# Patient Record
Sex: MALE | Race: WHITE | NOT HISPANIC OR LATINO | Employment: FULL TIME | URBAN - METROPOLITAN AREA
[De-identification: names, ages, dates, MRNs, and addresses within clinical notes are randomized per-mention and may not be internally consistent; named-entity substitution may affect disease eponyms.]

---

## 2014-09-19 LAB — SL AMB POCT HEMOGLOBIN AIC: 5.8

## 2017-03-31 ENCOUNTER — OFFICE VISIT (OUTPATIENT)
Dept: URGENT CARE | Facility: MEDICAL CENTER | Age: 57
End: 2017-03-31
Payer: COMMERCIAL

## 2017-03-31 PROCEDURE — S9088 SERVICES PROVIDED IN URGENT: HCPCS

## 2017-03-31 PROCEDURE — 99203 OFFICE O/P NEW LOW 30 MIN: CPT

## 2017-08-05 ENCOUNTER — TRANSCRIBE ORDERS (OUTPATIENT)
Dept: LAB | Facility: HOSPITAL | Age: 57
End: 2017-08-05

## 2017-08-05 ENCOUNTER — APPOINTMENT (OUTPATIENT)
Dept: LAB | Facility: HOSPITAL | Age: 57
End: 2017-08-05
Payer: COMMERCIAL

## 2017-08-05 DIAGNOSIS — Z00.8 HEALTH EXAMINATION IN POPULATION SURVEY: Primary | ICD-10-CM

## 2017-08-05 DIAGNOSIS — Z00.8 HEALTH EXAMINATION IN POPULATION SURVEY: ICD-10-CM

## 2017-08-05 LAB
CHOLEST SERPL-MCNC: 265 MG/DL (ref 50–200)
EST. AVERAGE GLUCOSE BLD GHB EST-MCNC: 131 MG/DL
HBA1C MFR BLD: 6.2 % (ref 4.2–6.3)
HDLC SERPL-MCNC: 43 MG/DL (ref 40–60)
LDLC SERPL CALC-MCNC: 168 MG/DL (ref 0–100)
TRIGL SERPL-MCNC: 269 MG/DL

## 2017-08-05 PROCEDURE — 80061 LIPID PANEL: CPT

## 2017-08-05 PROCEDURE — 83036 HEMOGLOBIN GLYCOSYLATED A1C: CPT

## 2017-08-05 PROCEDURE — 36415 COLL VENOUS BLD VENIPUNCTURE: CPT

## 2018-04-21 ENCOUNTER — OFFICE VISIT (OUTPATIENT)
Dept: URGENT CARE | Facility: MEDICAL CENTER | Age: 58
End: 2018-04-21
Payer: COMMERCIAL

## 2018-04-21 VITALS
RESPIRATION RATE: 18 BRPM | TEMPERATURE: 96 F | DIASTOLIC BLOOD PRESSURE: 78 MMHG | OXYGEN SATURATION: 97 % | SYSTOLIC BLOOD PRESSURE: 124 MMHG | HEART RATE: 68 BPM | BODY MASS INDEX: 27.47 KG/M2 | HEIGHT: 67 IN | WEIGHT: 175 LBS

## 2018-04-21 DIAGNOSIS — K57.92 DIVERTICULITIS: Primary | ICD-10-CM

## 2018-04-21 PROCEDURE — S9088 SERVICES PROVIDED IN URGENT: HCPCS | Performed by: PHYSICIAN ASSISTANT

## 2018-04-21 PROCEDURE — 99212 OFFICE O/P EST SF 10 MIN: CPT | Performed by: PHYSICIAN ASSISTANT

## 2018-04-21 RX ORDER — CIPROFLOXACIN 500 MG/1
500 TABLET, FILM COATED ORAL EVERY 12 HOURS SCHEDULED
Qty: 20 TABLET | Refills: 0 | Status: SHIPPED | OUTPATIENT
Start: 2018-04-21 | End: 2018-05-01

## 2018-04-21 RX ORDER — METOPROLOL SUCCINATE 50 MG/1
100 TABLET, EXTENDED RELEASE ORAL DAILY
COMMUNITY
End: 2019-03-13 | Stop reason: ALTCHOICE

## 2018-04-21 RX ORDER — IBUPROFEN 800 MG/1
TABLET ORAL
Status: ON HOLD | COMMUNITY
End: 2018-07-29

## 2018-04-21 RX ORDER — GLUCOSAMINE SULFATE 500 MG
CAPSULE ORAL DAILY
COMMUNITY

## 2018-04-21 RX ORDER — AMOXICILLIN 500 MG
CAPSULE ORAL 2 TIMES DAILY
COMMUNITY

## 2018-04-21 RX ORDER — METRONIDAZOLE 500 MG/1
500 TABLET ORAL EVERY 8 HOURS SCHEDULED
Qty: 21 TABLET | Refills: 0 | Status: SHIPPED | OUTPATIENT
Start: 2018-04-21 | End: 2018-04-28

## 2018-04-21 NOTE — PROGRESS NOTES
[facility] Now        NAME: Lorie Colvin is a 61 y o  male  : 1960    MRN: [id number]  DATE: 2018  TIME: 10:05 AM    Assessment and Plan   Diverticulitis [K57 92]  1  Diverticulitis  metroNIDAZOLE (FLAGYL) 500 mg tablet    ciprofloxacin (CIPRO) 500 mg tablet         Patient Instructions   Call to Reschedule colonoscopy    Follow up with PCP in 3-5 days  Proceed to  ER if symptoms worsen  Chief Complaint     Chief Complaint   Patient presents with    Abdominal Pain     LLQ intermittent abdominal pain that began Thursday, Pt has hx of diverticulitis  History of Present Illness       Abdominal Pain   This is a new problem  The current episode started yesterday  The onset quality is gradual  The problem occurs intermittently  The problem has been gradually worsening  The pain is located in the LLQ  The pain is mild  The quality of the pain is cramping  The abdominal pain does not radiate  Associated symptoms include diarrhea  Pertinent negatives include no anorexia, arthralgias, belching, constipation, dysuria, fever, flatus, frequency, headaches, hematochezia, hematuria, melena, myalgias, nausea, vomiting or weight loss  Nothing aggravates the pain  Patient with history of diverticulitis states he has started with left lower quadrant pain and this is how he usually presents for his diverticulitis  Last episode was several years ago  He was scheduled for a colonoscopy next week  Review of Systems   Review of Systems   Constitutional: Negative for fever and weight loss  Gastrointestinal: Positive for abdominal pain and diarrhea  Negative for anorexia, constipation, flatus, hematochezia, melena, nausea and vomiting  Genitourinary: Negative for dysuria, frequency and hematuria  Musculoskeletal: Negative for arthralgias and myalgias  Neurological: Negative for headaches  All other systems reviewed and are negative          Current Medications       Current Outpatient Prescriptions:     ciprofloxacin (CIPRO) 500 mg tablet, Take 1 tablet (500 mg total) by mouth every 12 (twelve) hours for 10 days, Disp: 20 tablet, Rfl: 0    glucosamine 500 MG CAPS capsule, Take by mouth, Disp: , Rfl:     ibuprofen (MOTRIN) 800 mg tablet, Take by mouth, Disp: , Rfl:     metoprolol succinate (TOPROL XL) 50 mg 24 hr tablet, Take by mouth, Disp: , Rfl:     metroNIDAZOLE (FLAGYL) 500 mg tablet, Take 1 tablet (500 mg total) by mouth every 8 (eight) hours for 7 days, Disp: 21 tablet, Rfl: 0    Omega-3 Fatty Acids (FISH OIL) 1200 MG CAPS, Take by mouth, Disp: , Rfl:     Current Allergies     Allergies as of 04/21/2018    (No Known Allergies)            The following portions of the patient's history were reviewed and updated as appropriate: allergies, current medications, past family history, past medical history, past social history, past surgical history and problem list      History reviewed  No pertinent past medical history  History reviewed  No pertinent surgical history  History reviewed  No pertinent family history  Medications have been verified  Objective   /78   Pulse 68   Temp (!) 96 °F (35 6 °C)   Resp 18   Ht 5' 7" (1 702 m)   Wt 79 4 kg (175 lb)   SpO2 97%   BMI 27 41 kg/m²        Physical Exam     Physical Exam   Constitutional: He appears well-developed and well-nourished  Cardiovascular: Normal rate, regular rhythm and normal heart sounds  Pulmonary/Chest: Effort normal and breath sounds normal    Abdominal: Soft  He exhibits no distension and no mass  There is tenderness  There is guarding  There is no rebound  Nursing note and vitals reviewed  Positive tenderness left lower quadrant with mild guarding  Increased bowel sounds throughout

## 2018-04-21 NOTE — PATIENT INSTRUCTIONS
Diverticulitis   WHAT YOU NEED TO KNOW:   Diverticulitis is a condition that causes small pockets along your intestine called diverticula to become inflamed or infected  This is caused by hard bowel movements, food, or bacteria that get stuck in the pockets  DISCHARGE INSTRUCTIONS:   Return to the emergency department if:   · You have bowel movement or foul-smelling discharge leaking from your vagina or in your urine  · You have severe diarrhea  · You urinate less than usual or not at all  · You are not able to have a bowel movement  · You cannot stop vomiting  · You have severe abdominal pain, a fever, and your abdomen is larger than usual      · You have new or increased blood in your bowel movements  Contact your healthcare provider if:   · You have pain when you urinate  · Your symptoms get worse or do not go away  · You have questions or concerns about your condition or care  Medicines:   · Antibiotics  may be given to help treat a bacterial infection  · Prescription pain medicine  may be given  Ask your healthcare provider how to take this medicine safely  Some prescription pain medicines contain acetaminophen  Do not take other medicines that contain acetaminophen without talking to your healthcare provider  Too much acetaminophen may cause liver damage  Prescription pain medicine may cause constipation  Ask your healthcare provider how to prevent or treat constipation  · Take your medicine as directed  Contact your healthcare provider if you think your medicine is not helping or if you have side effects  Tell him or her if you are allergic to any medicine  Keep a list of the medicines, vitamins, and herbs you take  Include the amounts, and when and why you take them  Bring the list or the pill bottles to follow-up visits  Carry your medicine list with you in case of an emergency  Clear liquid diet:  A clear liquid diet includes any liquids that you can see through  Examples include water, ginger-arpita, cranberry or apple juice, frozen fruit ice, or broth  Stay on a clear liquid diet until your symptoms are gone, or as directed  Follow up with your healthcare provider as directed: You may need to return for a colonoscopy  When your symptoms are gone, you may need a low-fat, high-fiber diet to prevent diverticulitis from developing again  Your healthcare provider or dietitian can help you create meal plans  Write down your questions so you remember to ask them during your visits  © 2017 [facility] [place] Information is for End User's use only and may not be sold, redistributed or otherwise used for commercial purposes  All illustrations and images included in CareNotes® are the copyrighted property of [employer] , [employer]  or Jorden Billings  The above information is an  only  It is not intended as medical advice for individual conditions or treatments  Talk to your doctor, nurse or pharmacist before following any medical regimen to see if it is safe and effective for you

## 2018-05-29 ENCOUNTER — ANESTHESIA EVENT (OUTPATIENT)
Dept: GASTROENTEROLOGY | Facility: HOSPITAL | Age: 58
End: 2018-05-29
Payer: COMMERCIAL

## 2018-05-30 ENCOUNTER — ANESTHESIA (OUTPATIENT)
Dept: GASTROENTEROLOGY | Facility: HOSPITAL | Age: 58
End: 2018-05-30
Payer: COMMERCIAL

## 2018-05-30 ENCOUNTER — HOSPITAL ENCOUNTER (OUTPATIENT)
Facility: HOSPITAL | Age: 58
Setting detail: OUTPATIENT SURGERY
Discharge: HOME | End: 2018-05-30
Attending: COLON & RECTAL SURGERY | Admitting: COLON & RECTAL SURGERY
Payer: COMMERCIAL

## 2018-05-30 VITALS
WEIGHT: 175 LBS | TEMPERATURE: 98.4 F | OXYGEN SATURATION: 96 % | BODY MASS INDEX: 27.47 KG/M2 | RESPIRATION RATE: 16 BRPM | SYSTOLIC BLOOD PRESSURE: 105 MMHG | DIASTOLIC BLOOD PRESSURE: 66 MMHG | HEART RATE: 61 BPM | HEIGHT: 67 IN

## 2018-05-30 DIAGNOSIS — Z86.010 HISTORY OF COLONIC POLYPS: ICD-10-CM

## 2018-05-30 PROCEDURE — 88305 TISSUE EXAM BY PATHOLOGIST: CPT | Performed by: PATHOLOGY

## 2018-05-30 RX ORDER — SODIUM CHLORIDE 9 MG/ML
125 INJECTION, SOLUTION INTRAVENOUS CONTINUOUS
Status: DISCONTINUED | OUTPATIENT
Start: 2018-05-30 | End: 2018-05-30 | Stop reason: HOSPADM

## 2018-05-30 RX ORDER — PROPOFOL 10 MG/ML
INJECTION, EMULSION INTRAVENOUS AS NEEDED
Status: DISCONTINUED | OUTPATIENT
Start: 2018-05-30 | End: 2018-05-30 | Stop reason: SURG

## 2018-05-30 RX ORDER — DIPHENOXYLATE HYDROCHLORIDE AND ATROPINE SULFATE 2.5; .025 MG/1; MG/1
1 TABLET ORAL DAILY
COMMUNITY

## 2018-05-30 RX ADMIN — PROPOFOL 20 MG: 10 INJECTION, EMULSION INTRAVENOUS at 08:48

## 2018-05-30 RX ADMIN — PROPOFOL 20 MG: 10 INJECTION, EMULSION INTRAVENOUS at 08:44

## 2018-05-30 RX ADMIN — PROPOFOL 20 MG: 10 INJECTION, EMULSION INTRAVENOUS at 08:40

## 2018-05-30 RX ADMIN — PROPOFOL 50 MG: 10 INJECTION, EMULSION INTRAVENOUS at 08:28

## 2018-05-30 RX ADMIN — PROPOFOL 50 MG: 10 INJECTION, EMULSION INTRAVENOUS at 08:24

## 2018-05-30 RX ADMIN — PROPOFOL 20 MG: 10 INJECTION, EMULSION INTRAVENOUS at 08:32

## 2018-05-30 RX ADMIN — SODIUM CHLORIDE: 900 INJECTION, SOLUTION INTRAVENOUS at 08:40

## 2018-05-30 RX ADMIN — PROPOFOL 50 MG: 10 INJECTION, EMULSION INTRAVENOUS at 08:19

## 2018-05-30 RX ADMIN — PROPOFOL 150 MG: 10 INJECTION, EMULSION INTRAVENOUS at 08:15

## 2018-05-30 RX ADMIN — SODIUM CHLORIDE 125 ML/HR: 900 INJECTION, SOLUTION INTRAVENOUS at 07:34

## 2018-05-30 RX ADMIN — PROPOFOL 20 MG: 10 INJECTION, EMULSION INTRAVENOUS at 08:36

## 2018-05-30 NOTE — ANESTHESIA PREPROCEDURE EVALUATION
Review of Systems/Medical History  Patient summary reviewed  Chart reviewed  No history of anesthetic complications     Cardiovascular  Hypertension controlled,    Pulmonary  Negative pulmonary ROS        GI/Hepatic    Bowel prep  Comment: Diverticular disease     Negative  ROS        Endo/Other  Negative endo/other ROS      GYN       Hematology  Negative hematology ROS      Musculoskeletal  Negative musculoskeletal ROS        Neurology  Negative neurology ROS      Psychology   Negative psychology ROS              Physical Exam    Airway    Mallampati score: I  TM Distance: >3 FB  Neck ROM: full     Dental   No notable dental hx     Cardiovascular      Pulmonary      Other Findings        Anesthesia Plan  ASA Score- 2     Anesthesia Type- IV sedation with anesthesia with ASA Monitors  Additional Monitors:   Airway Plan:         Plan Factors-  Patient did not smoke on day of surgery  Induction-     Postoperative Plan-     Informed Consent- Anesthetic plan and risks discussed with patient  I personally reviewed this patient with the CRNA  Discussed and agreed on the Anesthesia Plan with the CRNA  Vera Nuñez

## 2018-05-30 NOTE — H&P
History and Physical   Colon and Rectal Surgery   Jimbo Ruffin 61 y o  male MRN: [id number]  Unit/Bed#: [facility] Encounter: [id number]  05/30/18   8:11 AM      CC: History of polyps and diverticultis  History of Present Illness   HPI:  Jimbo Ruffin is a 61 y o  male for surveillance  Historical Information   Past Medical History:   Diagnosis Date    Cat scratch fever     Colon polyp     Diverticulitis     Hypertension     Patella-femoral syndrome      Past Surgical History:   Procedure Laterality Date    CLOSED REDUCTION WRIST FRACTURE      COLONOSCOPY      UMBILICAL GRANULOMA EXCISION Left     groin    WRIST GANGLION EXCISION         Meds/Allergies     Prescriptions Prior to Admission   Medication    metoprolol succinate (TOPROL XL) 50 mg 24 hr tablet    multivitamin (THERAGRAN) TABS    Omega-3 Fatty Acids (FISH OIL) 1200 MG CAPS    glucosamine 500 MG CAPS capsule    ibuprofen (MOTRIN) 800 mg tablet         Current Facility-Administered Medications:     sodium chloride 0 9 % infusion, 125 mL/hr, Intravenous, Continuous, Emory Roberts MD, Last Rate: 125 mL/hr at 05/30/18 0734, 125 mL/hr at 05/30/18 0734    No Known Allergies      Social History   History   Alcohol Use    Yes     Comment: social     History   Drug Use No     History   Smoking Status    Never Smoker   Smokeless Tobacco    Never Used         Family History: History reviewed  No pertinent family history  Objective     Current Vitals:   Blood Pressure: 156/84 (05/30/18 0721)  Pulse: 76 (05/30/18 0721)  Temperature: 98 4 °F (36 9 °C) (05/30/18 0721)  Temp Source: Oral (05/30/18 0721)  Respirations: 20 (05/30/18 0721)  Height: 5' 7" (170 2 cm) (05/30/18 0721)  Weight - Scale: 79 4 kg (175 lb) (05/30/18 0721)  SpO2: 97 % (05/30/18 0721)  No intake or output data in the 24 hours ending 05/30/18 0811    Physical Exam:  General:  Well nourished, no distress    Neuro: Alert and oriented  Eyes:Sclera anicteric, conjunctiva pink  Pulm: Clear to auscultation bilaterally  No respiratory Distress  CV:  Regular rate and rhythm  No murmurs  Abdomen:  Soft, flat, non-tender, without masses or hepatosplenomegaly  Lab Results:       ASSESSMENT:  Syed Pascual is a 61 y o  male for surveillance of polyps and recent diverticulitis  PLAN:  Colonoscopy  Risks , including, but not limited to, bleeding, perforation, missed lesions, and potential need for surgery, were reviewed  Alternatives to colonoscopy were discussed    Yeny Tsai MD

## 2018-05-30 NOTE — ANESTHESIA POSTPROCEDURE EVALUATION
Post-Op Assessment Note      CV Status:  Stable    Mental Status:  Awake and somnolent    Hydration Status:  Euvolemic    PONV Controlled:  Controlled    Airway Patency:  Patent    Post Op Vitals Reviewed: Yes          Staff: CRNA           /71 (05/30/18 0850)    Temp      Pulse 69 (05/30/18 0850)   Resp 16 (05/30/18 0850)    SpO2 96 % (05/30/18 0850)

## 2018-05-30 NOTE — OP NOTE
**** GI/ENDOSCOPY REPORT ****     PATIENT NAME: Shayne Conn ------ VISIT ID:  Patient ID:   [id number] YOB: 1960     INTRODUCTION: Colonoscopy - A 61 male patient presents for an outpatient   Colonoscopy at [address] [place] [address]  PREVIOUS COLONOSCOPY:     INDICATIONS: Screening for personal history of polyps  History of   diverticulitis  CONSENT:  The benefits, risks, and alternatives to the procedure were   discussed and informed consent was obtained from the patient  PREPARATION: EKG, pulse, pulse oximetry and blood pressure were monitored   throughout the procedure  The patient was identified by myself both   verbally and by visual inspection of ID band  MEDICATIONS: Anesthesia-check records     PROCEDURE:  The endoscope was passed without difficulty through the anus   under direct visualization and advanced to the cecum, confirmed by   appendiceal orifice, ileocecal valve, and cecal strap (crow's foot)  The   scope was withdrawn and the mucosa was carefully examined  The quality of   the preparation was  Cecal Intubation Time: Minute(s) Scope Withdrawal   Time: Minute(s)     RECTAL EXAM: Normal rectal exam  No prostatic nodules  FINDINGS:  There was evidence of moderately severe diverticulosis in the   sigmoid colon  A single polyp, measuring between 5 and 10 mm in size, was   found in the sigmoid colon  The polyp was removed by hot biopsy   polypectomy and placed in jar 1  Otherwise, the colon appeared to be   normal      COMPLICATIONS: There were no complications  IMPRESSIONS: Moderately severe diverticulosis found in the sigmoid colon  A single polyp found in the sigmoid colon; removed by hot biopsy   polypectomy  RECOMMENDATIONS: Colonoscopy recommended in 5 years  Call if any signs or   symptoms of abdominal pain, bleeding or diverticulitis       ESTIMATED BLOOD LOSS:     PATHOLOGY SPECIMENS: Single polyp removed by hot biopsy polypectomy (jar   1)   PROCEDURE CODES:     ICD-9 Codes: V12 72 Personal history of colonic polyps 562 10   Diverticulosis of colon (without mention of hemorrhage) 211 3 Benign   neoplasm of colon     ICD-10 Codes: Z86 010 Personal history of colonic polyps K57 Diverticular   disease of intestine K63 5 Polyp of colon     PERFORMED BY: ART Garg  on 05/30/2018  Version 1, electronically signed by ART Ng  on 05/30/2018 at   08:48

## 2018-07-27 ENCOUNTER — APPOINTMENT (EMERGENCY)
Dept: RADIOLOGY | Facility: HOSPITAL | Age: 58
End: 2018-07-27
Payer: COMMERCIAL

## 2018-07-27 ENCOUNTER — HOSPITAL ENCOUNTER (OUTPATIENT)
Facility: HOSPITAL | Age: 58
Setting detail: OBSERVATION
Discharge: HOME | End: 2018-07-29
Attending: SURGERY | Admitting: SURGERY
Payer: COMMERCIAL

## 2018-07-27 DIAGNOSIS — S32.039A CLOSED FRACTURE OF THIRD LUMBAR VERTEBRA, UNSPECIFIED FRACTURE MORPHOLOGY, INITIAL ENCOUNTER (HCC): Primary | ICD-10-CM

## 2018-07-27 PROBLEM — S32.030A CLOSED COMPRESSION FRACTURE OF L3 LUMBAR VERTEBRA: Status: ACTIVE | Noted: 2018-07-27

## 2018-07-27 LAB
BASE EXCESS BLDA CALC-SCNC: 4 MMOL/L (ref -2–3)
CA-I BLD-SCNC: 1.17 MMOL/L (ref 1.12–1.32)
GLUCOSE SERPL-MCNC: 104 MG/DL (ref 65–140)
HCO3 BLDA-SCNC: 29.3 MMOL/L (ref 24–30)
HCT VFR BLD CALC: 42 % (ref 36.5–49.3)
HGB BLDA-MCNC: 14.3 G/DL (ref 12–17)
PCO2 BLD: 31 MMOL/L (ref 21–32)
PCO2 BLD: 47.8 MM HG (ref 42–50)
PH BLD: 7.39 [PH] (ref 7.3–7.4)
PO2 BLD: 32 MM HG (ref 35–45)
POTASSIUM BLD-SCNC: 4.3 MMOL/L (ref 3.5–5.3)
SAO2 % BLD FROM PO2: 61 % (ref 95–98)
SODIUM BLD-SCNC: 141 MMOL/L (ref 136–145)
SPECIMEN SOURCE: ABNORMAL

## 2018-07-27 PROCEDURE — 84132 ASSAY OF SERUM POTASSIUM: CPT

## 2018-07-27 PROCEDURE — 71260 CT THORAX DX C+: CPT

## 2018-07-27 PROCEDURE — 84295 ASSAY OF SERUM SODIUM: CPT

## 2018-07-27 PROCEDURE — 82947 ASSAY GLUCOSE BLOOD QUANT: CPT

## 2018-07-27 PROCEDURE — 85014 HEMATOCRIT: CPT

## 2018-07-27 PROCEDURE — 99219 PR INITIAL OBSERVATION CARE/DAY 50 MINUTES: CPT | Performed by: SURGERY

## 2018-07-27 PROCEDURE — 74177 CT ABD & PELVIS W/CONTRAST: CPT

## 2018-07-27 PROCEDURE — 71045 X-RAY EXAM CHEST 1 VIEW: CPT

## 2018-07-27 PROCEDURE — 82803 BLOOD GASES ANY COMBINATION: CPT

## 2018-07-27 PROCEDURE — 82330 ASSAY OF CALCIUM: CPT

## 2018-07-27 RX ORDER — METHOCARBAMOL 500 MG/1
500 TABLET, FILM COATED ORAL EVERY 6 HOURS SCHEDULED
Status: DISCONTINUED | OUTPATIENT
Start: 2018-07-28 | End: 2018-07-29 | Stop reason: HOSPADM

## 2018-07-27 RX ORDER — OXYCODONE HYDROCHLORIDE 10 MG/1
10 TABLET ORAL EVERY 4 HOURS PRN
Status: DISCONTINUED | OUTPATIENT
Start: 2018-07-27 | End: 2018-07-29 | Stop reason: HOSPADM

## 2018-07-27 RX ORDER — OXYCODONE HYDROCHLORIDE 5 MG/1
5 TABLET ORAL EVERY 4 HOURS PRN
Status: DISCONTINUED | OUTPATIENT
Start: 2018-07-27 | End: 2018-07-29 | Stop reason: HOSPADM

## 2018-07-27 RX ORDER — LIDOCAINE 50 MG/G
1 PATCH TOPICAL
Status: DISCONTINUED | OUTPATIENT
Start: 2018-07-27 | End: 2018-07-29 | Stop reason: HOSPADM

## 2018-07-27 RX ORDER — ACETAMINOPHEN 325 MG/1
650 TABLET ORAL EVERY 6 HOURS SCHEDULED
Status: DISCONTINUED | OUTPATIENT
Start: 2018-07-28 | End: 2018-07-29 | Stop reason: HOSPADM

## 2018-07-27 RX ORDER — ONDANSETRON 2 MG/ML
4 INJECTION INTRAMUSCULAR; INTRAVENOUS EVERY 4 HOURS PRN
Status: DISCONTINUED | OUTPATIENT
Start: 2018-07-27 | End: 2018-07-29 | Stop reason: HOSPADM

## 2018-07-27 RX ADMIN — IOHEXOL 100 ML: 350 INJECTION, SOLUTION INTRAVENOUS at 20:33

## 2018-07-27 RX ADMIN — LIDOCAINE 1 PATCH: 50 PATCH TOPICAL at 21:48

## 2018-07-27 RX ADMIN — OXYCODONE HYDROCHLORIDE 10 MG: 10 TABLET ORAL at 21:47

## 2018-07-28 ENCOUNTER — APPOINTMENT (OUTPATIENT)
Dept: RADIOLOGY | Facility: HOSPITAL | Age: 58
End: 2018-07-28
Payer: COMMERCIAL

## 2018-07-28 LAB
PLATELET # BLD AUTO: 272 THOUSANDS/UL (ref 149–390)
PMV BLD AUTO: 10.3 FL (ref 8.9–12.7)

## 2018-07-28 PROCEDURE — 99285 EMERGENCY DEPT VISIT HI MDM: CPT

## 2018-07-28 PROCEDURE — G8979 MOBILITY GOAL STATUS: HCPCS

## 2018-07-28 PROCEDURE — 72080 X-RAY EXAM THORACOLMB 2/> VW: CPT

## 2018-07-28 PROCEDURE — G8978 MOBILITY CURRENT STATUS: HCPCS

## 2018-07-28 PROCEDURE — 36415 COLL VENOUS BLD VENIPUNCTURE: CPT | Performed by: STUDENT IN AN ORGANIZED HEALTH CARE EDUCATION/TRAINING PROGRAM

## 2018-07-28 PROCEDURE — 99245 OFF/OP CONSLTJ NEW/EST HI 55: CPT | Performed by: NEUROLOGICAL SURGERY

## 2018-07-28 PROCEDURE — 97162 PT EVAL MOD COMPLEX 30 MIN: CPT

## 2018-07-28 PROCEDURE — 85049 AUTOMATED PLATELET COUNT: CPT | Performed by: STUDENT IN AN ORGANIZED HEALTH CARE EDUCATION/TRAINING PROGRAM

## 2018-07-28 PROCEDURE — G8980 MOBILITY D/C STATUS: HCPCS

## 2018-07-28 RX ORDER — LIDOCAINE 50 MG/G
1 PATCH TOPICAL DAILY
Status: DISCONTINUED | OUTPATIENT
Start: 2018-07-28 | End: 2018-07-29 | Stop reason: HOSPADM

## 2018-07-28 RX ORDER — METOPROLOL SUCCINATE 100 MG/1
100 TABLET, EXTENDED RELEASE ORAL DAILY
Status: DISCONTINUED | OUTPATIENT
Start: 2018-07-29 | End: 2018-07-28

## 2018-07-28 RX ORDER — METOPROLOL SUCCINATE 100 MG/1
100 TABLET, EXTENDED RELEASE ORAL DAILY
Status: DISCONTINUED | OUTPATIENT
Start: 2018-07-28 | End: 2018-07-29 | Stop reason: HOSPADM

## 2018-07-28 RX ADMIN — METHOCARBAMOL 500 MG: 500 TABLET ORAL at 01:16

## 2018-07-28 RX ADMIN — ACETAMINOPHEN 650 MG: 325 TABLET ORAL at 05:35

## 2018-07-28 RX ADMIN — ACETAMINOPHEN 650 MG: 325 TABLET ORAL at 23:59

## 2018-07-28 RX ADMIN — METOPROLOL SUCCINATE 100 MG: 100 TABLET, FILM COATED, EXTENDED RELEASE ORAL at 18:16

## 2018-07-28 RX ADMIN — OXYCODONE HYDROCHLORIDE 10 MG: 10 TABLET ORAL at 02:34

## 2018-07-28 RX ADMIN — ENOXAPARIN SODIUM 30 MG: 30 INJECTION, SOLUTION INTRAVENOUS; SUBCUTANEOUS at 09:09

## 2018-07-28 RX ADMIN — ACETAMINOPHEN 650 MG: 325 TABLET ORAL at 17:36

## 2018-07-28 RX ADMIN — METHOCARBAMOL 500 MG: 500 TABLET ORAL at 23:59

## 2018-07-28 RX ADMIN — OXYCODONE HYDROCHLORIDE 10 MG: 10 TABLET ORAL at 09:08

## 2018-07-28 RX ADMIN — LIDOCAINE 1 PATCH: 50 PATCH TOPICAL at 22:01

## 2018-07-28 RX ADMIN — ACETAMINOPHEN 650 MG: 325 TABLET ORAL at 12:24

## 2018-07-28 RX ADMIN — ENOXAPARIN SODIUM 30 MG: 30 INJECTION, SOLUTION INTRAVENOUS; SUBCUTANEOUS at 01:22

## 2018-07-28 RX ADMIN — ENOXAPARIN SODIUM 30 MG: 30 INJECTION, SOLUTION INTRAVENOUS; SUBCUTANEOUS at 22:01

## 2018-07-28 RX ADMIN — LIDOCAINE 1 PATCH: 50 PATCH TOPICAL at 18:17

## 2018-07-28 RX ADMIN — METHOCARBAMOL 500 MG: 500 TABLET ORAL at 17:36

## 2018-07-28 RX ADMIN — METHOCARBAMOL 500 MG: 500 TABLET ORAL at 12:24

## 2018-07-28 RX ADMIN — ACETAMINOPHEN 650 MG: 325 TABLET ORAL at 01:17

## 2018-07-28 RX ADMIN — LIDOCAINE 1 PATCH: 50 PATCH CUTANEOUS at 18:17

## 2018-07-28 RX ADMIN — METHOCARBAMOL 500 MG: 500 TABLET ORAL at 06:08

## 2018-07-28 RX ADMIN — OXYCODONE HYDROCHLORIDE 10 MG: 10 TABLET ORAL at 22:05

## 2018-07-28 NOTE — PHYSICAL THERAPY NOTE
Physical Therapy Evaluation     Patient's Name: Iam Brito    Admitting Diagnosis  Closed fracture of third lumbar vertebra, unspecified fracture morphology, initial encounter ([facility] [address] ) Mark Villalta  Unspecified multiple injuries, initial encounter [T07  XXXA]    Problem List  Patient Active Problem List   Diagnosis    Closed compression fracture of L3 lumbar vertebra [facility])       Past Medical History  Past Medical History:   Diagnosis Date    Cat scratch fever     Colon polyp     Diverticulitis     Hypertension     Patella-femoral syndrome        Past Surgical History  Past Surgical History:   Procedure Laterality Date    CLOSED REDUCTION WRIST FRACTURE      COLONOSCOPY      CO COLONOSCOPY FLX DX W/COLLJ SPEC WHEN PFRMD N/A 5/30/2018    Procedure: COLONOSCOPY;  Surgeon: Hosea Gama MD;  [facility];   Service: Colorectal    UMBILICAL GRANULOMA EXCISION Left     groin    WRIST GANGLION EXCISION          07/28/18 1154   Note Type   Note type Eval only   Pain Assessment   Pain Assessment 0-10   Pain Score 7   Pain Type Acute pain   Pain Location Rib cage   Pain Orientation Left   Effect of Pain on Daily Activities impaired tolerance to mobility    Home Living   Type of [address] Multi-level   Additional Comments Patient fully (I), 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   Prior Function   Level of [place] Independent with ADLs and functional mobility   Lives With Alone   Receives Help From Family;Friend(s)   ADL Assistance Independent   IADLs Independent   Falls in the last 6 months 1 to 4  (1-reason for admission )   Vocational Full time employment   Restrictions/Precautions   Wells Luis Bearing Precautions Per Order No   Other Precautions Spinal precautions   General   Additional Pertinent History Pt educated on and able to recall and demosntrate spinal precautions    Family/Caregiver Present No   Cognition   Overall Cognitive Status [facility]   Arousal/Participation Alert   Orientation Level Oriented X4   Memory Within functional limits   Following Commands Follows all commands and directions without difficulty   RUE Assessment   RUE Assessment WFL   LUE Assessment   LUE Assessment WFL   RLE Assessment   RLE Assessment WFL   LLE Assessment   LLE Assessment WFL   Coordination   Movements are Fluid and Coordinated 1   Sensation WFL   Light Touch   RLE Light Touch Grossly intact   LLE Light Touch Grossly intact   Proprioception   RLE Proprioception Grossly intact   LLE Proprioception Grossly Intact   Bed Mobility   Rolling R 6  Modified independent   Additional items Increased time required   Supine to Sit 6  Modified independent   Additional items Increased time required   Sit to Supine 6  Modified independent   Additional items Increased time required   Additional Comments Educated on log roll tecnhique-able to demonstrate  No dizziness/light headedness EOB    Transfers   Sit to Stand 6  Modified independent   Additional items Increased time required   Stand to Sit 6  Modified independent   Additional items Increased time required   Ambulation/Elevation   Gait pattern Antalgic   Gait Assistance 6  Modified independent   Additional items Other (Comment)  (INCREASED TIME )   Assistive Device None   Distance 30' IN ROOM    Balance   Static Sitting Good   Dynamic Sitting Fair +   Static Standing Fair +   Dynamic Standing Fair +   Ambulatory Fair   Endurance Deficit   Endurance Deficit Yes   Endurance Deficit Description PAIN REPORTED IN RIB CAGE    Activity Tolerance   Activity Tolerance Patient limited by pain   Medical Staff Made Aware Irena Grider CM    Nurse Made Aware appropriate to see    Assessment   Prognosis Good   Problem List Orthopedic restrictions;Pain   Assessment Pt is a 60year old male presenting s/p fall out of tree while cutting branches in which he sustained a closed fracture of 3rd lumbar vertebra   Pt with and additional problem list which includes HTN, diverticulitis, and patella femoral syndrome  PT consulted to assess functional mobility and assist with safe d/c planning  PT eval performed with LS precautions and clearance from Faith Montana, miroslava HERCULES and MIKALA Carvalho to mobilize  PTA, pt living at home where he was fully (I), driving and working as an RN  On eval, pt limited by pain in rib cage but demonstrate ability to perform all functional tasks (I)  NO LOB or adverse reactions noted  Patient educated on and able to demonstrate spinal precautions  No further skilled IP PT needs at this time      Goals   Patient Goals Patient eager to go home    Plan   PT Frequency One time visit   Recommendation   Recommendation Home independently   Equipment Recommended Other (Comment)  (no needs identified)   PT - OK to Discharge Yes  (when medically appropriate )   Barthel Index   Feeding 10   Bathing 5   Grooming Score 5   Dressing Score 10   Bladder Score 10   Bowels Score 10   Toilet Use Score 10   Transfers (Bed/Chair) Score 15   Mobility (Level Surface) Score 0  (<150')   Stairs Score 0   Barthel Index Score 75         Zaynab Orozco, PT

## 2018-07-28 NOTE — OCCUPATIONAL THERAPY NOTE
OT SCREEN    OT orders received  Chart reviewed  Spoke w/ pt who reported he had been ambulating around his room independently  He reported he has no concerns w/ ADLS/IADLS  Pt has help at home if needed and has unused DME if necessary  Pt's only complaint is feeling sore in his ribs  Pt reports no concerns about d/c home w/ family support  Pt w/ no immediate acute skilled OT needs  Will D/C OT at this time  Please re-consult if medically necessary      Thanks  [employer], OTR/L

## 2018-07-28 NOTE — PROGRESS NOTES
Pt care rounds completed with Dr Rudi Islas, awaiting neurosurgery consult and recommendation anticipate discharge when cleared by p/t and stable

## 2018-07-28 NOTE — SOCIAL WORK
Cm met with patient to review role of Cm  Patient presented as alert during conversation  Patient reported that he lives with wife Jonh Johnson, but daughter Nova Backer [phone] is his emergency contact  Patient reported that he lives in a 3 story home with 5 steps to enter with rails  Patient reported that bedroom is on the 1st floor  Patient reported that he does not need to go 2nd floor, but that there are 13 steps with rails to basement level  Patient denied having any inpatient PT/OT, inpatient , substance abuse treatment or [facility] services  Patient reported that pharmacy of choice is [facility]  Patient refused [facility] Card for PCP connection  Patient anticipated for d/c today or tomorrow pending medical clearance  Per PT/OT patient is cleared to discharge home with no other needs  Awaiting medical clearance

## 2018-07-28 NOTE — ED PROVIDER NOTES
Emergency Department Airway Evaluation and Management Form    History  Obtained from:  Patient  Patient has no known allergies  Chief Complaint   Patient presents with    Fall     Patient was cutting the branches from a tree that fell and fell about 4 feet, landed flat on his back, complaining of lower thoracic, lumbar and sacral pain, denies numbness or tingling in lower extremities, no LOC     HPI this is a 34-year-old male who was climbing in a tree that had fallen, and fell 3 in a 0 5 feet to the ground, landing on his back  The patient states he got the wind knocked out of him  The patient complains of some back discomfort, did not lose consciousness and denies neck pain, numbness, tingling, or weakness  He has no other complaints at this time  Past Medical History:   Diagnosis Date    Cat scratch fever     Colon polyp     Diverticulitis     Hypertension     Patella-femoral syndrome      Past Surgical History:   Procedure Laterality Date    CLOSED REDUCTION WRIST FRACTURE      COLONOSCOPY      GA COLONOSCOPY FLX DX W/COLLJ SPEC WHEN PFRMD N/A 5/30/2018    Procedure: COLONOSCOPY;  Surgeon: Bri Wong MD;  [facility]; Service: Colorectal    UMBILICAL GRANULOMA EXCISION Left     groin    WRIST GANGLION EXCISION       History reviewed  No pertinent family history  Social History   Substance Use Topics    Smoking status: Never Smoker    Smokeless tobacco: Never Used    Alcohol use Yes      Comment: social     I have reviewed and agree with the history as documented  Review of Systems    Physical Exam  /84   Pulse 79   Temp 98 °F (36 7 °C) (Tympanic)   Resp 20   Ht 5' 7" (1 702 m)   Wt 79 4 kg (175 lb)   SpO2 96%   BMI 27 41 kg/m²     Physical Exam  On exam the patient is awake, alert, interactive, hemodynamically stable  He has an abrasion to his right upper extremity    He has normal work of breathing, able to manage his secretions, midline trachea, GCS of 15   ED Medications  Medications - No data to display    Intubation  Procedures    Notes      CritCare Time    Final Diagnosis  Final diagnoses:   None    Arm abrasion, back pain, stable airway    ED Provider  Electronically Signed by     Philippe Vaughn MD  07/27/18 2023

## 2018-07-28 NOTE — CASE MANAGEMENT
Initial Clinical Review    Admission: Date/Time/Statement: OBS 7/27 @ 2102    Orders Placed This Encounter   Procedures    Place in Observation     Standing Status:   Standing     Number of Occurrences:   1     Order Specific Question:   Admitting Physician     Answer:   Oscar Hoffman [[id number]]     Order Specific Question:   Level of Care     Answer:   Med Surg [16]     Order Specific Question:   Bed Type     Answer:   Trauma [7]       ED: Date/Time/Mode of Arrival:   ED Arrival Information     Expected Arrival Acuity Means of Arrival Escorted By Service Admission Type    - 7/27/2018 20:02 Emergent Walk-In Self Trauma Emergency    Arrival Complaint    fall         Chief Complaint:   Chief Complaint   Patient presents with    Fall     Patient was cutting the branches from a tree that fell and fell about 4 feet, landed flat on his back, complaining of lower thoracic, lumbar and sacral pain, denies numbness or tingling in lower extremities, no LOC       History of Illness: 21-year-old male who was climbing in a tree that had fallen, and fell 3 & 0 5 feet to the ground, landing on his back  The patient states he got the wind knocked out of him  The patient complains of some back discomfort, did not lose consciousness and denies neck pain, numbness, tingling, or weakness    He has no other complaints at this time    ED Vital Signs:   ED Triage Vitals [07/27/18 2010]   Temperature Pulse Respirations Blood Pressure SpO2   97 9 °F (36 6 °C) 69 18 (!) 190/97 96 %      Temp Source Heart Rate Source Patient Position - Orthostatic VS BP Location FiO2 (%)   Tympanic Monitor Standing Right arm --      Pain Score       8        Wt Readings from Last 1 Encounters:   07/28/18 79 4 kg (175 lb)       Vital Signs (abnormal):   07/27/18 2145 -- 80 18  170/104 95 % -- MEM   07/27/18 2115 -- 74 18 159/96 96 % -- MEM   07/27/18 2100 -- 74 18  170/104 97 % -- MEM   07/27/18 2045 -- 80 18 155/70 98 % -- GP   07/27/18 2025 -- 77 18 178/107 98 % -- BJY   07/27/18 2020 98 °F (36 7 °C) 79 20 158/84 96 % -- BJY   07/27/18 2010 97 9 °F (36 6 °C) 69 18  190/97          Abnormal Labs/Diagnostic Test Results:  CT chest/abd/pelvis:    1   Acute mild superior endplate compression fracture of L3   Acute small fracture fragment along the anterior superior endplate, without significant displacement  2  Incidental partially visualized right lower thyroid nodule measuring approximate 1 9 x 1 9 cm identified on this CT  ED Treatment:   Medication Administration from 07/27/2018 2002 to 07/28/2018 0210       Date/Time Order Dose Route Action Action by Comments     07/27/2018 2033 iohexol (OMNIPAQUE) 350 MG/ML injection (MULTI-DOSE) 100 mL 100 mL Intravenous Given       07/28/2018 0122 enoxaparin (LOVENOX) subcutaneous injection 30 mg 30 mg Subcutaneous Given       07/28/2018 0117 acetaminophen (TYLENOL) tablet 650 mg 650 mg Oral Given       07/27/2018 2147 oxyCODONE (ROXICODONE) immediate release tablet 10 mg 10 mg Oral Given       07/28/2018 0116 methocarbamol (ROBAXIN) tablet 500 mg 500 mg Oral Given       07/27/2018 2148 lidocaine (LIDODERM) 5 % patch 1 patch 1 patch Transdermal Medication Applied  left ribs          Past Medical/Surgical History: Active Ambulatory Problems     Diagnosis Date Noted    No Active Ambulatory Problems     Resolved Ambulatory Problems     Diagnosis Date Noted    No Resolved Ambulatory Problems     Past Medical History:   Diagnosis Date    Cat scratch fever     Colon polyp     Diverticulitis     Hypertension     Patella-femoral syndrome        Admitting Diagnosis: Closed fracture of third lumbar vertebra, unspecified fracture morphology, initial encounter ([facility] ) [S32 039A]  Unspecified multiple injuries, initial encounter [T07  XXXA]    Age/Sex: 60 y o  male    Assessment/Plan:   Trauma Alert: Level B  Model of Arrival: Self  Trauma Team: Attending Ronnie Brito and Residents [facility] [place]  Consultants: Neurosurgery     Trauma Active Problems:   S/p fall from tree  L3 superior endplate fracture     Trauma Plan:  Admit to trauma  Neurosurgery consultation  Lumbar spine precautions  Pain control  DVT ppx     Admission Orders:  OBS  Neuro Checks q4h  Consult NeuroSx  PT / OT Eval  Lumbar spine precautions    Scheduled Meds:   Current Facility-Administered Medications:  acetaminophen 650 mg Oral Q6H [address]    enoxaparin 30 mg Subcutaneous Q12H [address]    lidocaine 1 patch Transdermal HS    methocarbamol 500 mg Oral Q6H [address]                           Continuous Infusions:    PRN Meds: ondansetron    oxyCODONE 10 mg  X 1  And x 2 thus far 7/28    oxyCODONE    7/28: 97 5 - 60 - 18   118/71;  RA 94%    Per neuro sx:   1  Mechanical fall  2  L3 superior/anterior endplate fracture     Plan:  - he has been ambulating in the room independently with minimal back discomfort  - will check a upright thoracolumbar x-ray with no brace at this time  Thank you,  [employer] Utilization Review Department  Phone: [phone]; Fax [phone]  ATTENTION: The Network Utilization Review Department is now centralized for our 9 Facilities  Make a note that we have a new phone and fax numbers for our Department  Please call with any questions or concerns to [phone] and carefully follow the prompts so that you are directed to the right person  All voicemails are confidential  Fax any determinations, approvals, denials, and requests for initial or continue stay review clinical to [phone]  Due to HIGH CALL volume, it would be easier if you could please send faxed requests to expedite your requests and in part, help us provide discharge notifications faster

## 2018-07-28 NOTE — TERTIARY TRAUMA SURVEY
Progress Note - Tertiary Trauma Survery   Jimbo Ruffin 60 y o  male MRN: [id number]  Unit/Bed#: [facility] 925-01 Encounter: [id number]    Summary of Diagnosed Injuries:   1  L3 superior endplate fracture     Clinical Plan:   Nsx consult  Pain control  PT/OT  DVT ppx    Nurse/provider rounds were completed with Bee Meza and patient  All questions were answered and plan of care was discussed with both patient and nurse  Mechanism of Injury: Fall    Transfer from: N/A  Outside Films Received: not applicable  Tertiary Exam Due on: 7/28    Vitals: Blood pressure 118/71, pulse 60, temperature 97 5 °F (36 4 °C), temperature source Oral, resp  rate 18, height 5' 7" (1 702 m), weight 79 4 kg (175 lb), SpO2 94 %  ,Body mass index is 27 41 kg/m²  CT / RADIOGRAPHS: ALL RESULTS MUST BE CONFIRMED BY FACULTY OR PRINTED REPORT    CT HEAD:  CT CHEST:    CT FACE:  CT C/ ABDOMEN / PELVIS:1   Acute mild superior endplate compression fracture of L3  Acute small fracture fragment along the anterior superior endplate, without significant displacement  Incidental partially visualized right lower thyroid nodule measuring approximate 1 9 x 1 9 cm identified on this CT  According to guidelines published in the February 2015 white paper on incidental thyroid nodules in the Journal of the [employer] of Radiology [facility]), Because the nodule(s) are greater than 1 5 cm in size, further characterization with thyroid ultrasound is recommended     CT CERVICAL SPINE:  XR PELVIS:    CT THORACIC / LUMBAR SPINE:  CXR CHEST: NAD   OTHER: OTHER:    OTHER:  OTHER:    OTHER: OTHER:    OTHER:  OTHER:    OTHER:  OTHER:      Consultants - List Service/ Faculty and Date: Neurosurgery- 7/28    Active medications:           Current Facility-Administered Medications:     acetaminophen (TYLENOL) tablet 650 mg, 650 mg, Oral, Q6H ERNIE, 650 mg at 07/28/18 0535    enoxaparin (LOVENOX) subcutaneous injection 30 mg, 30 mg, Subcutaneous, Q12H ERNIE, 30 mg at 07/28/18 0909    lidocaine (LIDODERM) 5 % patch 1 patch, 1 patch, Transdermal, HS, 1 patch at 07/27/18 2148    methocarbamol (ROBAXIN) tablet 500 mg, 500 mg, Oral, Q6H ERNIE, 500 mg at 07/28/18 0608    ondansetron (ZOFRAN) injection 4 mg, 4 mg, Intravenous, Q4H PRN    oxyCODONE (ROXICODONE) immediate release tablet 10 mg, 10 mg, Oral, Q4H PRN, 10 mg at 07/28/18 0908    oxyCODONE (ROXICODONE) IR tablet 5 mg, 5 mg, Oral, Q4H PRN      Intake/Output Summary (Last 24 hours) at 07/28/18 1209  Last data filed at 07/28/18 9473   Gross per 24 hour   Intake              300 ml   Output                0 ml   Net              300 ml       Invasive Devices     Peripheral Intravenous Line            Peripheral IV 07/27/18 Right Antecubital less than 1 day                CAGE-AID Questionnaire:    Was the patient able to participate in the CAGE-AID screening questions on admission? Yes    Is the patient 65 years or older: No    1  GCS:  GCS Total:  15  2  Head:    Normocephalic/atraumatic  3  Neck:    No midline C-spine tenderness  Full range of motion without any pain  4  Chest:    Heart:  Regular rate rhythm  Lungs:  CTA bilaterally  5  Abdomen/Pelvis:    Soft, nontender, nondistended  6  Back (log roll with spinal immobilization unless cleared radiographically):   Positive L-spine tenderness without any bony step-offs  No T-spine tenderness  No abrasions or ecchymosis  7  Extremities:  From range of motion of all extremities without any pain or difficulty  No abrasions or ecchymosis noted  8  Peripheral Nerves:   Neurovascularly intact throughout    Do NOT use the following abbreviations: DTO, gr, Francisco, MS, MSO4, MgSO4, Nitro, QD, QID, QOD, u, , ?, ?g or trailing zeros   Always use a zero before a decimal

## 2018-07-28 NOTE — CONSULTS
Consultation - Neurosurgery   Susana Cornelius 60 y o  male MRN: [id number]  Unit/Bed#: [facility] 925-01 Encounter: [id number]      Assessment/Plan     Assessment:  1  Mechanical fall  2  L3 superior/anterior endplate fracture    Plan:  - he has been ambulating in the room independently with minimal back discomfort  - will check a upright thoracolumbar x-ray with no brace at this time  - 3-4 week Neurosurgery outpatient follow-up    History of Present Illness   Consults    HPI: Susana Cornelius is a 60 y o  male who was admitted to the Trauma service following a mechanical fall  He was cutting branches from a tree when he fell landing flat on his back from approximately 4 feet  He had no loss of consciousness  He is not on any blood thinners  He has some back pain  Otherwise no other injuries  He has already been ambulating in the room independently  He has not or brace  He complains of minimal back pain  Consults    Review of Systems   Musculoskeletal: Back pain: Which is minimal    All other systems reviewed and are negative  Historical Information   Past Medical History:   Diagnosis Date    Cat scratch fever     Colon polyp     Diverticulitis     Hypertension     Patella-femoral syndrome      Past Surgical History:   Procedure Laterality Date    CLOSED REDUCTION WRIST FRACTURE      COLONOSCOPY      SD COLONOSCOPY FLX DX W/COLLJ SPEC WHEN PFRMD N/A 5/30/2018    Procedure: COLONOSCOPY;  Surgeon: Alfa Martinez MD;  [facility]; Service: Colorectal    UMBILICAL GRANULOMA EXCISION Left     groin    WRIST GANGLION EXCISION       History   Alcohol Use    Yes     Comment: social     History   Drug Use No     History   Smoking Status    Never Smoker   Smokeless Tobacco    Never Used     History reviewed  No pertinent family history      Meds/Allergies   all current active meds have been reviewed  No Known Allergies    Objective     Intake/Output Summary (Last 24 hours) at 07/28/18 [place] 2 filed at 07/28/18 0908   Gross per 24 hour   Intake              580 ml   Output                0 ml   Net              580 ml       Physical Exam   Constitutional: He is oriented to person, place, and time  He appears well-nourished  HENT:   Head: Normocephalic and atraumatic  Eyes: EOM are normal  Pupils are equal, round, and reactive to light  Neck: Normal range of motion  Neck supple  Cardiovascular: Normal rate  Pulmonary/Chest: Effort normal    Abdominal: Soft  Musculoskeletal: Normal range of motion  He exhibits no tenderness  Neurological: He is alert and oriented to person, place, and time  He has normal strength  Gait normal    Skin: Skin is warm and dry  Psychiatric: He has a normal mood and affect  His speech is normal      Neurologic Exam     Mental Status   Oriented to person, place, and time  Attention: normal  Concentration: normal    Speech: speech is normal   Level of consciousness: alert  Knowledge: good  Cranial Nerves   Cranial nerves II through XII intact  CN III, IV, VI   Pupils are equal, round, and reactive to light  Extraocular motions are normal      Motor Exam   Muscle bulk: normal  Overall muscle tone: normal    Strength   Strength 5/5 throughout  Sensory Exam   Light touch normal      Gait, Coordination, and Reflexes     Gait  Gait: normal       Vitals:Blood pressure 118/71, pulse 60, temperature 97 5 °F (36 4 °C), temperature source Oral, resp  rate 18, height 5' 7" (1 702 m), weight 79 4 kg (175 lb), SpO2 94 %  ,Body mass index is 27 41 kg/m²  Lab Results: I have personally reviewed pertinent results  Imaging Studies: I have personally reviewed pertinent films in PACS  CT CHEST, ABDOMEN AND PELVIS WITH IV CONTRAST        IMPRESSION:  1  Acute mild superior endplate compression fracture of L3  Acute small fracture fragment along the anterior superior endplate, without significant displacement    2  Incidental partially visualized right lower thyroid nodule measuring approximate 1 9 x 1 9 cm identified on this CT  According to guidelines published in the February 2015 white paper on incidental thyroid nodules in the Journal of the [employer] of Radiology [facility]), Because the nodule(s) are greater than 1 5 cm in size, further characterization with thyroid ultrasound is recommended        VTE Prophylaxis: Sequential compression device (Venodyne)

## 2018-07-28 NOTE — H&P
H&P Exam - Trauma   Crissy James 60 y o  male MRN: [id number]  Unit/Bed#: [facility] Encounter: [id number]    Assessment/Plan   Trauma Alert: Level B  Model of Arrival: Self  Trauma Team: Attending Herbert Milian and Residents Lorena Horn  Consultants: Neurosurgery    Trauma Active Problems:   S/p fall from tree  L3 superior endplate fracture    Trauma Plan:  Admit to trauma  Neurosurgery consultation  Lumbar spine precautions  Pain control  DVT ppx    Chief Complaint: Frankie Finner    History of Present Illness   HPI:  Crissy James is a 60 y o  male who presents as a level B trauma alert  He was cutting branches from a tree when he fell out of a tree  Prior he fell approximately 4 feet and landed on his back  He denies loss of consciousness  He denies hitting his head  He was brought in complaining of back pain  Is otherwise relatively healthy and does not take any anticoagulation or antiplatelet agents  Mechanism:Fall, +LOC:  no    Review of Systems   Constitutional: Negative  HENT: Negative  Eyes: Negative  Respiratory: Negative  Cardiovascular: Negative  Gastrointestinal: Negative  Endocrine: Negative  Genitourinary: Negative  Musculoskeletal: Positive for back pain  Skin: Negative  Allergic/Immunologic: Negative  Neurological: Negative  Hematological: Negative  Psychiatric/Behavioral: Negative  Historical Information   History is unobtainable from the patient due to n/a  Efforts to obtain history included the following sources: obtained from other records    Past Medical History:   Diagnosis Date    Cat scratch fever     Colon polyp     Diverticulitis     Hypertension     Patella-femoral syndrome      Past Surgical History:   Procedure Laterality Date    CLOSED REDUCTION WRIST FRACTURE      COLONOSCOPY      [place] COLONOSCOPY FLX DX W/COLLJ SPEC WHEN PFRMD N/A 5/30/2018    Procedure: COLONOSCOPY;  Surgeon: Ciera Mas MD;  [facility];   Service: Colorectal  UMBILICAL GRANULOMA EXCISION Left     groin    WRIST GANGLION EXCISION       Social History   History   Alcohol Use    Yes     Comment: social     History   Drug Use No     History   Smoking Status    Never Smoker   Smokeless Tobacco    Never Used       There is no immunization history on file for this patient  Last Tetanus: unknown  Family History: Non-contributory  Unable to obtain/limited by n/a      Meds/Allergies   PTA meds:   Prior to Admission Medications   Prescriptions Last Dose Informant Patient Reported? Taking? KRILL OIL PO   Yes Yes   Sig: Take by mouth   Omega-3 Fatty Acids (FISH OIL) 1200 MG CAPS   Yes No   Sig: Take by mouth   glucosamine 500 MG CAPS capsule   Yes No   Sig: Take by mouth   ibuprofen (MOTRIN) 800 mg tablet   Yes No   Sig: Take by mouth   metoprolol succinate (TOPROL XL) 50 mg 24 hr tablet   Yes Yes   Sig: Take 100 mg by mouth     multivitamin (THERAGRAN) TABS   Yes No   Sig: Take 1 tablet by mouth daily      Facility-Administered Medications: None       No Known Allergies      PHYSICAL EXAM    PE limited by: n/a    Objective   Vitals:   First set: Temperature: 97 9 °F (36 6 °C) (07/27/18 2010)  Pulse: 69 (07/27/18 2010)  Respirations: 18 (07/27/18 2010)  Blood Pressure: (!) 190/97 (07/27/18 2010)    Primary Survey:   (A) Airway: intact  (B) Breathing: breath sounds bilaterally  (C) Circulation: Pulses:   carotid  2/4, pedal  2/4, radial  2/4 and femoral  2/4  (D) Disabliity:  GCS Total:  15  (E) Expose:  Completed    Secondary Survey: (Click on Physical Exam tab above)  Physical Exam   Constitutional: He is oriented to person, place, and time  He appears well-developed and well-nourished  HENT:   Head: Normocephalic  Eyes: Pupils are equal, round, and reactive to light  Neck: Normal range of motion  Cardiovascular: Normal rate  Pulmonary/Chest: Effort normal    Abdominal: Soft  He exhibits no distension  There is no tenderness     Musculoskeletal: Normal range of motion  He exhibits no tenderness or deformity  Neurological: He is alert and oriented to person, place, and time  No C or T spine tenderness, L spine tenderness, no step-offs  5/5 strength bilateral upper and lower extremities  Sensation grossly intact   Skin: Skin is warm and dry  Psychiatric: He has a normal mood and affect  His behavior is normal        Invasive Devices     Peripheral Intravenous Line            Peripheral IV 07/27/18 Right Antecubital less than 1 day                Lab Results: ISTAT: No components found for: VBG  Imaging/EKG Studies: FAST: negative x4   Trauma - Ct Chest Abdomen Pelvis W Contrast    Result Date: 7/27/2018  Impression: 1  Acute mild superior endplate compression fracture of L3  Acute small fracture fragment along the anterior superior endplate, without significant displacement  2  Incidental partially visualized right lower thyroid nodule measuring approximate 1 9 x 1 9 cm identified on this CT  According to guidelines published in the February 2015 white paper on incidental thyroid nodules in the Journal of the [employer] of Radiology [facility]), Because the nodule(s) are greater than 1 5 cm in size, further characterization with thyroid ultrasound is recommended    I personally discussed this study with Cole Espino on 7/27/2018 at 8:50 PM  Workstation performed: [id number]     Other Studies: n/a    Code Status: Level 1 - Full Code  Advance Directive and Living Will:      Power of :    POLST:

## 2018-07-28 NOTE — ED TRIAGE NOTES
Charge nurse made aware of patient, as per Dr Juve Farrar patient needs to be a level B trauma alert

## 2018-07-28 NOTE — PLAN OF CARE
Discharge to home or other facility with appropriate resources Progressing      Patient/family/caregiver demonstrates understanding of disease process, treatment plan, medications, and discharge instructions Progressing      Maintain or return mobility to safest level of function Progressing      Maintain proper alignment of affected body part Progressing      Verbalizes/displays adequate comfort level or baseline comfort level Progressing      Patient will remain free of falls Progressing      Maintain or return to baseline ADL function Progressing      Maintain or return mobility status to optimal level Progressing      Skin integrity remains intact Progressing      Incision(s), wounds(s) or drain site(s) healing without S/S of infection Progressing      Oral mucous membranes remain intact Progressing

## 2018-07-29 VITALS
OXYGEN SATURATION: 94 % | HEIGHT: 67 IN | HEART RATE: 71 BPM | RESPIRATION RATE: 18 BRPM | DIASTOLIC BLOOD PRESSURE: 85 MMHG | SYSTOLIC BLOOD PRESSURE: 152 MMHG | WEIGHT: 175 LBS | BODY MASS INDEX: 27.47 KG/M2 | TEMPERATURE: 98.4 F

## 2018-07-29 PROCEDURE — 99213 OFFICE O/P EST LOW 20 MIN: CPT | Performed by: NEUROLOGICAL SURGERY

## 2018-07-29 RX ORDER — METHOCARBAMOL 500 MG/1
750 TABLET, FILM COATED ORAL EVERY 6 HOURS SCHEDULED
Qty: 60 TABLET | Refills: 0 | Status: SHIPPED | OUTPATIENT
Start: 2018-07-29 | End: 2018-08-14 | Stop reason: SDUPTHER

## 2018-07-29 RX ORDER — ACETAMINOPHEN 325 MG/1
650 TABLET ORAL EVERY 6 HOURS PRN
Qty: 30 TABLET | Refills: 0 | Status: SHIPPED | OUTPATIENT
Start: 2018-07-29 | End: 2019-03-13 | Stop reason: ALTCHOICE

## 2018-07-29 RX ORDER — IBUPROFEN 800 MG/1
800 TABLET ORAL EVERY 8 HOURS SCHEDULED
Qty: 30 TABLET | Refills: 0 | Status: SHIPPED | OUTPATIENT
Start: 2018-07-29 | End: 2018-09-04 | Stop reason: ALTCHOICE

## 2018-07-29 RX ORDER — METHOCARBAMOL 500 MG/1
750 TABLET, FILM COATED ORAL EVERY 6 HOURS SCHEDULED
Qty: 60 TABLET | Refills: 0 | Status: SHIPPED | OUTPATIENT
Start: 2018-07-29 | End: 2018-07-29

## 2018-07-29 RX ORDER — OXYCODONE HYDROCHLORIDE AND ACETAMINOPHEN 5; 325 MG/1; MG/1
1 TABLET ORAL EVERY 4 HOURS PRN
Qty: 30 TABLET | Refills: 0 | Status: SHIPPED | OUTPATIENT
Start: 2018-07-29 | End: 2018-08-08

## 2018-07-29 RX ADMIN — METOPROLOL SUCCINATE 100 MG: 100 TABLET, FILM COATED, EXTENDED RELEASE ORAL at 09:10

## 2018-07-29 RX ADMIN — METHOCARBAMOL 500 MG: 500 TABLET ORAL at 05:42

## 2018-07-29 RX ADMIN — ACETAMINOPHEN 650 MG: 325 TABLET ORAL at 05:42

## 2018-07-29 RX ADMIN — ACETAMINOPHEN 650 MG: 325 TABLET ORAL at 12:06

## 2018-07-29 RX ADMIN — METHOCARBAMOL 500 MG: 500 TABLET ORAL at 12:06

## 2018-07-29 NOTE — CASE MANAGEMENT
Continued Stay Review    Date:  7/29/2018   OBSERVATION    Vital Signs: /85 (BP Location: Left arm)   Pulse 71   Temp 98 4 °F (36 9 °C) (Oral)   Resp 18   Ht 5' 7" (1 702 m)   Wt 79 4 kg (175 lb)   SpO2 94%   BMI 27 41 kg/m²     Medications:   Scheduled Meds:   Current Facility-Administered Medications:  acetaminophen 650 mg Oral Q6H ERNIE    enoxaparin 30 mg Subcutaneous Q12H [facility]    lidocaine 1 patch Transdermal HS    lidocaine 1 patch Transdermal Daily    lidocaine 1 patch Transdermal Daily    methocarbamol 500 mg Oral Q6H [facility]    metoprolol succinate 100 mg Oral Daily                           Continuous Infusions:    PRN Meds: ondansetron    oxyCODONE    oxyCODONE    Abnormal Labs/Diagnostic Results:   Xray Spine Thoracolumbar:  7/28: Stable appearance to the L3 superior endplate compression      Retrolisthesis of L5 with respect to L4 also stable  This may be on a degenerative basis as there is facet degeneration seen at the L4-L5 and L5-S1 levels      Age/Sex: 60 y o  male     Assessment/Plan:  PENDING for 7/29  Anticipate DC today    Discharge Plan:  Home -no needs once medically cleared

## 2018-07-29 NOTE — ORTHOTIC NOTE
Orthotic Note            Date: 7/29/2018     Time: 10:27    Patient Name: Anthony Rucker        LSO ordered by Amna Cole PA-C    Reason for Consult:  Patient Active Problem List   Diagnosis    Closed compression fracture of L3 lumbar vertebra ([address] )     Pt has already been ambulating with PT without a brace therefore pt was able to stand at the bedside to have the LSO donned  Adjusted the brace to a size large for optimal fit  Pt was able to don with verbal instructions; educated on how to doff  Pt did not have any questions at this time  Pt's nurse, Sky Benites is aware pt has been fit for the LSO  Recommendations:  Please call the ortho tech, ext [phone], with any bracing questions and/or adjustments      [employer],

## 2018-07-29 NOTE — PROGRESS NOTES
Progress Note - Cherry Zavala 1960, 60 y o  male MRN: [id number]    Unit/Bed#: [facility] 925-01 Encounter: [id number]    Primary Care Provider: No primary care provider on file  Date and time admitted to hospital: 7/27/2018  8:18 PM        * Closed compression fracture of L3 lumbar vertebra ([address] )   Assessment & Plan    Ambulating well without brace  Pain is controlled  - brace PRN  - discharge with neurosurgery follow-up                  Subjective/Objective   Chief Complaint: None    Subjective: no events  Feels well  Walking well without brace  Objective:     Meds/Allergies   Prescriptions Prior to Admission   Medication Sig Dispense Refill Last Dose    KRILL OIL PO Take by mouth       metoprolol succinate (TOPROL XL) 50 mg 24 hr tablet Take 100 mg by mouth     5/29/2018 at Unknown time    glucosamine 500 MG CAPS capsule Take by mouth       ibuprofen (MOTRIN) 800 mg tablet Take by mouth       multivitamin (THERAGRAN) TABS Take 1 tablet by mouth daily   5/29/2018 at Unknown time    Omega-3 Fatty Acids (FISH OIL) 1200 MG CAPS Take by mouth   5/29/2018 at Unknown time       Vitals: Blood pressure 152/85, pulse 71, temperature 98 4 °F (36 9 °C), temperature source Oral, resp  rate 18, height 5' 7" (1 702 m), weight 79 4 kg (175 lb), SpO2 94 %  Body mass index is 27 41 kg/m²   SpO2 Device: O2 Device: None (Room air)    ABG: No results found for: PHART, RXF4CDD, PO2ART, ORN8SBJ, Y4LKIAMF, BEART, SOURCE      Intake/Output Summary (Last 24 hours) at 07/29/18 1324  Last data filed at 07/29/18 0900   Gross per 24 hour   Intake              760 ml   Output                0 ml   Net              760 ml       Invasive Devices     Peripheral Intravenous Line            Peripheral IV 07/27/18 Right Antecubital 1 day                Nutrition/GI Proph/Bowel Reg: regular    Physical Exam:   GENERAL APPEARANCE: NAD AAOx3  HEENT: NCAT  CV: regular  LUNGS: non-labored  ABD: soft  EXT: atraumatic  NEURO: non-focal, 5/5 strength b/l upper and lower extr  SKIN: intact    Lab Results: BMP/CMP: No results found for: NA, K, CL, CO2, ANIONGAP, BUN, CREATININE, GLUCOSE, CALCIUM, AST, ALT, ALKPHOS, PROT, ALBUMIN, BILITOT, EGFR and CBC: No results found for: WBC, HGB, HCT, MCV, PLT, ADJUSTEDWBC, MCH, MCHC, RDW, MPV, NRBC  Imaging/EKG Studies: Results: I have personally reviewed pertinent reports      Other Studies: n/a  VTE Prophylaxis: Lovenox

## 2018-07-29 NOTE — PROGRESS NOTES
Progress Note - Neurosurgery   Mark Nagy 60 y o  male MRN: [id number]  Unit/Bed#: [facility] 925-01 Encounter: [id number]    Assessment:  1  Mechanical fall  2  L3 superior/anterior endplate fracture    Plan:  -upright x-rays completed and stable  - although there is height loss at the L3 level  - recommend LSO brace with activity  - follow-up with Neurosurgery in 3-4 weeks as outpatient  - okay to discharge from a neurosurgery standpoint    Subjective/Objective     Some back soreness with activity        Invasive Devices     Peripheral Intravenous Line            Peripheral IV 07/27/18 Right Antecubital 1 day                Physical Exam:    Vitals: Blood pressure 152/85, pulse 71, temperature 98 4 °F (36 9 °C), temperature source Oral, resp  rate 18, height 5' 7" (1 702 m), weight 79 4 kg (175 lb), SpO2 94 %  ,Body mass index is 27 41 kg/m²  Awake and alert  Moves all extremities  Ambulates independently  No tenderness to palpation over the expected area  Good strength throughout    Lab Results: I have personally reviewed pertinent results  Imaging Studies: I have personally reviewed pertinent films in PACS  THORACOLUMBAR SPINE  IMPRESSION:     Stable appearance to the L3 superior endplate compression  Retrolisthesis of L5 with respect to L4 also stable  This may be on a degenerative basis as there is facet degeneration seen at the L4-L5 and L5-S1 levels        VTE Pharmacologic Prophylaxis: Enoxaparin (Lovenox)    VTE Mechanical Prophylaxis: sequential compression device

## 2018-07-29 NOTE — PLAN OF CARE
Discharge to home or other facility with appropriate resources Adequate for Discharge      Discharge to post-acute care or home with appropriate resources Adequate for Discharge      Patient/family/caregiver demonstrates understanding of disease process, treatment plan, medications, and discharge instructions Adequate for Discharge      Maintain or return mobility to safest level of function Adequate for Discharge      Maintain proper alignment of affected body part Adequate for Discharge      Verbalizes/displays adequate comfort level or baseline comfort level Adequate for Discharge      Patient will remain free of falls Adequate for Discharge      Maintain or return to baseline ADL function Adequate for Discharge      Maintain or return mobility status to optimal level Adequate for Discharge      Skin integrity remains intact Adequate for Discharge      Incision(s), wounds(s) or drain site(s) healing without S/S of infection Adequate for Discharge      Oral mucous membranes remain intact Adequate for Discharge

## 2018-07-29 NOTE — PLAN OF CARE
Discharge to home or other facility with appropriate resources Progressing      Discharge to post-acute care or home with appropriate resources Progressing      Patient/family/caregiver demonstrates understanding of disease process, treatment plan, medications, and discharge instructions Progressing      Maintain or return mobility to safest level of function Progressing      Maintain proper alignment of affected body part Progressing      Verbalizes/displays adequate comfort level or baseline comfort level Progressing      Patient will remain free of falls Progressing      Maintain or return to baseline ADL function Progressing      Maintain or return mobility status to optimal level Progressing      Skin integrity remains intact Progressing      Incision(s), wounds(s) or drain site(s) healing without S/S of infection Progressing      Oral mucous membranes remain intact Progressing

## 2018-07-29 NOTE — ASSESSMENT & PLAN NOTE
Ambulating well without brace  Pain is controlled  - brace PRN  - discharge with neurosurgery follow-up

## 2018-07-29 NOTE — DISCHARGE SUMMARY
Discharge Summary - Trauma  Meghna Vaughn 60 y o  male MRN: [id number]  Unit/Bed#: [facility] 925-01 Encounter: [id number]      Date of admission: 7/27/2018  Date of discharge: 7/29/2018    Admitting diagnosis: Closed fracture of third lumbar vertebra, unspecified fracture morphology, initial encounter ([facility] ) [S32 039A]  Unspecified multiple injuries, initial encounter [T07  XXXA]  Discharge diagnosis: Same    HPI  Meghna Vaughn is a 60 y o  male who presented to hospital as a level B trauma alert  He was cutting trees and fell about 4 feet onto his back  Hospital Course  He was seen and evaluated by the trauma team on admission  He was found an L3 compression fracture  He was admitted to the hospital for pain control  Neurosurgery was consulted and recommended a brace  Upright lumbar plain films were obtained which revealed stable fracture  He was ambulating well and his pain was controlled  He was discharged on 07/29 with instructions to follow up with Neurosurgery as an outpatient  Condition at discharge: good    Procedures/Services:  Trauma evaluation  Neurosurgical evaluation  Physical and occupational therapy    Current Vitals:   Blood Pressure: 152/85 (07/29/18 0737)  Pulse: 71 (07/29/18 0737)  Temperature: 98 4 °F (36 9 °C) (07/29/18 0737)  Temp Source: Oral (07/29/18 0737)  Respirations: 18 (07/29/18 0737)  Height: 5' 7" (170 2 cm) (07/28/18 0231)  Weight - Scale: 79 4 kg (175 lb) (07/28/18 0231)  SpO2: 94 % (07/29/18 0737)      Intake/Output Summary (Last 24 hours) at 07/29/18 1327  Last data filed at 07/29/18 0900   Gross per 24 hour   Intake              760 ml   Output                0 ml   Net              760 ml       Lab Results: I have personally reviewed pertinent lab results  Imaging: I have personally reviewed pertinent reports  EKG, Pathology, and Other Studies: I have personally reviewed pertinent reports        Disposition: Home  Planned Readmission: No    Discharge Medications:  See after visit summary for reconciled discharge medications provided to patient and family  Discharge instructions/Information to patient and family:   See after visit summary for information provided to patient and family  Provisions for Follow-Up Care:  See after visit summary for information related to follow-up care and any pertinent home health orders  Discharge Statement   I spent 30 minutes discharging the patient  This time was spent on the day of discharge  I had direct contact with the patient on the day of discharge  Additional documentation is required if more than 30 minutes were spent on discharge

## 2018-07-30 DIAGNOSIS — S32.009A CLOSED FRACTURE OF LUMBAR VERTEBRA, UNSPECIFIED FRACTURE MORPHOLOGY, INITIAL ENCOUNTER (HCC): Primary | ICD-10-CM

## 2018-08-14 ENCOUNTER — OFFICE VISIT (OUTPATIENT)
Dept: SURGERY | Facility: CLINIC | Age: 58
End: 2018-08-14
Payer: COMMERCIAL

## 2018-08-14 VITALS
BODY MASS INDEX: 27.37 KG/M2 | SYSTOLIC BLOOD PRESSURE: 122 MMHG | WEIGHT: 174.4 LBS | TEMPERATURE: 96.8 F | DIASTOLIC BLOOD PRESSURE: 82 MMHG | HEIGHT: 67 IN

## 2018-08-14 DIAGNOSIS — E04.1 THYROID NODULE: ICD-10-CM

## 2018-08-14 DIAGNOSIS — S32.030D CLOSED COMPRESSION FRACTURE OF L3 LUMBAR VERTEBRA WITH ROUTINE HEALING, SUBSEQUENT ENCOUNTER: ICD-10-CM

## 2018-08-14 DIAGNOSIS — S20.212D CONTUSION OF LEFT CHEST WALL, SUBSEQUENT ENCOUNTER: Primary | ICD-10-CM

## 2018-08-14 DIAGNOSIS — S32.039A CLOSED FRACTURE OF THIRD LUMBAR VERTEBRA, UNSPECIFIED FRACTURE MORPHOLOGY, INITIAL ENCOUNTER (HCC): ICD-10-CM

## 2018-08-14 PROBLEM — S20.219A CHEST WALL CONTUSION: Status: ACTIVE | Noted: 2018-08-14

## 2018-08-14 PROCEDURE — 1111F DSCHRG MED/CURRENT MED MERGE: CPT | Performed by: EMERGENCY MEDICINE

## 2018-08-14 PROCEDURE — 99214 OFFICE O/P EST MOD 30 MIN: CPT | Performed by: EMERGENCY MEDICINE

## 2018-08-14 RX ORDER — METHOCARBAMOL 500 MG/1
750 TABLET, FILM COATED ORAL EVERY 6 HOURS SCHEDULED
Qty: 60 TABLET | Refills: 0 | Status: SHIPPED | OUTPATIENT
Start: 2018-08-14 | End: 2019-03-06 | Stop reason: ALTCHOICE

## 2018-08-14 RX ORDER — METHOCARBAMOL 500 MG/1
500 TABLET, FILM COATED ORAL 4 TIMES DAILY
Qty: 20 TABLET | Refills: 0 | Status: SHIPPED | OUTPATIENT
Start: 2018-08-14 | End: 2018-09-04 | Stop reason: SDUPTHER

## 2018-08-14 NOTE — ASSESSMENT & PLAN NOTE
- Rx for robaxin given  - continue other modalities such as heat, ibuprofen, tylenol  - follow up here as needed

## 2018-08-14 NOTE — PROGRESS NOTES
Office Visit - General Surgery  Marisol Bauer MRN: [id number]  Encounter: [id number]    Assessment and Plan    Problem List Items Addressed This Visit     None          Chief Complaint:  Marisol Bauer is a 60 y o  male who presents for Fall (f/u fall)    Subjective  Here for follow up  C/O left sided rib pain  NO rib fractures on radiograph  Due to see neurosurgery next week  Past Medical History  Past Medical History:   Diagnosis Date    Cat scratch fever     Colon polyp     Diverticulitis     Hypertension     Patella-femoral syndrome        Past Surgical History  Past Surgical History:   Procedure Laterality Date    CLOSED REDUCTION WRIST FRACTURE      COLONOSCOPY      MN COLONOSCOPY FLX DX W/COLLJ SPEC WHEN PFRMD N/A 5/30/2018    Procedure: COLONOSCOPY;  Surgeon: Julio Miranda MD;  Location: [facility]; Service: Colorectal    UMBILICAL GRANULOMA EXCISION Left     groin    WRIST GANGLION EXCISION         Family History  History reviewed  No pertinent family history  Medications  Current Outpatient Prescriptions on File Prior to Visit   Medication Sig Dispense Refill    acetaminophen (TYLENOL) 325 mg tablet Take 2 tablets (650 mg total) by mouth every 6 (six) hours as needed for mild pain 30 tablet 0    glucosamine 500 MG CAPS capsule Take by mouth      ibuprofen (MOTRIN) 800 mg tablet Take 1 tablet (800 mg total) by mouth every 8 (eight) hours 30 tablet 0    KRILL OIL PO Take by mouth      methocarbamol (ROBAXIN) 500 mg tablet Take 1 5 tablets (750 mg total) by mouth every 6 (six) hours 60 tablet 0    metoprolol succinate (TOPROL XL) 50 mg 24 hr tablet Take 100 mg by mouth        multivitamin (THERAGRAN) TABS Take 1 tablet by mouth daily      Omega-3 Fatty Acids (FISH OIL) 1200 MG CAPS Take by mouth       No current facility-administered medications on file prior to visit  Allergies  No Known Allergies    Review of Systems   Constitutional: Negative      HENT: Negative  Eyes: Negative  Respiratory: Negative  Cardiovascular: Negative  Gastrointestinal: Negative  Endocrine: Negative  Genitourinary: Negative  Musculoskeletal: Positive for myalgias  Skin: Negative  Allergic/Immunologic: Negative  Neurological: Negative  Psychiatric/Behavioral: Negative  Objective  Vitals:    08/14/18 1510   BP: 122/82   Temp: (!) 96 8 °F (36 °C)       Physical Exam   Constitutional: He is oriented to person, place, and time  He appears well-developed and well-nourished  HENT:   Head: Normocephalic and atraumatic  Eyes: Conjunctivae are normal  Pupils are equal, round, and reactive to light  Neck: Normal range of motion  Neck supple  No tracheal deviation present  Cardiovascular: Normal rate  Pulmonary/Chest: Effort normal and breath sounds normal  No respiratory distress  He has no wheezes  Abdominal: Soft  Bowel sounds are normal  He exhibits no distension  There is no tenderness  Musculoskeletal: Normal range of motion  He exhibits tenderness (left lateral ribs)  He exhibits no edema  Neurological: He is alert and oriented to person, place, and time  No cranial nerve deficit

## 2018-08-14 NOTE — LETTER
August 14, 2018     Patient: Juany Mejia   YOB: 1960   Date of Visit: 8/14/2018       To Whom it May Concern:    Danica Alexander is under my professional care  He was seen in my office on 8/14/2018  He may return to work on 8/23/18  If you have any questions or concerns, please don't hesitate to call           Sincerely,        DELISA Ellis TRAUMA PROVIDER        CC: No Recipients

## 2018-08-20 ENCOUNTER — APPOINTMENT (OUTPATIENT)
Dept: LAB | Facility: HOSPITAL | Age: 58
End: 2018-08-20
Payer: COMMERCIAL

## 2018-08-20 ENCOUNTER — HOSPITAL ENCOUNTER (OUTPATIENT)
Dept: RADIOLOGY | Facility: HOSPITAL | Age: 58
Discharge: HOME | End: 2018-08-20
Payer: COMMERCIAL

## 2018-08-20 ENCOUNTER — TRANSCRIBE ORDERS (OUTPATIENT)
Dept: RADIOLOGY | Facility: HOSPITAL | Age: 58
End: 2018-08-20

## 2018-08-20 DIAGNOSIS — Z00.8 HEALTH EXAMINATION IN POPULATION SURVEY: Primary | ICD-10-CM

## 2018-08-20 DIAGNOSIS — S32.009A CLOSED FRACTURE OF LUMBAR VERTEBRA, UNSPECIFIED FRACTURE MORPHOLOGY, INITIAL ENCOUNTER (HCC): ICD-10-CM

## 2018-08-20 DIAGNOSIS — Z00.8 HEALTH EXAMINATION IN POPULATION SURVEY: ICD-10-CM

## 2018-08-20 LAB
CHOLEST SERPL-MCNC: 247 MG/DL (ref 50–200)
EST. AVERAGE GLUCOSE BLD GHB EST-MCNC: 126 MG/DL
HBA1C MFR BLD: 6 % (ref 4.2–6.3)
HDLC SERPL-MCNC: 40 MG/DL (ref 40–60)
LDLC SERPL CALC-MCNC: 131 MG/DL (ref 0–100)
NONHDLC SERPL-MCNC: 207 MG/DL
TRIGL SERPL-MCNC: 378 MG/DL

## 2018-08-20 PROCEDURE — 36415 COLL VENOUS BLD VENIPUNCTURE: CPT

## 2018-08-20 PROCEDURE — 80061 LIPID PANEL: CPT

## 2018-08-20 PROCEDURE — 83036 HEMOGLOBIN GLYCOSYLATED A1C: CPT

## 2018-08-20 PROCEDURE — 72080 X-RAY EXAM THORACOLMB 2/> VW: CPT

## 2018-08-22 ENCOUNTER — OFFICE VISIT (OUTPATIENT)
Dept: NEUROSURGERY | Facility: CLINIC | Age: 58
End: 2018-08-22
Payer: COMMERCIAL

## 2018-08-22 VITALS
HEIGHT: 67 IN | SYSTOLIC BLOOD PRESSURE: 140 MMHG | TEMPERATURE: 98.1 F | WEIGHT: 181.6 LBS | HEART RATE: 71 BPM | DIASTOLIC BLOOD PRESSURE: 80 MMHG | BODY MASS INDEX: 28.5 KG/M2

## 2018-08-22 DIAGNOSIS — S32.030D CLOSED COMPRESSION FRACTURE OF L3 LUMBAR VERTEBRA WITH ROUTINE HEALING, SUBSEQUENT ENCOUNTER: Primary | ICD-10-CM

## 2018-08-22 PROCEDURE — 99213 OFFICE O/P EST LOW 20 MIN: CPT | Performed by: PHYSICIAN ASSISTANT

## 2018-08-22 NOTE — LETTER
August 22, 2018     Patient: Ina Castro   YOB: 1960   Date of Visit: 8/22/2018       To Whom it May Concern:    Yazan Guerrero is under my professional care  He was seen in my office on 8/22/2018  He is to remain out of work at least until he is re-evaluated at follow up visit in approximately 4 weeks  If you have any questions or concerns, please don't hesitate to call           Sincerely,          Sylvester Higgins PA-C        CC: No Recipients

## 2018-08-22 NOTE — LETTER
August 23, 2018     Beba Curtis, [address]    Patient: Ivett Rich   YOB: 1960   Date of Visit: 8/22/2018       Dear Dr Kendrick Dover:    Thank you for referring Marcial Blum to me for evaluation  Below are my notes for this consultation  If you have questions, please do not hesitate to call me  I look forward to following your patient along with you  Sincerely,        Amparo Cool PA-C        CC: No Recipients  Amparo Cool PA-C  8/23/2018  5:09 AM  Sign at close encounter  Assessment/Plan:    No problem-specific Assessment & Plan notes found for this encounter  Diagnoses and all orders for this visit:    Closed compression fracture of L3 lumbar vertebra with routine healing, subsequent encounter  -     X-ray lumbar spine 2 or 3 views; Future        Discussion / Summary: This is a 60 y o  male who presents for hospital consult (7/28/18) follow up of L3 compression fracture that was diagnosed s/p about  3 1/2 feet fall from tree branch  Patient has been maintained in LSO  back brace for approximately 3 1/2 weeks   The thoracolumbar spine xray ( 8/20/18 )  was reviewed in detail by Dr Robin Rizvi and demonstrates stable height and alignment of the L3 vertebral compression deformity compared to thoracolumbar xray (7/28/18)  There is lower lumbar DDD changes as well  Also reviewed prior CT Abd/Pelvis 7/27/18  Continued management with LSO brace is advised  May remove back brace to shower but otherwise recommend he wear LSO brace when out of bed  Advised no driving in setting of having to wear back brace  He is to refrain from strenuous activity  Patient is to refrain from bending / twisting back and refrain from lifting, pulling, or pushing more then  5-10 lbs  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 at next follow up visit      Recommend patient take fall precautions and refrain from activity that increases chance of falling or injury to back  Patient is advised to follow up in 4-6 weeks with L-spine xray to be completed a few days prior to follow up visit  Patient wishes to follow up in 4 weeks so have asked office to arranged accordingly  Patient is to contact our office or present to hospital Emergency Room if experience worsening or new back pain, sensory or motor change, bladder or bowel dysfunction, or other neurological change  Patient expressed understanding and agreement     ______________________________________________________________________________    Subjective:      Patient ID: Yesika Dee is a 60 y o  male follow up of L3 compression fracture that was diagnosed s/p about  3 1/2 feet fall from tree branch  He had been seen in hospital consult 7/28/18 and recommended management with LSO brace  He had follow up L-spine xray completed 8/20/18  HPI  Patient reports he has some achy lower back pain (rated 2-3/10 currently) which is consistent with his typical chronic low back pain he had even prior to the fall  Bending can be aggravating factor  He reports he has been wearing LSO brace with driving and with activity  He reports he experienced pain in right buttock and down right leg to calf with sitting when had brace on -- resolved when standing  Last occurred this past Sunday  No leg pain currently  Denies numbness, tingling, or weakness of lower extremities  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  What is most aggravating is reports of left sided lateral rib pain  He has followed up with Trauma service for this -- contusion of left chest wall listed  Reports taking Methocarbamol 500mg 1 5 tabs q 6 hrs and Tylenol 325mg 2 tabs q 6 hrs prn  Reports he discontinued Percocet about 1 week ago  Denies bladder or bowel dysfunction  Denies saddle paresthesias       The following portions of the patient's history were reviewed and updated as appropriate: allergies, current medications, past family history, past medical history, past social history and past surgical history  Review of Systems   Constitutional: Negative for fever  Respiratory: Negative for shortness of breath  Gastrointestinal:        Denies bowel dysfunction    Genitourinary: Negative for difficulty urinating  Musculoskeletal: Positive for back pain  Neurological: Negative for weakness and numbness  Psychiatric/Behavioral: Negative for agitation  Objective:      /80 (BP Location: Right arm, Patient Position: Sitting, Cuff Size: Standard)   Pulse 71   Temp 98 1 °F (36 7 °C) (Tympanic)   Ht 5' 7" (1 702 m)   Wt 82 4 kg (181 lb 9 6 oz)   BMI 28 44 kg/m²           Physical Exam   Constitutional: He appears well-developed and well-nourished  No distress  Wearing LSO brace   Eyes: Conjunctivae are normal    Pulmonary/Chest: Effort normal    Musculoskeletal:        Lumbar back: He exhibits no tenderness and no deformity  Tenderness on palpation over lateral aspect of ribs on left side   Neurological: He is alert  Gait normal    Reflex Scores:       Tricep reflexes are 2+ on the right side and 2+ on the left side  Bicep reflexes are 2+ on the right side and 2+ on the left side  Brachioradialis reflexes are 2+ on the right side and 2+ on the left side  Patellar reflexes are 2+ on the right side and 2+ on the left side  Achilles reflexes are 2+ on the right side and 2+ on the left side  Psychiatric: He has a normal mood and affect   His speech is normal        Neurologic Exam     Mental Status   Speech: speech is normal   Level of consciousness: alert    Motor Exam     Strength   Right deltoid: 5/5  Left deltoid: 5/5  Right biceps: 5/5  Left biceps: 5/5  Right triceps: 5/5  Left triceps: 5/5  Right wrist flexion: 5/5  Left wrist flexion: 5/5  Right wrist extension: 5/5  Left wrist extension: 5/5  Right interossei: 5/5  Left interossei: 5/5  Right iliopsoas: 5/5  Left iliopsoas: 5/5  Right quadriceps: 5/5  Left quadriceps: 5/5  Right hamstrin/5  Left hamstrin/5  Right anterior tibial: 5/5  Left anterior tibial: 5/5  Right gastroc: 5/5  Left gastroc: 5/5  Right EHL 5/5  Left EHL 5/5     Sensory Exam   Vibration normal    Pinprick normal      Sensation to pinprick intact b/l upper extremities, torso, and b/l lower extremities  JPS and Vibratory sense intact b/l thumbs and great toes  Gait, Coordination, and Reflexes     Gait  Gait: normal (Independent )    Reflexes   Right brachioradialis: 2+  Left brachioradialis: 2+  Right biceps: 2+  Left biceps: 2+  Right triceps: 2+  Left triceps: 2+  Right patellar: 2+  Left patellar: 2+  Right achilles: 2+  Left achilles: 2+  Right plantar: normal  Left plantar: normal  Right ankle clonus: absent  Left ankle clonus: absent      Imaging study:    [facility] Thoracolumbar spine xray -- per report:  "THORACOLUMBAR SPINE     INDICATION:   S32 009A: Unspecified fracture of unspecified lumbar vertebra, initial encounter for closed fracture      COMPARISON: 2018     VIEWS:  XR SPINE THORACOLUMBAR 2 VW        FINDINGS:     The superior endplate compression deformity at L3 remains stable  Overall alignment of the lumbar spine demonstrates minimal retrolisthesis of L5 with respect to L4      Stable disc space narrowing L5-S1   Remaining disc spaces appear preserved       The pedicles appear intact      IMPRESSION:     Stable appearance to the L3 superior endplate compression deformity      Stable retrolisthesis of L5 with respect to L4         Workstation performed: [id number] "

## 2018-08-22 NOTE — PATIENT INSTRUCTIONS
May remove LSO back brace to shower  Otherwise wear LSO back brace when out of bed  Refrain from strenuous activity  Refrain from bending and twisting back  Limit lifting, pulling, pushing to no more then 5-10 lbs  You should not drive while wearing back brace  Recommend you take fall precautions and refrain from activity that increases chance of fall or injury to back  Recommend you remain out of work at least until re-evaluated at next follow up visit  Contact our office or present to Emergency Room if experience worsening or new back pain, sensory or motor change, bladder or bowel dysfunction, or other neurological change

## 2018-08-22 NOTE — PROGRESS NOTES
Assessment/Plan:    No problem-specific Assessment & Plan notes found for this encounter  Diagnoses and all orders for this visit:    Closed compression fracture of L3 lumbar vertebra with routine healing, subsequent encounter  -     X-ray lumbar spine 2 or 3 views; Future        Discussion / Summary: This is a 60 y o  male who presents for hospital consult (7/28/18) follow up of L3 compression fracture that was diagnosed s/p about  3 1/2 feet fall from tree branch  Patient has been maintained in LSO  back brace for approximately 3 1/2 weeks   The thoracolumbar spine xray ( 8/20/18 )  was reviewed in detail by Dr Aj Vidal and demonstrates stable height and alignment of the L3 vertebral compression deformity compared to thoracolumbar xray (7/28/18)  There is lower lumbar DDD changes as well  Also reviewed prior CT Abd/Pelvis 7/27/18  Continued management with LSO brace is advised  May remove back brace to shower but otherwise recommend he wear LSO brace when out of bed  Advised no driving in setting of having to wear back brace  He is to refrain from strenuous activity  Patient is to refrain from bending / twisting back and refrain from lifting, pulling, or pushing more then  5-10 lbs  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 at next follow up visit  Recommend patient take fall precautions and refrain from activity that increases chance of falling or injury to back  Patient is advised to follow up in 4-6 weeks with L-spine xray to be completed a few days prior to follow up visit  Patient wishes to follow up in 4 weeks so have asked office to arranged accordingly  Patient is to contact our office or present to hospital Emergency Room if experience worsening or new back pain, sensory or motor change, bladder or bowel dysfunction, or other neurological change      Patient expressed understanding and agreement     ______________________________________________________________________________    Subjective:      Patient ID: Bebeto Daigle is a 60 y o  male follow up of L3 compression fracture that was diagnosed s/p about  3 1/2 feet fall from tree branch  He had been seen in hospital consult 7/28/18 and recommended management with LSO brace  He had follow up L-spine xray completed 8/20/18  HPI  Patient reports he has some achy lower back pain (rated 2-3/10 currently) which is consistent with his typical chronic low back pain he had even prior to the fall  Bending can be aggravating factor  He reports he has been wearing LSO brace with driving and with activity  He reports he experienced pain in right buttock and down right leg to calf with sitting when had brace on -- resolved when standing  Last occurred this past Sunday  No leg pain currently  Denies numbness, tingling, or weakness of lower extremities  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  What is most aggravating is reports of left sided lateral rib pain  He has followed up with Trauma service for this -- contusion of left chest wall listed  Reports taking Methocarbamol 500mg 1 5 tabs q 6 hrs and Tylenol 325mg 2 tabs q 6 hrs prn  Reports he discontinued Percocet about 1 week ago  Denies bladder or bowel dysfunction  Denies saddle paresthesias  The following portions of the patient's history were reviewed and updated as appropriate: allergies, current medications, past family history, past medical history, past social history and past surgical history  Review of Systems   Constitutional: Negative for fever  Respiratory: Negative for shortness of breath  Gastrointestinal:        Denies bowel dysfunction    Genitourinary: Negative for difficulty urinating  Musculoskeletal: Positive for back pain  Neurological: Negative for weakness and numbness     Psychiatric/Behavioral: Negative for agitation  Objective:      /80 (BP Location: Right arm, Patient Position: Sitting, Cuff Size: Standard)   Pulse 71   Temp 98 1 °F (36 7 °C) (Tympanic)   Ht 5' 7" (1 702 m)   Wt 82 4 kg (181 lb 9 6 oz)   BMI 28 44 kg/m²          Physical Exam   Constitutional: He appears well-developed and well-nourished  No distress  Wearing LSO brace   Eyes: Conjunctivae are normal    Pulmonary/Chest: Effort normal    Musculoskeletal:        Lumbar back: He exhibits no tenderness and no deformity  Tenderness on palpation over lateral aspect of ribs on left side   Neurological: He is alert  Gait normal    Reflex Scores:       Tricep reflexes are 2+ on the right side and 2+ on the left side  Bicep reflexes are 2+ on the right side and 2+ on the left side  Brachioradialis reflexes are 2+ on the right side and 2+ on the left side  Patellar reflexes are 2+ on the right side and 2+ on the left side  Achilles reflexes are 2+ on the right side and 2+ on the left side  Psychiatric: He has a normal mood and affect  His speech is normal        Neurologic Exam     Mental Status   Speech: speech is normal   Level of consciousness: alert    Motor Exam     Strength   Right deltoid: 5/5  Left deltoid: 5/5  Right biceps: 5/5  Left biceps: 5/5  Right triceps: 5/5  Left triceps: 5/5  Right wrist flexion: 5/5  Left wrist flexion: 5/5  Right wrist extension: 5/5  Left wrist extension: 5/5  Right interossei: 5/5  Left interossei: 5/5  Right iliopsoas: 5/5  Left iliopsoas: 5/5  Right quadriceps: 5/5  Left quadriceps: 5/5  Right hamstrin/5  Left hamstrin/5  Right anterior tibial: 5/5  Left anterior tibial: 5/5  Right gastroc: 5/5  Left gastroc: 5/5  Right EHL 5/5  Left EHL 5/5     Sensory Exam   Vibration normal    Pinprick normal      Sensation to pinprick intact b/l upper extremities, torso, and b/l lower extremities  JPS and Vibratory sense intact b/l thumbs and great toes         Gait, Coordination, and Reflexes     Gait  Gait: normal (Independent )    Reflexes   Right brachioradialis: 2+  Left brachioradialis: 2+  Right biceps: 2+  Left biceps: 2+  Right triceps: 2+  Left triceps: 2+  Right patellar: 2+  Left patellar: 2+  Right achilles: 2+  Left achilles: 2+  Right plantar: normal  Left plantar: normal  Right ankle clonus: absent  Left ankle clonus: absent      Imaging study:    8/20/18 [facility] Thoracolumbar spine xray -- per report:  "THORACOLUMBAR SPINE     INDICATION:   S32 009A: Unspecified fracture of unspecified lumbar vertebra, initial encounter for closed fracture      COMPARISON: 7/28/2018     VIEWS:  XR SPINE THORACOLUMBAR 2 VW        FINDINGS:     The superior endplate compression deformity at L3 remains stable  Overall alignment of the lumbar spine demonstrates minimal retrolisthesis of L5 with respect to L4      Stable disc space narrowing L5-S1   Remaining disc spaces appear preserved       The pedicles appear intact      IMPRESSION:     Stable appearance to the L3 superior endplate compression deformity      Stable retrolisthesis of L5 with respect to L4         Workstation performed: [id number] "

## 2018-09-04 ENCOUNTER — OFFICE VISIT (OUTPATIENT)
Dept: FAMILY MEDICINE CLINIC | Facility: CLINIC | Age: 58
End: 2018-09-04
Payer: COMMERCIAL

## 2018-09-04 VITALS
HEIGHT: 65 IN | BODY MASS INDEX: 29.36 KG/M2 | HEART RATE: 82 BPM | DIASTOLIC BLOOD PRESSURE: 78 MMHG | TEMPERATURE: 98.1 F | WEIGHT: 176.2 LBS | RESPIRATION RATE: 16 BRPM | SYSTOLIC BLOOD PRESSURE: 140 MMHG

## 2018-09-04 DIAGNOSIS — E78.2 MIXED HYPERLIPIDEMIA: ICD-10-CM

## 2018-09-04 DIAGNOSIS — Z12.5 SCREENING FOR PROSTATE CANCER: ICD-10-CM

## 2018-09-04 DIAGNOSIS — R73.01 IMPAIRED FASTING GLUCOSE: ICD-10-CM

## 2018-09-04 DIAGNOSIS — E04.1 THYROID NODULE: ICD-10-CM

## 2018-09-04 DIAGNOSIS — S32.030D CLOSED COMPRESSION FRACTURE OF L3 LUMBAR VERTEBRA WITH ROUTINE HEALING, SUBSEQUENT ENCOUNTER: ICD-10-CM

## 2018-09-04 DIAGNOSIS — I10 ESSENTIAL HYPERTENSION: Primary | ICD-10-CM

## 2018-09-04 DIAGNOSIS — N28.1 RENAL CYST: ICD-10-CM

## 2018-09-04 PROCEDURE — 99204 OFFICE O/P NEW MOD 45 MIN: CPT | Performed by: FAMILY MEDICINE

## 2018-09-04 NOTE — ASSESSMENT & PLAN NOTE
Discussed a low-cholesterol, low-fat diet with patient  Recommended regular exercise  Continue Omega 3  Will recheck labs in 6 months

## 2018-09-04 NOTE — PROGRESS NOTES
Chief Complaint   Patient presents with    New Patient     New patient here to establish care  Health Maintenance   Topic Date Due    Depression Screening PHQ  1960    DTaP,Tdap,and Td Vaccines (1 - Tdap) 06/21/1980    INFLUENZA VACCINE  09/01/2018    CRC Screening: Colonoscopy  05/30/2028     Assessment/Plan:    Essential hypertension  BP is stable  Continue Metoprolol  mg daily  Mixed hyperlipidemia  Discussed a low-cholesterol, low-fat diet with patient  Recommended regular exercise  Continue Omega 3  Will recheck labs in 6 months  Impaired fasting glucose  Follow a low carb diet, avoid concentrated sweets  Thyroid nodule  Incidental finding on CT of the abdomen  Recommend to schedule Thyroid U/S  Renal cyst  Schedule kidney U/S  Closed compression fracture of L3 lumbar vertebra (HCC)  Continue to wear LSO brace  Follow-up with neurosurgeon this month  HM:  patient will get Flu shot at work next month  Colonoscopy done in May 2018  Schedule follow-up office visit in 6 months  Check labs prior to next visit  Diagnoses and all orders for this visit:    Essential hypertension  -     CBC and differential; Future  -     Comprehensive metabolic panel; Future  -     TSH, 3rd generation with Free T4 reflex; Future    Mixed hyperlipidemia  -     Comprehensive metabolic panel; Future  -     Lipid panel; Future    Impaired fasting glucose    Thyroid nodule  -     US thyroid; Future    Renal cyst  -     US kidney and bladder; Future    Screening for prostate cancer  -     PSA, Total Screen; Future    Closed compression fracture of L3 lumbar vertebra with routine healing, subsequent encounter          Subjective:      Patient ID: Yesika Dee is a 60 y o  male  HPI     New patient presents to establish medical care  He has H/o HTN, Hyperlipidemia, impaired fasting glucose        Patient works at [employer], had blood work done for [employer] program last month  Hemoglobin A1c 6 0, cholesterol 247, triglycerides 378, HDL 40,   Patient takes Omega 3 1000 mg one capsule twice daily  Patient fell on July 27, 2018, was seen in ER and was diagnosed with close compression fracture of L3  Patient has been followed by a neurosurgeon, last seen  August 22, 2018  He wears LSO brace  C/o pain in lower back  He stopped taking Ibuprofen 800 mg due to GI side effects  Currently taking Methocarbamol 750 mg 4 times daily  He will follow up with neurosurgeon in 2 weeks  CT scan abdomen done in July showed 1 9 x 1 9 cm right thyroid nodule, 1 or more sharply circumscribed subcentimeter renal hypodensities, most likely representing benign cortical renal cysts  HTN - BP is stable  Patient takes Metoprolol  mg daily  Denies CP, SOB, dizziness  Family history is positive for melanoma in his father, colon cancer in maternal uncle and grandparents  Patient denies tobacco use  He drinks wine a few times per week  Denies drinking beer  Colonoscopy done in May 2018, one colon polyp was removed which showed no dysplasia or malignancy  Patient gets flu shot at work at [employer]     The following portions of the patient's history were reviewed and updated as appropriate: allergies, past family history, past medical history, past social history, past surgical history and problem list     Review of Systems   Constitutional: Negative for activity change, appetite change, chills, fatigue and fever  HENT: Negative for congestion, facial swelling, nosebleeds, sore throat, tinnitus and trouble swallowing  Eyes: Negative for pain, discharge, redness, itching and visual disturbance  Respiratory: Negative for cough, chest tightness, shortness of breath and wheezing  Cardiovascular: Negative for chest pain, palpitations and leg swelling     Gastrointestinal: Negative for abdominal pain, blood in stool, constipation, diarrhea, nausea and vomiting  Genitourinary: Negative for difficulty urinating, dysuria, flank pain, frequency and hematuria  Musculoskeletal: Positive for back pain (low back)  Negative for gait problem and joint swelling  Skin: Negative for rash and wound  Neurological: Negative for dizziness, syncope and headaches  Hematological: Negative  Psychiatric/Behavioral: Negative  Objective:      /78 (BP Location: Left arm, Patient Position: Sitting, Cuff Size: Standard)   Pulse 82   Temp 98 1 °F (36 7 °C) (Tympanic)   Resp 16   Ht 5' 5 25" (1 657 m)   Wt 79 9 kg (176 lb 3 2 oz)   BMI 29 10 kg/m²          Physical Exam   Constitutional: He appears well-developed and well-nourished  HENT:   Head: Normocephalic and atraumatic  Right Ear: External ear normal    Left Ear: External ear normal    Mouth/Throat: Oropharynx is clear and moist    Eyes: Conjunctivae are normal  Pupils are equal, round, and reactive to light  Neck: Normal range of motion  Neck supple  No thyromegaly present  Cardiovascular: Normal rate, regular rhythm and normal heart sounds  No murmur heard  No BL LE edema  No abdominal bruits  No BL LE edema   Pulmonary/Chest: Effort normal and breath sounds normal  He has no wheezes  He has no rales  Abdominal: Soft  Bowel sounds are normal  There is no tenderness  Musculoskeletal:   Patient wears LSO brace   Skin: Skin is warm and dry  No rash noted  Psychiatric: He has a normal mood and affect  Nursing note and vitals reviewed

## 2018-09-11 ENCOUNTER — HOSPITAL ENCOUNTER (OUTPATIENT)
Dept: RADIOLOGY | Facility: HOSPITAL | Age: 58
Discharge: HOME | End: 2018-09-11
Payer: COMMERCIAL

## 2018-09-11 DIAGNOSIS — N28.1 RENAL CYST: ICD-10-CM

## 2018-09-11 DIAGNOSIS — E04.1 THYROID NODULE: ICD-10-CM

## 2018-09-11 DIAGNOSIS — S32.030D CLOSED COMPRESSION FRACTURE OF L3 LUMBAR VERTEBRA WITH ROUTINE HEALING, SUBSEQUENT ENCOUNTER: ICD-10-CM

## 2018-09-11 PROCEDURE — 76770 US EXAM ABDO BACK WALL COMP: CPT

## 2018-09-11 PROCEDURE — 72100 X-RAY EXAM L-S SPINE 2/3 VWS: CPT

## 2018-09-11 PROCEDURE — 76536 US EXAM OF HEAD AND NECK: CPT

## 2018-09-16 DIAGNOSIS — E04.1 THYROID NODULE: Primary | ICD-10-CM

## 2018-09-16 DIAGNOSIS — N28.1 RENAL CYST: Primary | ICD-10-CM

## 2018-09-17 ENCOUNTER — OFFICE VISIT (OUTPATIENT)
Dept: NEUROSURGERY | Facility: CLINIC | Age: 58
End: 2018-09-17
Payer: COMMERCIAL

## 2018-09-17 VITALS
WEIGHT: 179 LBS | SYSTOLIC BLOOD PRESSURE: 150 MMHG | HEIGHT: 65 IN | HEART RATE: 97 BPM | BODY MASS INDEX: 29.82 KG/M2 | DIASTOLIC BLOOD PRESSURE: 90 MMHG | TEMPERATURE: 98 F

## 2018-09-17 DIAGNOSIS — M43.17 SPONDYLOLISTHESIS OF LUMBOSACRAL REGION: ICD-10-CM

## 2018-09-17 DIAGNOSIS — M51.36 DDD (DEGENERATIVE DISC DISEASE), LUMBAR: ICD-10-CM

## 2018-09-17 DIAGNOSIS — S32.030D CLOSED COMPRESSION FRACTURE OF L3 LUMBAR VERTEBRA WITH ROUTINE HEALING, SUBSEQUENT ENCOUNTER: Primary | ICD-10-CM

## 2018-09-17 PROBLEM — M51.369 DDD (DEGENERATIVE DISC DISEASE), LUMBAR: Status: ACTIVE | Noted: 2018-09-17

## 2018-09-17 PROBLEM — G89.29 CHRONIC LOW BACK PAIN: Status: ACTIVE | Noted: 2018-09-17

## 2018-09-17 PROBLEM — M54.50 CHRONIC LOW BACK PAIN: Status: ACTIVE | Noted: 2018-09-17

## 2018-09-17 PROCEDURE — 99213 OFFICE O/P EST LOW 20 MIN: CPT | Performed by: PHYSICIAN ASSISTANT

## 2018-09-17 NOTE — LETTER
September 17, 2018     Patient: Liliana More   YOB: 1960   Date of Visit: 9/17/2018       To Whom it May Concern:    Esvin Monet is under my professional care  He was seen in my office on 9/17/2018  He is to remain out of work until cleared from neurosurgical standpoint  If you have any questions or concerns, please don't hesitate to call           Sincerely,          Ezekiel Paniagua PA-C        CC: No Recipients

## 2018-09-17 NOTE — PROGRESS NOTES
Assessment/Plan:       Diagnoses and all orders for this visit:    Closed compression fracture of L3 lumbar vertebra with routine healing, subsequent encounter  -     Ambulatory referral to Physical Therapy; Future    DDD (degenerative disc disease), lumbar  -     Ambulatory referral to Physical Therapy; Future    Spondylolisthesis of lumbosacral region  -     Ambulatory referral to Physical Therapy; Future        Discussion / Summary: This is a 60 y o  male who presents for follow up of traumatic L3 compression fracture  Patient has been maintained in LSO back brace for approximately 7 weeks  Lumbar spine xray ( 9/11/18 )  was reviewed in detail by Dr Federico Kate and demonstrates stable height of L3 vertebral body compression deformity and stable alignment of lumbosacral spine in comparison to previous imaging (8/20/18 and 7/28/18 thoracolumbar xrays)  He has degenerative disc disease L5-S1 with mild anterolisthesis L4 on L5 and retrolisthesis of L5 on S1 likely degenerative  (He has history of prior L-spine MRI 6/25/14)  At this juncture Mr Albert Rhodes may gradually wean out of LSO back brace in the next 7-10 days  Discussed with patient how to gradually wean out of back brace by taking back brace off for short periods of time, reapplying back brace periodically, and gradually increasing the amount of time he has back brace off until fully out of back brace over next 7-10 days  He may then begin a course of physical therapy  He may gradually increase his range of motion, lifting, and activity with physical therapy as tolerated  Recommend he pursue at least 2-3 weeks of physical therapy prior to resuming working  Patient is advised to contact our office after he completes 2-3 weeks of physical therapy to update our office of how he is progressing with therapy to consider providing clearance for returning to work at that juncture      Recommend patient fall precautions and refrain from activity that increases chance for fall or injury to back  Further neurosurgical follow up would be on prn basis  Patient is not interested in considering anything surgical at this juncture for lower lumbar DDD / spondylolisthesis  Patient advised to contact our office in future it he wishes to discuss surgical option for history of chronic lower lumbar degenerative disc disease / spondylolisthesis -- at which juncture would suggest lumbar flex/ext xray  Patient is to contact our office of present to Emergency Room if experience worsening or new back/leg pain, sensory or motor change, bladder or bowel dysfunction, or other neurological change  Patient expressed understanding and agreement     ___________________________________________________________________________      Subjective:      Patient ID: 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 low back pain who presents for follow up of traumatic L3 compression fracture  Ms Xiang Rincon has been managed in LSO brace for approximately 7 weeks  He had f/u L-spine xray completed 9/11/18  Mr Xiang Rincon is accompanied with his wife  HPI   In review -- Mr Xiang Rincon reports history of chronic low back pain for about 20+ years  He was diagnosed with L3 compression fracture at the end of July 2018 s/p 3 1/2 ft fall from tree branch  He has been managed with LSO brace for the L3 compression fracture  Patient reports his low back discomfort is consistent with his chronic lower back pain that he has had for several years and predated his back injury at end of July 2018  He currently rates LBP as 2/10 on pain scale  Reports notices the LBP with weather changes  He reported history of low back injections in past but has been several years since last received  He reports the previous rib pain resolved  He denies lower extremity pain  He denies numbness, tingling, or weakness of his lower extremities   He ambulates independent denies any issues with walking  Patient reports he has probably been "doing more then I should"  Patient remains out of work  (nurse) but would like to get back to work as soon as possible  Patient denies taking pain medication / muscle relaxer at this juncture  Patient reports he discontinued taking Methocarbamol as of about 2 weeks ago  He reports he has not taken Tylenol for last 1 1/2 - 2 weeks  Patient discontinued taking Percocet as of about 1 week prior to last office visit on 8/22/18  Patient denies bladder or bowel dysfunction  He denies saddle paresthesias  The following portions of the patient's history were reviewed and updated as appropriate: allergies, current medications, past family history, past medical history, past social history and past surgical history  Review of Systems   Constitutional: Negative for fever  Respiratory: Negative for shortness of breath  Gastrointestinal:        Denies bowel dysfunction    Genitourinary: Negative for difficulty urinating  Musculoskeletal: Positive for back pain (see HPI)  Neurological: Negative for weakness and numbness  Psychiatric/Behavioral: Negative for agitation  Objective:      /90 (BP Location: Right arm, Patient Position: Sitting, Cuff Size: Standard)   Pulse 97   Temp 98 °F (36 7 °C) (Temporal)   Ht 5' 5 25" (1 657 m)   Wt 81 2 kg (179 lb)   BMI 29 56 kg/m²          Physical Exam   Constitutional: He is oriented to person, place, and time  He appears well-developed and well-nourished  No distress  Wearing LSO brace which appears well fit  Eyes: Conjunctivae are normal    Pulmonary/Chest: Effort normal    Musculoskeletal:        Thoracic back: He exhibits no tenderness and no deformity  Lumbar back: He exhibits no tenderness and no deformity  Neurological: He is alert and oriented to person, place, and time   Gait normal    Reflex Scores:       Tricep reflexes are 2+ on the right side and 2+ on the left side  Bicep reflexes are 2+ on the right side and 2+ on the left side  Brachioradialis reflexes are 2+ on the right side and 2+ on the left side  Patellar reflexes are 2+ on the right side and 2+ on the left side  Achilles reflexes are 2+ on the right side and 1+ on the left side  Skin: Skin is warm and dry  Psychiatric: He has a normal mood and affect  His speech is normal        Neurologic Exam     Mental Status   Oriented to person, place, and time  Speech: speech is normal   Level of consciousness: alert    Motor Exam     Motor:  Shoulder abduction 5/5 bilaterally  Elbow flexion/extension 5/5 bilaterally  Wrist flexion/extension 5/5 bilaterally  Finger  / abduction 5/5 bilaterally  Hip flexion/abduction/adduction 5/5 bilaterally  Knee flexion/extension 5/5 bilaterally  Ankle DF/PF 5/5 bilaterally  Great toe DF 5/5 bilaterally  Sensory Exam     Sensation to pinprick intact b/l upper extremities, torso, and b/l lower extremities  JPS and Vibratory sense intact b/l thumbs and great toes  Gait, Coordination, and Reflexes     Gait  Gait: normal (Independent)    Reflexes   Right brachioradialis: 2+  Left brachioradialis: 2+  Right biceps: 2+  Left biceps: 2+  Right triceps: 2+  Left triceps: 2+  Right patellar: 2+  Left patellar: 2+  Right achilles: 2+  Left achilles: 1+  Right plantar: normal  Left plantar: normal  Right ankle clonus: absent  Left ankle clonus: absent      Imaging study:    9/11/18 [facility] L-spine xray -- per report: " LUMBAR SPINE     INDICATION:   [id number] [id number]: Wedge compression fracture of third lumbar vertebra, subsequent encounter for fracture with routine healing  L3 compression fracture      COMPARISON:  Thoracolumbar spine series of 8/20/2018     VIEWS:  XR SPINE LUMBAR 2 OR 3 VIEWS INJURY        FINDINGS:     Superior endplate compression deformity again noted at L3 of mild/moderate severity, stable    Vertebral body height is otherwise maintained  Mild grade 1 retrolisthesis of L5 in relationship to L4 and S1      Mild intervertebral disc space narrowing at L4-5 and L5-S1 with mild marginal osteophyte formation      The pedicles appear intact      External back brace noted      IMPRESSION:     1    Stable mild/moderate superior endplate deformity at L3   2  Stable mild retrolisthesis of L5      Workstation performed: [id number] "

## 2018-09-17 NOTE — PATIENT INSTRUCTIONS
May gradually wean out of LSO back brace in the next 7-10 days  Then you may begin a course of physical therapy  You may gradually increase your range of motion, lifting, and activity with physical therapy as tolerated  Recommend you pursue at least 2-3 weeks of physical therapy prior to resuming working  Please contact our office after you completed 2-3 weeks of physical therapy to update our office of how you are doing to discuss return to work status  Contact our office of present to Emergency Room if experience worsening or new back/leg pain, sensory or motor change, bladder or bowel dysfunction, or other neurological change  Recommend you take fall precautions and refrain from activity that increases chance for fall or injury to back  Contact our office in future it you wish to discuss surgical option for history of lower lumbar degenerative disc disease / spondylolisthesis

## 2018-09-17 NOTE — LETTER
September 18, 2018     Merlene Jean[address]    Patient: Kala Ortega   YOB: 1960   Date of Visit: 9/17/2018       Dear Dr David Hamilton:    Thank you for referring Sarthak Saavedra to me for evaluation  Below are my notes for this consultation  If you have questions, please do not hesitate to call me  I look forward to following your patient along with you  Sincerely,        Johnathan Hidalgo PA-C        CC: No Recipients  Johnathan Hidalgo PA-C  9/18/2018  3:41 AM  Sign at close encounter  Assessment/Plan:       Diagnoses and all orders for this visit:    Closed compression fracture of L3 lumbar vertebra with routine healing, subsequent encounter  -     Ambulatory referral to Physical Therapy; Future    DDD (degenerative disc disease), lumbar  -     Ambulatory referral to Physical Therapy; Future    Spondylolisthesis of lumbosacral region  -     Ambulatory referral to Physical Therapy; Future        Discussion / Summary: This is a 60 y o  male who presents for follow up of traumatic L3 compression fracture  Patient has been maintained in LSO back brace for approximately 7 weeks  Lumbar spine xray ( 9/11/18 )  was reviewed in detail by Dr Dominic De Los Santos and demonstrates stable height of L3 vertebral body compression deformity and stable alignment of lumbosacral spine in comparison to previous imaging (8/20/18 and 7/28/18 thoracolumbar xrays)  He has degenerative disc disease L5-S1 with mild anterolisthesis L4 on L5 and retrolisthesis of L5 on S1 likely degenerative  (He has history of prior L-spine MRI 6/25/14)  At this juncture Mr Mallory Romero may gradually wean out of LSO back brace in the next 7-10 days    Discussed with patient how to gradually wean out of back brace by taking back brace off for short periods of time, reapplying back brace periodically, and gradually increasing the amount of time he has back brace off until fully out of back brace over next 7-10 days  He may then begin a course of physical therapy  He may gradually increase his range of motion, lifting, and activity with physical therapy as tolerated  Recommend he pursue at least 2-3 weeks of physical therapy prior to resuming working  Patient is advised to contact our office after he completes 2-3 weeks of physical therapy to update our office of how he is progressing with therapy to consider providing clearance for returning to work at that juncture  Recommend patient fall precautions and refrain from activity that increases chance for fall or injury to back  Further neurosurgical follow up would be on prn basis  Patient is not interested in considering anything surgical at this juncture for lower lumbar DDD / spondylolisthesis  Patient advised to contact our office in future it he wishes to discuss surgical option for history of chronic lower lumbar degenerative disc disease / spondylolisthesis -- at which juncture would suggest lumbar flex/ext xray  Patient is to contact our office of present to Emergency Room if experience worsening or new back/leg pain, sensory or motor change, bladder or bowel dysfunction, or other neurological change  Patient expressed understanding and agreement     ___________________________________________________________________________      Subjective:      Patient ID: 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 who presents for follow up of traumatic L3 compression fracture  Ms Rikki Barbour has been managed in LSO brace for approximately 7 weeks  He had f/u L-spine xray completed 9/11/18  Mr Rikki Barbour is accompanied with his wife  HPI   In review -- Mr Rikki Barbour reports history of chronic low back pain for about 20+ years  He was diagnosed with L3 compression fracture at the end of July 2018 s/p 3 1/2 ft fall from tree branch    He has been managed with LSO brace for the L3 compression fracture  Patient reports his low back discomfort is consistent with his chronic lower back pain that he has had for several years and predated his back injury at end of July 2018  He currently rates LBP as 2/10 on pain scale  Reports notices the LBP with weather changes  He reported history of low back injections in past but has been several years since last received  He reports the previous rib pain resolved  He denies lower extremity pain  He denies numbness, tingling, or weakness of his lower extremities  He ambulates independent denies any issues with walking  Patient reports he has probably been "doing more then I should"  Patient remains out of work  (nurse) but would like to get back to work as soon as possible  Patient denies taking pain medication / muscle relaxer at this juncture  Patient reports he discontinued taking Methocarbamol as of about 2 weeks ago  He reports he has not taken Tylenol for last 1 1/2 - 2 weeks  Patient discontinued taking Percocet as of about 1 week prior to last office visit on 8/22/18  Patient denies bladder or bowel dysfunction  He denies saddle paresthesias  The following portions of the patient's history were reviewed and updated as appropriate: allergies, current medications, past family history, past medical history, past social history and past surgical history  Review of Systems   Constitutional: Negative for fever  Respiratory: Negative for shortness of breath  Gastrointestinal:        Denies bowel dysfunction    Genitourinary: Negative for difficulty urinating  Musculoskeletal: Positive for back pain (see HPI)  Neurological: Negative for weakness and numbness  Psychiatric/Behavioral: Negative for agitation           Objective:      /90 (BP Location: Right arm, Patient Position: Sitting, Cuff Size: Standard)   Pulse 97   Temp 98 °F (36 7 °C) (Temporal)   Ht 5' 5 25" (1 657 m)   Wt 81 2 kg (179 lb) BMI 29 56 kg/m²           Physical Exam   Constitutional: He is oriented to person, place, and time  He appears well-developed and well-nourished  No distress  Wearing LSO brace which appears well fit  Eyes: Conjunctivae are normal    Pulmonary/Chest: Effort normal    Musculoskeletal:        Thoracic back: He exhibits no tenderness and no deformity  Lumbar back: He exhibits no tenderness and no deformity  Neurological: He is alert and oriented to person, place, and time  Gait normal    Reflex Scores:       Tricep reflexes are 2+ on the right side and 2+ on the left side  Bicep reflexes are 2+ on the right side and 2+ on the left side  Brachioradialis reflexes are 2+ on the right side and 2+ on the left side  Patellar reflexes are 2+ on the right side and 2+ on the left side  Achilles reflexes are 2+ on the right side and 1+ on the left side  Skin: Skin is warm and dry  Psychiatric: He has a normal mood and affect  His speech is normal        Neurologic Exam     Mental Status   Oriented to person, place, and time  Speech: speech is normal   Level of consciousness: alert    Motor Exam     Motor:  Shoulder abduction 5/5 bilaterally  Elbow flexion/extension 5/5 bilaterally  Wrist flexion/extension 5/5 bilaterally  Finger  / abduction 5/5 bilaterally  Hip flexion/abduction/adduction 5/5 bilaterally  Knee flexion/extension 5/5 bilaterally  Ankle DF/PF 5/5 bilaterally  Great toe DF 5/5 bilaterally  Sensory Exam     Sensation to pinprick intact b/l upper extremities, torso, and b/l lower extremities  JPS and Vibratory sense intact b/l thumbs and great toes         Gait, Coordination, and Reflexes     Gait  Gait: normal (Independent)    Reflexes   Right brachioradialis: 2+  Left brachioradialis: 2+  Right biceps: 2+  Left biceps: 2+  Right triceps: 2+  Left triceps: 2+  Right patellar: 2+  Left patellar: 2+  Right achilles: 2+  Left achilles: 1+  Right plantar: normal  Left plantar: normal  Right ankle clonus: absent  Left ankle clonus: absent      Imaging study:    9/11/18 [facility] L-spine xray -- per report: " LUMBAR SPINE     INDICATION:   S32 030D: Wedge compression fracture of third lumbar vertebra, subsequent encounter for fracture with routine healing  L3 compression fracture      COMPARISON:  Thoracolumbar spine series of 8/20/2018     VIEWS:  XR SPINE LUMBAR 2 OR 3 VIEWS INJURY        FINDINGS:     Superior endplate compression deformity again noted at L3 of mild/moderate severity, stable  Vertebral body height is otherwise maintained  Mild grade 1 retrolisthesis of L5 in relationship to L4 and S1      Mild intervertebral disc space narrowing at L4-5 and L5-S1 with mild marginal osteophyte formation      The pedicles appear intact      External back brace noted      IMPRESSION:     1    Stable mild/moderate superior endplate deformity at L3   2  Stable mild retrolisthesis of L5      Workstation performed: [id number] "

## 2018-09-20 ENCOUNTER — TRANSCRIBE ORDERS (OUTPATIENT)
Dept: RADIOLOGY | Facility: HOSPITAL | Age: 58
End: 2018-09-20

## 2018-09-20 ENCOUNTER — HOSPITAL ENCOUNTER (OUTPATIENT)
Dept: RADIOLOGY | Facility: HOSPITAL | Age: 58
Discharge: HOME | End: 2018-09-20
Admitting: RADIOLOGY
Payer: COMMERCIAL

## 2018-09-20 DIAGNOSIS — E04.1 THYROID NODULE: ICD-10-CM

## 2018-09-20 PROCEDURE — 76942 ECHO GUIDE FOR BIOPSY: CPT

## 2018-09-20 PROCEDURE — 88173 CYTOPATH EVAL FNA REPORT: CPT | Performed by: PATHOLOGY

## 2018-09-20 PROCEDURE — 10022 HB FNA W/IMAGE: CPT

## 2018-09-20 RX ORDER — LIDOCAINE HYDROCHLORIDE 10 MG/ML
5 INJECTION, SOLUTION EPIDURAL; INFILTRATION; INTRACAUDAL; PERINEURAL ONCE
Status: COMPLETED | OUTPATIENT
Start: 2018-09-20 | End: 2018-09-20

## 2018-09-20 RX ADMIN — LIDOCAINE HYDROCHLORIDE 1 ML: 10 INJECTION, SOLUTION EPIDURAL; INFILTRATION; INTRACAUDAL; PERINEURAL at 09:25

## 2018-09-21 ENCOUNTER — APPOINTMENT (OUTPATIENT)
Dept: PHYSICAL THERAPY | Facility: MEDICAL CENTER | Age: 58
End: 2018-09-21
Payer: COMMERCIAL

## 2018-09-21 ENCOUNTER — EVALUATION (OUTPATIENT)
Dept: PHYSICAL THERAPY | Facility: MEDICAL CENTER | Age: 58
End: 2018-09-21
Payer: COMMERCIAL

## 2018-09-21 DIAGNOSIS — S32.030D CLOSED COMPRESSION FRACTURE OF L3 LUMBAR VERTEBRA WITH ROUTINE HEALING, SUBSEQUENT ENCOUNTER: ICD-10-CM

## 2018-09-21 DIAGNOSIS — M51.36 DDD (DEGENERATIVE DISC DISEASE), LUMBAR: ICD-10-CM

## 2018-09-21 DIAGNOSIS — M43.17 SPONDYLOLISTHESIS OF LUMBOSACRAL REGION: ICD-10-CM

## 2018-09-21 DIAGNOSIS — E04.1 THYROID NODULE: Primary | ICD-10-CM

## 2018-09-21 PROCEDURE — 97161 PT EVAL LOW COMPLEX 20 MIN: CPT | Performed by: PHYSICAL THERAPIST

## 2018-09-21 PROCEDURE — 97110 THERAPEUTIC EXERCISES: CPT | Performed by: PHYSICAL THERAPIST

## 2018-09-21 PROCEDURE — G8991 OTHER PT/OT GOAL STATUS: HCPCS | Performed by: PHYSICAL THERAPIST

## 2018-09-21 PROCEDURE — G8990 OTHER PT/OT CURRENT STATUS: HCPCS | Performed by: PHYSICAL THERAPIST

## 2018-09-21 NOTE — PROGRESS NOTES
PT Evaluation     Today's date: 2018  Patient name: Syed Pascual  : 1960  MRN: [id number]  Referring provider: VIDAL Steel  Dx:   Encounter Diagnosis     ICD-10-CM    1  Closed compression fracture of L3 lumbar vertebra with routine healing, subsequent encounter S32 030D Ambulatory referral to Physical Therapy   2  DDD (degenerative disc disease), lumbar M51 36 Ambulatory referral to Physical Therapy   3  Spondylolisthesis of lumbosacral region M43 17 Ambulatory referral to Physical Therapy                  Assessment  Impairments: abnormal muscle firing, abnormal muscle tone, abnormal or restricted ROM, impaired physical strength, lacks appropriate home exercise program and pain with function    Assessment details: Syed Pascual is a pleasant 60 y o  male presents with a closed L3 compression fx with routine healing  No further referral appears necessary at this time  Pt was educated on his lifting precautions and driving precautions at this point  Primary movement diagnosis of decreased lumbar stability resulting in symptoms of decreased upper body lifting tolerancc  and limiting his participation/performance in worki  Primary impairments include:  1) decreased lumbar stabilization--> addressed with stabilization activity  2) hypomobile lumbar region--> addressed with progressive exercise incorporating further motion    Skilled PT services appropriate to facilitate return to prior level of function with transition to home exercise program for independent management when appropriate  Understanding of Dx/Px/POC: good   Prognosis: good    Goals  Patient will successfully transition to home exercise program   Patient will be able to manage symptoms independently  Patient will be able to return to work with minimal pain within 4 weeks  Patient will be able to perform lumbar motions WFL within 4 weeks    Patient will perform ADLs with minimal levels of pain within 4 weeks  Patient will lift 20 pounds from floor to waist without pain within 8 weeks  Plan  Patient would benefit from: skilled PT  Referral necessary: No  Planned modality interventions: thermotherapy: hydrocollator packs  Planned therapy interventions: home exercise program, manual therapy, neuromuscular re-education, patient education, functional ROM exercises, strengthening, stretching, joint mobilization, graded activity, graded exercise, therapeutic exercise, body mechanics training, motor coordination training and activity modification  Frequency: 2x week  Duration in weeks: 12  Treatment plan discussed with: patient        Subjective Evaluation    History of Present Illness  Date of onset: 2018  Mechanism of injury: Leonid Pillai is a 60 y o  male presenting to therapy with a closed L3 compression fx with routine healing  Pt reports falling 3 5 feet out of a tree  He is currently wearing an LSO 4 hours a day and weaning off  Pt reported that he has been taking minimal pain medication  He reported he has not been following the 10 lb lifting restriction closely at home and has been driving  Occupation: nurse in labor and delivery    Symptom Aggravating factors include:   1) downward pressure in the hips  2) forward bend  Symptom alleviating factors include  1) sitting up straight  2) staying active    Pain  Current pain ratin  At best pain ratin  At worst pain ratin  Quality: dull ache      Diagnostic Tests  X-ray: abnormal  CT scan: abnormal  Treatments  Previous treatment: physical therapy  Patient Goals  Patient goals for therapy: return to work, increased strength and increased motion          Objective     Special Questions  Negative for night pain, disturbed sleep and bowel dysfunction    Neurological Testing     Sensation     Lumbar   Left   Intact: light touch    Right   Intact: light touch    Hip   Left Hip   Intact: light touch    Right Hip   Intact: light touch    Knee Left Knee   Intact: light touch    Right Knee   Intact: light touch     Reflexes   Left   Patellar (L4): trace (1+)  Achilles (S1): normal (2+)    Right   Patellar (L4): trace (1+)  Achilles (S1): normal (2+)    Active Range of Motion   Left Hip   Normal active range of motion    Right Hip   Normal active range of motion  Left Knee   Normal active range of motion    Right Knee   Normal active range of motion    Additional Active Range of Motion Details  Full lumbar motion testing was not performed and will be incorporated progressively throughout sessions  Passive Range of Motion   Left Hip   Normal passive range of motion    Right Hip   Normal passive range of motion  Left Knee   Normal passive range of motion    Right Knee   Normal passive range of motion    Strength/Myotome Testing     Lumbar   Left   Normal strength    Right   Normal strength    Left Hip   Normal muscle strength    Right Hip   Normal muscle strength    Tests     Left Hip   90/90 SLR: Positive  Right Hip   90/90 SLR: Positive             Precautions: L3 compression fx, mild anterolisthesis L4 on L5 and retrolisthesis of L5 on S1, 5-10 pound lifting precaution    Daily Treatment Diary     Manual                                                                                   Exercise Diary              LTR             theraband presses             bridge             Quadriped alternating             Supine ab brace w/march             bike                                                                                                                                                                                                       Modalities

## 2018-09-24 ENCOUNTER — OFFICE VISIT (OUTPATIENT)
Dept: PHYSICAL THERAPY | Facility: MEDICAL CENTER | Age: 58
End: 2018-09-24
Payer: COMMERCIAL

## 2018-09-24 DIAGNOSIS — S32.030D CLOSED COMPRESSION FRACTURE OF L3 LUMBAR VERTEBRA WITH ROUTINE HEALING, SUBSEQUENT ENCOUNTER: Primary | ICD-10-CM

## 2018-09-24 PROCEDURE — 97110 THERAPEUTIC EXERCISES: CPT | Performed by: PHYSICAL THERAPIST

## 2018-09-24 NOTE — PROGRESS NOTES
Daily Note     Today's date: 2018  Patient name: Susana Cornelius  : 1960  MRN: [id number]  Referring provider: ANNA Gold*  Dx:   Encounter Diagnosis     ICD-10-CM    1  Closed compression fracture of L3 lumbar vertebra with routine healing, subsequent encounter S32 030D                   Subjective: Pt reported that he is not having pain in his low back and the exercises have been going well at home  Objective: See treatment diary below    Precautions: L3 compression fx, mild anterolisthesis L4 on L5 and retrolisthesis of L5 on S1, 5-10 pound lifting precaution    Daily Treatment Diary     Manual                                                                                   Exercise Diary              LTR -            theraband presses 2x10 ea PTB            bridge 3x10            Quadriped alternating 3x10 standing            Supine ab brace w/march -            bike 10'            SLR flex w/ abd 2x10 ea            Clamshells w/ab brace 3x10 PTB            Adductor set w/ bridge 3x10            Floor to waist lift 2x10 5'            Wall squat  3x10 2#                                                                                                                                     Modalities                                                         Assessment details: Susana Cornelius is a pleasant 60 y o  male presents with a closed L3 compression fx with routine healing  No further referral appears necessary at this time  Pt was educated on his lifting precautions and driving precautions at this point  Primary movement diagnosis of decreased lumbar stability resulting in symptoms of decreased upper body lifting tolerancc  and limiting his participation/performance in worki    Primary impairments include:  1) decreased lumbar stabilization--> addressed with stabilization activity  2) hypomobile lumbar region--> addressed with progressive exercise incorporating further motion    Skilled PT services appropriate to facilitate return to prior level of function with transition to home exercise program for independent management when appropriate  Understanding of Dx/Px/POC: good   Prognosis: good    Goals  Patient will successfully transition to home exercise program   Patient will be able to manage symptoms independently  Patient will be able to return to work with minimal pain within 4 weeks  Patient will be able to perform lumbar motions WFL within 4 weeks  Patient will perform ADLs with minimal levels of pain within 4 weeks  Patient will lift 20 pounds from floor to waist without pain within 8 weeks  Assessment: Tolerated treatment well  Patient would benefit from continued PT  Pt was able to perform exercises within lifting protocol and will continue to be progressed as tolerated to increase lumbar stability  Plan: Continue per plan of care

## 2018-09-26 ENCOUNTER — OFFICE VISIT (OUTPATIENT)
Dept: PHYSICAL THERAPY | Facility: MEDICAL CENTER | Age: 58
End: 2018-09-26
Payer: COMMERCIAL

## 2018-09-26 DIAGNOSIS — S32.030D CLOSED COMPRESSION FRACTURE OF L3 LUMBAR VERTEBRA WITH ROUTINE HEALING, SUBSEQUENT ENCOUNTER: Primary | ICD-10-CM

## 2018-09-26 PROCEDURE — 97110 THERAPEUTIC EXERCISES: CPT

## 2018-09-26 PROCEDURE — 97112 NEUROMUSCULAR REEDUCATION: CPT

## 2018-09-26 NOTE — PROGRESS NOTES
Daily Note     Today's date: 2018  Patient name: Syed Pascual  : 1960  MRN: [id number]  Referring provider: ANNA Steel*  Dx:   Encounter Diagnosis     ICD-10-CM    1  Closed compression fracture of L3 lumbar vertebra with routine healing, subsequent encounter S32 030D    2  DDD (degenerative disc disease), lumbar M51 36    3  Spondylolisthesis of lumbosacral region M43 17                   Subjective: Pt reported that he is not having pain in his back  He sometimes has discomfort with sitting if it's for a prolonged period of time  Objective: See treatment diary below    Precautions: L3 compression fx, mild anterolisthesis L4 on L5 and retrolisthesis of L5 on S1, 5-10 pound lifting precaution    Daily Treatment Diary     Manual                                                                                   Exercise Diary              LTR -            theraband presses 3x10 ea PTB            bridge 3x12            Quadriped alternating 3x10 standing alt arm/leg            Supine ab brace w/march -            bike 10'            SLR flex w/ abd 3x10 ea            Clamshells w/ab brace 3x12 PTB            Adductor set w/ bridge 3x12            Floor to waist lift 2x10 5#            Wall squat  3x10 2#                                                                                                                                     Modalities                                                         Assessment details: Syed Pascual is a pleasant 60 y o  male presents with a closed L3 compression fx with routine healing  No further referral appears necessary at this time  Pt was educated on his lifting precautions and driving precautions at this point  Primary movement diagnosis of decreased lumbar stability resulting in symptoms of decreased upper body lifting tolerancc  and limiting his participation/performance in worki    Primary impairments include:  1) decreased lumbar stabilization--> addressed with stabilization activity  2) hypomobile lumbar region--> addressed with progressive exercise incorporating further motion    Skilled PT services appropriate to facilitate return to prior level of function with transition to home exercise program for independent management when appropriate  Understanding of Dx/Px/POC: good   Prognosis: good    Goals  Patient will successfully transition to home exercise program   Patient will be able to manage symptoms independently  Patient will be able to return to work with minimal pain within 4 weeks  Patient will be able to perform lumbar motions WFL within 4 weeks  Patient will perform ADLs with minimal levels of pain within 4 weeks  Patient will lift 20 pounds from floor to waist without pain within 8 weeks  Assessment: Tolerated treatment well  Patient would benefit from continued PT  Pt was able to perform exercises within lifting protocol demonstrating good body mechanics and ex technique  Will continue to  progress as the pt tolerates to increase lumbar stability  Plan: Continue per plan of care

## 2018-09-28 ENCOUNTER — OFFICE VISIT (OUTPATIENT)
Dept: PHYSICAL THERAPY | Facility: MEDICAL CENTER | Age: 58
End: 2018-09-28
Payer: COMMERCIAL

## 2018-09-28 DIAGNOSIS — S32.030D CLOSED COMPRESSION FRACTURE OF L3 LUMBAR VERTEBRA WITH ROUTINE HEALING, SUBSEQUENT ENCOUNTER: Primary | ICD-10-CM

## 2018-09-28 PROCEDURE — 97110 THERAPEUTIC EXERCISES: CPT | Performed by: PHYSICAL THERAPIST

## 2018-09-28 PROCEDURE — 97112 NEUROMUSCULAR REEDUCATION: CPT | Performed by: PHYSICAL THERAPIST

## 2018-09-28 NOTE — PROGRESS NOTES
Daily Note     Today's date: 2018  Patient name: Crissy James  : 1960  MRN: [id number]  Referring provider: ANNA Wilson*  Dx:   Encounter Diagnosis     ICD-10-CM    1  Closed compression fracture of L3 lumbar vertebra with routine healing, subsequent encounter S32 030D                   Subjective: Pt reported that his back and knees have been hurting a little more secondary to the weather changes  Objective: See treatment diary below  Precautions: L3 compression fx, mild anterolisthesis L4 on L5 and retrolisthesis of L5 on S1, 5-10 pound lifting precaution    Daily Treatment Diary     Manual                                                                                   Exercise Diary              LTR x30            theraband presses 3x10 ea PTB            bridge 3x12            Quadriped alternating 3x10 standing alt arm/leg            Supine ab brace w/march -            bike 10'            SLR flex w/ abd 3x10 ea            Clamshells w/ab brace 3x12 PTB            Adductor set w/ bridge 3x12            Floor to waist lift -            Wall squat  3x10 2#            3 way PB rollouts x30            Modified plank 5x10"                                                                                                           Modalities                                                           Primary movement diagnosis of decreased lumbar stability resulting in symptoms of decreased upper body lifting tolerancc  and limiting his participation/performance in worki  Primary impairments include:  1) decreased lumbar stabilization--> addressed with stabilization activity  2) hypomobile lumbar region--> addressed with progressive exercise incorporating further motion    Assessment: Tolerated treatment well  Patient would benefit from continued PT  Pt reported decreased LBP following PB rollouts and no increases above baseline with other activities   Continue to progress LB strengthening and NMRE as tolerated  Plan: Continue per plan of care

## 2018-10-01 ENCOUNTER — OFFICE VISIT (OUTPATIENT)
Dept: PHYSICAL THERAPY | Facility: MEDICAL CENTER | Age: 58
End: 2018-10-01
Payer: COMMERCIAL

## 2018-10-01 DIAGNOSIS — M43.17 SPONDYLOLISTHESIS OF LUMBOSACRAL REGION: ICD-10-CM

## 2018-10-01 DIAGNOSIS — S32.030D CLOSED COMPRESSION FRACTURE OF L3 LUMBAR VERTEBRA WITH ROUTINE HEALING, SUBSEQUENT ENCOUNTER: Primary | ICD-10-CM

## 2018-10-01 DIAGNOSIS — M51.36 DDD (DEGENERATIVE DISC DISEASE), LUMBAR: ICD-10-CM

## 2018-10-01 PROCEDURE — 97110 THERAPEUTIC EXERCISES: CPT

## 2018-10-01 PROCEDURE — 97112 NEUROMUSCULAR REEDUCATION: CPT

## 2018-10-01 PROCEDURE — 97140 MANUAL THERAPY 1/> REGIONS: CPT

## 2018-10-01 NOTE — PROGRESS NOTES
Daily Note     Today's date: 10/1/2018  Patient name: Víctor Tellez  : 1960  MRN: [id number]  Referring provider: ANNA Ford*  Dx:   Encounter Diagnosis     ICD-10-CM    1  Closed compression fracture of L3 lumbar vertebra with routine healing, subsequent encounter S32 030D    2  DDD (degenerative disc disease), lumbar M51 36    3  Spondylolisthesis of lumbosacral region M43 17                   Subjective:  Ashish Chu reports that he is still really sore across his low back  He notes that his back feels the worst when he is sedentary verses being busy  Objective: See treatment diary below  Precautions: L3 compression fx, mild anterolisthesis L4 on L5 and retrolisthesis of L5 on S1, 5-10 pound lifting precaution    Daily Treatment Diary     Manual  10/1                         STM B low back 10'                                                       Exercise Diary              LTR x30            theraband presses 3x10 ea PTB            bridge See below            Quadriped alternating 3x10 standing alt arm/leg/np            Supine ab brace w/march -            bike 10'            SLR flex w/ abd 3x10 ea            Clamshells w/ab brace 3x12 PTB            Adductor set w/ bridge 3x12            Floor to waist lift -            Wall squat  3x10 2#            3 way PB rollouts 10x  10"            Modified plank 5x10"                                                                                                           Modalities                           MHP 10'                               Primary movement diagnosis of decreased lumbar stability resulting in symptoms of decreased upper body lifting tolerancc  and limiting his participation/performance in worki    Primary impairments include:  1) decreased lumbar stabilization--> addressed with stabilization activity  2) hypomobile lumbar region--> addressed with progressive exercise incorporating further motion    Assessment: Tolerated treatment well  Pt presents with reports of continued low back soreness which worsens at rest   Added STM to B low back area   + R>L lumbar paraspinal and quadratus lumborum tightness noted  Patient would benefit from continued PT to improved core/hip strength, improve soft tissue mobility and return to full activities  Plan: Continue per plan of care

## 2018-10-03 ENCOUNTER — OFFICE VISIT (OUTPATIENT)
Dept: PHYSICAL THERAPY | Facility: MEDICAL CENTER | Age: 58
End: 2018-10-03
Payer: COMMERCIAL

## 2018-10-03 DIAGNOSIS — S32.030D CLOSED COMPRESSION FRACTURE OF L3 LUMBAR VERTEBRA WITH ROUTINE HEALING, SUBSEQUENT ENCOUNTER: Primary | ICD-10-CM

## 2018-10-03 PROCEDURE — 97112 NEUROMUSCULAR REEDUCATION: CPT

## 2018-10-03 PROCEDURE — 97140 MANUAL THERAPY 1/> REGIONS: CPT

## 2018-10-03 PROCEDURE — 97110 THERAPEUTIC EXERCISES: CPT

## 2018-10-03 NOTE — PROGRESS NOTES
10/4/2018    Francisco Batch  1960  [id number]    Discussion and Plan    Regarding imaging findings, patient appears to have a Bosniak 2 renal cyst on the right with a single septation  This is likely benign, though I have recommended follow-up ultrasound in 1 year to ensure stability  We had a prolonged discussion regards to the etiology of Peyronie's disease and management options  Fortunately is able have relations without difficulty  I therefore have recommended low-dose vitamin E and antihistamine daily  Patient will return in 1 year with ultrasound and PSA prior to visit  All questions answered at this time  1  Renal cyst  - Ambulatory referral to Urology  - US retroperitoneal complete; Future    2  Peyronie disease  - PSA Total, Diagnostic; Future    Assessment      Patient Active Problem List   Diagnosis    Closed compression fracture of L3 lumbar vertebra (HCC)    Chest wall contusion    Thyroid nodule    Primary osteoarthritis of left knee    Primary osteoarthritis of right knee    Essential hypertension    Mixed hyperlipidemia    Impaired fasting glucose    Renal cyst    DDD (degenerative disc disease), lumbar    Spondylolisthesis of lumbosacral region    Chronic low back pain    Peyronie disease       History of Present Illness    Amina Quinteros is a 60 y o  male seen today in regards to a history of an incidentally discovered minimally complex right renal cyst   This is detected initially on a CT scan performed following a fall  Subsequent ultrasound demonstrates a greater than 5 cm right renal cyst with solitary septation  No areas of enhancement noted  He denies any family history of genitourinary malignancy  No urinary complaints  No prior bouts of hematuria  Unrelated, patient does note constriction of the distal phallus with upward curvature during erections      Urinary Symptom Assessment        Past Medical History  Past Medical History:   Diagnosis Date    Cat scratch fever     Colon polyp     Diverticulitis     Hyperlipidemia     Hypertension     IFG (impaired fasting glucose)     L3 vertebral fracture (HCC) 07/2018    Patella-femoral syndrome        Past Social History  Past Surgical History:   Procedure Laterality Date    CLOSED REDUCTION WRIST FRACTURE      COLONOSCOPY      KS COLONOSCOPY FLX DX W/COLLJ SPEC WHEN PFRMD N/A 5/30/2018    Procedure: COLONOSCOPY;  Surgeon: Padmini Yoder MD;  Location:  GI LAB; Service: Colorectal    UMBILICAL GRANULOMA EXCISION Left     groin    US GUIDED THYROID BIOPSY  9/20/2018    WISDOM TOOTH EXTRACTION      WRIST GANGLION EXCISION  1969    closed reduction        Past Family History  Family History   Problem Relation Age of Onset    Hypertension Mother     Melanoma Father     Colon cancer Maternal Grandmother     Colon cancer Maternal Uncle        Past Social history  Social History     Social History    Marital status: /Civil Union     Spouse name: N/A    Number of children: N/A    Years of education: N/A     Occupational History    Not on file       Social History Main Topics    Smoking status: Never Smoker    Smokeless tobacco: Never Used    Alcohol use 1 8 oz/week     3 Glasses of wine per week      Comment: social    Drug use: No    Sexual activity: Not on file     Other Topics Concern    Not on file     Social History Narrative    No narrative on file       Current Medications  Current Outpatient Prescriptions   Medication Sig Dispense Refill    acetaminophen (TYLENOL) 325 mg tablet Take 2 tablets (650 mg total) by mouth every 6 (six) hours as needed for mild pain 30 tablet 0    glucosamine 500 MG CAPS capsule Take by mouth daily        metoprolol succinate (TOPROL XL) 50 mg 24 hr tablet Take 100 mg by mouth daily        multivitamin (THERAGRAN) TABS Take 1 tablet by mouth daily      Omega-3 Fatty Acids (FISH OIL) 1200 MG CAPS Take by mouth      methocarbamol (ROBAXIN) 500 mg tablet Take 1 5 tablets (750 mg total) by mouth every 6 (six) hours (Patient not taking: Reported on 10/4/2018 ) 60 tablet 0     No current facility-administered medications for this visit  Allergies  No Known Allergies    Past Medical History, Social History, Family History, medications and allergies were reviewed  Review of Systems  Review of Systems   Constitutional: Negative  HENT: Negative  Eyes: Negative  Respiratory: Negative  Cardiovascular: Negative  Gastrointestinal: Negative  Endocrine: Negative  Genitourinary: Positive for difficulty urinating  Negative for decreased urine volume, hematuria and urgency  Musculoskeletal: Negative  Skin: Negative  Neurological: Negative  Hematological: Negative  Psychiatric/Behavioral: Negative  Vitals  Vitals:    10/04/18 0938   BP: 130/66   Pulse: 79   Weight: 80 8 kg (178 lb 3 2 oz)   Height: 5' 7" (1 702 m)         Physical Exam    Physical Exam   Constitutional: He is oriented to person, place, and time  He appears well-developed and well-nourished  HENT:   Head: Normocephalic and atraumatic  Eyes: Pupils are equal, round, and reactive to light  Neck: Normal range of motion  Cardiovascular: Normal rate, regular rhythm and normal heart sounds  Pulmonary/Chest: Effort normal and breath sounds normal  No accessory muscle usage  No respiratory distress  Abdominal: Soft  Normal appearance and bowel sounds are normal  There is no tenderness  Genitourinary: No penile tenderness  Genitourinary Comments: Approximate 2 cm Peyronie's plaque in the dorsum of phallus toward midportion  Musculoskeletal: Normal range of motion  Neurological: He is alert and oriented to person, place, and time  Skin: Skin is warm, dry and intact  Psychiatric: He has a normal mood and affect  His speech is normal  Cognition and memory are normal    Nursing note and vitals reviewed        Results    Below listed labs, pathology results, and radiology images were personally reviewed:    Lab Results   Component Value Date/Time    PSA 2 2 08/22/2014 09:28 AM     Lab Results   Component Value Date    GLUCOSE 104 07/27/2018    CALCIUM 9 1 08/22/2014     (U) 08/22/2014    K 4 2 08/22/2014    CO2 29 08/22/2014     (U) 08/22/2014    BUN 16 08/22/2014    CREATININE 0 90 08/22/2014     Lab Results   Component Value Date    WBC 5 59 08/22/2014    HGB 14 3 07/27/2018    HCT 42 07/27/2018    MCV 95 08/22/2014     07/28/2018       No results found for this or any previous visit (from the past 1 hour(s)) ]    RENAL ULTRASOUND     INDICATION:   N28 1: Cyst of kidney, acquired      COMPARISON: CT 7/27/2018     TECHNIQUE:   Ultrasound of the retroperitoneum was performed with a curvilinear transducer utilizing volumetric sweeps and still imaging techniques       FINDINGS:     KIDNEYS:  Symmetric and normal size  Right kidney:  12 8 x 4 9 cm  Left kidney: cm      Right kidney  Normal echogenicity and contour  No suspicious masses detected  Large cyst right upper pole 4 5 x 5 5 x 5 2 cm  It has thin marginal septation's without suspicious wall thickening or solid nodularity  (Bosniak II)  No hydronephrosis  No shadowing calculi  No perinephric fluid collections      Left kidney  Normal echogenicity and contour  No suspicious masses detected  No hydronephrosis  No shadowing calculi  No perinephric fluid collections      URETERS:  Nonvisualized      BLADDER:   Normally distended  No focal thickening or mass lesions  Bilateral ureteral jets detected      IMPRESSION:     Minimally complex cyst right upper pole without suspicious nodular features    Bosniak II       Workstation performed: [id number]

## 2018-10-03 NOTE — PROGRESS NOTES
Daily Note     Today's date: 10/3/2018  Patient name: Dalila Chowdhury  : 1960  MRN: [id number]  Referring provider: ANNA Cagle*  Dx:   Encounter Diagnosis     ICD-10-CM    1  Closed compression fracture of L3 lumbar vertebra with routine healing, subsequent encounter S32 030D                   Subjective: Pt reports that his back is feeling better, he is less sore today  Holly Pavon notes that he felt the soft tissue massage was helpful  Objective: See treatment diary below  Precautions: L3 compression fx, mild anterolisthesis L4 on L5 and retrolisthesis of L5 on S1, 5-10 pound lifting precaution    Daily Treatment Diary     Manual  10/3                         STM B low back 10'                                                       Exercise Diary              LTR x30            theraband presses 3x10 ea PTB            bridge See below            Quadriped alternating 3x10 standing alt arm/leg/np            Supine ab brace w/march -            bike 10'            SLR flex w/ abd 3x10 ea            Clamshells/Reverse clamshells w/ab brace 3x12 S/L with ball            Adductor set w/ bridge 3x12            Floor to waist lift -            Wall squat  3x10 2#            3 way PB rollouts 10x  10"            Modified plank 10x5"                                                                                                           Modalities                           MHP 10'                               Primary movement diagnosis of decreased lumbar stability resulting in symptoms of decreased upper body lifting tolerancc  and limiting his participation/performance in worki  Primary impairments include:  1) decreased lumbar stabilization--> addressed with stabilization activity  2) hypomobile lumbar region--> addressed with progressive exercise incorporating further motion    Assessment: Tolerated treatment well  Pt presents with decreased reports of low back soreness    Progressed alan to S/L with ball with fatigue reported only  Patient exhibited good technique with therapeutic exercises and would benefit from continued PT      Plan: Continue per plan of care

## 2018-10-04 ENCOUNTER — OFFICE VISIT (OUTPATIENT)
Dept: UROLOGY | Facility: AMBULATORY SURGERY CENTER | Age: 58
End: 2018-10-04
Payer: COMMERCIAL

## 2018-10-04 VITALS
DIASTOLIC BLOOD PRESSURE: 66 MMHG | BODY MASS INDEX: 27.97 KG/M2 | WEIGHT: 178.2 LBS | HEART RATE: 79 BPM | HEIGHT: 67 IN | SYSTOLIC BLOOD PRESSURE: 130 MMHG

## 2018-10-04 DIAGNOSIS — N48.6 PEYRONIE DISEASE: Primary | ICD-10-CM

## 2018-10-04 DIAGNOSIS — N28.1 RENAL CYST: ICD-10-CM

## 2018-10-04 PROCEDURE — 99244 OFF/OP CNSLTJ NEW/EST MOD 40: CPT | Performed by: UROLOGY

## 2018-10-05 ENCOUNTER — TELEPHONE (OUTPATIENT)
Dept: NEUROSURGERY | Facility: CLINIC | Age: 58
End: 2018-10-05

## 2018-10-05 ENCOUNTER — OFFICE VISIT (OUTPATIENT)
Dept: PHYSICAL THERAPY | Facility: MEDICAL CENTER | Age: 58
End: 2018-10-05
Payer: COMMERCIAL

## 2018-10-05 DIAGNOSIS — M51.36 DDD (DEGENERATIVE DISC DISEASE), LUMBAR: ICD-10-CM

## 2018-10-05 DIAGNOSIS — M43.17 SPONDYLOLISTHESIS OF LUMBOSACRAL REGION: ICD-10-CM

## 2018-10-05 DIAGNOSIS — S32.030D CLOSED COMPRESSION FRACTURE OF L3 LUMBAR VERTEBRA WITH ROUTINE HEALING, SUBSEQUENT ENCOUNTER: Primary | ICD-10-CM

## 2018-10-05 PROCEDURE — 97140 MANUAL THERAPY 1/> REGIONS: CPT

## 2018-10-05 PROCEDURE — 97110 THERAPEUTIC EXERCISES: CPT

## 2018-10-05 PROCEDURE — 97112 NEUROMUSCULAR REEDUCATION: CPT

## 2018-10-05 NOTE — TELEPHONE ENCOUNTER
----- Message from Terrie Darling sent at 10/5/2018  2:09 PM EDT -----  Contact: [phone]  Patient left voicemail on 10/5/18 @ 1:30pm wanting to know if he could be cleared to go back to work  I looked up your last note where you had recommended he do about 3 weeks of PT and then contact our office with his progression  He started PT on 9/21 and I see the last time he went to PT was today so its been about 2 weeks  Do you recommend he do one more week of PT and then call us back or would you return his call  Thank you!

## 2018-10-05 NOTE — TELEPHONE ENCOUNTER
Discussed with patient at his last office visit that he could resume working if he felt ready to so so after completing 2-3 weeks of Physical Therapy  Spoke on telephone with Mr  Rashmi Grijalva who reports he has been participating in PT x 2 weeks and reports PT is going well  Patient reports he feels ready to resume working and would like to return to work on Monday 10/8/18  Return to work note placed in [employer]  Mr Rashmi Grijalva reports he will  the RTW note at office this upcoming Monday

## 2018-10-05 NOTE — PROGRESS NOTES
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 and he is unsure how doing chest compressions would feel on his back  Objective: See treatment diary below  Precautions: L3 compression fx, mild anterolisthesis L4 on L5 and retrolisthesis of L5 on S1, 5-10 pound lifting precaution    Daily Treatment Diary     Manual  10/5                         STM B low back 10'                                                       Exercise Diary              LTR x30            theraband presses 3x10 ea PTB            bridge See below                         Supine ab brace w/march -            bike 10'            SLR flex w/ abd np            Clamshells/Reverse clamshells w/ab brace 3x12 S/L with ball            Adductor set w/ bridge 3x12            Floor to waist lift -            Wall squat  3x10 2#            3 way PB rollouts 10x  10"            Modified plank 10x5"            Standing hip abd 3# 3x10            Standing alt UE, LE 3# LE  3x10                                                                                 Modalities                           MHP 10'                               Primary movement diagnosis of decreased lumbar stability resulting in symptoms of decreased upper body lifting tolerancc  and limiting his participation/performance in worki    Primary impairments include:  1) decreased lumbar stabilization--> addressed with stabilization activity  2) hypomobile lumbar region--> addressed with progressive exercise incorporating further motion    Assessment: Tolerated treatment well  Pt presents with decreased reports of low back soreness and is tolerating increased activity at home  Patient exhibited good technique with therapeutic exercises and would benefit from continued PT      Plan: Continue per plan of care

## 2018-10-05 NOTE — LETTER
October 5, 2018     Patient: Rolando Parmar   YOB: 1960           To Whom it May Concern:    Espinoza Hernandez is under my professional care  He may return to work on 10/8/18 without restriction   If you have any questions or concerns, please don't hesitate to call           Sincerely,          Yahaira Garcia PA-C        CC: No Recipients

## 2018-10-08 ENCOUNTER — OFFICE VISIT (OUTPATIENT)
Dept: PHYSICAL THERAPY | Facility: MEDICAL CENTER | Age: 58
End: 2018-10-08
Payer: COMMERCIAL

## 2018-10-08 DIAGNOSIS — M51.36 DDD (DEGENERATIVE DISC DISEASE), LUMBAR: ICD-10-CM

## 2018-10-08 DIAGNOSIS — S32.030D CLOSED COMPRESSION FRACTURE OF L3 LUMBAR VERTEBRA WITH ROUTINE HEALING, SUBSEQUENT ENCOUNTER: Primary | ICD-10-CM

## 2018-10-08 DIAGNOSIS — M43.17 SPONDYLOLISTHESIS OF LUMBOSACRAL REGION: ICD-10-CM

## 2018-10-08 PROCEDURE — G8991 OTHER PT/OT GOAL STATUS: HCPCS

## 2018-10-08 PROCEDURE — 97112 NEUROMUSCULAR REEDUCATION: CPT

## 2018-10-08 PROCEDURE — 97110 THERAPEUTIC EXERCISES: CPT

## 2018-10-08 PROCEDURE — G8990 OTHER PT/OT CURRENT STATUS: HCPCS

## 2018-10-08 NOTE — PROGRESS NOTES
Daily Note     Today's date: 10/8/2018  Patient name: Lul Boswell  : 1960  MRN: [id number]  Referring provider: ANNA Lawton*  Dx:   Encounter Diagnosis     ICD-10-CM    1  Closed compression fracture of L3 lumbar vertebra with routine healing, subsequent encounter S32 030D    2  DDD (degenerative disc disease), lumbar M51 36    3  Spondylolisthesis of lumbosacral region M43 17                   Subjective:   Partha Vargas reports that his back is feeling good  He went into the doctor's office and he has been released to full duty work without restrictions  Objective: See treatment diary below  Precautions: L3 compression fx, mild anterolisthesis L4 on L5 and retrolisthesis of L5 on S1, 5-10 pound lifting precaution    Daily Treatment Diary     Manual  10/8                         STM B low back np                                                       Exercise Diary              LTR x30            theraband presses 3x10 ea PTB            bridge See below                         Supine ab brace w/march -            bike 10'            SLR flex w/ abd np            Clamshells/Reverse clamshells w/ab brace 3x12 S/L with ball            Adductor set w/ bridge 3x12            Floor to waist lift -            Wall squat  3x10 2#            3 way PB rollouts 10x  10"            Modified plank 10x5"            Standing hip abd 3# 3x10            Standing alt UE, LE 3# LE  3x10            Cart push/pull 125# 5x20'                                                                    Modalities                           MHP 10'                               Primary movement diagnosis of decreased lumbar stability resulting in symptoms of decreased upper body lifting tolerance and limiting his participation/performance in worki    Primary impairments include:  1) decreased lumbar stabilization--> addressed with stabilization activity  2) hypomobile lumbar region--> addressed with progressive exercise incorporating further motion    Assessment: Tolerated treatment well  Pt presents reporting good progress and increased tolerance for activity at home without increased soreness  Able to progress functional training today with fatigue noted only  Patient exhibited good technique with therapeutic exercises and would benefit from continued PT      Plan: Continue per plan of care

## 2018-10-10 ENCOUNTER — OFFICE VISIT (OUTPATIENT)
Dept: PHYSICAL THERAPY | Facility: MEDICAL CENTER | Age: 58
End: 2018-10-10
Payer: COMMERCIAL

## 2018-10-10 DIAGNOSIS — M51.36 DDD (DEGENERATIVE DISC DISEASE), LUMBAR: ICD-10-CM

## 2018-10-10 DIAGNOSIS — S32.030D CLOSED COMPRESSION FRACTURE OF L3 LUMBAR VERTEBRA WITH ROUTINE HEALING, SUBSEQUENT ENCOUNTER: Primary | ICD-10-CM

## 2018-10-10 DIAGNOSIS — M43.17 SPONDYLOLISTHESIS OF LUMBOSACRAL REGION: ICD-10-CM

## 2018-10-10 PROCEDURE — 97110 THERAPEUTIC EXERCISES: CPT

## 2018-10-10 PROCEDURE — 97112 NEUROMUSCULAR REEDUCATION: CPT

## 2018-10-10 NOTE — PROGRESS NOTES
Daily Note     Today's date: 10/10/2018  Patient name: Minus Class  : 1960  MRN: [id number]  Referring provider: Donal Schilder, PA*  Dx:   Encounter Diagnosis     ICD-10-CM    1  Closed compression fracture of L3 lumbar vertebra with routine healing, subsequent encounter S32 030D    2  DDD (degenerative disc disease), lumbar M51 36    3  Spondylolisthesis of lumbosacral region M43 17        Start Time: 1430  Stop Time: 1530  Total time in clinic (min): 60 minutes    Subjective:   Delores Marroquin reports that he is sore mostly in his L low back since returning to work yesterday  He reports that he is non stop at work and he is surprised at how fatigued he feels  Objective: See treatment diary below  Precautions: L3 compression fx, mild anterolisthesis L4 on L5 and retrolisthesis of L5 on S1, 5-10 pound lifting precaution    Daily Treatment Diary     Manual  10/8                         STM B low back np                                                       Exercise Diary              LTR x30            theraband presses 3x10 ea PTB            bridge See below                         Supine ab brace w/march -            bike 10'            SLR flex w/ abd np            Clamshells/Reverse clamshells w/ab brace 3x12 S/L with ball            Adductor set w/ bridge 3x12            Floor to waist lift -            Wall squat  3x10 2#            3 way PB rollouts 10x  10"            Modified plank 10x5"            Standing hip abd 4# 3x10            Standing alt UE, LE 4# LE  3x10            Cart push/pull 125# 5x20'            Supine hip flex str OET 3x30" B                                                       Modalities                           MHP 10'                               Primary movement diagnosis of decreased lumbar stability resulting in symptoms of decreased upper body lifting tolerance and limiting his participation/performance in worki    Primary impairments include:  1) decreased lumbar stabilization--> addressed with stabilization activity  2) hypomobile lumbar region--> addressed with progressive exercise incorporating further motion    Assessment: Tolerated treatment well  Pt presents with reports of L low back soreness  + lumbar paraspinal tightness noted  Pt noted improved mobility post ex  Patient exhibited good technique with therapeutic exercises and would benefit from continued PT      Plan: Continue per plan of care

## 2018-10-12 ENCOUNTER — OFFICE VISIT (OUTPATIENT)
Dept: PHYSICAL THERAPY | Facility: MEDICAL CENTER | Age: 58
End: 2018-10-12
Payer: COMMERCIAL

## 2018-10-12 DIAGNOSIS — S32.030D CLOSED COMPRESSION FRACTURE OF L3 LUMBAR VERTEBRA WITH ROUTINE HEALING, SUBSEQUENT ENCOUNTER: Primary | ICD-10-CM

## 2018-10-12 DIAGNOSIS — M43.17 SPONDYLOLISTHESIS OF LUMBOSACRAL REGION: ICD-10-CM

## 2018-10-12 DIAGNOSIS — M51.36 DDD (DEGENERATIVE DISC DISEASE), LUMBAR: ICD-10-CM

## 2018-10-12 PROCEDURE — 97112 NEUROMUSCULAR REEDUCATION: CPT

## 2018-10-12 PROCEDURE — 97140 MANUAL THERAPY 1/> REGIONS: CPT

## 2018-10-12 PROCEDURE — 97110 THERAPEUTIC EXERCISES: CPT

## 2018-10-12 NOTE — PROGRESS NOTES
Daily Note     Today's date: 10/12/2018  Patient name: Orlando Boateng  : 1960  MRN: [id number]  Referring provider: ANNA Quintero*  Dx:   Encounter Diagnosis     ICD-10-CM    1  Closed compression fracture of L3 lumbar vertebra with routine healing, subsequent encounter S32 030D    2  DDD (degenerative disc disease), lumbar M51 36    3  Spondylolisthesis of lumbosacral region M43 17                   Subjective:   Geremias Raphael reports that his back is a little less sore than it was  He notes that he did fatigue more at work than he anticipated  Objective: See treatment diary below  Precautions: L3 compression fx, mild anterolisthesis L4 on L5 and retrolisthesis of L5 on S1, 5-10 pound lifting precaution    Daily Treatment Diary     Manual  10/8                         STM B low back CC                                                       Exercise Diary              LTR x30            theraband presses 3x10 ea PTB            bridge See below                         Supine ab brace w/march -            bike 10'            SLR flex w/ abd np            Clamshells/Reverse clamshells w/ab brace 3x12 S/L with ball            Adductor set w/ bridge 3x12            Floor to waist lift -            Wall squat  3x10 2#            3 way PB rollouts 10x  10"            Modified plank 10x5"            Standing hip abd 4# 3x10            Standing alt UE, LE 4# LE  3x10            Cart push/pull 125# 5x20'            Supine hip flex str OET 3x30" B                                                       Modalities                           MHP 10'                               Primary movement diagnosis of decreased lumbar stability resulting in symptoms of decreased upper body lifting tolerance and limiting his participation/performance in worki    Primary impairments include:  1) decreased lumbar stabilization--> addressed with stabilization activity  2) hypomobile lumbar region--> addressed with progressive exercise incorporating further motion    Assessment: Tolerated treatment well  Pt presents with decreased reports of L low back soreness  Patient exhibited good technique with therapeutic exercises and would benefit from continued PT      Plan: Continue per plan of care

## 2018-10-15 ENCOUNTER — OFFICE VISIT (OUTPATIENT)
Dept: PHYSICAL THERAPY | Facility: MEDICAL CENTER | Age: 58
End: 2018-10-15
Payer: COMMERCIAL

## 2018-10-15 DIAGNOSIS — S32.030D CLOSED COMPRESSION FRACTURE OF L3 LUMBAR VERTEBRA WITH ROUTINE HEALING, SUBSEQUENT ENCOUNTER: Primary | ICD-10-CM

## 2018-10-15 DIAGNOSIS — M51.36 DDD (DEGENERATIVE DISC DISEASE), LUMBAR: ICD-10-CM

## 2018-10-15 DIAGNOSIS — M43.17 SPONDYLOLISTHESIS OF LUMBOSACRAL REGION: ICD-10-CM

## 2018-10-15 PROCEDURE — 97112 NEUROMUSCULAR REEDUCATION: CPT

## 2018-10-15 PROCEDURE — 97140 MANUAL THERAPY 1/> REGIONS: CPT

## 2018-10-15 PROCEDURE — 97110 THERAPEUTIC EXERCISES: CPT

## 2018-10-15 NOTE — PROGRESS NOTES
Daily Note     Today's date: 10/15/2018  Patient name: Cb Hirsch  : 1960  MRN: [id number]  Referring provider: ANNA Brito*  Dx:   Encounter Diagnosis     ICD-10-CM    1  Closed compression fracture of L3 lumbar vertebra with routine healing, subsequent encounter S32 030D    2  DDD (degenerative disc disease), lumbar M51 36    3  Spondylolisthesis of lumbosacral region M43 17                   Subjective:   Gaston Maldonado reports that the R side of his back is feeling pretty sore today  He reports that he pushed a lawnmower up hill and then returned to the OR today at work which is work  Objective: See treatment diary below  Precautions: L3 compression fx, mild anterolisthesis L4 on L5 and retrolisthesis of L5 on S1, 5-10 pound lifting precaution    Daily Treatment Diary     Manual  10/15                         STM B low back CC                                                       Exercise Diary              LTR x30            theraband presses 3x10 ea PTB            bridge See below                         Supine ab brace w/march -            bike 10'            SLR flex w/ abd np            Clamshells/Reverse clamshells w/ab brace 3x12 S/L with ball            Adductor set w/ bridge 3x12            Floor to waist lift -            Wall squat  3x10 2#            3 way PB rollouts 10x  10"            Modified plank 10x5"            Standing hip abd 4# 3x10            Standing alt UE, LE 4# LE  3x10            Cart push/pull 125# 5x20'/np            Supine hip flex str OET 3x30" B                                                       Modalities                           MHP 10'                               Primary movement diagnosis of decreased lumbar stability resulting in symptoms of decreased upper body lifting tolerance and limiting his participation/performance in worki    Primary impairments include:  1) decreased lumbar stabilization--> addressed with stabilization activity  2) hypomobile lumbar region--> addressed with progressive exercise incorporating further motion    Assessment: Tolerated treatment fair  Pt presents with increased reports of R lumbar/thoracic tightness and soreness from activity over the weekend and work today   + R  lumbar/thoracic paraspinal tightness noted  Pt fatigued quickly during ex today held cart push/pull  Patient exhibited good technique with therapeutic exercises and would benefit from continued PT      Plan: Continue per plan of care

## 2018-10-17 ENCOUNTER — APPOINTMENT (OUTPATIENT)
Dept: PHYSICAL THERAPY | Facility: MEDICAL CENTER | Age: 58
End: 2018-10-17
Payer: COMMERCIAL

## 2018-10-19 ENCOUNTER — APPOINTMENT (OUTPATIENT)
Dept: PHYSICAL THERAPY | Facility: MEDICAL CENTER | Age: 58
End: 2018-10-19
Payer: COMMERCIAL

## 2018-10-22 ENCOUNTER — OFFICE VISIT (OUTPATIENT)
Dept: PHYSICAL THERAPY | Facility: MEDICAL CENTER | Age: 58
End: 2018-10-22
Payer: COMMERCIAL

## 2018-10-22 DIAGNOSIS — M51.36 DDD (DEGENERATIVE DISC DISEASE), LUMBAR: ICD-10-CM

## 2018-10-22 DIAGNOSIS — S32.030D CLOSED COMPRESSION FRACTURE OF L3 LUMBAR VERTEBRA WITH ROUTINE HEALING, SUBSEQUENT ENCOUNTER: Primary | ICD-10-CM

## 2018-10-22 DIAGNOSIS — M43.17 SPONDYLOLISTHESIS OF LUMBOSACRAL REGION: ICD-10-CM

## 2018-10-22 PROCEDURE — 97140 MANUAL THERAPY 1/> REGIONS: CPT

## 2018-10-22 PROCEDURE — 97112 NEUROMUSCULAR REEDUCATION: CPT

## 2018-10-22 PROCEDURE — 97110 THERAPEUTIC EXERCISES: CPT

## 2018-10-22 NOTE — PROGRESS NOTES
Daily Note     Today's date: 10/22/2018  Patient name: Lexa Khan  : 1960  MRN: [id number]  Referring provider: ANNA Vega*  Dx:   Encounter Diagnosis     ICD-10-CM    1  Closed compression fracture of L3 lumbar vertebra with routine healing, subsequent encounter S32 030D    2  DDD (degenerative disc disease), lumbar M51 36    3  Spondylolisthesis of lumbosacral region M43 17                   Subjective:   Katia Goldstein reports that the R side of his low back is sore from working however he is doing more and his soreness is not increasing  Objective: See treatment diary below    Precautions: L3 compression fx, mild anterolisthesis L4 on L5 and retrolisthesis of L5 on S1, 5-10 pound lifting precaution    Daily Treatment Diary     Manual  10/22                         STM B low back CC                                                       Exercise Diary              LTR x30            theraband presses 3x10 ea PTB            bridge See below                         Supine ab brace w/march -            bike 10'            SLR flex w/ abd np            Clamshells/Reverse clamshells w/ab brace 3x12 S/L with ball            Adductor set w/ bridge 3x12            Floor to waist lift -            Wall squat  3x10 2#            3 way PB rollouts 10x  10"            Modified plank 10x5"            Standing hip abd 4# 3x10            Standing alt UE, LE 4# LE  3x10            Cart push/pull 125# 5x20'/np            Supine hip flex str OET 3x30" B                                                       Modalities                           MHP 10'                               Primary movement diagnosis of decreased lumbar stability resulting in symptoms of decreased upper body lifting tolerance and limiting his participation/performance in worki    Primary impairments include:  1) decreased lumbar stabilization--> addressed with stabilization activity  2) hypomobile lumbar region--> addressed with progressive exercise incorporating further motion    Assessment: Tolerated treatment well  Pt presents with increased reports of R lumbar/thoracic tightness and soreness from activity over the weekend and work today   + R  lumbar/thoracic paraspinal tightness noted  Patient exhibited good technique with therapeutic exercises and would benefit from continued PT      Plan: Continue per plan of care

## 2018-10-29 ENCOUNTER — OFFICE VISIT (OUTPATIENT)
Dept: PHYSICAL THERAPY | Facility: MEDICAL CENTER | Age: 58
End: 2018-10-29
Payer: COMMERCIAL

## 2018-10-29 DIAGNOSIS — S32.030D CLOSED COMPRESSION FRACTURE OF L3 LUMBAR VERTEBRA WITH ROUTINE HEALING, SUBSEQUENT ENCOUNTER: Primary | ICD-10-CM

## 2018-10-29 DIAGNOSIS — M51.36 DDD (DEGENERATIVE DISC DISEASE), LUMBAR: ICD-10-CM

## 2018-10-29 DIAGNOSIS — M43.17 SPONDYLOLISTHESIS OF LUMBOSACRAL REGION: ICD-10-CM

## 2018-10-29 PROCEDURE — 97110 THERAPEUTIC EXERCISES: CPT

## 2018-10-29 PROCEDURE — 97112 NEUROMUSCULAR REEDUCATION: CPT

## 2018-10-29 NOTE — PROGRESS NOTES
Daily Note     Today's date: 10/29/2018  Patient name: Víctor Tellez  : 1960  MRN: [id number]  Referring provider: ANNA Ford*  Dx:   Encounter Diagnosis     ICD-10-CM    1  Closed compression fracture of L3 lumbar vertebra with routine healing, subsequent encounter S32 030D    2  DDD (degenerative disc disease), lumbar M51 36    3  Spondylolisthesis of lumbosacral region M43 17                   Subjective:   Ashish Chu reports that he is feeling good during the day at work but he has a R sided back ache with standing or sitting in one place for a period of time  Objective: See treatment diary below    Precautions: L3 compression fx, mild anterolisthesis L4 on L5 and retrolisthesis of L5 on S1, 5-10 pound lifting precaution    Daily Treatment Diary     Manual  10/29                         STM B low back np                                                       Exercise Diary              LTR x30            theraband presses 3x10 ea PTB            bridge See below                         Supine ab brace w/march -            bike 10'            SLR flex w/ abd np            Clamshells/Reverse clamshells w/ab brace 3x12 S/L with ball            Adductor set w/ bridge 3x12            Floor to waist lift -            Wall squat  3x10 2#            3 way PB rollouts 10x  10"             plank 10x5"            Standing hip abd 4# 3x10            Standing alt UE, LE 4# LE  3x10            Cart push/pull DC            Supine hip flex str OET 3x30" B                                                       Modalities                           MHP 10'                               Primary movement diagnosis of decreased lumbar stability resulting in symptoms of decreased upper body lifting tolerance and limiting his participation/performance in worki    Primary impairments include:  1) decreased lumbar stabilization--> addressed with stabilization activity  2) hypomobile lumbar region--> addressed with progressive exercise incorporating further motion    Assessment: Tolerated treatment well  Pt has adjusted well to returning to work  Pt continues to report R sides low back soreness with static positioning for a prolonged period of time  Patient exhibited good technique with therapeutic exercises and would benefit from continued PT      Plan: Continue per plan of care

## 2018-11-05 ENCOUNTER — OFFICE VISIT (OUTPATIENT)
Dept: PHYSICAL THERAPY | Facility: MEDICAL CENTER | Age: 58
End: 2018-11-05
Payer: COMMERCIAL

## 2018-11-05 DIAGNOSIS — M51.36 DDD (DEGENERATIVE DISC DISEASE), LUMBAR: ICD-10-CM

## 2018-11-05 DIAGNOSIS — S32.030D CLOSED COMPRESSION FRACTURE OF L3 LUMBAR VERTEBRA WITH ROUTINE HEALING, SUBSEQUENT ENCOUNTER: Primary | ICD-10-CM

## 2018-11-05 DIAGNOSIS — M43.17 SPONDYLOLISTHESIS OF LUMBOSACRAL REGION: ICD-10-CM

## 2018-11-05 PROCEDURE — 97112 NEUROMUSCULAR REEDUCATION: CPT

## 2018-11-05 PROCEDURE — 97110 THERAPEUTIC EXERCISES: CPT

## 2018-11-06 NOTE — PROGRESS NOTES
Daily Note     Today's date: 2018  Patient name: Marylene Mcbride  : 1960  MRN: [id number]  Referring provider: ANNA Finney*  Dx:   Encounter Diagnosis     ICD-10-CM    1  Closed compression fracture of L3 lumbar vertebra with routine healing, subsequent encounter S32 030D    2  DDD (degenerative disc disease), lumbar M51 36    3  Spondylolisthesis of lumbosacral region M43 17                   Subjective:   Jamie Denise reports that he is sore and tight in his R low back but he feels it may be due to the weather  Objective: See treatment diary below    Precautions: L3 compression fx, mild anterolisthesis L4 on L5 and retrolisthesis of L5 on S1, 5-10 pound lifting precaution    Daily Treatment Diary     Manual                           STM B low back CC                                                       Exercise Diary              LTR x30            theraband presses 3x10 ea PTB            bridge See below                         Supine ab brace w/march -            bike 10'            SLR flex w/ abd np            Clamshells/Reverse clamshells w/ab brace 3x12 S/L with ball            Adductor set w/ bridge 3x12            Floor to waist lift -            Wall squat  3x10 2#            3 way PB rollouts 10x  10"             plank 10x5"            Standing hip abd 4# 3x10            Standing alt UE, LE 4# LE  3x10            Cart push/pull DC            Supine hip flex str OET 3x30" B                                                       Modalities                           MHP 10'                               Primary movement diagnosis of decreased lumbar stability resulting in symptoms of decreased upper body lifting tolerance and limiting his participation/performance in worki    Primary impairments include:  1) decreased lumbar stabilization--> addressed with stabilization activity  2) hypomobile lumbar region--> addressed with progressive exercise incorporating further motion    Assessment: Tolerated treatment well  Patient exhibited good technique with therapeutic exercises and would benefit from continued PT      Plan: Continue per plan of care

## 2018-11-12 ENCOUNTER — OFFICE VISIT (OUTPATIENT)
Dept: PHYSICAL THERAPY | Facility: MEDICAL CENTER | Age: 58
End: 2018-11-12
Payer: COMMERCIAL

## 2018-11-12 DIAGNOSIS — S32.030D CLOSED COMPRESSION FRACTURE OF L3 LUMBAR VERTEBRA WITH ROUTINE HEALING, SUBSEQUENT ENCOUNTER: Primary | ICD-10-CM

## 2018-11-12 DIAGNOSIS — M43.17 SPONDYLOLISTHESIS OF LUMBOSACRAL REGION: ICD-10-CM

## 2018-11-12 DIAGNOSIS — M51.36 DDD (DEGENERATIVE DISC DISEASE), LUMBAR: ICD-10-CM

## 2018-11-12 NOTE — PROGRESS NOTES
Pt arrived to clinic but declined therapy tonight  Pt states, "it's not condusive to my schedule, I can do all these exercises at home " Pt requests a HEP and to self D/C

## 2018-11-19 ENCOUNTER — APPOINTMENT (OUTPATIENT)
Dept: PHYSICAL THERAPY | Facility: MEDICAL CENTER | Age: 58
End: 2018-11-19
Payer: COMMERCIAL

## 2018-11-26 ENCOUNTER — APPOINTMENT (OUTPATIENT)
Dept: PHYSICAL THERAPY | Facility: MEDICAL CENTER | Age: 58
End: 2018-11-26
Payer: COMMERCIAL

## 2019-01-06 ENCOUNTER — OFFICE VISIT (OUTPATIENT)
Dept: URGENT CARE | Facility: CLINIC | Age: 59
End: 2019-01-06
Payer: COMMERCIAL

## 2019-01-06 VITALS
BODY MASS INDEX: 29.16 KG/M2 | DIASTOLIC BLOOD PRESSURE: 71 MMHG | WEIGHT: 185.8 LBS | HEART RATE: 74 BPM | HEIGHT: 67 IN | RESPIRATION RATE: 20 BRPM | SYSTOLIC BLOOD PRESSURE: 150 MMHG | OXYGEN SATURATION: 98 % | TEMPERATURE: 97.1 F

## 2019-01-06 DIAGNOSIS — J06.9 VIRAL UPPER RESPIRATORY ILLNESS: Primary | ICD-10-CM

## 2019-01-06 PROCEDURE — S9088 SERVICES PROVIDED IN URGENT: HCPCS | Performed by: NURSE PRACTITIONER

## 2019-01-06 PROCEDURE — 99213 OFFICE O/P EST LOW 20 MIN: CPT | Performed by: NURSE PRACTITIONER

## 2019-01-06 RX ORDER — PREDNISONE 10 MG/1
10 TABLET ORAL 2 TIMES DAILY WITH MEALS
Qty: 4 TABLET | Refills: 0 | Status: SHIPPED | OUTPATIENT
Start: 2019-01-06 | End: 2019-01-16

## 2019-01-06 RX ORDER — GUAIFENESIN, PSEUDOEPHEDRINE HYDROCHLORIDE 600; 60 MG/1; MG/1
1 TABLET, EXTENDED RELEASE ORAL EVERY 12 HOURS
Qty: 12 TABLET | Refills: 0 | Status: SHIPPED | OUTPATIENT
Start: 2019-01-06 | End: 2019-03-06 | Stop reason: ALTCHOICE

## 2019-01-06 NOTE — PATIENT INSTRUCTIONS
Cold Symptoms   AMBULATORY CARE:   Cold symptoms  include sneezing, dry throat, a stuffy nose, headache, watery eyes, and a cough  Your cough may be dry, or you may cough up mucus  You may also have muscle aches, joint pain, and tiredness  Rarely, you may have a fever  Cold symptoms occur from inflammation in your upper respiratory system caused by a virus  Most colds go away without treatment  Seek care immediately if:   · You have increased tiredness and weakness  · You are unable to eat  · Your heart is beating much faster than usual for you  · You see white spots in the back of your throat and your neck is swollen and sore to the touch  · You see pinpoint or larger reddish-purple dots on your skin  Contact your healthcare provider if:   · You have a fever higher than 102°F (38 9°C)  · You have new or worsening shortness of breath  · You have thick nasal drainage for more than 2 days  · Your symptoms do not improve or get worse within 5 days  · You have questions or concerns about your condition or care  Treatment for cold symptoms  may include NSAIDS to decrease muscle aches and fever  Cold medicines may also be given to decrease coughing, nasal stuffiness, sneezing, and a runny nose  Manage your cold symptoms: The following may help relieve cold symptoms, such as a dry throat and congestion:  · Gargle with mouthwash or warm salt water as directed  · Suck on throat lozenges or hard candy  · Use a cold or warm vaporizer or humidifier to ease your breathing  · Rest for at least 2 days and then as needed to decrease tiredness and weakness  · Use petroleum based jelly around your nostrils to decrease irritation from blowing your nose  · Drink plenty of liquids  Liquids will help thin and loosen thick mucus so you can cough it up  Liquids will also keep you hydrated   Ask your healthcare provider which liquids are best for you and how much to drink each day   Prevent the spread of germs  by washing your hands often  You can spread your cold germs to others for at least 3 days after your symptoms start  Do not share items, such as eating utensils  Cover your nose and mouth when you cough or sneeze using the crook of your elbow instead of your hands  Throw used tissues in the garbage  Do not smoke:  Smoking may worsen your symptoms and increase the length of time you feel sick  Talk with your healthcare provider if you need help to stop smoking  Follow up with your healthcare provider as directed:  Write down your questions so you remember to ask them during your visits  © 2017 [facility] Information is for End User's use only and may not be sold, redistributed or otherwise used for commercial purposes  All illustrations and images included in CareNotes® are the copyrighted property of [employer]  or [employer]  The above information is an  only  It is not intended as medical advice for individual conditions or treatments  Talk to your doctor, nurse or pharmacist before following any medical regimen to see if it is safe and effective for you

## 2019-01-06 NOTE — PROGRESS NOTES
Assessment/Plan    Viral upper respiratory illness [J06 9]  1  Viral upper respiratory illness  predniSONE 10 mg tablet    pseudoephedrine-guaifenesin (MUCINEX D)  MG per tablet     - patient advised to use prednisone and Mucinex D  - also continue Tylenol as needed for pain or fever  - follow up with PCP if symptoms persist          Subjective:  Complains of chest congestion and sinus congestion     Patient ID: Lul Boswell is a 60 y o  male  Reason For Visit / Chief Complaint  Chief Complaint   Patient presents with    Cold Like Symptoms     pt reports wednesday he started with chest congestion and sinus congestion/facial pressure  no cough per pt  pt has been taking motrin and sudafed  HPI  States this is ongoing for a few days  Denies coughing  States he has mucus has been changing from clear to slightly yellow  Temperature at home was 104 degrees at the highest   This was yesterday or 2 days ago  Took some Motrin and Sudafed 4 days ago on also yesterday  Past Medical History:   Diagnosis Date    Cat scratch fever     Colon polyp     Diverticulitis     Hyperlipidemia     Hypertension     IFG (impaired fasting glucose)     L3 vertebral fracture (HCC) 07/2018    Lumbar compression fracture (HCC)     Patella-femoral syndrome        Past Surgical History:   Procedure Laterality Date    CLOSED REDUCTION WRIST FRACTURE      COLONOSCOPY      NM COLONOSCOPY FLX DX W/COLLJ SPEC WHEN PFRMD N/A 5/30/2018    Procedure: COLONOSCOPY;  Surgeon: Jessa Aguayo MD;  Location: [facility];   Service: Colorectal    UMBILICAL GRANULOMA EXCISION Left     groin    US GUIDED THYROID BIOPSY  9/20/2018    WISDOM TOOTH EXTRACTION      WRIST GANGLION EXCISION  1969    closed reduction        Family History   Problem Relation Age of Onset    Hypertension Mother     Melanoma Father     Colon cancer Maternal Grandmother     Colon cancer Maternal Uncle        Review of Systems   HENT: Positive for congestion (Nose and sinuses), postnasal drip, rhinorrhea and sinus pressure  Negative for ear pain, sore throat and trouble swallowing  Respiratory: Negative for cough, chest tightness and wheezing  Cardiovascular: Negative for chest pain  Objective:    /71 (BP Location: Right arm, Patient Position: Sitting)   Pulse 74   Temp (!) 97 1 °F (36 2 °C) (Tympanic)   Resp 20   Ht 5' 7" (1 702 m)   Wt 84 3 kg (185 lb 12 8 oz)   SpO2 98%   BMI 29 10 kg/m²     Physical Exam   HENT:   Right Ear: External ear normal    Left Ear: External ear normal    Postnasal drip  No redness in the general throat area  Eyes: Conjunctivae are normal    Cardiovascular: Normal rate, regular rhythm and normal heart sounds  Pulmonary/Chest: Effort normal and breath sounds normal  No respiratory distress  He has no wheezes  Lymphadenopathy:     He has no cervical adenopathy

## 2019-01-16 ENCOUNTER — LAB (OUTPATIENT)
Dept: LAB | Facility: CLINIC | Age: 59
End: 2019-01-16
Payer: COMMERCIAL

## 2019-01-16 ENCOUNTER — OFFICE VISIT (OUTPATIENT)
Dept: ENDOCRINOLOGY | Facility: CLINIC | Age: 59
End: 2019-01-16
Payer: COMMERCIAL

## 2019-01-16 VITALS
DIASTOLIC BLOOD PRESSURE: 90 MMHG | HEIGHT: 67 IN | HEART RATE: 66 BPM | SYSTOLIC BLOOD PRESSURE: 156 MMHG | BODY MASS INDEX: 29.03 KG/M2 | WEIGHT: 185 LBS

## 2019-01-16 DIAGNOSIS — R73.01 IMPAIRED FASTING GLUCOSE: ICD-10-CM

## 2019-01-16 DIAGNOSIS — S32.030D CLOSED COMPRESSION FRACTURE OF L3 LUMBAR VERTEBRA WITH ROUTINE HEALING, SUBSEQUENT ENCOUNTER: ICD-10-CM

## 2019-01-16 DIAGNOSIS — E04.1 THYROID NODULE: Primary | ICD-10-CM

## 2019-01-16 DIAGNOSIS — E78.2 MIXED HYPERLIPIDEMIA: ICD-10-CM

## 2019-01-16 DIAGNOSIS — E04.1 THYROID NODULE: ICD-10-CM

## 2019-01-16 LAB
25(OH)D3 SERPL-MCNC: 24.6 NG/ML (ref 30–100)
T4 FREE SERPL-MCNC: 0.86 NG/DL (ref 0.76–1.46)
TSH SERPL DL<=0.05 MIU/L-ACNC: 2.48 UIU/ML (ref 0.36–3.74)

## 2019-01-16 PROCEDURE — 99245 OFF/OP CONSLTJ NEW/EST HI 55: CPT | Performed by: INTERNAL MEDICINE

## 2019-01-16 PROCEDURE — 84443 ASSAY THYROID STIM HORMONE: CPT

## 2019-01-16 PROCEDURE — 82306 VITAMIN D 25 HYDROXY: CPT

## 2019-01-16 PROCEDURE — 36415 COLL VENOUS BLD VENIPUNCTURE: CPT

## 2019-01-16 PROCEDURE — 84439 ASSAY OF FREE THYROXINE: CPT

## 2019-01-16 NOTE — LETTER
January 16, 2019     [facility], [address]    Patient: Kelsy Dyer   YOB: 1960   Date of Visit: 1/16/2019       Dear Dr Yasmin Butler:    Thank you for referring Irena Zamora to me for evaluation  Below are my notes for this consultation  If you have questions, please do not hesitate to call me  I look forward to following your patient along with you  Sincerely,        aSlvador Cristobal MD        CC: No Recipients  Salvador Cristobal MD  1/16/2019  9:02 AM  Sign at close encounter   Kelsy Dyer 60 y o  male MRN: [id number]    Encounter: [id number]      Assessment/Plan    Problem List Items Addressed This Visit     Closed compression fracture of L3 lumbar vertebra (HCC)    Relevant Orders    Vitamin D 25 hydroxy Lab Collect    Thyroid nodule - Primary     Will set up for fine-needle aspiration biopsy of the thyroid nodule with options for Afirma- further management will depend on the results of the biopsy  He will have thyroid function tests done today  If TSH is low will cancel the biopsy and set him up for a thyroid uptake and scan         Relevant Orders    US guided thyroid biopsy with AFIRMA    T4, free Lab Collect    TSH, 3rd generation Lab Collect    Mixed hyperlipidemia     Referred for nutrition evaluation         Relevant Orders    Ambulatory referral to Nutrition Services    Impaired fasting glucose     Patient counseled on increased risk of developing type 2 diabetes-counseled on dietary and lifestyle modifications and weight loss-referred for nutrition evaluation         Relevant Orders    Ambulatory referral to Nutrition Services          Problem List Items Addressed This Visit     Thyroid nodule          CC:   Thyroid nodule    History of Present Illness     HPI:  53-year-old gentleman referred here for evaluation of thyroid nodule    Thyroid nodule inicidentaly discovered during imaging done after a fall in July-he 1 underwent a thyroid ultrasound which showed a right-sided thyroid nodule  He underwent fine-needle aspiration biopsy of the thyroid nodule in September which was consistent with atypia of undetermined significance  No obstructive symptoms , denies any difficulty swallowing, breathing or pressure sensations  Denies any hoarseness  No FH of thyroid cancer , no history of RT to neck  Denies any symptoms of hypo or hyperthyroidism  Weight stable , no constipation , hyper defectaion , no heat/cold   No palpitations   Sleeps ok  + back pain       Review of Systems   Constitutional: Negative for fatigue and unexpected weight change  Respiratory: Negative for cough and shortness of breath  Cardiovascular: Negative for palpitations and leg swelling  Gastrointestinal: Negative for constipation, diarrhea, nausea and vomiting  Endocrine: Negative for polydipsia and polyuria  Musculoskeletal: Positive for arthralgias and back pain  Skin: Negative for color change, pallor, rash and wound  Psychiatric/Behavioral: Negative for sleep disturbance  All other systems reviewed and are negative  Historical Information   Past Medical History:   Diagnosis Date    Cat scratch fever     Colon polyp     Diverticulitis     Hyperlipidemia     Hypertension     IFG (impaired fasting glucose)     L3 vertebral fracture (HCC) 07/2018    Lumbar compression fracture (HCC)     Patella-femoral syndrome      Past Surgical History:   Procedure Laterality Date    CLOSED REDUCTION WRIST FRACTURE      COLONOSCOPY      [place] COLONOSCOPY FLX DX W/COLLJ SPEC WHEN PFRMD N/A 5/30/2018    Procedure: COLONOSCOPY;  Surgeon: Emmanuelle Coe MD;  Location: [facility];   Service: Colorectal    UMBILICAL GRANULOMA EXCISION Left     groin    US GUIDED THYROID BIOPSY  9/20/2018    WISDOM TOOTH EXTRACTION      WRIST GANGLION EXCISION  1969    closed reduction      Social History   History   Alcohol Use    1 8 oz/week    3 Glasses of wine per week     Comment: social     History   Drug Use No     History   Smoking Status    Never Smoker   Smokeless Tobacco    Never Used     Family History:   Family History   Problem Relation Age of Onset    Hypertension Mother     Melanoma Father     Diabetes type II Father     Colon cancer Maternal Grandmother     Colon cancer Maternal Uncle        Meds/Allergies   Current Outpatient Prescriptions   Medication Sig Dispense Refill    acetaminophen (TYLENOL) 325 mg tablet Take 2 tablets (650 mg total) by mouth every 6 (six) hours as needed for mild pain 30 tablet 0    glucosamine 500 MG CAPS capsule Take by mouth daily        methocarbamol (ROBAXIN) 500 mg tablet Take 1 5 tablets (750 mg total) by mouth every 6 (six) hours 60 tablet 0    metoprolol succinate (TOPROL XL) 50 mg 24 hr tablet Take 100 mg by mouth daily        multivitamin (THERAGRAN) TABS Take 1 tablet by mouth daily      Omega-3 Fatty Acids (FISH OIL) 1200 MG CAPS Take by mouth      pseudoephedrine-guaifenesin (MUCINEX D)  MG per tablet Take 1 tablet by mouth every 12 (twelve) hours 12 tablet 0     No current facility-administered medications for this visit  No Known Allergies    Objective   Vitals: Blood pressure 156/90, pulse 66, height 5' 7" (1 702 m), weight 83 9 kg (185 lb)  Physical Exam   Constitutional: He is oriented to person, place, and time  He appears well-developed and well-nourished  No distress  HENT:   Head: Normocephalic and atraumatic  Mouth/Throat: No oropharyngeal exudate  Eyes: Conjunctivae and EOM are normal  No scleral icterus  Neck: Normal range of motion  Neck supple  Right lower pole thyroid nodule palpated   Cardiovascular: Normal rate, regular rhythm and normal heart sounds  No murmur heard  Pulmonary/Chest: Effort normal and breath sounds normal  No respiratory distress  He has no wheezes  He has no rales  Abdominal: Soft  Bowel sounds are normal  He exhibits no distension   There is no tenderness  There is no rebound  Musculoskeletal: Normal range of motion  He exhibits no edema or deformity  Lymphadenopathy:     He has no cervical adenopathy  Neurological: He is alert and oriented to person, place, and time  Skin: Skin is warm and dry  No rash noted  No erythema  No pallor  Psychiatric: He has a normal mood and affect  His behavior is normal  Judgment and thought content normal        The history was obtained from the review of the chart, patient  Lab Results:       Final Diagnosis   A - B  Thyroid, Right, lower pole: Atypia of undetermined significance ([place] Category III) - See note  Follicular cells in micro follicles with focal nuclear atypia characterized by crowding/overlapping, occasional enlarged nuclei with pale chromatin and some alteration in nuclear countors  Very scant colloid is present      Satisfactory for evaluation  Imaging Studies:   Results for orders placed during the hospital encounter of 09/11/18   US thyroid    Impression The following meet current ACR criteria for recommending ultrasound guided biopsy:     The 2 9 cm right lower pole nodule  (Image number [id number]) (CRITERIA: TR 3, Mildly suspicious  FNA if >2 5 cm  Reference: ACR Thyroid Imaging, Reporting and Data System (TI-RADS): White Paper of the [employer]  J AM Mayur Radiol 4748;92:[id number]  (additional recommendations based on American Thyroid Association 2015 guidelines )      Workstation performed: [id number]         I have personally reviewed pertinent reports  Portions of the record may have been created with voice recognition software  Occasional wrong word or "sound a like" substitutions may have occurred due to the inherent limitations of voice recognition software  Read the chart carefully and recognize, using context, where substitutions have occurred

## 2019-01-16 NOTE — PROGRESS NOTES
Jody Smith 60 y o  male MRN: [id number]    Encounter: [id number]      Assessment/Plan    Problem List Items Addressed This Visit     Closed compression fracture of L3 lumbar vertebra (HCC)    Relevant Orders    Vitamin D 25 hydroxy Lab Collect    Thyroid nodule - Primary     Will set up for fine-needle aspiration biopsy of the thyroid nodule with options for Afirma- further management will depend on the results of the biopsy  He will have thyroid function tests done today  If TSH is low will cancel the biopsy and set him up for a thyroid uptake and scan         Relevant Orders    US guided thyroid biopsy with AFIRMA    T4, free Lab Collect    TSH, 3rd generation Lab Collect    Mixed hyperlipidemia     Referred for nutrition evaluation         Relevant Orders    Ambulatory referral to Nutrition Services    Impaired fasting glucose     Patient counseled on increased risk of developing type 2 diabetes-counseled on dietary and lifestyle modifications and weight loss-referred for nutrition evaluation         Relevant Orders    Ambulatory referral to Nutrition Services          Problem List Items Addressed This Visit     Thyroid nodule          CC:   Thyroid nodule    History of Present Illness     HPI:  62-year-old gentleman referred here for evaluation of thyroid nodule  Thyroid nodule inicidentaly discovered during imaging done after a fall in July-he 1 underwent a thyroid ultrasound which showed a right-sided thyroid nodule  He underwent fine-needle aspiration biopsy of the thyroid nodule in September which was consistent with atypia of undetermined significance  No obstructive symptoms , denies any difficulty swallowing, breathing or pressure sensations  Denies any hoarseness  No FH of thyroid cancer , no history of RT to neck    Denies any symptoms of hypo or hyperthyroidism  Weight stable , no constipation , hyper defectaion , no heat/cold   No palpitations   Sleeps ok  + back pain       Review of Systems   Constitutional: Negative for fatigue and unexpected weight change  Respiratory: Negative for cough and shortness of breath  Cardiovascular: Negative for palpitations and leg swelling  Gastrointestinal: Negative for constipation, diarrhea, nausea and vomiting  Endocrine: Negative for polydipsia and polyuria  Musculoskeletal: Positive for arthralgias and back pain  Skin: Negative for color change, pallor, rash and wound  Psychiatric/Behavioral: Negative for sleep disturbance  All other systems reviewed and are negative  Historical Information   Past Medical History:   Diagnosis Date    Cat scratch fever     Colon polyp     Diverticulitis     Hyperlipidemia     Hypertension     IFG (impaired fasting glucose)     L3 vertebral fracture (HCC) 07/2018    Lumbar compression fracture (HCC)     Patella-femoral syndrome      Past Surgical History:   Procedure Laterality Date    CLOSED REDUCTION WRIST FRACTURE      COLONOSCOPY      DE COLONOSCOPY FLX DX W/COLLJ SPEC WHEN PFRMD N/A 5/30/2018    Procedure: COLONOSCOPY;  Surgeon: Padmini Yoder MD;  Location: [facility];   Service: Colorectal    UMBILICAL GRANULOMA EXCISION Left     groin    US GUIDED THYROID BIOPSY  9/20/2018    WISDOM TOOTH EXTRACTION      WRIST GANGLION EXCISION  1969    closed reduction      Social History   History   Alcohol Use    1 8 oz/week    3 Glasses of wine per week     Comment: social     History   Drug Use No     History   Smoking Status    Never Smoker   Smokeless Tobacco    Never Used     Family History:   Family History   Problem Relation Age of Onset    Hypertension Mother     Melanoma Father     Diabetes type II Father     Colon cancer Maternal Grandmother     Colon cancer Maternal Uncle        Meds/Allergies   Current Outpatient Prescriptions   Medication Sig Dispense Refill    acetaminophen (TYLENOL) 325 mg tablet Take 2 tablets (650 mg total) by mouth every 6 (six) hours as needed for mild pain 30 tablet 0    glucosamine 500 MG CAPS capsule Take by mouth daily        methocarbamol (ROBAXIN) 500 mg tablet Take 1 5 tablets (750 mg total) by mouth every 6 (six) hours 60 tablet 0    metoprolol succinate (TOPROL XL) 50 mg 24 hr tablet Take 100 mg by mouth daily        multivitamin (THERAGRAN) TABS Take 1 tablet by mouth daily      Omega-3 Fatty Acids (FISH OIL) 1200 MG CAPS Take by mouth      pseudoephedrine-guaifenesin (MUCINEX D)  MG per tablet Take 1 tablet by mouth every 12 (twelve) hours 12 tablet 0     No current facility-administered medications for this visit  No Known Allergies    Objective   Vitals: Blood pressure 156/90, pulse 66, height 5' 7" (1 702 m), weight 83 9 kg (185 lb)  Physical Exam   Constitutional: He is oriented to person, place, and time  He appears well-developed and well-nourished  No distress  HENT:   Head: Normocephalic and atraumatic  Mouth/Throat: No oropharyngeal exudate  Eyes: Conjunctivae and EOM are normal  No scleral icterus  Neck: Normal range of motion  Neck supple  Right lower pole thyroid nodule palpated   Cardiovascular: Normal rate, regular rhythm and normal heart sounds  No murmur heard  Pulmonary/Chest: Effort normal and breath sounds normal  No respiratory distress  He has no wheezes  He has no rales  Abdominal: Soft  Bowel sounds are normal  He exhibits no distension  There is no tenderness  There is no rebound  Musculoskeletal: Normal range of motion  He exhibits no edema or deformity  Lymphadenopathy:     He has no cervical adenopathy  Neurological: He is alert and oriented to person, place, and time  Skin: Skin is warm and dry  No rash noted  No erythema  No pallor  Psychiatric: He has a normal mood and affect  His behavior is normal  Judgment and thought content normal        The history was obtained from the review of the chart, patient  Lab Results:       Final Diagnosis   A - B   Thyroid, Right, lower pole:  Atypia of undetermined significance ([place] Category III) - See note  Follicular cells in micro follicles with focal nuclear atypia characterized by crowding/overlapping, occasional enlarged nuclei with pale chromatin and some alteration in nuclear countors  Very scant colloid is present      Satisfactory for evaluation  Imaging Studies:   Results for orders placed during the hospital encounter of 09/11/18   US thyroid    Impression The following meet current ACR criteria for recommending ultrasound guided biopsy:     The 2 9 cm right lower pole nodule  (Image number [id number]) (CRITERIA: TR 3, Mildly suspicious  FNA if >2 5 cm  Reference: ACR Thyroid Imaging, Reporting and Data System (TI-RADS): White Paper of the [employer]  J AM Mayur Radiol 1167;66:537-894  (additional recommendations based on American Thyroid Association 2015 guidelines )      Workstation performed: [id number]         I have personally reviewed pertinent reports  Portions of the record may have been created with voice recognition software  Occasional wrong word or "sound a like" substitutions may have occurred due to the inherent limitations of voice recognition software  Read the chart carefully and recognize, using context, where substitutions have occurred

## 2019-01-16 NOTE — ASSESSMENT & PLAN NOTE
Will set up for fine-needle aspiration biopsy of the thyroid nodule with options for Afirma- further management will depend on the results of the biopsy  He will have thyroid function tests done today    If TSH is low will cancel the biopsy and set him up for a thyroid uptake and scan

## 2019-01-16 NOTE — ASSESSMENT & PLAN NOTE
Patient counseled on increased risk of developing type 2 diabetes-counseled on dietary and lifestyle modifications and weight loss-referred for nutrition evaluation

## 2019-01-21 ENCOUNTER — TELEPHONE (OUTPATIENT)
Dept: ENDOCRINOLOGY | Facility: CLINIC | Age: 59
End: 2019-01-21

## 2019-01-21 NOTE — TELEPHONE ENCOUNTER
----- Message from Tarik Mondragon MD sent at 1/20/2019 10:09 PM EST -----  Please call the patient regarding labs - thyroid function tests ok- low vitamin D - start vitamin D3 2000 iu daily

## 2019-01-21 NOTE — PROGRESS NOTES
Please call the patient regarding labs - thyroid function tests ok- low vitamin D - start vitamin D3 2000 iu daily

## 2019-02-13 ENCOUNTER — HOSPITAL ENCOUNTER (OUTPATIENT)
Dept: RADIOLOGY | Facility: HOSPITAL | Age: 59
Discharge: HOME | End: 2019-02-13
Attending: INTERNAL MEDICINE
Payer: COMMERCIAL

## 2019-02-13 DIAGNOSIS — E04.1 THYROID NODULE: ICD-10-CM

## 2019-02-13 PROCEDURE — 10005 FNA BX W/US GDN 1ST LES: CPT

## 2019-02-13 PROCEDURE — 88173 CYTOPATH EVAL FNA REPORT: CPT | Performed by: PATHOLOGY

## 2019-02-13 PROCEDURE — 76942 ECHO GUIDE FOR BIOPSY: CPT

## 2019-02-13 RX ORDER — LIDOCAINE HYDROCHLORIDE 10 MG/ML
5 INJECTION, SOLUTION EPIDURAL; INFILTRATION; INTRACAUDAL; PERINEURAL ONCE
Status: COMPLETED | OUTPATIENT
Start: 2019-02-13 | End: 2019-02-13

## 2019-02-13 RX ADMIN — LIDOCAINE HYDROCHLORIDE 2 ML: 10 INJECTION, SOLUTION EPIDURAL; INFILTRATION; INTRACAUDAL; PERINEURAL at 09:55

## 2019-02-14 ENCOUNTER — TELEPHONE (OUTPATIENT)
Dept: ENDOCRINOLOGY | Facility: CLINIC | Age: 59
End: 2019-02-14

## 2019-02-14 NOTE — TELEPHONE ENCOUNTER
----- Message from Estrellita Kim MD sent at 2/14/2019 12:19 PM EST -----  Please call the patient regarding biopsy-biopsy showed atypia of undetermined significance-specimen was sent out for Afirma -usually takes about 2 weeks for results to come back-should be back before his visit

## 2019-02-14 NOTE — RESULT ENCOUNTER NOTE
Please call the patient regarding biopsy-biopsy showed atypia of undetermined significance-specimen was sent out for Afirma -usually takes about 2 weeks for results to come back-should be back before his visit

## 2019-03-06 ENCOUNTER — OFFICE VISIT (OUTPATIENT)
Dept: ENDOCRINOLOGY | Facility: CLINIC | Age: 59
End: 2019-03-06
Payer: COMMERCIAL

## 2019-03-06 VITALS
HEIGHT: 67 IN | HEART RATE: 68 BPM | BODY MASS INDEX: 29.66 KG/M2 | SYSTOLIC BLOOD PRESSURE: 150 MMHG | WEIGHT: 189 LBS | DIASTOLIC BLOOD PRESSURE: 76 MMHG

## 2019-03-06 DIAGNOSIS — E78.2 MIXED HYPERLIPIDEMIA: ICD-10-CM

## 2019-03-06 DIAGNOSIS — S32.030D CLOSED COMPRESSION FRACTURE OF L3 LUMBAR VERTEBRA WITH ROUTINE HEALING, SUBSEQUENT ENCOUNTER: ICD-10-CM

## 2019-03-06 DIAGNOSIS — E55.9 VITAMIN D DEFICIENCY: ICD-10-CM

## 2019-03-06 DIAGNOSIS — E04.1 THYROID NODULE: Primary | ICD-10-CM

## 2019-03-06 DIAGNOSIS — I10 ESSENTIAL HYPERTENSION: ICD-10-CM

## 2019-03-06 DIAGNOSIS — R73.01 IMPAIRED FASTING GLUCOSE: ICD-10-CM

## 2019-03-06 PROCEDURE — 99214 OFFICE O/P EST MOD 30 MIN: CPT | Performed by: PHYSICIAN ASSISTANT

## 2019-03-06 RX ORDER — MAG HYDROX/ALUMINUM HYD/SIMETH 400-400-40
1 SUSPENSION, ORAL (FINAL DOSE FORM) ORAL DAILY
COMMUNITY

## 2019-03-06 NOTE — ASSESSMENT & PLAN NOTE
FNA showed atypica of undetermined malignancy, but afirma testing was benign  Repeat ultrasoudn in 6 months to monitor for growth/change of nodule

## 2019-03-06 NOTE — ASSESSMENT & PLAN NOTE
Encouraged lifestyle modifications and weight loss  He was referred to dietician at prior visit and he plans to schedule

## 2019-03-06 NOTE — PROGRESS NOTES
Established Patient Progress Note       Chief Complaint   Patient presents with    Thyroid Problem    Vitamin D Deficiency        History of Present Illness:     Katie Saunders is a 60 y o  male with a history of Thyroid nodule  Discovered incidentally during fall in July  Ultrasound showed right sided nodule  FNA performed 2/12/2019 which showed atypica of undetermined significance  Afirma testing came back benign  He denies FH of thyroid Ca  Denies radiation therapy to head/neck  Denies dysphagia  Thyroid function testing was normal      He had lumbar compression fracture after fall out of tree  Reports fall less than 3 feet, horizontal tree trunk though fell hard/strong impact  Lab testing showed low Vitamin D level  He has been taking supplements  He has had lab testing showing pre-diabetes and elevated triglycerides  Also treated for HTN  He reports 5 pound weight gain  No FH of Diabetes  He is planning on scheduling with dietician         Patient Active Problem List   Diagnosis    Closed compression fracture of L3 lumbar vertebra (HCC)    Chest wall contusion    Thyroid nodule    Primary osteoarthritis of left knee    Primary osteoarthritis of right knee    Essential hypertension    Mixed hyperlipidemia    Impaired fasting glucose    Renal cyst    DDD (degenerative disc disease), lumbar    Spondylolisthesis of lumbosacral region    Chronic low back pain    Peyronie disease    Vitamin D deficiency      Past Medical History:   Diagnosis Date    Cat scratch fever     Colon polyp     Diverticulitis     Hyperlipidemia     Hypertension     IFG (impaired fasting glucose)     L3 vertebral fracture (HCC) 07/2018    Lumbar compression fracture (HCC)     Patella-femoral syndrome       Past Surgical History:   Procedure Laterality Date    CLOSED REDUCTION WRIST FRACTURE      COLONOSCOPY      NC COLONOSCOPY FLX DX W/COLLJ SPEC WHEN PFRMD N/A 5/30/2018    Procedure: COLONOSCOPY; Surgeon: Kendrick Ahumada, MD;  Location: [facility]; Service: Colorectal    UMBILICAL GRANULOMA EXCISION Left     groin    US GUIDED THYROID BIOPSY  9/20/2018    US GUIDED THYROID BIOPSY  2/13/2019    WISDOM TOOTH EXTRACTION      WRIST GANGLION EXCISION  1969    closed reduction       Family History   Problem Relation Age of Onset    Hypertension Mother     Melanoma Father     Diabetes type II Father     Colon cancer Maternal Grandmother     Colon cancer Maternal Uncle      Social History     Tobacco Use    Smoking status: Never Smoker    Smokeless tobacco: Never Used   Substance Use Topics    Alcohol use: Yes     Alcohol/week: 1 8 oz     Types: 3 Glasses of wine per week     Comment: social     No Known Allergies    Current Outpatient Medications:     acetaminophen (TYLENOL) 325 mg tablet, Take 2 tablets (650 mg total) by mouth every 6 (six) hours as needed for mild pain, Disp: 30 tablet, Rfl: 0    Cholecalciferol (VITAMIN D3) 5000 units CAPS, Take 1 capsule by mouth daily, Disp: , Rfl:     glucosamine 500 MG CAPS capsule, Take by mouth daily  , Disp: , Rfl:     metoprolol succinate (TOPROL XL) 50 mg 24 hr tablet, Take 100 mg by mouth daily  , Disp: , Rfl:     multivitamin (THERAGRAN) TABS, Take 1 tablet by mouth daily, Disp: , Rfl:     Omega-3 Fatty Acids (FISH OIL) 1200 MG CAPS, Take by mouth, Disp: , Rfl:     Review of Systems   Constitutional: Negative for activity change, appetite change and fatigue  HENT: Negative for sore throat, trouble swallowing and voice change  Eyes: Negative for visual disturbance  Respiratory: Negative for choking, chest tightness and shortness of breath  Cardiovascular: Negative for chest pain, palpitations and leg swelling  Gastrointestinal: Negative for abdominal pain, constipation and diarrhea  Endocrine: Negative for cold intolerance, heat intolerance, polydipsia, polyphagia and polyuria  Genitourinary: Negative for frequency  Musculoskeletal: Positive for arthralgias and back pain  Negative for myalgias  Skin: Negative for rash  Neurological: Negative for dizziness and syncope  Hematological: Negative for adenopathy  Psychiatric/Behavioral: Negative for sleep disturbance  All other systems reviewed and are negative  Physical Exam:  Body mass index is 29 6 kg/m²  /76   Pulse 68   Ht 5' 7" (1 702 m)   Wt 85 7 kg (189 lb)   BMI 29 60 kg/m²    Wt Readings from Last 3 Encounters:   03/06/19 85 7 kg (189 lb)   01/16/19 83 9 kg (185 lb)   01/06/19 84 3 kg (185 lb 12 8 oz)       Physical Exam   Constitutional: He is oriented to person, place, and time  He appears well-developed and well-nourished  No distress  HENT:   Head: Normocephalic and atraumatic  Mouth/Throat: Oropharynx is clear and moist    Eyes: Pupils are equal, round, and reactive to light  Conjunctivae and EOM are normal    Neck: Normal range of motion  Neck supple  No thyromegaly present  Cardiovascular: Normal rate, regular rhythm and normal heart sounds  No murmur heard  Pulmonary/Chest: Effort normal and breath sounds normal  No respiratory distress  He has no wheezes  He has no rales  Abdominal: Soft  Bowel sounds are normal  He exhibits no distension  There is no tenderness  Musculoskeletal: Normal range of motion  He exhibits no edema  Lymphadenopathy:     He has no cervical adenopathy  Neurological: He is alert and oriented to person, place, and time  Skin: Skin is warm and dry  Psychiatric: He has a normal mood and affect  Vitals reviewed        Labs:     Component      Latest Ref Rng & Units 8/20/2018 1/16/2019   Cholesterol      50 - 200 mg/dL 247 (H)    Triglycerides      <=150 mg/dL 378 (H)    HDL      40 - 60 mg/dL 40    LDL Direct      0 - 100 mg/dL 131 (H)    Non-HDL Cholesterol      mg/dl 207    Hemoglobin A1C      4 2 - 6 3 % 6 0    EAG      mg/dl 126    Free T4      0 76 - 1 46 ng/dL  0 86   TSH 3RD GENERATON 0 358 - 3 740 uIU/mL  2 480       Impression & Plan:    Problem List Items Addressed This Visit        Endocrine    Thyroid nodule - Primary     FNA showed atypica of undetermined malignancy, but afirma testing was benign  Repeat ultrasoudn in 6 months to monitor for growth/change of nodule  Relevant Orders    US thyroid    Impaired fasting glucose     Encouraged lifestyle modifications and weight loss to prevent development of type 2 diabetes  Cardiovascular and Mediastinum    Essential hypertension     Not at goal- he will be seeing his PCP next week  Musculoskeletal and Integument    Closed compression fracture of L3 lumbar vertebra (HCC)     Reports compression fracture after fall from low height though likely traumatic fracture as he reports very strong impact will screen for osteoporosis with dexa scan  Relevant Orders    DXA bone density spine hip and pelvis       Other    Mixed hyperlipidemia     Encouraged lifestyle modifications and weight loss  He was referred to dietician at prior visit and he plans to schedule  Vitamin D deficiency     Continue supplements  Orders Placed This Encounter   Procedures    DXA bone density spine hip and pelvis     Standing Status:   Future     Standing Expiration Date:   3/6/2023     Scheduling Instructions:      Please do not wear jewelry on the day of the exam  Please wear comfortable clothing with no metal buttons, zippers, snaps or an underwire bra  Do not take any calcium supplements 24 hours prior to your test  Please bring your insurance cards, a form of photo ID and a list of your medications with you  Arrive 5-10 minutes prior to your appointment time in order to register  To schedule this appointment, please contact Central Scheduling at [phone]       Order Specific Question:   Reason for Exam:     Answer:   compression fractrure, vitamin d deficiency    US thyroid     Standing Status:   Future     Standing Expiration Date:   3/6/2020     Scheduling Instructions:      No prep required  Please bring your physician order, insurance cards, a form of photo ID and a list of your medications with you  Arrive 15 minutes prior to your appointment time in order to register  To schedule this appointment, please contact central scheduling at [phone]     Order Specific Question:   Reason for Exam:     Answer:   Thyroid Nodule       There are no Patient Instructions on file for this visit  Discussed with the patient and all questioned fully answered  He will call me if any problems arise  Follow-up appointment in 6 months       Counseled patient on diagnostic results, prognosis, risk and benefit of treatment options, instruction for management, importance of treatment compliance, Risk  factor reduction and impressions      Curt Clinton PA-C

## 2019-03-06 NOTE — ASSESSMENT & PLAN NOTE
Reports compression fracture after fall from low height though likely traumatic fracture as he reports very strong impact will screen for osteoporosis with dexa scan

## 2019-03-09 ENCOUNTER — APPOINTMENT (OUTPATIENT)
Dept: LAB | Facility: HOSPITAL | Age: 59
End: 2019-03-09
Payer: COMMERCIAL

## 2019-03-09 DIAGNOSIS — E78.2 MIXED HYPERLIPIDEMIA: ICD-10-CM

## 2019-03-09 DIAGNOSIS — Z12.5 SCREENING FOR PROSTATE CANCER: ICD-10-CM

## 2019-03-09 DIAGNOSIS — I10 ESSENTIAL HYPERTENSION: ICD-10-CM

## 2019-03-09 LAB
ALBUMIN SERPL BCP-MCNC: 3.6 G/DL (ref 3.5–5)
ALP SERPL-CCNC: 51 U/L (ref 46–116)
ALT SERPL W P-5'-P-CCNC: 22 U/L (ref 12–78)
ANION GAP SERPL CALCULATED.3IONS-SCNC: 7 MMOL/L (ref 4–13)
AST SERPL W P-5'-P-CCNC: 13 U/L (ref 5–45)
BASOPHILS # BLD AUTO: 0.05 THOUSANDS/ΜL (ref 0–0.1)
BASOPHILS NFR BLD AUTO: 1 % (ref 0–1)
BILIRUB SERPL-MCNC: 0.54 MG/DL (ref 0.2–1)
BUN SERPL-MCNC: 12 MG/DL (ref 5–25)
CALCIUM SERPL-MCNC: 8.7 MG/DL (ref 8.3–10.1)
CHLORIDE SERPL-SCNC: 105 MMOL/L (ref 100–108)
CHOLEST SERPL-MCNC: 274 MG/DL (ref 50–200)
CO2 SERPL-SCNC: 28 MMOL/L (ref 21–32)
CREAT SERPL-MCNC: 0.99 MG/DL (ref 0.6–1.3)
EOSINOPHIL # BLD AUTO: 0 THOUSAND/ΜL (ref 0–0.61)
EOSINOPHIL NFR BLD AUTO: 0 % (ref 0–6)
ERYTHROCYTE [DISTWIDTH] IN BLOOD BY AUTOMATED COUNT: 13.3 % (ref 11.6–15.1)
GFR SERPL CREATININE-BSD FRML MDRD: 83 ML/MIN/1.73SQ M
GLUCOSE P FAST SERPL-MCNC: 118 MG/DL (ref 65–99)
HCT VFR BLD AUTO: 44.6 % (ref 36.5–49.3)
HDLC SERPL-MCNC: 41 MG/DL (ref 40–60)
HGB BLD-MCNC: 14.9 G/DL (ref 12–17)
IMM GRANULOCYTES # BLD AUTO: 0.01 THOUSAND/UL (ref 0–0.2)
IMM GRANULOCYTES NFR BLD AUTO: 0 % (ref 0–2)
LDLC SERPL CALC-MCNC: 197 MG/DL (ref 0–100)
LYMPHOCYTES # BLD AUTO: 1.67 THOUSANDS/ΜL (ref 0.6–4.47)
LYMPHOCYTES NFR BLD AUTO: 31 % (ref 14–44)
MCH RBC QN AUTO: 32.3 PG (ref 26.8–34.3)
MCHC RBC AUTO-ENTMCNC: 33.4 G/DL (ref 31.4–37.4)
MCV RBC AUTO: 97 FL (ref 82–98)
MONOCYTES # BLD AUTO: 0.58 THOUSAND/ΜL (ref 0.17–1.22)
MONOCYTES NFR BLD AUTO: 11 % (ref 4–12)
NEUTROPHILS # BLD AUTO: 3.01 THOUSANDS/ΜL (ref 1.85–7.62)
NEUTS SEG NFR BLD AUTO: 57 % (ref 43–75)
NONHDLC SERPL-MCNC: 233 MG/DL
NRBC BLD AUTO-RTO: 0 /100 WBCS
PLATELET # BLD AUTO: 242 THOUSANDS/UL (ref 149–390)
PMV BLD AUTO: 9.9 FL (ref 8.9–12.7)
POTASSIUM SERPL-SCNC: 4.3 MMOL/L (ref 3.5–5.3)
PROT SERPL-MCNC: 7.2 G/DL (ref 6.4–8.2)
PSA SERPL-MCNC: 2.4 NG/ML (ref 0–4)
RBC # BLD AUTO: 4.61 MILLION/UL (ref 3.88–5.62)
SODIUM SERPL-SCNC: 140 MMOL/L (ref 136–145)
TRIGL SERPL-MCNC: 182 MG/DL
TSH SERPL DL<=0.05 MIU/L-ACNC: 2.61 UIU/ML (ref 0.36–3.74)
WBC # BLD AUTO: 5.32 THOUSAND/UL (ref 4.31–10.16)

## 2019-03-09 PROCEDURE — 80053 COMPREHEN METABOLIC PANEL: CPT

## 2019-03-09 PROCEDURE — 85025 COMPLETE CBC W/AUTO DIFF WBC: CPT

## 2019-03-09 PROCEDURE — 80061 LIPID PANEL: CPT

## 2019-03-09 PROCEDURE — 36415 COLL VENOUS BLD VENIPUNCTURE: CPT

## 2019-03-09 PROCEDURE — G0103 PSA SCREENING: HCPCS

## 2019-03-09 PROCEDURE — 84443 ASSAY THYROID STIM HORMONE: CPT

## 2019-03-13 ENCOUNTER — OFFICE VISIT (OUTPATIENT)
Dept: FAMILY MEDICINE CLINIC | Facility: CLINIC | Age: 59
End: 2019-03-13
Payer: COMMERCIAL

## 2019-03-13 VITALS
DIASTOLIC BLOOD PRESSURE: 80 MMHG | HEART RATE: 68 BPM | SYSTOLIC BLOOD PRESSURE: 132 MMHG | OXYGEN SATURATION: 96 % | TEMPERATURE: 98.1 F | RESPIRATION RATE: 16 BRPM | HEIGHT: 67 IN | BODY MASS INDEX: 29.32 KG/M2 | WEIGHT: 186.8 LBS

## 2019-03-13 DIAGNOSIS — R73.01 IMPAIRED FASTING GLUCOSE: ICD-10-CM

## 2019-03-13 DIAGNOSIS — Z91.89 ENCOUNTER FOR HEPATITIS C VIRUS SCREENING TEST FOR HIGH RISK PATIENT: ICD-10-CM

## 2019-03-13 DIAGNOSIS — I10 ESSENTIAL HYPERTENSION: Primary | ICD-10-CM

## 2019-03-13 DIAGNOSIS — E55.9 VITAMIN D DEFICIENCY: ICD-10-CM

## 2019-03-13 DIAGNOSIS — E04.1 THYROID NODULE: ICD-10-CM

## 2019-03-13 DIAGNOSIS — Z11.59 ENCOUNTER FOR HEPATITIS C VIRUS SCREENING TEST FOR HIGH RISK PATIENT: ICD-10-CM

## 2019-03-13 DIAGNOSIS — E78.2 MIXED HYPERLIPIDEMIA: ICD-10-CM

## 2019-03-13 PROCEDURE — 3075F SYST BP GE 130 - 139MM HG: CPT | Performed by: FAMILY MEDICINE

## 2019-03-13 PROCEDURE — 1036F TOBACCO NON-USER: CPT | Performed by: FAMILY MEDICINE

## 2019-03-13 PROCEDURE — 3079F DIAST BP 80-89 MM HG: CPT | Performed by: FAMILY MEDICINE

## 2019-03-13 PROCEDURE — 99214 OFFICE O/P EST MOD 30 MIN: CPT | Performed by: FAMILY MEDICINE

## 2019-03-13 RX ORDER — METOPROLOL SUCCINATE 100 MG/1
100 TABLET, EXTENDED RELEASE ORAL DAILY
Qty: 90 TABLET | Refills: 3 | Status: SHIPPED | OUTPATIENT
Start: 2019-03-13 | End: 2020-06-16 | Stop reason: SDUPTHER

## 2019-03-13 NOTE — PROGRESS NOTES
Chief Complaint   Patient presents with    Follow-up     6 month follow up     Health Maintenance   Topic Date Due    Hepatitis C Screening  1960    PT PLAN OF CARE  1960    BMI: Followup Plan  06/21/1977    DTaP,Tdap,and Td Vaccines (1 - Tdap) 06/21/1980    Depression Screening PHQ  01/06/2020    BMI: Adult  03/13/2020    CRC Screening: Colonoscopy  05/30/2028    INFLUENZA VACCINE  Completed    HEPATITIS B VACCINES  Aged Out     Assessment/Plan:    Essential hypertension  BP is stable  Will continue Metoprolol  mg daily  Follow a low-sodium diet, lose weight  Impaired fasting glucose  Discussed dietary modifications  Patient was referred to dietitian by endocrinology  Will check Hb A1C in 6 months  Mixed hyperlipidemia  Uncontrolled  Patient would like to try lifestyle and dietary modifications before starting on lipid lowering medication  Will recheck lipid panel in 5 months  Consider starting on statin therapy if continues with elevated cholesterol and triglycerides  Thyroid nodule  FNA biopsy of R thyroid nodule done last month showed atypia of undetermined significance  Afirma testing was benign  Patient has been followed by endocrinology who recommended to repeat Thyroid U/S in 6 months  Vitamin D deficiency  Continue Vit D 5000 IU daily  Schedule follow-up office visit in 6 months  Check labs prior to next visit  Diagnoses and all orders for this visit:    Essential hypertension  -     metoprolol succinate (TOPROL-XL) 100 mg 24 hr tablet; Take 1 tablet (100 mg total) by mouth daily    Impaired fasting glucose  -     Comprehensive metabolic panel; Future  -     Hemoglobin A1C; Future    Mixed hyperlipidemia  -     Comprehensive metabolic panel; Future  -     Lipid panel; Future    Thyroid nodule    Vitamin D deficiency    Encounter for hepatitis C virus screening test for high risk patient  -     Hepatitis C antibody;  Future Subjective:      Patient ID: Carlos Mohr is a 60 y o  male  HPI     Patient presents for 6 month follow-up office visit  PMHx: HTN, Hyperlipidemia, IFG, R thyroid nodule, Vit D deficiency  Reviewed current medications, blood work results from March 9, 2019  Cholesterol 274, triglycerides 182, ,   HDL 41, fasting blood sugar 118, potassium 4 3, creatinine 0 99, TSH 2 610  PSA 2 4  HTN - patient takes Metoprolol  mg 1 tablet daily  Denies chest pain, shortness of breath, dizziness  Hyperlipidemia -currently taking Omega 3 1000 mg 1 capsule twice daily  He was referred to dietitian by endocrinology  Patient has been followed by endocrinology for   R thyroid nodule  FNA  biopsy performed on February 12, 2019 showed atypia of undetermined significance  Afirma testing was benign  Patient was recommended to repeat Thyroid U/S  in 6 month  Colonoscopy performed by colorectal surgeon   Dr Juanis Dominguez in May 2018, one colon polyp was removed which showed no dysplasia or malignancy  Patient had Flu shot in October 2018  The following portions of the patient's history were reviewed and updated as appropriate: current medications, past family history, past medical history, past social history, past surgical history and problem list     Review of Systems   Constitutional: Negative for activity change, appetite change, chills, fatigue and fever  HENT: Negative for congestion, ear pain, hearing loss, nosebleeds, sore throat, tinnitus and trouble swallowing  Eyes: Negative for pain, discharge, redness, itching and visual disturbance  Respiratory: Negative for cough, chest tightness, shortness of breath and wheezing  Cardiovascular: Negative for chest pain, palpitations and leg swelling  Gastrointestinal: Negative for abdominal pain, blood in stool, constipation, diarrhea, nausea and vomiting     Genitourinary: Negative for difficulty urinating, dysuria, flank pain, frequency and hematuria  Musculoskeletal: Negative for arthralgias, back pain, joint swelling, myalgias and neck pain  Skin: Negative for rash and wound  Neurological: Negative for dizziness and headaches  Hematological: Negative  Psychiatric/Behavioral: Negative  Objective:      /80 (BP Location: Left arm, Patient Position: Sitting, Cuff Size: Standard)   Pulse 68   Temp 98 1 °F (36 7 °C) (Tympanic)   Resp 16   Ht 5' 7" (1 702 m)   Wt 84 7 kg (186 lb 12 8 oz)   SpO2 96%   BMI 29 26 kg/m²          Physical Exam   Constitutional: He appears well-developed and well-nourished  HENT:   Head: Normocephalic and atraumatic  Right Ear: External ear normal    Left Ear: External ear normal    Mouth/Throat: Oropharynx is clear and moist    Eyes: Pupils are equal, round, and reactive to light  Conjunctivae are normal    Cardiovascular: Normal rate, regular rhythm and normal heart sounds  No murmur heard  No carotid bruits BL  No BL LE edema   Pulmonary/Chest: Effort normal and breath sounds normal    Abdominal: Soft  Bowel sounds are normal  There is no tenderness  Musculoskeletal: Normal range of motion  He exhibits no edema, tenderness or deformity  Skin: Skin is warm and dry  No rash noted  Psychiatric: He has a normal mood and affect  Nursing note and vitals reviewed  BMI Counseling: Body mass index is 29 26 kg/m²  Discussed the patient's BMI with him  The BMI is above average  BMI counseling and education was provided to the patient  Nutrition recommendations include reducing portion sizes, decreasing overall calorie intake, 3-5 servings of fruits/vegetables daily, reducing fast food intake, consuming healthier snacks, decreasing soda and/or juice intake, moderation in carbohydrate intake, increasing intake of lean protein, reducing intake of saturated fat and trans fat and reducing intake of cholesterol

## 2019-03-13 NOTE — ASSESSMENT & PLAN NOTE
Uncontrolled  Patient would like to try lifestyle and dietary modifications before starting on lipid lowering medication  Will recheck lipid panel in 5 months  Consider starting on statin therapy if continues with elevated cholesterol and triglycerides

## 2019-03-13 NOTE — ASSESSMENT & PLAN NOTE
FNA biopsy of R thyroid nodule done last month showed atypia of undetermined significance  Afirma testing was benign  Patient has been followed by endocrinology who recommended to repeat Thyroid U/S in 6 months

## 2019-03-13 NOTE — ASSESSMENT & PLAN NOTE
Discussed dietary modifications  Patient was referred to dietitian by endocrinology  Will check Hb A1C in 6 months

## 2019-09-11 ENCOUNTER — TELEPHONE (OUTPATIENT)
Dept: UROLOGY | Facility: AMBULATORY SURGERY CENTER | Age: 59
End: 2019-09-11

## 2019-09-11 NOTE — TELEPHONE ENCOUNTER
I called patient and left a message about rescheduling his appt Dr Kaden Castaneda be in the office     Izzy rescheduled him   and gave him a call back number to confirm or deny the appt

## 2019-09-18 ENCOUNTER — OFFICE VISIT (OUTPATIENT)
Dept: FAMILY MEDICINE CLINIC | Facility: CLINIC | Age: 59
End: 2019-09-18
Payer: COMMERCIAL

## 2019-09-18 VITALS
TEMPERATURE: 97.6 F | BODY MASS INDEX: 28.5 KG/M2 | HEIGHT: 67 IN | WEIGHT: 181.6 LBS | OXYGEN SATURATION: 95 % | DIASTOLIC BLOOD PRESSURE: 78 MMHG | HEART RATE: 66 BPM | RESPIRATION RATE: 16 BRPM | SYSTOLIC BLOOD PRESSURE: 126 MMHG

## 2019-09-18 DIAGNOSIS — E04.1 THYROID NODULE: ICD-10-CM

## 2019-09-18 DIAGNOSIS — I10 ESSENTIAL HYPERTENSION: Primary | ICD-10-CM

## 2019-09-18 DIAGNOSIS — R73.01 IMPAIRED FASTING GLUCOSE: ICD-10-CM

## 2019-09-18 DIAGNOSIS — E78.2 MIXED HYPERLIPIDEMIA: ICD-10-CM

## 2019-09-18 DIAGNOSIS — E55.9 VITAMIN D DEFICIENCY: ICD-10-CM

## 2019-09-18 PROCEDURE — 99214 OFFICE O/P EST MOD 30 MIN: CPT | Performed by: FAMILY MEDICINE

## 2019-09-18 PROCEDURE — 3074F SYST BP LT 130 MM HG: CPT | Performed by: FAMILY MEDICINE

## 2019-09-18 PROCEDURE — 3078F DIAST BP <80 MM HG: CPT | Performed by: FAMILY MEDICINE

## 2019-09-18 NOTE — ASSESSMENT & PLAN NOTE
Patient had FNA biopsy of right thyroid nodule in February 2019 which showed atypia of undetermined significance  Afirma testing was benign  Patient is asymptomatic  He has been followed by endocrinology, has appointment next month

## 2019-09-18 NOTE — ASSESSMENT & PLAN NOTE
Recommended to check blood work as ordered in March  Patient will go for blood test tomorrow  Discussed dietary and lifestyle modifications with patient  Follow a low-cholesterol, low-fat diet, regular exercise  Continue Fish oil  Consider starting on statin therapy

## 2019-09-18 NOTE — PROGRESS NOTES
Chief Complaint   Patient presents with    Follow-up     6 month follow up     Health Maintenance   Topic Date Due    Hepatitis C Screening  1960    PT PLAN OF CARE  1960    DTaP,Tdap,and Td Vaccines (1 - Tdap) 06/21/1980    INFLUENZA VACCINE  07/01/2019    Depression Screening PHQ  01/06/2020    BMI: Followup Plan  03/13/2020    BMI: Adult  03/13/2020    Pneumococcal Vaccine: 65+ Years (1 of 2 - PCV13) 06/21/2024    CRC Screening: Colonoscopy  05/30/2028    Pneumococcal Vaccine: Pediatrics (0 to 5 Years) and At-Risk Patients (6 to 59 Years)  Aged Out    HEPATITIS B VACCINES  Aged Out     Assessment/Plan:    Essential hypertension  BP is well controlled  Continue Metoprolol  mg daily  Follow a low-sodium diet  Mixed hyperlipidemia  Recommended to check blood work as ordered in March  Patient will go for blood test tomorrow  Discussed dietary and lifestyle modifications with patient  Follow a low-cholesterol, low-fat diet, regular exercise  Continue Fish oil  Consider starting on statin therapy  Impaired fasting glucose  Follow a low carb diet  Check Hb A1C as ordered in 3/19  Thyroid nodule  Patient had FNA biopsy of right thyroid nodule in February 2019 which showed atypia of undetermined significance  Afirma testing was benign  Patient is asymptomatic  He has been followed by endocrinology, has appointment next month  Vitamin D deficiency  Take Vit D 5000 IU daily  HM: patient will get Flu shot at work next month  He will check immunization records at work regarding Tdap vaccination  I have spent 25 minutes with Patient  today in which greater than 50% of this time was spent in counseling/coordination of care regarding Diagnostic results, Risks and benefits of tx options, Intructions for management, Patient and family education, Importance of tx compliance, Risk factor reductions and Impressions      Schedule follow-up office visit in 10 months  Diagnoses and all orders for this visit:    Essential hypertension    Mixed hyperlipidemia    Impaired fasting glucose    Thyroid nodule    Vitamin D deficiency          Subjective:      Patient ID: [place] Christopher is a 60 y o  male  HPI     Patient is 57-year-old male with Hypertension, Hyperlipidemia, IFG, Vit D deficiency, right thyroid nodule  He presents for 6 month follow-up office visit  Reviewed current medications, last blood test done in March 2019  Patient did not go for blood work prior to today's visit  HTN - blood pressure is stable  Patient takes Metoprolol  mg 1 tablet daily  Denies chest pain, shortness of breath, dizziness  Hyperlipidemia - patient states that he eats everything, does not follow a strict diet  He takes Omega 3 1000 mg 1 capsule twice daily  Denies family history of CAD, stroke  R thyroid nodule- atient has been followed by endocrinology Dr Yuli Ellis  FNA biopsy performed in February 2019 showed atypia of undetermined significance  Afirma testing was benign  Patient was recommended to repeat thyroid U/S in 6 months  He has follow-up office visit with endocrinology next month  Colonoscopy performed by colorectal surgeon Dr Arabella Angelo in May 2018,  1 colon polyp was removed which showed no dysplasia or malignancy  Patient states that he was advised to repeat colonoscopy in 5 years  Patient works at [employer], will get Flu shot next month  The following portions of the patient's history were reviewed and updated as appropriate: allergies, past family history, past medical history, past social history, past surgical history and problem list     Review of Systems   Constitutional: Negative for activity change, appetite change, chills, fatigue and fever  HENT: Negative for congestion, ear pain, hearing loss, mouth sores, nosebleeds, sore throat, tinnitus and trouble swallowing      Eyes: Negative for pain, discharge, redness, itching and visual disturbance  Respiratory: Negative for cough, chest tightness, shortness of breath and wheezing  Cardiovascular: Negative for chest pain, palpitations and leg swelling  Gastrointestinal: Negative for abdominal pain, blood in stool, constipation, diarrhea, nausea and vomiting  Genitourinary: Negative for difficulty urinating, dysuria, flank pain, frequency and hematuria  Musculoskeletal: Negative for arthralgias, back pain, joint swelling, myalgias and neck pain  Skin: Negative for rash and wound  Neurological: Negative for dizziness, syncope, numbness and headaches  Hematological: Negative  Psychiatric/Behavioral: Negative  Objective:      /78 (BP Location: Left arm, Patient Position: Sitting, Cuff Size: Adult)   Pulse 66   Temp 97 6 °F (36 4 °C) (Tympanic)   Resp 16   Ht 5' 7" (1 702 m)   Wt 82 4 kg (181 lb 9 6 oz)   SpO2 95%   BMI 28 44 kg/m²          Physical Exam   Constitutional: He appears well-developed and well-nourished  Overweight   HENT:   Head: Normocephalic and atraumatic  Right Ear: External ear normal    Left Ear: External ear normal    Mouth/Throat: Oropharynx is clear and moist    Eyes: Pupils are equal, round, and reactive to light  Conjunctivae are normal    Cardiovascular: Normal rate, regular rhythm and normal heart sounds  No murmur heard  No carotid bruits BL  No BL LE edema   Pulmonary/Chest: Effort normal and breath sounds normal    Abdominal: Soft  Bowel sounds are normal  There is no tenderness  Musculoskeletal: Normal range of motion  He exhibits no edema, tenderness or deformity  Skin: Skin is warm and dry  No rash noted  Psychiatric: He has a normal mood and affect  Nursing note and vitals reviewed

## 2019-10-09 ENCOUNTER — HOSPITAL ENCOUNTER (OUTPATIENT)
Dept: RADIOLOGY | Facility: HOSPITAL | Age: 59
Discharge: HOME | End: 2019-10-09
Attending: UROLOGY
Payer: COMMERCIAL

## 2019-10-09 ENCOUNTER — TRANSCRIBE ORDERS (OUTPATIENT)
Dept: RADIOLOGY | Facility: HOSPITAL | Age: 59
End: 2019-10-09

## 2019-10-09 ENCOUNTER — OFFICE VISIT (OUTPATIENT)
Dept: ENDOCRINOLOGY | Facility: CLINIC | Age: 59
End: 2019-10-09
Payer: COMMERCIAL

## 2019-10-09 VITALS
WEIGHT: 189.4 LBS | DIASTOLIC BLOOD PRESSURE: 88 MMHG | HEIGHT: 67 IN | BODY MASS INDEX: 29.73 KG/M2 | HEART RATE: 63 BPM | SYSTOLIC BLOOD PRESSURE: 144 MMHG

## 2019-10-09 DIAGNOSIS — E04.1 THYROID NODULE: Primary | ICD-10-CM

## 2019-10-09 DIAGNOSIS — I10 ESSENTIAL HYPERTENSION: ICD-10-CM

## 2019-10-09 DIAGNOSIS — R73.01 IMPAIRED FASTING GLUCOSE: ICD-10-CM

## 2019-10-09 DIAGNOSIS — E55.9 VITAMIN D DEFICIENCY: ICD-10-CM

## 2019-10-09 DIAGNOSIS — S32.030D CLOSED COMPRESSION FRACTURE OF L3 LUMBAR VERTEBRA WITH ROUTINE HEALING, SUBSEQUENT ENCOUNTER: ICD-10-CM

## 2019-10-09 DIAGNOSIS — N28.1 RENAL CYST: ICD-10-CM

## 2019-10-09 PROCEDURE — 99214 OFFICE O/P EST MOD 30 MIN: CPT | Performed by: PHYSICIAN ASSISTANT

## 2019-10-09 PROCEDURE — 76770 US EXAM ABDO BACK WALL COMP: CPT

## 2019-10-09 NOTE — ASSESSMENT & PLAN NOTE
Discussed importance of lifestyle modifications/weight loss to prevent progression to diabetes  Refer to nutrition

## 2019-10-09 NOTE — ASSESSMENT & PLAN NOTE
BP high today but was 126/78 at recent visit to family physician  Taking toprol  Advised him to monitor at work and follow up with Dr Boom French if remains elevated

## 2019-10-09 NOTE — ASSESSMENT & PLAN NOTE
Reprinted order for thyroid ultrasound to monitor after FNA 2/2019 after nodule was benign on afirma testing     Thyroid function normal

## 2019-10-09 NOTE — PROGRESS NOTES
Established Patient Progress Note       Chief Complaint   Patient presents with    Thyroid Problem        History of Present Illness:     Kimberly Willingham is a 60 y o  male with a history of Thyroid nodule  Discovered incidentally during fall in July 2018  Ultrasound showed right sided nodule  FNA performed 2/12/2019 which showed atypica of undetermined significance  Afirma testing came back benign  He denies FH of thyroid Ca  Denies radiation therapy to head/neck  Denies dysphagia  Thyroid function testing was normal   Order for repeat ultrasound testing not yet completed       He had lumbar compression fracture after fall out of tree  Reported fall less than 3 feet, horizontal tree trunk though fell hard/strong impact  Lab testing showed low Vitamin D level  He has been taking supplements      He has had lab testing showing pre-diabetes and elevated triglycerides  Also treated for HTN  He would be interested in seeing dietician  When asked about exercise, reports a lot of walking at work on [facility] floor at [facility] [facility] [employer] [facility] [employer]       Patient Active Problem List   Diagnosis    Closed compression fracture of L3 lumbar vertebra    Chest wall contusion    Thyroid nodule    Primary osteoarthritis of left knee    Primary osteoarthritis of right knee    Essential hypertension    Mixed hyperlipidemia    Impaired fasting glucose    Renal cyst    DDD (degenerative disc disease), lumbar    Spondylolisthesis of lumbosacral region    Chronic low back pain    Peyronie disease    Vitamin D deficiency      Past Medical History:   Diagnosis Date    Cat scratch fever     Colon polyp     Diverticulitis     Hyperlipidemia     Hypertension     IFG (impaired fasting glucose)     L3 vertebral fracture ([address] ) 07/2018    Lumbar compression fracture (HCC)     Patella-femoral syndrome     Thyroid nodule       Past Surgical History:   Procedure Laterality Date    CLOSED REDUCTION WRIST FRACTURE      COLONOSCOPY  NC COLONOSCOPY FLX DX W/COLLJ SPEC WHEN PFRMD N/A 5/30/2018    Procedure: COLONOSCOPY;  Surgeon: Lexii Blanton MD;  Location: [facility]; Service: Colorectal    UMBILICAL GRANULOMA EXCISION Left     groin    US GUIDED THYROID BIOPSY  9/20/2018    US GUIDED THYROID BIOPSY  2/13/2019    WISDOM TOOTH EXTRACTION      WRIST GANGLION EXCISION  1969    closed reduction       Family History   Problem Relation Age of Onset    Hypertension Mother     Melanoma Father     Diabetes type II Father     Colon cancer Maternal Grandmother     Colon cancer Maternal Uncle      Social History     Tobacco Use    Smoking status: Never Smoker    Smokeless tobacco: Never Used   Substance Use Topics    Alcohol use: Yes     Alcohol/week: 3 0 standard drinks     Types: 3 Glasses of wine per week     Comment: social     No Known Allergies    Current Outpatient Medications:     Cholecalciferol (VITAMIN D3) 5000 units CAPS, Take 1 capsule by mouth daily, Disp: , Rfl:     glucosamine 500 MG CAPS capsule, Take by mouth daily  , Disp: , Rfl:     metoprolol succinate (TOPROL-XL) 100 mg 24 hr tablet, Take 1 tablet (100 mg total) by mouth daily, Disp: 90 tablet, Rfl: 3    multivitamin (THERAGRAN) TABS, Take 1 tablet by mouth daily, Disp: , Rfl:     Omega-3 Fatty Acids (FISH OIL) 1200 MG CAPS, Take by mouth 2 (two) times a day , Disp: , Rfl:     Review of Systems   Constitutional: Negative for activity change, appetite change and fatigue  HENT: Negative for sore throat, trouble swallowing and voice change  Eyes: Negative for visual disturbance  Respiratory: Negative for choking, chest tightness and shortness of breath  Cardiovascular: Negative for chest pain, palpitations and leg swelling  Gastrointestinal: Negative for abdominal pain, constipation and diarrhea  Endocrine: Negative for cold intolerance, heat intolerance, polydipsia, polyphagia and polyuria  Genitourinary: Negative for frequency  Musculoskeletal: Negative for arthralgias and myalgias  Skin: Negative for rash  Neurological: Negative for dizziness and syncope  Hematological: Negative for adenopathy  Psychiatric/Behavioral: Negative for sleep disturbance  All other systems reviewed and are negative  Physical Exam:  Body mass index is 29 66 kg/m²  /88   Pulse 63   Ht 5' 7" (1 702 m)   Wt 85 9 kg (189 lb 6 4 oz)   BMI 29 66 kg/m²    Wt Readings from Last 3 Encounters:   10/09/19 85 9 kg (189 lb 6 4 oz)   09/18/19 82 4 kg (181 lb 9 6 oz)   03/13/19 84 7 kg (186 lb 12 8 oz)       Physical Exam   Constitutional: He is oriented to person, place, and time  He appears well-developed and well-nourished  No distress  HENT:   Head: Normocephalic and atraumatic  Mouth/Throat: Oropharynx is clear and moist    Eyes: Pupils are equal, round, and reactive to light  Conjunctivae and EOM are normal    Neck: Normal range of motion  Neck supple  No thyromegaly present  Cardiovascular: Normal rate, regular rhythm and normal heart sounds  No murmur heard  Pulmonary/Chest: Effort normal and breath sounds normal  No respiratory distress  He has no wheezes  He has no rales  Abdominal: Soft  Bowel sounds are normal  He exhibits no distension  There is no tenderness  Musculoskeletal: Normal range of motion  He exhibits no edema  Lymphadenopathy:     He has no cervical adenopathy  Neurological: He is alert and oriented to person, place, and time  Skin: Skin is warm and dry  Psychiatric: He has a normal mood and affect  Vitals reviewed        Labs:       Lab Results   Component Value Date    CREATININE 0 99 03/09/2019    CREATININE 0 90 08/22/2014    BUN 12 03/09/2019     (U) 08/22/2014    K 4 3 03/09/2019     03/09/2019    CO2 28 03/09/2019     eGFR   Date Value Ref Range Status   03/09/2019 83 ml/min/1 73sq m Final       Lab Results   Component Value Date    CHOL 248 08/09/2015    HDL 41 03/09/2019    TRIG 182 (H) 03/09/2019       Lab Results   Component Value Date    ALT 22 03/09/2019    AST 13 03/09/2019    ALKPHOS 51 03/09/2019    BILITOT 0 39 08/22/2014       Lab Results   Component Value Date    FREET4 0 86 01/16/2019         Impression & Plan:    Problem List Items Addressed This Visit        Endocrine    Thyroid nodule - Primary     Reprinted order for thyroid ultrasound to monitor after FNA 2/2019 after nodule was benign on afirma testing  Thyroid function normal           Relevant Orders    TSH, 3rd generation    T4, free    Impaired fasting glucose     Discussed importance of lifestyle modifications/weight loss to prevent progression to diabetes  Refer to nutrition  Relevant Orders    Ambulatory referral to Nutrition Services       Cardiovascular and Mediastinum    Essential hypertension     BP high today but was 126/78 at recent visit to family physician  Taking toprol  Advised him to monitor at work and follow up with Dr Jori Castro if remains elevated  Musculoskeletal and Integument    Closed compression fracture of L3 lumbar vertebra     Likely traumatic fracture, but reprinted DEXA ordered at last visit to rule out ostoeporosis  Other    Vitamin D deficiency     Continue supplements  Orders Placed This Encounter   Procedures    TSH, 3rd generation     This is a patient instruction: This test is non-fasting  Please drink two glasses of water morning of bloodwork          Standing Status:   Future     Standing Expiration Date:   4/9/2021    T4, free     Standing Status:   Future     Standing Expiration Date:   4/9/2021    Ambulatory referral to Nutrition Services     Standing Status:   Future     Standing Expiration Date:   10/9/2020     Referral Priority:   Routine     Referral Type:   Consult - AMB     Referral Reason:   Specialty Services Required     Requested Specialty:   Nutrition     Number of Visits Requested:   1     Expiration Date:   10/9/2020 There are no Patient Instructions on file for this visit  Discussed with the patient and all questioned fully answered  He will call me if any problems arise  Follow-up appointment in 12 months       Counseled patient on diagnostic results, prognosis, risk and benefit of treatment options, instruction for management, importance of treatment compliance, Risk  factor reduction and impressions      Lawrence Coates PA-C

## 2019-10-23 ENCOUNTER — OFFICE VISIT (OUTPATIENT)
Dept: UROLOGY | Facility: CLINIC | Age: 59
End: 2019-10-23
Payer: COMMERCIAL

## 2019-10-23 VITALS
WEIGHT: 185.8 LBS | HEIGHT: 67 IN | HEART RATE: 77 BPM | SYSTOLIC BLOOD PRESSURE: 138 MMHG | BODY MASS INDEX: 29.16 KG/M2 | DIASTOLIC BLOOD PRESSURE: 92 MMHG

## 2019-10-23 DIAGNOSIS — N28.1 KIDNEY CYST, ACQUIRED: ICD-10-CM

## 2019-10-23 DIAGNOSIS — N40.0 BENIGN PROSTATIC HYPERPLASIA WITHOUT LOWER URINARY TRACT SYMPTOMS: Primary | ICD-10-CM

## 2019-10-23 PROCEDURE — 99213 OFFICE O/P EST LOW 20 MIN: CPT | Performed by: PHYSICIAN ASSISTANT

## 2019-10-23 NOTE — PROGRESS NOTES
UROLOGY PROGRESS NOTE   Patient Identifiers: Marjorie Delaney (MRN [id number])  Date of Service: 10/23/2019    Subjective:      53-year-old man history of BPH and right renal cyst   He had a CT scan in 2018 following a fall which showed a 5 7 cm right renal cyst which was categorized as a Bosniak 2 requiring follow-up  His PSA is 2 4  Follow-up ultrasound is unchanged  He has no significant outlet complaints  No family history of prostate cancer  Patient   Is here for prostate cancer screening and followup kidney cyst    Objective:     VITALS:    Vitals:    10/23/19 1143   BP: 138/92   Pulse: 77           LABS:  Lab Results   Component Value Date    HGB 14 9 03/09/2019    HCT 44 6 03/09/2019    WBC 5 32 03/09/2019     03/09/2019   ]    Lab Results   Component Value Date     (U) 08/22/2014    K 4 3 03/09/2019     03/09/2019    CO2 28 03/09/2019    BUN 12 03/09/2019    CREATININE 0 99 03/09/2019    CALCIUM 8 7 03/09/2019    GLUCOSE 104 07/27/2018   ]        INPATIENT MEDS:    Current Outpatient Medications:     Cholecalciferol (VITAMIN D3) 5000 units CAPS, Take 1 capsule by mouth daily, Disp: , Rfl:     glucosamine 500 MG CAPS capsule, Take by mouth daily  , Disp: , Rfl:     metoprolol succinate (TOPROL-XL) 100 mg 24 hr tablet, Take 1 tablet (100 mg total) by mouth daily, Disp: 90 tablet, Rfl: 3    multivitamin (THERAGRAN) TABS, Take 1 tablet by mouth daily, Disp: , Rfl:     Omega-3 Fatty Acids (FISH OIL) 1200 MG CAPS, Take by mouth 2 (two) times a day , Disp: , Rfl:       Physical Exam:   /92 (BP Location: Right arm, Patient Position: Sitting, Cuff Size: Adult)   Pulse 77   Ht 5' 7" (1 702 m)   Wt 84 3 kg (185 lb 12 8 oz)   BMI 29 10 kg/m²   GEN: no acute distress    RESP: breathing comfortably with no accessory muscle use    ABD: soft, non-tender, non-distended   INCISION:    EXT: no significant peripheral edema   (Male): Penis circumcised, phallus normal, meatus patent  Testicles descended into scrotum bilaterally without masses nor tenderness  No inguinal hernias bilaterally  LEANN: Prostate is not enlarged at 28 grams  The prostate is not boggy  The prostate is not tender  No nodules noted      RADIOLOGY:    RENAL ULTRASOUND   IMPRESSION:     1  Thinly septated right renal cyst is stable  No new or suspicious findings  2   Sonographically normal left kidney      Assessment:    1  BPH   2    Right renal cyst    Plan:   - follow-up in 1 year with a PSA and ultrasound prior to visit  - if the ultrasound is stable I would discontinue surveillance at that time  -  -

## 2020-02-21 ENCOUNTER — TELEPHONE (OUTPATIENT)
Dept: UROLOGY | Facility: AMBULATORY SURGERY CENTER | Age: 60
End: 2020-02-21

## 2020-02-21 NOTE — TELEPHONE ENCOUNTER
Patient was on our recall list  Patient last seen October 2019  Called patient and left a message to call our office to get scheduled for October or after for his 1 yr fu with a PSA and US PTV  Left office number in message

## 2020-03-15 ENCOUNTER — TRANSCRIBE ORDERS (OUTPATIENT)
Dept: LAB | Facility: HOSPITAL | Age: 60
End: 2020-03-15

## 2020-03-15 ENCOUNTER — APPOINTMENT (OUTPATIENT)
Dept: LAB | Facility: HOSPITAL | Age: 60
End: 2020-03-15
Payer: COMMERCIAL

## 2020-03-15 DIAGNOSIS — E78.5 HYPERLIPIDEMIA, UNSPECIFIED HYPERLIPIDEMIA TYPE: ICD-10-CM

## 2020-03-15 DIAGNOSIS — Z91.89 PERSONAL HISTORY OF POISONING, PRESENTING HAZARDS TO HEALTH: ICD-10-CM

## 2020-03-15 DIAGNOSIS — R73.01 IMPAIRED FASTING GLUCOSE: ICD-10-CM

## 2020-03-15 DIAGNOSIS — Z11.59 SCREENING EXAMINATION FOR POLIOMYELITIS: ICD-10-CM

## 2020-03-15 DIAGNOSIS — Z00.8 HEALTH EXAMINATION IN POPULATION SURVEY: ICD-10-CM

## 2020-03-15 DIAGNOSIS — Z00.8 HEALTH EXAMINATION IN POPULATION SURVEY: Primary | ICD-10-CM

## 2020-03-15 LAB
ALBUMIN SERPL BCP-MCNC: 3.9 G/DL (ref 3.5–5)
ALP SERPL-CCNC: 65 U/L (ref 46–116)
ALT SERPL W P-5'-P-CCNC: 27 U/L (ref 12–78)
ANION GAP SERPL CALCULATED.3IONS-SCNC: 6 MMOL/L (ref 4–13)
AST SERPL W P-5'-P-CCNC: 22 U/L (ref 5–45)
BILIRUB SERPL-MCNC: 0.5 MG/DL (ref 0.2–1)
BUN SERPL-MCNC: 15 MG/DL (ref 5–25)
CALCIUM SERPL-MCNC: 9.4 MG/DL (ref 8.3–10.1)
CHLORIDE SERPL-SCNC: 102 MMOL/L (ref 100–108)
CHOLEST SERPL-MCNC: 297 MG/DL (ref 50–200)
CO2 SERPL-SCNC: 27 MMOL/L (ref 21–32)
CREAT SERPL-MCNC: 1.14 MG/DL (ref 0.6–1.3)
EST. AVERAGE GLUCOSE BLD GHB EST-MCNC: 123 MG/DL
GFR SERPL CREATININE-BSD FRML MDRD: 70 ML/MIN/1.73SQ M
GLUCOSE P FAST SERPL-MCNC: 131 MG/DL (ref 65–99)
HBA1C MFR BLD: 5.9 %
HCV AB SER QL: NORMAL
HDLC SERPL-MCNC: 41 MG/DL
LDLC SERPL CALC-MCNC: 198 MG/DL (ref 0–100)
NONHDLC SERPL-MCNC: 256 MG/DL
POTASSIUM SERPL-SCNC: 4.7 MMOL/L (ref 3.5–5.3)
PROT SERPL-MCNC: 8 G/DL (ref 6.4–8.2)
SODIUM SERPL-SCNC: 135 MMOL/L (ref 136–145)
TRIGL SERPL-MCNC: 290 MG/DL

## 2020-03-15 PROCEDURE — 80053 COMPREHEN METABOLIC PANEL: CPT

## 2020-03-15 PROCEDURE — 86803 HEPATITIS C AB TEST: CPT

## 2020-03-15 PROCEDURE — 36415 COLL VENOUS BLD VENIPUNCTURE: CPT

## 2020-03-15 PROCEDURE — 83036 HEMOGLOBIN GLYCOSYLATED A1C: CPT

## 2020-03-15 PROCEDURE — 80061 LIPID PANEL: CPT

## 2020-03-18 ENCOUNTER — OFFICE VISIT (OUTPATIENT)
Dept: FAMILY MEDICINE CLINIC | Facility: CLINIC | Age: 60
End: 2020-03-18
Payer: COMMERCIAL

## 2020-03-18 VITALS
HEART RATE: 76 BPM | SYSTOLIC BLOOD PRESSURE: 134 MMHG | TEMPERATURE: 97.5 F | DIASTOLIC BLOOD PRESSURE: 84 MMHG | WEIGHT: 188 LBS | HEIGHT: 67 IN | RESPIRATION RATE: 16 BRPM | OXYGEN SATURATION: 98 % | BODY MASS INDEX: 29.51 KG/M2

## 2020-03-18 DIAGNOSIS — E55.9 VITAMIN D DEFICIENCY: ICD-10-CM

## 2020-03-18 DIAGNOSIS — M79.642 HAND PAIN, LEFT: ICD-10-CM

## 2020-03-18 DIAGNOSIS — I10 ESSENTIAL HYPERTENSION: Primary | ICD-10-CM

## 2020-03-18 DIAGNOSIS — E78.2 MIXED HYPERLIPIDEMIA: ICD-10-CM

## 2020-03-18 DIAGNOSIS — K21.9 GASTROESOPHAGEAL REFLUX DISEASE, ESOPHAGITIS PRESENCE NOT SPECIFIED: ICD-10-CM

## 2020-03-18 DIAGNOSIS — R73.01 IMPAIRED FASTING GLUCOSE: ICD-10-CM

## 2020-03-18 DIAGNOSIS — E04.1 THYROID NODULE: ICD-10-CM

## 2020-03-18 PROCEDURE — 1036F TOBACCO NON-USER: CPT | Performed by: FAMILY MEDICINE

## 2020-03-18 PROCEDURE — 3008F BODY MASS INDEX DOCD: CPT | Performed by: FAMILY MEDICINE

## 2020-03-18 PROCEDURE — 99215 OFFICE O/P EST HI 40 MIN: CPT | Performed by: FAMILY MEDICINE

## 2020-03-18 PROCEDURE — 3075F SYST BP GE 130 - 139MM HG: CPT | Performed by: FAMILY MEDICINE

## 2020-03-18 PROCEDURE — 3079F DIAST BP 80-89 MM HG: CPT | Performed by: FAMILY MEDICINE

## 2020-03-18 RX ORDER — ROSUVASTATIN CALCIUM 5 MG/1
5 TABLET, COATED ORAL DAILY
Qty: 90 TABLET | Refills: 3 | Status: SHIPPED | OUTPATIENT
Start: 2020-03-18 | End: 2021-03-15

## 2020-03-18 RX ORDER — FAMOTIDINE 20 MG/1
TABLET, FILM COATED ORAL
Qty: 60 TABLET | Refills: 5 | Status: SHIPPED | OUTPATIENT
Start: 2020-03-18 | End: 2020-10-19 | Stop reason: SDUPTHER

## 2020-03-18 NOTE — ASSESSMENT & PLAN NOTE
Discussed dietary modifications with patient  Recommended to follow a low carb diet, avoid concentrated sweets, lose weight

## 2020-03-18 NOTE — PROGRESS NOTES
Chief Complaint   Patient presents with    Follow-up     6 month follow up     Health Maintenance   Topic Date Due    PT PLAN OF CARE  1960    DTaP,Tdap,and Td Vaccines (1 - Tdap) 06/21/1970    HIV Screening  06/21/1974    Annual Physical  06/21/1977    BMI: Followup Plan  03/13/2020    Depression Screening PHQ  09/18/2020    BMI: Adult  03/18/2021    Pneumococcal Vaccine: 65+ Years (1 of 2 - PCV13) 06/21/2024    CRC Screening: Colonoscopy  05/30/2028    Hepatitis C Screening  Completed    Influenza Vaccine  Completed    Pneumococcal Vaccine: Pediatrics (0 to 5 Years) and At-Risk Patients (6 to 59 Years)  Aged Out    HIB Vaccine  Aged Out    Hepatitis B Vaccine  Aged Out    IPV Vaccine  Aged Out    Hepatitis A Vaccine  Aged Out    Meningococcal ACWY Vaccine  Aged Out    HPV Vaccine  Aged Out     BMI Counseling: Body mass index is 29 44 kg/m²  The BMI is above normal  Nutrition recommendations include reducing portion sizes, decreasing overall calorie intake, 3-5 servings of fruits/vegetables daily, reducing fast food intake, consuming healthier snacks, decreasing soda and/or juice intake, moderation in carbohydrate intake, increasing intake of lean protein, reducing intake of saturated fat and trans fat and reducing intake of cholesterol  Exercise recommendations include moderate aerobic physical activity for 150 minutes/week  Assessment/Plan:    Essential hypertension  BP is stable  Continue Metoprolol  mg 1 tablet daily  Impaired fasting glucose  Discussed dietary modifications with patient  Recommended to follow a low carb diet, avoid concentrated sweets, lose weight  Mixed hyperlipidemia  Uncontrolled  Will start Crestor 5 mg daily  Follow a low cholesterol, low fat diet  Cont  Omega 3  Recheck CMP, lipid panel in 2-3 months  Vitamin D deficiency  Continue Vit D 5000 IU daily        Thyroid nodule  FNA biopsy from February 2019 showed atypia of indeterminate significance  Afirma testing was benign  Patient is asymptomatic  Follow- up with [facility] endocrinology  GERD (gastroesophageal reflux disease)  Discussed anti-reflux measures with patient  Recommended to avoid caffeine,  tomato juice, tomato sauce,spicy, fried foods  Do not eat 2-3 hours prior to bedtime  Will start Pepcid 20 mg 1 tablet twice daily before meals for 4 weeks, then may take PRN  Recommended to call office with update on symptoms in 4 weeks  If continues with heartburn will refer to gastroenterologist for evaluation, discuss scheduling EGD to R/o Tai's esophagus  Hand pain, left  Recommended evaluation by orthopedic surgeon Dr Amanda Gonsales  I have spent 40 minutes with Patient  today in which greater than 50% of this time was spent in counseling/coordination of care regarding Diagnostic results, Risks and benefits of tx options, Intructions for management, Patient and family education, Importance of tx compliance, Risk factor reductions and Impressions  Schedule follow-up visit in 6 months  Diagnoses and all orders for this visit:    Essential hypertension    Impaired fasting glucose    Mixed hyperlipidemia  -     rosuvastatin (CRESTOR) 5 mg tablet; Take 1 tablet (5 mg total) by mouth daily  -     Comprehensive metabolic panel; Future  -     Lipid panel; Future    Vitamin D deficiency    Thyroid nodule    Gastroesophageal reflux disease, esophagitis presence not specified  -     famotidine (PEPCID) 20 mg tablet; Take 1 tablet twice daily before meals    Hand pain, left  -     Ambulatory referral to Orthopedic Surgery; Future          Subjective:      Patient ID: Dieudonne Brito is a 60 y o  male  HPI     Patient presents for 6 month follow-up visit  PMHx: HTN, Hyperlipidemia, IFG, Vit D deficiency, right thyroid nodule           Reviewed current medications, blood work results from 3/15/20      Cholesterol 297, triglycerides 290, HDL 41, , potassium 0 7, creatinine 1 14, LFTs -within normal range, glucose 131, Hb A1C 5 9  HTN - patient takes Metoprolol  mg 1 tablet daily  Denies chest pain, dizziness, shortness of breath  Hyperlipidemia - uncontrolled  Patient does not follow a strict diet  Takes Omega 3 1000 mg 1 capsule twice daily  Denies family history of CAD, stroke  C/o left hand pain palmar aspect for the past month  Denies left hand injury  No tingling, numbness in fingers  Patient c/o heartburn at night  Family history is positive for PUD, Tai's esophagus  Patient admits eating spicy, acidic foods, drinks coffee  Denies abdominal pain, nausea, vomiting  He did not try Tums or OTC Pepcid  Denies tobacco use  R thyroid nodule - patient has been followed by endocrinology Dr Jose Ayers, was seen by endocrinology DELISA in 10/19  FNA biopsy performed in February 2019 showed atypia of undetermined significance  Afirma testing was benign  Colonoscopy done by colorectal surgeon Dr Cha Bang in May 2018, 1 polyp was remowed which showed no dysplasia or malignancy  Patient was advised to repeat colonoscopy in 5 years  Patient works at [employer], got Flu shot in 10/19  The following portions of the patient's history were reviewed and updated as appropriate: current medications, past family history, past medical history, past social history, past surgical history and problem list     Review of Systems   Constitutional: Negative for activity change, appetite change, chills, fatigue and fever  HENT: Negative for congestion, dental problem, ear pain, hearing loss, mouth sores, nosebleeds, sore throat, tinnitus and trouble swallowing  Eyes: Negative for pain, discharge, redness, itching and visual disturbance  Respiratory: Negative for cough, chest tightness, shortness of breath and wheezing  Cardiovascular: Negative for chest pain, palpitations and leg swelling  Gastrointestinal: Negative for abdominal pain, blood in stool, constipation, diarrhea, nausea and vomiting         C/o heartburn at night   Genitourinary: Negative for difficulty urinating, dysuria, flank pain, frequency and hematuria  Musculoskeletal: Negative for back pain, joint swelling, myalgias and neck pain  Left hand pain   Skin: Negative for rash and wound  Neurological: Negative for dizziness, numbness and headaches  Hematological: Negative  Psychiatric/Behavioral: Negative  Objective:      /84 (BP Location: Left arm, Patient Position: Sitting, Cuff Size: Standard)   Pulse 76   Temp 97 5 °F (36 4 °C) (Tympanic)   Resp 16   Ht 5' 7" (1 702 m)   Wt 85 3 kg (188 lb)   SpO2 98%   BMI 29 44 kg/m²          Physical Exam   Constitutional: He appears well-developed and well-nourished  Overweight   HENT:   Head: Normocephalic and atraumatic  Right Ear: External ear normal    Left Ear: External ear normal    Mouth/Throat: Oropharynx is clear and moist    Eyes: Pupils are equal, round, and reactive to light  Conjunctivae are normal    Cardiovascular: Normal rate, regular rhythm and normal heart sounds  No murmur heard  No BL LE edema  No carotid bruits BL   Pulmonary/Chest: Effort normal and breath sounds normal    Abdominal: Soft  Bowel sounds are normal  There is no tenderness  Musculoskeletal:   Left hand exam: tender bulging area on palmar aspect  No hand joints swelling  Skin: Skin is warm and dry  No rash noted  Psychiatric: He has a normal mood and affect  Nursing note and vitals reviewed

## 2020-03-18 NOTE — ASSESSMENT & PLAN NOTE
Uncontrolled  Will start Crestor 5 mg daily  Follow a low cholesterol, low fat diet  Cont  Omega 3  Recheck CMP, lipid panel in 2-3 months

## 2020-03-18 NOTE — ASSESSMENT & PLAN NOTE
FNA biopsy from February 2019 showed atypia of indeterminate significance  Afirma testing was benign  Patient is asymptomatic  Follow- up with [facility] endocrinology

## 2020-03-18 NOTE — ASSESSMENT & PLAN NOTE
Discussed anti-reflux measures with patient  Recommended to avoid caffeine,  tomato juice, tomato sauce,spicy, fried foods  Do not eat 2-3 hours prior to bedtime  Will start Pepcid 20 mg 1 tablet twice daily before meals for 4 weeks, then may take PRN  Recommended to call office with update on symptoms in 4 weeks  If continues with heartburn will refer to gastroenterologist for evaluation, discuss scheduling EGD to R/o Tai's esophagus

## 2020-06-16 DIAGNOSIS — I10 ESSENTIAL HYPERTENSION: ICD-10-CM

## 2020-06-16 RX ORDER — METOPROLOL SUCCINATE 100 MG/1
100 TABLET, EXTENDED RELEASE ORAL DAILY
Qty: 90 TABLET | Refills: 3 | Status: SHIPPED | OUTPATIENT
Start: 2020-06-16 | End: 2021-06-22 | Stop reason: SDUPTHER

## 2020-09-23 ENCOUNTER — OFFICE VISIT (OUTPATIENT)
Dept: FAMILY MEDICINE CLINIC | Facility: CLINIC | Age: 60
End: 2020-09-23
Payer: COMMERCIAL

## 2020-09-23 VITALS
DIASTOLIC BLOOD PRESSURE: 82 MMHG | WEIGHT: 185 LBS | RESPIRATION RATE: 20 BRPM | SYSTOLIC BLOOD PRESSURE: 124 MMHG | HEIGHT: 67 IN | HEART RATE: 64 BPM | TEMPERATURE: 97.9 F | BODY MASS INDEX: 29.03 KG/M2

## 2020-09-23 DIAGNOSIS — E78.2 MIXED HYPERLIPIDEMIA: ICD-10-CM

## 2020-09-23 DIAGNOSIS — R73.01 IMPAIRED FASTING GLUCOSE: ICD-10-CM

## 2020-09-23 DIAGNOSIS — E55.9 VITAMIN D DEFICIENCY: ICD-10-CM

## 2020-09-23 DIAGNOSIS — I10 ESSENTIAL HYPERTENSION: Primary | ICD-10-CM

## 2020-09-23 DIAGNOSIS — K21.9 GASTROESOPHAGEAL REFLUX DISEASE, ESOPHAGITIS PRESENCE NOT SPECIFIED: ICD-10-CM

## 2020-09-23 PROBLEM — S20.219A CHEST WALL CONTUSION: Status: RESOLVED | Noted: 2018-08-14 | Resolved: 2020-09-23

## 2020-09-23 PROCEDURE — 99213 OFFICE O/P EST LOW 20 MIN: CPT | Performed by: FAMILY MEDICINE

## 2020-09-23 NOTE — PROGRESS NOTES
Chief Complaint   Patient presents with    Follow-up     no concerns     Health Maintenance   Topic Date Due    PT PLAN OF CARE  1960    HIV Screening  06/21/1974    Annual Physical  06/21/1977    DTaP,Tdap,and Td Vaccines (2 - Td) 05/14/2018    Influenza Vaccine  07/01/2020    BMI: Followup Plan  03/18/2021    Depression Screening PHQ  09/23/2021    BMI: Adult  09/23/2021    Colorectal Cancer Screening  05/30/2028    Hepatitis C Screening  Completed    Pneumococcal Vaccine: Pediatrics (0 to 5 Years) and At-Risk Patients (6 to 59 Years)  Aged Out    HIB Vaccine  Aged Out    Hepatitis B Vaccine  Aged Out    IPV Vaccine  Aged Out    Hepatitis A Vaccine  Aged Out    Meningococcal ACWY Vaccine  Aged Out    HPV Vaccine  Aged Out     Assessment/Plan:    Essential hypertension  Blood pressure is well controlled  Continue Metoprolol  mg 1 tablet daily  Impaired fasting glucose  Follow a low carb diet, avoid concentrated sweets, work on weight reduction  Mixed hyperlipidemia   Continue Crestor 5 mg daily, Omega 3  Follow low-cholesterol, low-fat diet  Check CMP, lipid panel as ordered in 3/20  GERD (gastroesophageal reflux disease)  Symptoms are stable  Continue Famotidine 20 mg 1 tablet twice daily before meals  Avoid caffeine, fried, fatty foods, late night meals  Vitamin D deficiency  Continue vit D 5000 IU daily  HM: patient will get Flu shot at work next month  Schedule follow-up office visit in 6 months  Diagnoses and all orders for this visit:    Essential hypertension    Impaired fasting glucose    Mixed hyperlipidemia    Gastroesophageal reflux disease, esophagitis presence not specified    Vitamin D deficiency          Subjective:      Patient ID: Orlando Boateng is a 63 y o  male  HPI     Patient presents for 6 month follow-up office visit  Reviewed current medications      Patient did not go for blood work prior to today's visit due to COVID-19 pandemic and busy work schedule  He offers no specific complaints today  PMHx: HTN, Hyperlipidemia, IFG, Vit D deficiency, right thyroid nodule, BPH, right renal cyst        HTN - blood pressure remained stable  Patient takes Metoprolol  mg 1 tablet daily  Denies chest pain, shortness of breath, dizziness  Hyperlipidemia - currently taking Crestor 5 mg daily, Omega 3 1000 mg 1 capsule twice daily  GERD - c/o occasional heartburn  Takes Pepcid 20 mg 1 tablet twice daily  Denies abdominal pain  Nonsmoker  R thyroid nodule - patient has been followed by endocrinology Dr Maria L Lopez  FNA biopsy performed in February 2019 showed atypia of undetermined significance  Affirma testing was benign  Follow-up visit scheduled with endocrinology in November  Colonoscopy done in May 2018 by colorectal surgeon Dr Stephanie Wolff, 1 polyp was removed  Patient was advised to repeat colonoscopy in 5 years  Patient works at [employer], will get Flu shot next month  The following portions of the patient's history were reviewed and updated as appropriate: allergies, current medications, past family history, past social history, past surgical history and problem list     Review of Systems   Constitutional: Negative for activity change, appetite change, chills, fatigue and fever  HENT: Negative for congestion, ear pain, hearing loss, mouth sores, nosebleeds, sore throat, tinnitus and trouble swallowing  Eyes: Negative for pain, discharge, redness, itching and visual disturbance  Respiratory: Negative for cough, chest tightness, shortness of breath and wheezing  Cardiovascular: Negative for chest pain, palpitations and leg swelling  Gastrointestinal: Negative for abdominal pain, blood in stool, constipation, diarrhea, nausea and vomiting          Occasional heartburn   Genitourinary: Negative for difficulty urinating, dysuria, flank pain, frequency and hematuria  Musculoskeletal: Negative for arthralgias, back pain, joint swelling, myalgias and neck pain  Skin: Negative for rash and wound  Neurological: Negative for dizziness, syncope and headaches  Hematological: Negative  Psychiatric/Behavioral: Negative  Objective:      /82 (BP Location: Left arm, Patient Position: Sitting, Cuff Size: Standard)   Pulse 64   Temp 97 9 °F (36 6 °C) (Oral)   Resp 20   Ht 5' 7" (1 702 m)   Wt 83 9 kg (185 lb)   BMI 28 98 kg/m²          Physical Exam  Vitals signs and nursing note reviewed  Constitutional:       Appearance: Normal appearance  HENT:      Head: Normocephalic and atraumatic  Right Ear: Tympanic membrane and external ear normal       Left Ear: Tympanic membrane and external ear normal    Eyes:      Conjunctiva/sclera: Conjunctivae normal       Pupils: Pupils are equal, round, and reactive to light  Neck:      Musculoskeletal: Normal range of motion and neck supple  Vascular: No carotid bruit  Cardiovascular:      Rate and Rhythm: Normal rate and regular rhythm  Heart sounds: No murmur  Pulmonary:      Effort: Pulmonary effort is normal       Breath sounds: Normal breath sounds  Abdominal:      General: Bowel sounds are normal  There is no distension  Palpations: Abdomen is soft  Tenderness: There is no abdominal tenderness  Musculoskeletal: Normal range of motion  General: No swelling, tenderness or deformity  Right lower leg: No edema  Left lower leg: No edema  Skin:     General: Skin is warm and dry  Findings: No rash  Neurological:      Mental Status: He is alert     Psychiatric:         Mood and Affect: Mood normal

## 2020-09-24 NOTE — ASSESSMENT & PLAN NOTE
Continue Crestor 5 mg daily, Omega 3  Follow low-cholesterol, low-fat diet  Check CMP, lipid panel as ordered in 3/20

## 2020-09-24 NOTE — ASSESSMENT & PLAN NOTE
Symptoms are stable  Continue Famotidine 20 mg 1 tablet twice daily before meals  Avoid caffeine, fried, fatty foods, late night meals

## 2020-09-24 NOTE — ASSESSMENT & PLAN NOTE
Blood pressure is well controlled  Continue Metoprolol  mg 1 tablet daily  Pt arrived to xray recovery area for MRI sedation via w/c with daughter.

## 2020-10-19 DIAGNOSIS — K21.9 GASTROESOPHAGEAL REFLUX DISEASE: ICD-10-CM

## 2020-10-19 RX ORDER — FAMOTIDINE 20 MG/1
TABLET, FILM COATED ORAL
Qty: 60 TABLET | Refills: 5 | Status: SHIPPED | OUTPATIENT
Start: 2020-10-19 | End: 2021-06-22 | Stop reason: SDUPTHER

## 2020-10-20 ENCOUNTER — LAB (OUTPATIENT)
Dept: LAB | Facility: HOSPITAL | Age: 60
End: 2020-10-20
Payer: COMMERCIAL

## 2020-10-20 DIAGNOSIS — N40.0 BENIGN PROSTATIC HYPERPLASIA WITHOUT LOWER URINARY TRACT SYMPTOMS: ICD-10-CM

## 2020-10-20 DIAGNOSIS — E04.1 THYROID NODULE: ICD-10-CM

## 2020-10-20 LAB
PSA SERPL-MCNC: 1.8 NG/ML (ref 0–4)
T4 FREE SERPL-MCNC: 0.85 NG/DL (ref 0.76–1.46)
TSH SERPL DL<=0.05 MIU/L-ACNC: 2.43 UIU/ML (ref 0.36–3.74)

## 2020-10-20 PROCEDURE — 84153 ASSAY OF PSA TOTAL: CPT

## 2020-10-20 PROCEDURE — 36415 COLL VENOUS BLD VENIPUNCTURE: CPT

## 2020-10-20 PROCEDURE — 84439 ASSAY OF FREE THYROXINE: CPT

## 2020-10-20 PROCEDURE — 84443 ASSAY THYROID STIM HORMONE: CPT

## 2020-11-04 ENCOUNTER — OFFICE VISIT (OUTPATIENT)
Dept: ENDOCRINOLOGY | Facility: CLINIC | Age: 60
End: 2020-11-04
Payer: COMMERCIAL

## 2020-11-04 VITALS
TEMPERATURE: 97.8 F | WEIGHT: 192.8 LBS | HEIGHT: 67 IN | SYSTOLIC BLOOD PRESSURE: 154 MMHG | DIASTOLIC BLOOD PRESSURE: 90 MMHG | BODY MASS INDEX: 30.26 KG/M2 | HEART RATE: 68 BPM

## 2020-11-04 DIAGNOSIS — E04.1 THYROID NODULE: Primary | ICD-10-CM

## 2020-11-04 DIAGNOSIS — E78.2 MIXED HYPERLIPIDEMIA: ICD-10-CM

## 2020-11-04 DIAGNOSIS — R73.01 IMPAIRED FASTING GLUCOSE: ICD-10-CM

## 2020-11-04 DIAGNOSIS — E55.9 VITAMIN D DEFICIENCY: ICD-10-CM

## 2020-11-04 PROCEDURE — 99214 OFFICE O/P EST MOD 30 MIN: CPT | Performed by: INTERNAL MEDICINE

## 2020-11-12 ENCOUNTER — TRANSCRIBE ORDERS (OUTPATIENT)
Dept: ADMINISTRATIVE | Facility: HOSPITAL | Age: 60
End: 2020-11-12

## 2020-11-12 DIAGNOSIS — S32.030D CLOSED COMPRESSION FRACTURE OF L3 LUMBAR VERTEBRA WITH ROUTINE HEALING, SUBSEQUENT ENCOUNTER: Primary | ICD-10-CM

## 2020-11-13 ENCOUNTER — HOSPITAL ENCOUNTER (OUTPATIENT)
Dept: RADIOLOGY | Facility: HOSPITAL | Age: 60
Discharge: HOME | End: 2020-11-13
Attending: ORTHOPAEDIC SURGERY
Payer: COMMERCIAL

## 2020-11-13 ENCOUNTER — OFFICE VISIT (OUTPATIENT)
Dept: OBGYN CLINIC | Facility: HOSPITAL | Age: 60
End: 2020-11-13
Payer: COMMERCIAL

## 2020-11-13 VITALS
WEIGHT: 189 LBS | SYSTOLIC BLOOD PRESSURE: 158 MMHG | DIASTOLIC BLOOD PRESSURE: 93 MMHG | HEART RATE: 61 BPM | BODY MASS INDEX: 29.6 KG/M2

## 2020-11-13 DIAGNOSIS — M79.642 LEFT HAND PAIN: Primary | ICD-10-CM

## 2020-11-13 DIAGNOSIS — M18.12 PRIMARY OSTEOARTHRITIS OF FIRST CARPOMETACARPAL JOINT OF LEFT HAND: ICD-10-CM

## 2020-11-13 DIAGNOSIS — M79.642 LEFT HAND PAIN: ICD-10-CM

## 2020-11-13 PROCEDURE — 73130 X-RAY EXAM OF HAND: CPT

## 2020-11-13 PROCEDURE — 99243 OFF/OP CNSLTJ NEW/EST LOW 30: CPT | Performed by: ORTHOPAEDIC SURGERY

## 2020-11-13 PROCEDURE — 20600 DRAIN/INJ JOINT/BURSA W/O US: CPT | Performed by: ORTHOPAEDIC SURGERY

## 2020-11-13 RX ORDER — LIDOCAINE HYDROCHLORIDE 10 MG/ML
0.5 INJECTION, SOLUTION EPIDURAL; INFILTRATION; INTRACAUDAL; PERINEURAL
Status: COMPLETED | OUTPATIENT
Start: 2020-11-13 | End: 2020-11-13

## 2020-11-13 RX ORDER — BETAMETHASONE SODIUM PHOSPHATE AND BETAMETHASONE ACETATE 3; 3 MG/ML; MG/ML
6 INJECTION, SUSPENSION INTRA-ARTICULAR; INTRALESIONAL; INTRAMUSCULAR; SOFT TISSUE
Status: COMPLETED | OUTPATIENT
Start: 2020-11-13 | End: 2020-11-13

## 2020-11-13 RX ADMIN — LIDOCAINE HYDROCHLORIDE 0.5 ML: 10 INJECTION, SOLUTION EPIDURAL; INFILTRATION; INTRACAUDAL; PERINEURAL at 09:13

## 2020-11-13 RX ADMIN — BETAMETHASONE SODIUM PHOSPHATE AND BETAMETHASONE ACETATE 6 MG: 3; 3 INJECTION, SUSPENSION INTRA-ARTICULAR; INTRALESIONAL; INTRAMUSCULAR; SOFT TISSUE at 09:13

## 2020-11-25 ENCOUNTER — TELEPHONE (OUTPATIENT)
Dept: ENDOCRINOLOGY | Facility: CLINIC | Age: 60
End: 2020-11-25

## 2020-11-25 ENCOUNTER — HOSPITAL ENCOUNTER (OUTPATIENT)
Dept: BONE DENSITY | Facility: MEDICAL CENTER | Age: 60
Discharge: HOME | End: 2020-11-25
Payer: COMMERCIAL

## 2020-11-25 DIAGNOSIS — S32.030D CLOSED COMPRESSION FRACTURE OF L3 LUMBAR VERTEBRA WITH ROUTINE HEALING, SUBSEQUENT ENCOUNTER: ICD-10-CM

## 2020-11-25 PROCEDURE — 77080 DXA BONE DENSITY AXIAL: CPT

## 2020-12-27 ENCOUNTER — IMMUNIZATIONS (OUTPATIENT)
Dept: FAMILY MEDICINE CLINIC | Facility: HOSPITAL | Age: 60
End: 2020-12-27
Payer: COMMERCIAL

## 2020-12-27 DIAGNOSIS — Z23 ENCOUNTER FOR IMMUNIZATION: ICD-10-CM

## 2020-12-27 PROCEDURE — 91301 SARS-COV-2 / COVID-19 MRNA VACCINE (MODERNA) 100 MCG: CPT

## 2020-12-27 PROCEDURE — 0011A SARS-COV-2 / COVID-19 MRNA VACCINE (MODERNA) 100 MCG: CPT

## 2021-01-24 ENCOUNTER — IMMUNIZATIONS (OUTPATIENT)
Dept: FAMILY MEDICINE CLINIC | Facility: HOSPITAL | Age: 61
End: 2021-01-24

## 2021-01-24 DIAGNOSIS — Z23 ENCOUNTER FOR IMMUNIZATION: Primary | ICD-10-CM

## 2021-01-24 PROCEDURE — 0012A SARS-COV-2 / COVID-19 MRNA VACCINE (MODERNA) 100 MCG: CPT

## 2021-01-24 PROCEDURE — 91301 SARS-COV-2 / COVID-19 MRNA VACCINE (MODERNA) 100 MCG: CPT

## 2021-02-17 ENCOUNTER — OFFICE VISIT (OUTPATIENT)
Dept: OBGYN CLINIC | Facility: HOSPITAL | Age: 61
End: 2021-02-17
Payer: COMMERCIAL

## 2021-02-17 VITALS
BODY MASS INDEX: 30.76 KG/M2 | DIASTOLIC BLOOD PRESSURE: 92 MMHG | HEART RATE: 74 BPM | WEIGHT: 196 LBS | HEIGHT: 67 IN | SYSTOLIC BLOOD PRESSURE: 173 MMHG

## 2021-02-17 DIAGNOSIS — M18.12 ARTHRITIS OF CARPOMETACARPAL (CMC) JOINT OF LEFT THUMB: Primary | ICD-10-CM

## 2021-02-17 PROCEDURE — 20600 DRAIN/INJ JOINT/BURSA W/O US: CPT | Performed by: ORTHOPAEDIC SURGERY

## 2021-02-17 PROCEDURE — 99213 OFFICE O/P EST LOW 20 MIN: CPT | Performed by: ORTHOPAEDIC SURGERY

## 2021-02-17 RX ORDER — LIDOCAINE HYDROCHLORIDE 10 MG/ML
0.5 INJECTION, SOLUTION INFILTRATION; PERINEURAL
Status: COMPLETED | OUTPATIENT
Start: 2021-02-17 | End: 2021-02-17

## 2021-02-17 RX ORDER — BETAMETHASONE SODIUM PHOSPHATE AND BETAMETHASONE ACETATE 3; 3 MG/ML; MG/ML
6 INJECTION, SUSPENSION INTRA-ARTICULAR; INTRALESIONAL; INTRAMUSCULAR; SOFT TISSUE
Status: COMPLETED | OUTPATIENT
Start: 2021-02-17 | End: 2021-02-17

## 2021-02-17 RX ADMIN — BETAMETHASONE SODIUM PHOSPHATE AND BETAMETHASONE ACETATE 6 MG: 3; 3 INJECTION, SUSPENSION INTRA-ARTICULAR; INTRALESIONAL; INTRAMUSCULAR; SOFT TISSUE at 09:16

## 2021-02-17 RX ADMIN — LIDOCAINE HYDROCHLORIDE 0.5 ML: 10 INJECTION, SOLUTION INFILTRATION; PERINEURAL at 09:16

## 2021-02-17 NOTE — PROGRESS NOTES
ASSESSMENT/PLAN:    Assessment:   Left thumb CMC OA, sp CMC CSI at previous appointment     Plan:   Left thumb CMC joint injection provided today  Continue Comfort Cool brace  Continue HEP     Follow Up:  3  month(s)    To Do Next Visit:  Recheck CMC OA    General Discussions:     CMC Arthritis: The anatomy and physiology of carpometacarpal joint arthritis was discussed with the patient today in the office  Deterioration of the articular cartilage eventually leads to hypermobility at the thumb [facility] joint, resulting in joint subluxation, osteophyte formation, cystic changes within the trapezium and base of the first metacarpal, as well as subchondral sclerosis  Eventually, pain, limited mobility, and compensatory hyperextension at the metacarpophalangeal joint may develop  While normal activity and usage of the thumb joint may provide a painful experience to the patient, this typically does not result in damage to the thumb or hand  Treatment options include resting thumb spica splints to decreased joint edema, pain, and inflammation  Therapy exercises to strengthen the thenar musculature may relieve pain, but do not alter the overall continued development of osteoarthritis  Oral medications, topical medications, corticosteroid injections may decrease pain and increase overall function  Eventually, approximately 5% of patients may require surgical intervention  _____________________________________________________  CHIEF COMPLAINT:  Chief Complaint   Patient presents with    Left Hand - Follow-up         SUBJECTIVE:  Brittanie Hernandez is a 63y o  year old male who presents for follow up for L thumb CMC arthritis  He was given a CSI at his last visit that provided significant pain relief that lasted until about two weeks ago  He has no limitations with daily activities and is able to work without restriction  He continues his HEP and continues to wear his brace which also provides relief        PAST MEDICAL HISTORY:  Past Medical History:   Diagnosis Date    Cat scratch fever     Colon polyp     Diverticulitis     GERD (gastroesophageal reflux disease)     Hyperlipidemia     Hypertension     IFG (impaired fasting glucose)     L3 vertebral fracture (HCC) 07/2018    Lumbar compression fracture (HCC)     Patella-femoral syndrome     Thyroid nodule        PAST SURGICAL HISTORY:  Past Surgical History:   Procedure Laterality Date    CLOSED REDUCTION WRIST FRACTURE      COLONOSCOPY      NM COLONOSCOPY FLX DX W/COLLJ SPEC WHEN PFRMD N/A 5/30/2018    Procedure: COLONOSCOPY;  Surgeon: Claudell Hoa, MD;  Location: [facility]; Service: Colorectal    UMBILICAL GRANULOMA EXCISION Left     groin    US GUIDED THYROID BIOPSY  9/20/2018    US GUIDED THYROID BIOPSY  2/13/2019    WISDOM TOOTH EXTRACTION      WRIST GANGLION EXCISION  1969    closed reduction        FAMILY HISTORY:  Family History   Problem Relation Age of Onset    Hypertension Mother     Melanoma Father     Diabetes type II Father     Colon cancer Maternal Grandmother     Colon cancer Maternal Uncle        SOCIAL HISTORY:  Social History     Tobacco Use    Smoking status: Never Smoker    Smokeless tobacco: Never Used   Substance Use Topics    Alcohol use:  Yes     Alcohol/week: 3 0 standard drinks     Types: 3 Glasses of wine per week     Comment: social    Drug use: No       MEDICATIONS:    Current Outpatient Medications:     Cholecalciferol (VITAMIN D3) 5000 units CAPS, Take 1 capsule by mouth daily, Disp: , Rfl:     famotidine (PEPCID) 20 mg tablet, Take 1 tablet twice daily before meals, Disp: 60 tablet, Rfl: 5    glucosamine 500 MG CAPS capsule, Take by mouth daily  , Disp: , Rfl:     metoprolol succinate (TOPROL-XL) 100 mg 24 hr tablet, Take 1 tablet (100 mg total) by mouth daily, Disp: 90 tablet, Rfl: 3    multivitamin (THERAGRAN) TABS, Take 1 tablet by mouth daily, Disp: , Rfl:     Omega-3 Fatty Acids (FISH OIL) 1200 MG CAPS, Take by mouth 2 (two) times a day , Disp: , Rfl:     rosuvastatin (CRESTOR) 5 mg tablet, Take 1 tablet (5 mg total) by mouth daily, Disp: 90 tablet, Rfl: 3    ALLERGIES:  No Known Allergies    REVIEW OF SYSTEMS:  Pertinent items are noted in HPI  A comprehensive review of systems was negative  LABS:  HgA1c:   Lab Results   Component Value Date    HGBA1C 5 9 (H) 03/15/2020     BMP:   Lab Results   Component Value Date    GLUCOSE 104 07/27/2018    CALCIUM 9 4 03/15/2020     (U) 08/22/2014    K 4 7 03/15/2020    CO2 27 03/15/2020     03/15/2020    BUN 15 03/15/2020    CREATININE 1 14 03/15/2020         _____________________________________________________  PHYSICAL EXAMINATION:  BP (!) 173/92   Pulse 74   Ht 5' 7" (1 702 m)   Wt 88 9 kg (196 lb)   BMI 30 70 kg/m²   General: well developed and well nourished, alert, oriented times 3 and appears comfortable  Psychiatric: Normal  HEENT: Trachea Midline, No torticollis  Cardiovascular: No discernable arrhythmia  Pulmonary: No wheezing or stridor  Skin: No masses, erythema, lacerations, fluctation, ulcerations  Neurovascular: Sensation Intact to the Median, Ulnar, Radial Nerve, Motor Intact to the Median, Ulnar, Radial Nerve and Pulses Intact    MUSCULOSKELETAL EXAMINATION:  LEFT SIDE:  CMC: Positive grind and Positive tendnerness CMC no appreciable hyperextension of the thumb, no crepitation, no instability, positive circumduction   5/5 abduction strength  No diminished sensation to the thumb or hand   Brisk capillary refill to the hand and thumb     _____________________________________________________  STUDIES REVIEWED:  No new images for review        PROCEDURES PERFORMED:  Small joint arthrocentesis: L thumb CMC  [place] Protocol:  Consent: Verbal consent obtained    Risks and benefits: risks, benefits and alternatives were discussed  Consent given by: patient    Supporting Documentation  Indications: pain   Procedure Details  Location: thumb - L thumb CMC  Needle size: 25 G  Approach: volar  Medications administered: 0 5 mL lidocaine 1 %; 6 mg betamethasone acetate-betamethasone sodium phosphate 6 (3-3) mg/mL                     I interviewed, took the history and examined the patient  I discuss the case with the resident and reviewed the resident's note  I supervised the resident and I agree with the resident management plan as it was presented to me  I was present in the clinic and examined the patient

## 2021-03-15 DIAGNOSIS — E78.2 MIXED HYPERLIPIDEMIA: ICD-10-CM

## 2021-03-15 RX ORDER — ROSUVASTATIN CALCIUM 5 MG/1
5 TABLET, COATED ORAL DAILY
Qty: 90 TABLET | Refills: 3 | Status: SHIPPED | OUTPATIENT
Start: 2021-03-15 | End: 2022-03-13

## 2021-03-24 ENCOUNTER — OFFICE VISIT (OUTPATIENT)
Dept: FAMILY MEDICINE CLINIC | Facility: CLINIC | Age: 61
End: 2021-03-24
Payer: COMMERCIAL

## 2021-03-24 VITALS
SYSTOLIC BLOOD PRESSURE: 144 MMHG | BODY MASS INDEX: 29.82 KG/M2 | RESPIRATION RATE: 14 BRPM | WEIGHT: 190 LBS | HEIGHT: 67 IN | DIASTOLIC BLOOD PRESSURE: 84 MMHG | TEMPERATURE: 97.9 F | OXYGEN SATURATION: 96 % | HEART RATE: 68 BPM

## 2021-03-24 DIAGNOSIS — E55.9 VITAMIN D DEFICIENCY: ICD-10-CM

## 2021-03-24 DIAGNOSIS — R07.89 ATYPICAL CHEST PAIN: ICD-10-CM

## 2021-03-24 DIAGNOSIS — R73.01 IMPAIRED FASTING GLUCOSE: ICD-10-CM

## 2021-03-24 DIAGNOSIS — E78.2 MIXED HYPERLIPIDEMIA: ICD-10-CM

## 2021-03-24 DIAGNOSIS — I10 ESSENTIAL HYPERTENSION: Primary | ICD-10-CM

## 2021-03-24 DIAGNOSIS — K21.9 GASTROESOPHAGEAL REFLUX DISEASE, UNSPECIFIED WHETHER ESOPHAGITIS PRESENT: ICD-10-CM

## 2021-03-24 PROCEDURE — 93000 ELECTROCARDIOGRAM COMPLETE: CPT | Performed by: FAMILY MEDICINE

## 2021-03-24 PROCEDURE — 99214 OFFICE O/P EST MOD 30 MIN: CPT | Performed by: FAMILY MEDICINE

## 2021-03-24 NOTE — ASSESSMENT & PLAN NOTE
Patient had episode of left-sided chest pain 2 months ago  EKG done in the office today showed sinus rhythm, 68 bpm   No acute ST-T wave changes  Most likely  left-sided chest pain was musculoskeletal in origin

## 2021-03-24 NOTE — ASSESSMENT & PLAN NOTE
Goal for /80  Continue Metoprolol  mg 1 tablet daily  Recommended follow low-sodium diet, work on weight reduction  Check blood pressure at work and call if BP > 140/90

## 2021-03-24 NOTE — PROGRESS NOTES
Chief Complaint   Patient presents with    Follow-up     6 month follow up     Health Maintenance   Topic Date Due    PT PLAN OF CARE  Never done    HIV Screening  Never done    Annual Physical  Never done    DTaP,Tdap,and Td Vaccines (2 - Td) 05/14/2018    BMI: Followup Plan  03/18/2021    Depression Screening PHQ  03/24/2022    BMI: Adult  03/24/2022    Colorectal Cancer Screening  05/30/2028    Hepatitis C Screening  Completed    Influenza Vaccine  Completed    COVID-19 Vaccine  Completed    Pneumococcal Vaccine: Pediatrics (0 to 5 Years) and At-Risk Patients (6 to 59 Years)  Aged Out    HIB Vaccine  Aged Out    Hepatitis B Vaccine  Aged Out    IPV Vaccine  Aged Out    Hepatitis A Vaccine  Aged Out    Meningococcal ACWY Vaccine  Aged Out    HPV Vaccine  Aged Out         BMI Counseling: Body mass index is 29 76 kg/m²  The BMI is above normal  Nutrition recommendations include decreasing portion sizes, encouraging healthy choices of fruits and vegetables, decreasing fast food intake, consuming healthier snacks, limiting drinks that contain sugar, moderation in carbohydrate intake, increasing intake of lean protein, reducing intake of saturated and trans fat and reducing intake of cholesterol  Exercise recommendations include exercising 3-5 times per week  No pharmacotherapy was ordered  Assessment/Plan:    Essential hypertension  Goal for /80  Continue Metoprolol  mg 1 tablet daily  Recommended follow low-sodium diet, work on weight reduction  Check blood pressure at work and call if BP > 140/90  Atypical chest pain  Patient had episode of left-sided chest pain 2 months ago  EKG done in the office today showed sinus rhythm, 68 bpm   No acute ST-T wave changes  Most likely  left-sided chest pain was musculoskeletal in origin  Impaired fasting glucose  Follow low carb diet, regular exercise      Mixed hyperlipidemia  Continue Crestor 5 mg 1 tablet EMPTIED PT'S URINAL AND TURNED ON BED ALARM AFTER HE GOT BACK INTO BED. PT
NEEDS NOTHING MORE AT THIS TIME. daily, Omega 3  Check CMP, lipid panel  Vitamin D deficiency  Continue vit D 5000 IU daily  GERD (gastroesophageal reflux disease)  Symptoms are stable  Continue Famotidine 20 mg 1 tablet twice daily before meals  Schedule follow-up office visit in 6 months  Diagnoses and all orders for this visit:    Essential hypertension  -     Comprehensive metabolic panel; Future  -     POCT ECG    Atypical chest pain  -     POCT ECG    Impaired fasting glucose  -     Comprehensive metabolic panel; Future    Mixed hyperlipidemia  -     Comprehensive metabolic panel; Future  -     Lipid panel; Future    Vitamin D deficiency    Gastroesophageal reflux disease, unspecified whether esophagitis present          Subjective:      Patient ID: Zina Astorga is a 63 y o  male  HPI     Patient presents for 6 month follow-up of chronic medical conditions  PMHx: HTN, Hyperlipidemia, IFG, Vit D deficiency, right thyroid nodule, BPH, right renal cyst        Reviewed current medications  Patient did not go for blood work prior to today's visit  HTN - patient takes Metoprolol  mg 1 tablet daily  Denies shortness of breath, dizziness  Patient reports that he had left-sided chest pain lasted 1 week approximately 2 months ago  He reports no associated symptoms such as shortness of breath, dizziness diaphoresis  He states that pain was likely due to muscle strain  Hyperlipidemia -currently taking Crestor 5 mg daily, Omega 3 1000 mg 1 capsule twice daily  Denies tobacco use  GERD - symptoms are stable  Patient takes Pepcid 20 mg 1 tablet twice daily  Denies abdominal pain  Patient has been followed by endocrinology Dr Jose Ayers, was seen in November 2020  DEXA scan done in November 2020 showed normal bone mineral density  Patient takes Vit D 5000  IU daily for Vit D deficiency  Patient has history of right thyroid nodule, followed by endocrinology      FNA biopsy performed in February 2019 showed atypia of undetermined significance  Affirma  testing was benign  Colonoscopy done  in May 2018 by colorectal surgeon Dr Makenzie Vela, 1 polyp was removed  Patient was recommended to repeat colonoscopy in 5 years  Patient completed Covid vaccination in 1/2021  The following portions of the patient's history were reviewed and updated as appropriate: allergies, past family history, past medical history, past social history, past surgical history and problem list     Review of Systems   Constitutional: Negative for activity change, appetite change, chills, fatigue and fever  HENT: Negative for congestion, ear pain, mouth sores, nosebleeds, sore throat, tinnitus and trouble swallowing  Eyes: Negative  Wears glasses   Respiratory: Negative for cough, chest tightness, shortness of breath and wheezing  Cardiovascular: Positive for chest pain (episode of chest pain 2 month ago)  Negative for palpitations and leg swelling  Gastrointestinal: Negative for abdominal pain, blood in stool, constipation, diarrhea, nausea and vomiting  Genitourinary: Negative for difficulty urinating, dysuria, flank pain, frequency and hematuria  Musculoskeletal: Negative for arthralgias, back pain, joint swelling, myalgias and neck pain  Neurological: Negative for dizziness, syncope and headaches  Hematological: Negative  Psychiatric/Behavioral: Negative for dysphoric mood and sleep disturbance  The patient is not nervous/anxious  Objective:      /84   Pulse 68   Temp 97 9 °F (36 6 °C) (Tympanic)   Resp 14   Ht 5' 7" (1 702 m)   Wt 86 2 kg (190 lb)   SpO2 96%   BMI 29 76 kg/m²          Physical Exam  Vitals signs and nursing note reviewed  Constitutional:       Appearance: Normal appearance  HENT:      Head: Normocephalic and atraumatic        Right Ear: Tympanic membrane and external ear normal       Left Ear: Tympanic membrane and external ear normal    Eyes:      Conjunctiva/sclera: Conjunctivae normal       Pupils: Pupils are equal, round, and reactive to light  Neck:      Musculoskeletal: Normal range of motion and neck supple  Vascular: No carotid bruit  Cardiovascular:      Rate and Rhythm: Normal rate and regular rhythm  Heart sounds: No murmur  Pulmonary:      Effort: Pulmonary effort is normal       Breath sounds: Normal breath sounds  Abdominal:      General: Bowel sounds are normal  There is no distension  Palpations: Abdomen is soft  Tenderness: There is no abdominal tenderness  Musculoskeletal: Normal range of motion  General: No swelling, tenderness or deformity  Right lower leg: No edema  Left lower leg: No edema  Skin:     General: Skin is warm and dry  Findings: No rash  Neurological:      Mental Status: He is alert     Psychiatric:         Mood and Affect: Mood normal

## 2021-05-19 ENCOUNTER — OFFICE VISIT (OUTPATIENT)
Dept: OBGYN CLINIC | Facility: HOSPITAL | Age: 61
End: 2021-05-19
Payer: COMMERCIAL

## 2021-05-19 VITALS
DIASTOLIC BLOOD PRESSURE: 90 MMHG | HEART RATE: 60 BPM | WEIGHT: 190 LBS | SYSTOLIC BLOOD PRESSURE: 162 MMHG | BODY MASS INDEX: 29.82 KG/M2 | HEIGHT: 67 IN

## 2021-05-19 DIAGNOSIS — M18.12 ARTHRITIS OF CARPOMETACARPAL (CMC) JOINT OF LEFT THUMB: Primary | ICD-10-CM

## 2021-05-19 PROCEDURE — 99213 OFFICE O/P EST LOW 20 MIN: CPT | Performed by: ORTHOPAEDIC SURGERY

## 2021-05-19 PROCEDURE — 20600 DRAIN/INJ JOINT/BURSA W/O US: CPT | Performed by: ORTHOPAEDIC SURGERY

## 2021-05-19 RX ORDER — BETAMETHASONE SODIUM PHOSPHATE AND BETAMETHASONE ACETATE 3; 3 MG/ML; MG/ML
3 INJECTION, SUSPENSION INTRA-ARTICULAR; INTRALESIONAL; INTRAMUSCULAR; SOFT TISSUE
Status: COMPLETED | OUTPATIENT
Start: 2021-05-19 | End: 2021-05-19

## 2021-05-19 RX ORDER — LIDOCAINE HYDROCHLORIDE 10 MG/ML
0.5 INJECTION, SOLUTION INFILTRATION; PERINEURAL
Status: COMPLETED | OUTPATIENT
Start: 2021-05-19 | End: 2021-05-19

## 2021-05-19 RX ADMIN — BETAMETHASONE SODIUM PHOSPHATE AND BETAMETHASONE ACETATE 3 MG: 3; 3 INJECTION, SUSPENSION INTRA-ARTICULAR; INTRALESIONAL; INTRAMUSCULAR; SOFT TISSUE at 09:03

## 2021-05-19 RX ADMIN — LIDOCAINE HYDROCHLORIDE 0.5 ML: 10 INJECTION, SOLUTION INFILTRATION; PERINEURAL at 09:03

## 2021-05-19 NOTE — PROGRESS NOTES
ASSESSMENT/PLAN:    Assessment:   Thumb CMC Arthritis  left    Plan:   steroid injections  - continue with betamethasone injections  Continue Comfort Cool splint as required    Follow Up:  3  month(s)    To Do Next Visit:  reassess current condition    _____________________________________________________  CHIEF COMPLAINT:  Chief Complaint   Patient presents with    Left Thumb - Follow-up     Aia 16- last injection 2/17/21         SUBJECTIVE:  Carol Dumont is a 63 y o  male who presents for follow up regarding  Left thumb CMC arthritis  Patient has been getting significant relief from his corticosteroid injections  Patient states that he just started getting some discomfort about a week or so ago  He uses his Comfort Cool splint when needed at work  He denies any other acute complaints  PAST MEDICAL HISTORY:  Past Medical History:   Diagnosis Date    Cat scratch fever     Colon polyp     Diverticulitis     GERD (gastroesophageal reflux disease)     Hyperlipidemia     Hypertension     IFG (impaired fasting glucose)     L3 vertebral fracture (HCC) 07/2018    Lumbar compression fracture (HCC)     Patella-femoral syndrome     Thyroid nodule        PAST SURGICAL HISTORY:  Past Surgical History:   Procedure Laterality Date    CLOSED REDUCTION WRIST FRACTURE      COLONOSCOPY      VT COLONOSCOPY FLX DX W/COLLJ SPEC WHEN PFRMD N/A 5/30/2018    Procedure: COLONOSCOPY;  Surgeon: Andrez Luther MD;  Location: [facility];   Service: Colorectal    UMBILICAL GRANULOMA EXCISION Left     groin    US GUIDED THYROID BIOPSY  9/20/2018    US GUIDED THYROID BIOPSY  2/13/2019    WISDOM TOOTH EXTRACTION      WRIST GANGLION EXCISION  1969    closed reduction        FAMILY HISTORY:  Family History   Problem Relation Age of Onset    Hypertension Mother     Melanoma Father     Diabetes type II Father     Colon cancer Maternal Grandmother     Colon cancer Maternal Uncle        SOCIAL HISTORY:  Social History Tobacco Use    Smoking status: Never Smoker    Smokeless tobacco: Never Used   Substance Use Topics    Alcohol use: Yes     Alcohol/week: 3 0 standard drinks     Types: 3 Glasses of wine per week     Comment: social    Drug use: No       MEDICATIONS:    Current Outpatient Medications:     Cholecalciferol (VITAMIN D3) 5000 units CAPS, Take 1 capsule by mouth daily, Disp: , Rfl:     famotidine (PEPCID) 20 mg tablet, Take 1 tablet twice daily before meals, Disp: 60 tablet, Rfl: 5    glucosamine 500 MG CAPS capsule, Take by mouth daily  , Disp: , Rfl:     metoprolol succinate (TOPROL-XL) 100 mg 24 hr tablet, Take 1 tablet (100 mg total) by mouth daily, Disp: 90 tablet, Rfl: 3    multivitamin (THERAGRAN) TABS, Take 1 tablet by mouth daily, Disp: , Rfl:     Omega-3 Fatty Acids (FISH OIL) 1200 MG CAPS, Take by mouth 2 (two) times a day , Disp: , Rfl:     rosuvastatin (CRESTOR) 5 mg tablet, Take 1 tablet (5 mg total) by mouth daily, Disp: 90 tablet, Rfl: 3    ALLERGIES:  No Known Allergies    REVIEW OF SYSTEMS:  Pertinent items are noted in HPI  A comprehensive review of systems was negative      LABS:  HgA1c:   Lab Results   Component Value Date    HGBA1C 5 9 (H) 03/15/2020     BMP:   Lab Results   Component Value Date    GLUCOSE 104 07/27/2018    CALCIUM 9 4 03/15/2020     (U) 08/22/2014    K 4 7 03/15/2020    CO2 27 03/15/2020     03/15/2020    BUN 15 03/15/2020    CREATININE 1 14 03/15/2020           _____________________________________________________  PHYSICAL EXAMINATION:  Vital signs: /90   Pulse 60   Ht 5' 7" (1 702 m)   Wt 86 2 kg (190 lb)   BMI 29 76 kg/m²   General: well developed and well nourished, alert, oriented times 3 and appears comfortable  Psychiatric: Normal  HEENT: Trachea Midline, No torticollis  Cardiovascular: No discernable arrhythmia  Pulmonary: No wheezing or stridor  Abdomen: No rebound or guarding  Extremities: No peripheral edema  Skin: No masses, erythema, lacerations, fluctation, ulcerations  Neurovascular: Sensation Intact to the Median, Ulnar, Radial Nerve, Motor Intact to the Median, Ulnar, Radial Nerve and Pulses Intact    MUSCULOSKELETAL EXAMINATION:  LEFT SIDE:  CMC: No Instability, Positive tendnerness CMC, Positive Shoulder Sign and no MP hyperextension    _____________________________________________________  STUDIES REVIEWED:  No Studies to review      PROCEDURES PERFORMED:  Small joint arthrocentesis: L thumb CMC  [facility] Protocol:  Consent: Verbal consent obtained  Consent given by: patient  Time out: Immediately prior to procedure a "time out" was called to verify the correct patient, procedure, equipment, support staff and site/side marked as required    Timeout called at: 5/19/2021 8:42 AM   Supporting Documentation  Indications: pain   Procedure Details  Location: thumb - L thumb CMC  Needle size: 25 G  Medications administered: 0 5 mL lidocaine 1 %; 3 mg betamethasone acetate-betamethasone sodium phosphate 6 (3-3) mg/mL    Patient tolerance: patient tolerated the procedure well with no immediate complications  Dressing:  Sterile dressing applied             Scribe Attestation    I,:  Sharon Leiva PA-C am acting as a scribe while in the presence of the attending physician :       I,:  Sharon Pollack MD personally performed the services described in this documentation    as scribed in my presence :           Nica Ann,:  Sharon Leiva PA-C am acting as a scribe while in the presence of the attending physician :       I,:  Sharon Pollack MD personally performed the services described in this documentation    as scribed in my presence :

## 2021-05-21 ENCOUNTER — HOSPITAL ENCOUNTER (EMERGENCY)
Facility: HOSPITAL | Age: 61
Discharge: HOME | End: 2021-05-21
Attending: EMERGENCY MEDICINE
Payer: COMMERCIAL

## 2021-05-21 VITALS
TEMPERATURE: 97.9 F | BODY MASS INDEX: 28.19 KG/M2 | RESPIRATION RATE: 18 BRPM | DIASTOLIC BLOOD PRESSURE: 100 MMHG | OXYGEN SATURATION: 99 % | SYSTOLIC BLOOD PRESSURE: 160 MMHG | WEIGHT: 180 LBS | HEART RATE: 66 BPM

## 2021-05-21 DIAGNOSIS — I10 ESSENTIAL HYPERTENSION: Primary | ICD-10-CM

## 2021-05-21 DIAGNOSIS — M54.2 NECK PAIN: ICD-10-CM

## 2021-05-21 DIAGNOSIS — M54.9 UPPER BACK PAIN ON RIGHT SIDE: ICD-10-CM

## 2021-05-21 LAB
ALBUMIN SERPL BCP-MCNC: 4 G/DL (ref 3.5–5)
ALP SERPL-CCNC: 64 U/L (ref 46–116)
ALT SERPL W P-5'-P-CCNC: 24 U/L (ref 12–78)
ANION GAP SERPL CALCULATED.3IONS-SCNC: 7 MMOL/L (ref 4–13)
AST SERPL W P-5'-P-CCNC: 14 U/L (ref 5–45)
ATRIAL RATE: 62 BPM
BASOPHILS # BLD AUTO: 0.04 THOUSANDS/ΜL (ref 0–0.1)
BASOPHILS NFR BLD AUTO: 1 % (ref 0–1)
BILIRUB SERPL-MCNC: 0.46 MG/DL (ref 0.2–1)
BUN SERPL-MCNC: 23 MG/DL (ref 5–25)
CALCIUM SERPL-MCNC: 8.6 MG/DL (ref 8.3–10.1)
CHLORIDE SERPL-SCNC: 101 MMOL/L (ref 100–108)
CO2 SERPL-SCNC: 28 MMOL/L (ref 21–32)
CREAT SERPL-MCNC: 1.13 MG/DL (ref 0.6–1.3)
EOSINOPHIL # BLD AUTO: 0 THOUSAND/ΜL (ref 0–0.61)
EOSINOPHIL NFR BLD AUTO: 0 % (ref 0–6)
ERYTHROCYTE [DISTWIDTH] IN BLOOD BY AUTOMATED COUNT: 13.2 % (ref 11.6–15.1)
GFR SERPL CREATININE-BSD FRML MDRD: 70 ML/MIN/1.73SQ M
GLUCOSE SERPL-MCNC: 135 MG/DL (ref 65–140)
HCT VFR BLD AUTO: 45 % (ref 36.5–49.3)
HGB BLD-MCNC: 14.9 G/DL (ref 12–17)
IMM GRANULOCYTES # BLD AUTO: 0.01 THOUSAND/UL (ref 0–0.2)
IMM GRANULOCYTES NFR BLD AUTO: 0 % (ref 0–2)
LYMPHOCYTES # BLD AUTO: 1.91 THOUSANDS/ΜL (ref 0.6–4.47)
LYMPHOCYTES NFR BLD AUTO: 25 % (ref 14–44)
MCH RBC QN AUTO: 31.9 PG (ref 26.8–34.3)
MCHC RBC AUTO-ENTMCNC: 33.1 G/DL (ref 31.4–37.4)
MCV RBC AUTO: 96 FL (ref 82–98)
MONOCYTES # BLD AUTO: 0.72 THOUSAND/ΜL (ref 0.17–1.22)
MONOCYTES NFR BLD AUTO: 10 % (ref 4–12)
NEUTROPHILS # BLD AUTO: 4.85 THOUSANDS/ΜL (ref 1.85–7.62)
NEUTS SEG NFR BLD AUTO: 64 % (ref 43–75)
NRBC BLD AUTO-RTO: 0 /100 WBCS
P AXIS: 49 DEGREES
PLATELET # BLD AUTO: 271 THOUSANDS/UL (ref 149–390)
PMV BLD AUTO: 9.5 FL (ref 8.9–12.7)
POTASSIUM SERPL-SCNC: 4.5 MMOL/L (ref 3.5–5.3)
PR INTERVAL: 134 MS
PROT SERPL-MCNC: 7.8 G/DL (ref 6.4–8.2)
QRS AXIS: 69 DEGREES
QRSD INTERVAL: 84 MS
QT INTERVAL: 384 MS
QTC INTERVAL: 384 MS
RBC # BLD AUTO: 4.67 MILLION/UL (ref 3.88–5.62)
SODIUM SERPL-SCNC: 136 MMOL/L (ref 136–145)
T WAVE AXIS: 4 DEGREES
TROPONIN I SERPL-MCNC: 0.02 NG/ML
VENTRICULAR RATE: 60 BPM
WBC # BLD AUTO: 7.53 THOUSAND/UL (ref 4.31–10.16)

## 2021-05-21 PROCEDURE — 93010 ELECTROCARDIOGRAM REPORT: CPT | Performed by: INTERNAL MEDICINE

## 2021-05-21 PROCEDURE — 93005 ELECTROCARDIOGRAM TRACING: CPT

## 2021-05-21 PROCEDURE — 84484 ASSAY OF TROPONIN QUANT: CPT | Performed by: EMERGENCY MEDICINE

## 2021-05-21 PROCEDURE — 36415 COLL VENOUS BLD VENIPUNCTURE: CPT

## 2021-05-21 PROCEDURE — 99283 EMERGENCY DEPT VISIT LOW MDM: CPT

## 2021-05-21 PROCEDURE — 85025 COMPLETE CBC W/AUTO DIFF WBC: CPT | Performed by: EMERGENCY MEDICINE

## 2021-05-21 PROCEDURE — 99284 EMERGENCY DEPT VISIT MOD MDM: CPT | Performed by: EMERGENCY MEDICINE

## 2021-05-21 PROCEDURE — 80053 COMPREHEN METABOLIC PANEL: CPT | Performed by: EMERGENCY MEDICINE

## 2021-05-21 RX ORDER — LIDOCAINE 50 MG/G
1 PATCH TOPICAL ONCE
Status: DISCONTINUED | OUTPATIENT
Start: 2021-05-21 | End: 2021-05-21 | Stop reason: HOSPADM

## 2021-05-21 RX ADMIN — LIDOCAINE 5% 1 PATCH: 700 PATCH TOPICAL at 12:48

## 2021-05-21 NOTE — DISCHARGE INSTRUCTIONS
Diagnosis: right upper back/ right posterior neck pain / elevated blood pressure 150/90    - activity as tolerated     - the lidocaine patch can stay on for 12 hrs at a time -- can not get warm or wet- can use over the counter lidocaine patches to area    - for pain- over the counter tylenol 500 mg every 4 hrs     - need to check your blood pressure over several different times and places - when you are pain free - calm and relaxed - should be persistently under 140/90-- if not- please call your primary doctor to schedule an appointment     - please return to  the er for a ny new problems with talking/swallowing/ vision / balance or walking or any new/ worsening/concerning symptoms to you

## 2021-05-21 NOTE — Clinical Note
Nataly Hale was seen and treated in our emergency department on 5/21/2021  Diagnosis:     Holly Pavon  may return to work on return date  He may return on this date: 05/24/2021         If you have any questions or concerns, please don't hesitate to call        Chica Yip MD    ______________________________           _______________          _______________  Hospital Representative                              Date                                Time

## 2021-05-21 NOTE — ED PROCEDURE NOTE
PROCEDURE  ECG 12 Lead Documentation Only    Date/Time: 5/21/2021 12:46 PM  Performed by: Gus Lopez MD  Authorized by: Gus Lopez MD     Indications / Diagnosis:  Upper/neck pain/ elevated bp   ECG reviewed by me, the ED Provider: yes    Patient location:  Bedside and ED  Previous ECG:     Previous ECG:  Compared to current    Comparison ECG info:  3/14/06- no sign changes    Similarity:  No change    Comparison to cardiac monitor: Yes    Interpretation:     Interpretation: non-specific    Rate:     ECG rate:  60    ECG rate assessment: normal    Rhythm:     Rhythm: sinus rhythm    QRS:     QRS axis:  Normal    QRS intervals:  Normal  Conduction:     Conduction: normal    ST segments:     ST segments:  Normal  T waves:     T waves: flattening      Flattening:  III and aVF  Other findings:     Other findings: poor R wave progression and U wave    Comments:      No ecg signs of ischemia/ injury / r heart strain / boris/pericarditis         Gus Lopez MD  05/21/21 [id number]

## 2021-05-21 NOTE — Clinical Note
Xiomy Estrada was seen and treated in our emergency department on 5/21/2021  Diagnosis:     Partha Vargas  may return to work on return date  He may return on this date: 05/24/2021         If you have any questions or concerns, please don't hesitate to call        Kassidy Hawkins MD    ______________________________           _______________          _______________  Hospital Representative                              Date                                Time

## 2021-05-25 ENCOUNTER — TELEPHONE (OUTPATIENT)
Dept: FAMILY MEDICINE CLINIC | Facility: CLINIC | Age: 61
End: 2021-05-25

## 2021-05-25 DIAGNOSIS — I10 ESSENTIAL HYPERTENSION: Primary | ICD-10-CM

## 2021-05-25 RX ORDER — LOSARTAN POTASSIUM 50 MG/1
50 TABLET ORAL DAILY
Qty: 30 TABLET | Refills: 5 | Status: SHIPPED | OUTPATIENT
Start: 2021-05-25 | End: 2021-05-26 | Stop reason: SDUPTHER

## 2021-05-25 NOTE — TELEPHONE ENCOUNTER
Please call patient  Recommend to continue Metoprolol 100 mg 1 tablet daily  I sent prescription to the pharmacy for Losartan 50 mg to start 1 tablet daily in the morning for HTN  Recommend to avoid salty foods  Stay well hydrated  Continue to monitor blood pressure at home and call if blood pressure remains elevated > 140/90, continues with headaches  Schedule follow-up visit for next week

## 2021-05-25 NOTE — TELEPHONE ENCOUNTER
Pt states that he had to go to the ED due that his BP was 190/105 the lowest it went was 155/99  Pt states that he is still presenting headaches and they won't go away ED recommended for his BP to be modified or increased  No appointment available He would like to know if the metoprolol succinate might be increased   Please advise

## 2021-05-26 ENCOUNTER — TELEPHONE (OUTPATIENT)
Dept: FAMILY MEDICINE CLINIC | Facility: CLINIC | Age: 61
End: 2021-05-26

## 2021-05-26 DIAGNOSIS — I10 ESSENTIAL HYPERTENSION: ICD-10-CM

## 2021-05-26 RX ORDER — LOSARTAN POTASSIUM 50 MG/1
50 TABLET ORAL DAILY
Qty: 30 TABLET | Refills: 5 | Status: SHIPPED | OUTPATIENT
Start: 2021-05-26 | End: 2021-06-17

## 2021-05-26 NOTE — TELEPHONE ENCOUNTER
Patient went to [facility] to  his prescription for losartan 50mg and they have not received it

## 2021-05-26 NOTE — TELEPHONE ENCOUNTER
Please inform patient that I resent prescription for Losartan 50 mg to take 1 tablet daily [facility] pharmacy in [facility]

## 2021-05-29 NOTE — ED PROVIDER NOTES
History  Chief Complaint   Patient presents with    Hypertension     PT was sent to ED from work with c/o "splitting headache" and HTN PT denies CP and SOB (PT took his prescribed Metoprolol 100mg this am)     63 yr male c/o gradual onset of mosly frontal headache today has had before with elevated bp-- no n/v- no neck pain - no visul comps-- admits to med compliant- no other comps       History provided by:  Patient   used: No    Hypertension  Associated symptoms: headaches    Associated symptoms: no dizziness and no weakness        Prior to Admission Medications   Prescriptions Last Dose Informant Patient Reported? Taking?    Cholecalciferol (VITAMIN D3) 5000 units CAPS  Self Yes No   Sig: Take 1 capsule by mouth daily   Omega-3 Fatty Acids (FISH OIL) 1200 MG CAPS  Self Yes No   Sig: Take by mouth 2 (two) times a day    famotidine (PEPCID) 20 mg tablet  Self No No   Sig: Take 1 tablet twice daily before meals   glucosamine 500 MG CAPS capsule  Self Yes No   Sig: Take by mouth daily     metoprolol succinate (TOPROL-XL) 100 mg 24 hr tablet  Self No No   Sig: Take 1 tablet (100 mg total) by mouth daily   multivitamin (THERAGRAN) TABS  Self Yes No   Sig: Take 1 tablet by mouth daily   rosuvastatin (CRESTOR) 5 mg tablet  Self No No   Sig: Take 1 tablet (5 mg total) by mouth daily      Facility-Administered Medications: None       Past Medical History:   Diagnosis Date    Cat scratch fever     Colon polyp     Diverticulitis     GERD (gastroesophageal reflux disease)     Hyperlipidemia     Hypertension     IFG (impaired fasting glucose)     L3 vertebral fracture (HCC) 07/2018    Lumbar compression fracture (HCC)     Patella-femoral syndrome     Thyroid nodule        Past Surgical History:   Procedure Laterality Date    CLOSED REDUCTION WRIST FRACTURE      COLONOSCOPY      MS COLONOSCOPY FLX DX W/COLLJ SPEC WHEN PFRMD N/A 5/30/2018    Procedure: COLONOSCOPY;  Surgeon: Sukumar Roque MD;  Location: [facility]; Service: Colorectal    UMBILICAL GRANULOMA EXCISION Left     groin    US GUIDED THYROID BIOPSY  9/20/2018    US GUIDED THYROID BIOPSY  2/13/2019    WISDOM TOOTH EXTRACTION      WRIST GANGLION EXCISION  1969    closed reduction        Family History   Problem Relation Age of Onset    Hypertension Mother     Melanoma Father     Diabetes type II Father     Colon cancer Maternal Grandmother     Colon cancer Maternal Uncle      I have reviewed and agree with the history as documented  E-Cigarette/Vaping    E-Cigarette Use Never User      E-Cigarette/Vaping Substances     Social History     Tobacco Use    Smoking status: Never Smoker    Smokeless tobacco: Never Used   Substance Use Topics    Alcohol use: Yes     Alcohol/week: 3 0 standard drinks     Types: 3 Glasses of wine per week     Comment: social    Drug use: No       Review of Systems   Constitutional: Negative  HENT: Negative  Eyes: Negative  Respiratory: Negative  Cardiovascular: Negative  Gastrointestinal: Negative  Endocrine: Negative  Genitourinary: Negative  Musculoskeletal: Negative  Skin: Negative  Allergic/Immunologic: Negative  Neurological: Positive for headaches  Negative for dizziness, tremors, seizures, syncope, facial asymmetry, speech difficulty, weakness, light-headedness and numbness  Hematological: Negative  Psychiatric/Behavioral: Negative  Physical Exam  Physical Exam  Vitals signs and nursing note reviewed  Constitutional:       General: He is not in acute distress  Appearance: Normal appearance  He is not ill-appearing, toxic-appearing or diaphoretic  Comments: avss- htnsive-- which decreased in the er - pulse ox 97 % on ra- interpretation is normal- no intervention- well appearing- in nad    HENT:      Head: Normocephalic and atraumatic  Right Ear: Tympanic membrane, ear canal and external ear normal  There is no impacted cerumen  Left Ear: Tympanic membrane, ear canal and external ear normal  There is no impacted cerumen  Nose: Nose normal  No congestion or rhinorrhea  Mouth/Throat:      Mouth: Mucous membranes are moist       Pharynx: Oropharynx is clear  No oropharyngeal exudate or posterior oropharyngeal erythema  Comments: Sym palate rise  Eyes:      General: No scleral icterus  Right eye: No discharge  Left eye: No discharge  Extraocular Movements: Extraocular movements intact  Conjunctiva/sclera: Conjunctivae normal       Pupils: Pupils are equal, round, and reactive to light  Comments: Mm pink- no papilledema   Neck:      Musculoskeletal: Normal range of motion and neck supple  No neck rigidity or muscular tenderness  Vascular: No carotid bruit  Comments: No pmt c/t/l/s spine   Cardiovascular:      Rate and Rhythm: Normal rate and regular rhythm  Pulses: Normal pulses  Heart sounds: Normal heart sounds  No murmur  No friction rub  No gallop  Pulmonary:      Effort: Pulmonary effort is normal  No respiratory distress  Breath sounds: Normal breath sounds  No stridor  No wheezing, rhonchi or rales  Chest:      Chest wall: No tenderness  Abdominal:      General: Bowel sounds are normal  There is no distension  Palpations: Abdomen is soft  There is no mass  Tenderness: There is no abdominal tenderness  There is no right CVA tenderness, left CVA tenderness, guarding or rebound  Hernia: No hernia is present  Musculoskeletal: Normal range of motion  General: No swelling, tenderness, deformity or signs of injury  Right lower leg: No edema  Left lower leg: No edema  Lymphadenopathy:      Cervical: No cervical adenopathy  Skin:     General: Skin is warm  Capillary Refill: Capillary refill takes less than 2 seconds  Coloration: Skin is not jaundiced or pale  Findings: No bruising, erythema, lesion or rash  Neurological:      General: No focal deficit present  Mental Status: He is alert and oriented to person, place, and time  Mental status is at baseline  Cranial Nerves: No cranial nerve deficit  Sensory: No sensory deficit  Motor: No weakness  Coordination: Coordination normal       Gait: Gait normal       Comments: Normal non focl neuro exam - no cerebellar signs   Psychiatric:         Mood and Affect: Mood normal          Behavior: Behavior normal          Thought Content:  Thought content normal          Judgment: Judgment normal          Vital Signs  ED Triage Vitals [05/21/21 1106]   Temperature Pulse Respirations Blood Pressure SpO2   97 9 °F (36 6 °C) 67 18 161/100 97 %      Temp Source Heart Rate Source Patient Position - Orthostatic VS BP Location FiO2 (%)   Oral Monitor Sitting Left arm --      Pain Score       5           Vitals:    05/21/21 1106 05/21/21 1200 05/21/21 1215   BP: 161/100 155/91 160/100   Pulse: 67 58 66   Patient Position - Orthostatic VS: Sitting Lying Lying         Visual Acuity      ED Medications  Medications - No data to display    Diagnostic Studies  Results Reviewed     Procedure Component Value Units Date/Time    Troponin I [[id number]]  (Normal) Collected: 05/21/21 1111    Lab Status: Final result Specimen: Blood from Arm, Right Updated: 05/21/21 1145     Troponin I 0 02 ng/mL     Comprehensive metabolic panel [[id number]] Collected: 05/21/21 1111    Lab Status: Final result Specimen: Blood from Arm, Right Updated: 05/21/21 1140     Sodium 136 mmol/L      Potassium 4 5 mmol/L      Chloride 101 mmol/L      CO2 28 mmol/L      ANION GAP 7 mmol/L      BUN 23 mg/dL      Creatinine 1 13 mg/dL      Glucose 135 mg/dL      Calcium 8 6 mg/dL      AST 14 U/L      ALT 24 U/L      Alkaline Phosphatase 64 U/L      Total Protein 7 8 g/dL      Albumin 4 0 g/dL      Total Bilirubin 0 46 mg/dL      eGFR 70 ml/min/1 73sq m     Narrative:      National Kidney Disease Foundation guidelines for Chronic Kidney Disease (CKD):     Stage 1 with normal or high GFR (GFR > 90 mL/min/1 73 square meters)    Stage 2 Mild CKD (GFR = 60-89 mL/min/1 73 square meters)    Stage 3A Moderate CKD (GFR = 45-59 mL/min/1 73 square meters)    Stage 3B Moderate CKD (GFR = 30-44 mL/min/1 73 square meters)    Stage 4 Severe CKD (GFR = 15-29 mL/min/1 73 square meters)    Stage 5 End Stage CKD (GFR <15 mL/min/1 73 square meters)  Note: GFR calculation is accurate only with a steady state creatinine    CBC and differential [[id number]] Collected: 05/21/21 1111    Lab Status: Final result Specimen: Blood from Arm, Right Updated: 05/21/21 1119     WBC 7 53 Thousand/uL      RBC 4 67 Million/uL      Hemoglobin 14 9 g/dL      Hematocrit 45 0 %      MCV 96 fL      MCH 31 9 pg      MCHC 33 1 g/dL      RDW 13 2 %      MPV 9 5 fL      Platelets 612 Thousands/uL      nRBC 0 /100 WBCs      Neutrophils Relative 64 %      Immat GRANS % 0 %      Lymphocytes Relative 25 %      Monocytes Relative 10 %      Eosinophils Relative 0 %      Basophils Relative 1 %      Neutrophils Absolute 4 85 Thousands/µL      Immature Grans Absolute 0 01 Thousand/uL      Lymphocytes Absolute 1 91 Thousands/µL      Monocytes Absolute 0 72 Thousand/µL      Eosinophils Absolute 0 00 Thousand/µL      Basophils Absolute 0 04 Thousands/µL                  No orders to display              Procedures  Procedures         ED Course  ED Course as of May 29 1231   Fri May 21, 2021   1156 Er md note workup first nursed                                               MDM    Disposition  Final diagnoses:   Essential hypertension   Upper back pain on right side   Neck pain     Time reflects when diagnosis was documented in both MDM as applicable and the Disposition within this note     Time User Action Codes Description Comment    5/21/2021  1:09 PM Eual Cola Add [I10] Essential hypertension     5/21/2021  1:09 PM Eual Cola Add [M54 9] Upper back pain on right side     5/21/2021  1:10 PM Min Curry [M54 2] Neck pain       ED Disposition     ED Disposition Condition Date/Time Comment    Discharge Stable Fri May 21, 2021  1:09 PM Katie Saunders discharge to home/self care  Follow-up Information    None         Discharge Medication List as of 5/21/2021  1:21 PM      CONTINUE these medications which have NOT CHANGED    Details   Cholecalciferol (VITAMIN D3) 5000 units CAPS Take 1 capsule by mouth daily, Historical Med      famotidine (PEPCID) 20 mg tablet Take 1 tablet twice daily before meals, Normal      glucosamine 500 MG CAPS capsule Take by mouth daily  , Historical Med      metoprolol succinate (TOPROL-XL) 100 mg 24 hr tablet Take 1 tablet (100 mg total) by mouth daily, Starting Tue 6/16/2020, Normal      multivitamin (THERAGRAN) TABS Take 1 tablet by mouth daily, Historical Med      Omega-3 Fatty Acids (FISH OIL) 1200 MG CAPS Take by mouth 2 (two) times a day , Historical Med      rosuvastatin (CRESTOR) 5 mg tablet Take 1 tablet (5 mg total) by mouth daily, Starting Mon 3/15/2021, Normal           No discharge procedures on file      PDMP Review     None          ED Provider  Electronically Signed by           Jennifer Hubbard MD  05/29/21 2897

## 2021-06-17 ENCOUNTER — OFFICE VISIT (OUTPATIENT)
Dept: FAMILY MEDICINE CLINIC | Facility: CLINIC | Age: 61
End: 2021-06-17
Payer: COMMERCIAL

## 2021-06-17 VITALS
HEIGHT: 67 IN | OXYGEN SATURATION: 98 % | HEART RATE: 76 BPM | SYSTOLIC BLOOD PRESSURE: 140 MMHG | RESPIRATION RATE: 14 BRPM | DIASTOLIC BLOOD PRESSURE: 82 MMHG | TEMPERATURE: 97.5 F | WEIGHT: 186 LBS | BODY MASS INDEX: 29.19 KG/M2

## 2021-06-17 DIAGNOSIS — M54.2 NECK PAIN: ICD-10-CM

## 2021-06-17 DIAGNOSIS — M62.838 NECK MUSCLE SPASM: ICD-10-CM

## 2021-06-17 DIAGNOSIS — I10 ESSENTIAL HYPERTENSION: Primary | ICD-10-CM

## 2021-06-17 DIAGNOSIS — E78.2 MIXED HYPERLIPIDEMIA: ICD-10-CM

## 2021-06-17 PROCEDURE — 99214 OFFICE O/P EST MOD 30 MIN: CPT | Performed by: FAMILY MEDICINE

## 2021-06-17 RX ORDER — LOSARTAN POTASSIUM 100 MG/1
100 TABLET ORAL DAILY
Qty: 30 TABLET | Refills: 5 | Status: SHIPPED | OUTPATIENT
Start: 2021-06-17 | End: 2021-07-21 | Stop reason: SDUPTHER

## 2021-06-17 RX ORDER — CYCLOBENZAPRINE HCL 5 MG
5 TABLET ORAL 3 TIMES DAILY PRN
Qty: 30 TABLET | Refills: 0 | Status: SHIPPED | OUTPATIENT
Start: 2021-06-17 | End: 2021-07-19

## 2021-06-17 RX ORDER — METHYLPREDNISOLONE 4 MG/1
TABLET ORAL
Qty: 21 EACH | Refills: 0 | Status: SHIPPED | OUTPATIENT
Start: 2021-06-17 | End: 2021-08-25 | Stop reason: ALTCHOICE

## 2021-06-18 NOTE — ASSESSMENT & PLAN NOTE
BP on retake 144/98  Recommended to increase dose of Losartan from 50 to 100 mg daily  Continue Metoprolol  mg 1 tablet daily  Follow a low-sodium diet, lose weight  Check BMP  Monitor blood pressure at home and call with blood pressure log in 1 week

## 2021-06-19 ENCOUNTER — HOSPITAL ENCOUNTER (OUTPATIENT)
Dept: RADIOLOGY | Facility: HOSPITAL | Age: 61
Discharge: HOME | End: 2021-06-19
Payer: COMMERCIAL

## 2021-06-19 DIAGNOSIS — M54.2 NECK PAIN: ICD-10-CM

## 2021-06-19 PROCEDURE — 72040 X-RAY EXAM NECK SPINE 2-3 VW: CPT

## 2021-06-22 DIAGNOSIS — M62.838 NECK MUSCLE SPASM: ICD-10-CM

## 2021-06-22 DIAGNOSIS — I10 ESSENTIAL HYPERTENSION: ICD-10-CM

## 2021-06-22 DIAGNOSIS — K21.9 GASTROESOPHAGEAL REFLUX DISEASE: ICD-10-CM

## 2021-06-22 DIAGNOSIS — M54.2 NECK PAIN: ICD-10-CM

## 2021-06-22 RX ORDER — FAMOTIDINE 20 MG/1
TABLET, FILM COATED ORAL
Qty: 60 TABLET | Refills: 5 | Status: SHIPPED | OUTPATIENT
Start: 2021-06-22

## 2021-06-22 RX ORDER — METOPROLOL SUCCINATE 100 MG/1
100 TABLET, EXTENDED RELEASE ORAL DAILY
Qty: 90 TABLET | Refills: 3 | Status: SHIPPED | OUTPATIENT
Start: 2021-06-22 | End: 2022-07-06 | Stop reason: SDUPTHER

## 2021-06-27 DIAGNOSIS — M50.30 DDD (DEGENERATIVE DISC DISEASE), CERVICAL: ICD-10-CM

## 2021-06-27 DIAGNOSIS — M54.2 NECK PAIN: Primary | ICD-10-CM

## 2021-06-30 DIAGNOSIS — M50.30 DDD (DEGENERATIVE DISC DISEASE), CERVICAL: ICD-10-CM

## 2021-06-30 DIAGNOSIS — M54.2 NECK PAIN: Primary | ICD-10-CM

## 2021-06-30 DIAGNOSIS — I10 ESSENTIAL HYPERTENSION: ICD-10-CM

## 2021-06-30 RX ORDER — AMLODIPINE BESYLATE 5 MG/1
5 TABLET ORAL DAILY
Qty: 30 TABLET | Refills: 5 | Status: SHIPPED | OUTPATIENT
Start: 2021-06-30 | End: 2021-12-27

## 2021-07-16 PROBLEM — M50.30 DDD (DEGENERATIVE DISC DISEASE), CERVICAL: Status: ACTIVE | Noted: 2021-07-16

## 2021-07-17 ENCOUNTER — OFFICE VISIT (OUTPATIENT)
Dept: FAMILY MEDICINE CLINIC | Facility: CLINIC | Age: 61
End: 2021-07-17
Payer: COMMERCIAL

## 2021-07-17 VITALS
TEMPERATURE: 97.5 F | HEIGHT: 67 IN | SYSTOLIC BLOOD PRESSURE: 126 MMHG | HEART RATE: 70 BPM | DIASTOLIC BLOOD PRESSURE: 78 MMHG | RESPIRATION RATE: 12 BRPM | OXYGEN SATURATION: 97 % | BODY MASS INDEX: 29.35 KG/M2 | WEIGHT: 187 LBS

## 2021-07-17 DIAGNOSIS — M54.2 NECK PAIN: ICD-10-CM

## 2021-07-17 DIAGNOSIS — E78.2 MIXED HYPERLIPIDEMIA: ICD-10-CM

## 2021-07-17 DIAGNOSIS — M50.30 DDD (DEGENERATIVE DISC DISEASE), CERVICAL: ICD-10-CM

## 2021-07-17 DIAGNOSIS — I10 ESSENTIAL HYPERTENSION: Primary | ICD-10-CM

## 2021-07-17 PROCEDURE — 99213 OFFICE O/P EST LOW 20 MIN: CPT | Performed by: FAMILY MEDICINE

## 2021-07-17 NOTE — ASSESSMENT & PLAN NOTE
X-ray of the cervical spine so that showed degenerative disc disease at C5-C6 with 3 mm of retrolisthesis at this level  Patient noticed improvement in neck pain with Medrol Dosepak, but then pain came back  He did not schedule physical therapy  He has appointment with pain management Dr Marion Mcqueen on Monday, July 19, 2021

## 2021-07-17 NOTE — ASSESSMENT & PLAN NOTE
BP improved since last month  Continue Metoprolol  mg 1 tablet daily, Losartan 100 mg daily, Amlodipine 5 mg daily  Follow a low-sodium diet, regular exercise, work on weight reduction

## 2021-07-17 NOTE — PROGRESS NOTES
Chief Complaint   Patient presents with    Follow-up     4 week follow up     Health Maintenance   Topic Date Due    PT PLAN OF CARE  Never done    HIV Screening  Never done    Annual Physical  Never done    DTaP,Tdap,and Td Vaccines (2 - Td or Tdap) 05/14/2018    Influenza Vaccine (1) 09/01/2021    Depression Screening PHQ  03/24/2022    BMI: Followup Plan  03/24/2022    BMI: Adult  07/17/2022    Colorectal Cancer Screening  05/30/2028    Hepatitis C Screening  Completed    COVID-19 Vaccine  Completed    Pneumococcal Vaccine: Pediatrics (0 to 5 Years) and At-Risk Patients (6 to 59 Years)  Aged Out    HIB Vaccine  Aged Out    Hepatitis B Vaccine  Aged Out    IPV Vaccine  Aged Out    Hepatitis A Vaccine  Aged Out    Meningococcal ACWY Vaccine  Aged Out    HPV Vaccine  Aged Out                  Assessment/Plan:    Essential hypertension  BP improved since last month  Continue Metoprolol  mg 1 tablet daily, Losartan 100 mg daily, Amlodipine 5 mg daily  Follow a low-sodium diet, regular exercise, work on weight reduction  Neck pain  X-ray of the cervical spine so that showed degenerative disc disease at C5-C6 with 3 mm of retrolisthesis at this level  Patient noticed improvement in neck pain with Medrol Dosepak, but then pain came back  He did not schedule physical therapy  He has appointment with pain management Dr Amrit Soliz on Monday, July 19, 2021  Mixed hyperlipidemia  Continue Crestor 5 mg daily, Omega 3  Check CMP, lipid panel as ordered last month  Schedule follow-up office visit / physical exam in 3 months         Diagnoses and all orders for this visit:    Essential hypertension    Neck pain    DDD (degenerative disc disease), cervical    Mixed hyperlipidemia    Other orders  -     Cancel: HIV 1/2 Antigen/Antibody (4th Generation) w Reflex [facility]; Future  -     Cancel: TDAP VACCINE GREATER THAN OR EQUAL TO 8YO IM          Subjective:      Patient ID: Ravinder Serranoier Lis Newton is a 57 y o  male  HPI     Patient presents for 4 week follow-up visit for HTN, neck pain  HTN -  blood pressure improved since last visit after adjusting blood pressure medications and adding Amlodipine 5 mg daily  Currently taking Amlodipine 5 mg at bedtime, Losartan 100 mg and Metoprolol  mg daily in the morning  BP at home ranges 128-140/76-84  Denies chest pain, shortness of breath, dizziness  Reports improvement in headaches  Neck pain - X-ray of the cervical spine done on June 19, 2021 showed degenerative disc disease at C5- C6 with 3 mm of retrolisthesis at this level  Patient noticed improvement in neck pain with Medrol Dosepak, but the pain returned  C/o right-sided neck worsening with moving his head to the right side  Denies numbness, tingling in upper extremities  Patient did start physical therapy  He scheduled appointment with pain management Dr Emily Ovalles on July 19, 2021  Hyperlipidemia - patient takes Crestor 5 mg daily  He did not go for blood test to check CMP, lipid panel as ordered last month  Family history is positive for HTN in his parents and his brother  Denies tobacco use  The following portions of the patient's history were reviewed and updated as appropriate: allergies, current medications, past medical history, past social history, past surgical history and problem list     Review of Systems   Constitutional: Negative for activity change, appetite change, chills, fatigue and fever  HENT: Negative for congestion, nosebleeds, sore throat and trouble swallowing  Eyes: Negative  Respiratory: Negative for cough, chest tightness, shortness of breath and wheezing  Cardiovascular: Negative for chest pain, palpitations and leg swelling  Gastrointestinal: Negative for abdominal pain, constipation, diarrhea, nausea and vomiting  Genitourinary: Negative for difficulty urinating, dysuria, flank pain, frequency and hematuria  Musculoskeletal: Positive for neck pain  Negative for back pain and joint swelling  Skin: Negative for rash  Neurological: Positive for headaches (improved)  Negative for dizziness, syncope and numbness  Hematological: Negative  Psychiatric/Behavioral: Negative for sleep disturbance  The patient is not nervous/anxious  Objective:      /78 (BP Location: Left arm, Patient Position: Sitting, Cuff Size: Adult)   Pulse 70   Temp 97 5 °F (36 4 °C) (Tympanic)   Resp 12   Ht 5' 7" (1 702 m)   Wt 84 8 kg (187 lb)   SpO2 97%   BMI 29 29 kg/m²          Physical Exam  Vitals and nursing note reviewed  Constitutional:       Appearance: Normal appearance  Comments: Overweight   HENT:      Head: Normocephalic and atraumatic  Eyes:      Conjunctiva/sclera: Conjunctivae normal       Pupils: Pupils are equal, round, and reactive to light  Neck:      Vascular: No carotid bruit  Cardiovascular:      Rate and Rhythm: Normal rate and regular rhythm  Heart sounds: No murmur heard  Pulmonary:      Effort: Pulmonary effort is normal       Breath sounds: Normal breath sounds  Abdominal:      General: Bowel sounds are normal  There is no distension  Palpations: Abdomen is soft  Tenderness: There is no abdominal tenderness  Musculoskeletal:      Cervical back: Normal range of motion and neck supple  Right lower leg: No edema  Left lower leg: No edema  Comments: Neck exam: decreased ROM with lateral rotation to right side  Paraspinal tenderness in cervical area on right side  Skin:     General: Skin is warm and dry  Findings: No rash  Neurological:      General: No focal deficit present  Mental Status: He is alert     Psychiatric:         Mood and Affect: Mood normal

## 2021-07-19 ENCOUNTER — CONSULT (OUTPATIENT)
Dept: PAIN MEDICINE | Facility: CLINIC | Age: 61
End: 2021-07-19
Payer: COMMERCIAL

## 2021-07-19 VITALS
SYSTOLIC BLOOD PRESSURE: 150 MMHG | BODY MASS INDEX: 29.35 KG/M2 | HEART RATE: 69 BPM | HEIGHT: 67 IN | WEIGHT: 187 LBS | DIASTOLIC BLOOD PRESSURE: 86 MMHG

## 2021-07-19 DIAGNOSIS — M50.30 DDD (DEGENERATIVE DISC DISEASE), CERVICAL: ICD-10-CM

## 2021-07-19 DIAGNOSIS — M62.838 NECK MUSCLE SPASM: ICD-10-CM

## 2021-07-19 DIAGNOSIS — M54.2 NECK PAIN: Primary | ICD-10-CM

## 2021-07-19 PROCEDURE — 99244 OFF/OP CNSLTJ NEW/EST MOD 40: CPT | Performed by: ANESTHESIOLOGY

## 2021-07-19 RX ORDER — TIZANIDINE 2 MG/1
2 TABLET ORAL EVERY 8 HOURS PRN
Qty: 90 TABLET | Refills: 1 | Status: SHIPPED | OUTPATIENT
Start: 2021-07-19 | End: 2021-08-25

## 2021-07-19 NOTE — PROGRESS NOTES
Assessment  1  Neck pain    2  DDD (degenerative disc disease), cervical    3  Neck muscle spasm        Plan  59-year-old male referred by Dr Irene Carpenter, presenting for initial consultation regarding a 6 week history of right-sided neck pain that radiates into the right trapezius region  He denies any radicular symptoms into the upper extremities  He denies any trauma or inciting event  X-ray of the cervical spine shows degenerative disc disease and facet arthrosis, most pronounced at C5-6  He has not done any formal physical therapy or chiropractic treatment  He is currently taking Motrin and Tylenol p r n  with mild to moderate relief  He has tried a course of oral steroids which provided significant relief  Cyclobenzaprine to not provide much relief at all  Patient's neck pain seems to be mostly myofascial and facet mediated  No evidence of cervical radiculopathy  Discogenic causes of neck pain lower on the differential     1  I will order physical therapy to reduced pain and improved function  2  Patient may continue with ibuprofen and Tylenol p r n   3  I will prescribe a trial of tizanidine 2 mg q 8 hours p r n  for his myofascial pain  4  I will follow up the patient in 6 weeks       My impressions and treatment recommendations were discussed in detail with the patient who verbalized understanding and had no further questions  Discharge instructions were provided  I personally saw and examined the patient and I agree with the above discussed plan of care  No orders of the defined types were placed in this encounter  No orders of the defined types were placed in this encounter  History of Present Illness    David Velazco is a 57 y o  male referred by Dr Irene Carpenter, presenting for initial consultation regarding a 6 week history of right-sided neck pain that radiates into the right trapezius region  He denies any radicular symptoms into the upper extremities   He denies any bladder or bowel incontinence or balance issues  He denies any trauma or inciting event  X-ray of the cervical spine shows degenerative disc disease and facet arthrosis, most pronounced at C5-6  He has not done any formal physical therapy or chiropractic treatment  He is currently taking Motrin and Tylenol p r n  with mild to moderate relief  He has tried a course of oral steroids which provided significant relief  Cyclobenzaprine to not provide much relief at all  The patient rates his pain a 4/10 on the pain is nearly constant  The pain is worse at night and described as cramping and pressure like  The pain is increased with bending  He does find some relief with Tylenol and NSAIDs  Excellent relief with oral steroids  Other than as stated above, the patient denies any interval changes in medications, medical condition, mental condition, symptoms, or allergies since the last office visit  I have personally reviewed and/or updated the patient's past medical history, past surgical history, family history, social history, current medications, allergies, and vital signs today  Review of Systems   Constitutional: Negative for fever and unexpected weight change  HENT: Positive for hearing loss  Negative for trouble swallowing  Eyes: Negative for visual disturbance  Respiratory: Negative for shortness of breath and wheezing  Cardiovascular: Negative for chest pain and palpitations  Gastrointestinal: Negative for constipation, diarrhea, nausea and vomiting  Endocrine: Negative for cold intolerance, heat intolerance and polydipsia  Genitourinary: Negative for difficulty urinating and frequency  Musculoskeletal: Positive for joint swelling  Negative for arthralgias, gait problem and myalgias  Skin: Negative for rash  Neurological: Positive for headaches  Negative for dizziness, seizures, syncope and weakness  Hematological: Does not bruise/bleed easily     Psychiatric/Behavioral: Negative for dysphoric mood  All other systems reviewed and are negative  Patient Active Problem List   Diagnosis    Closed compression fracture of L3 lumbar vertebra    Thyroid nodule    Primary osteoarthritis of left knee    Primary osteoarthritis of right knee    Essential hypertension    Mixed hyperlipidemia    Impaired fasting glucose    Renal cyst    DDD (degenerative disc disease), lumbar    Spondylolisthesis of lumbosacral region    Chronic low back pain    Peyronie disease    Vitamin D deficiency    GERD (gastroesophageal reflux disease)    Hand pain, left    Atypical chest pain    Neck pain    Neck muscle spasm    DDD (degenerative disc disease), cervical       Past Medical History:   Diagnosis Date    Cat scratch fever     Colon polyp     Diverticulitis     GERD (gastroesophageal reflux disease)     Hyperlipidemia     Hypertension     IFG (impaired fasting glucose)     L3 vertebral fracture (HCC) 07/2018    Lumbar compression fracture (HCC)     Patella-femoral syndrome     Thyroid nodule        Past Surgical History:   Procedure Laterality Date    CLOSED REDUCTION WRIST FRACTURE      COLONOSCOPY      HI COLONOSCOPY FLX DX W/COLLJ SPEC WHEN PFRMD N/A 5/30/2018    Procedure: COLONOSCOPY;  Surgeon: Trevor Sharif MD;  Location: [facility];   Service: Colorectal    UMBILICAL GRANULOMA EXCISION Left     groin    US GUIDED THYROID BIOPSY  9/20/2018    US GUIDED THYROID BIOPSY  2/13/2019    WISDOM TOOTH EXTRACTION      WRIST GANGLION EXCISION  1969    closed reduction        Family History   Problem Relation Age of Onset    Hypertension Mother     Melanoma Father     Diabetes type II Father     Hypertension Father     Colon cancer Maternal Grandmother     Colon cancer Maternal Uncle     Hypertension Brother        Social History     Occupational History    Not on file   Tobacco Use    Smoking status: Never Smoker    Smokeless tobacco: Never Used   Vaping Use    Vaping Use: Never used   Substance and Sexual Activity    Alcohol use: Yes     Alcohol/week: 3 0 standard drinks     Types: 3 Glasses of wine per week     Comment: social    Drug use: No    Sexual activity: Not on file       Current Outpatient Medications on File Prior to Visit   Medication Sig    amLODIPine (NORVASC) 5 mg tablet Take 1 tablet (5 mg total) by mouth daily    Cholecalciferol (VITAMIN D3) 5000 units CAPS Take 1 capsule by mouth daily    famotidine (PEPCID) 20 mg tablet Take 1 tablet twice daily before meals    glucosamine 500 MG CAPS capsule Take by mouth daily      losartan (COZAAR) 100 MG tablet Take 1 tablet (100 mg total) by mouth daily    metoprolol succinate (TOPROL-XL) 100 mg 24 hr tablet Take 1 tablet (100 mg total) by mouth daily    multivitamin (THERAGRAN) TABS Take 1 tablet by mouth daily    Omega-3 Fatty Acids (FISH OIL) 1200 MG CAPS Take by mouth 2 (two) times a day     rosuvastatin (CRESTOR) 5 mg tablet Take 1 tablet (5 mg total) by mouth daily    cyclobenzaprine (FLEXERIL) 5 mg tablet Take 1 tablet (5 mg total) by mouth 3 (three) times a day as needed for muscle spasms (Patient not taking: Reported on 7/19/2021)    methylPREDNISolone 4 MG tablet therapy pack Take as directed with food (Patient not taking: Reported on 7/19/2021)     No current facility-administered medications on file prior to visit  No Known Allergies    Physical Exam    /86   Pulse 69   Ht 5' 7" (1 702 m)   Wt 84 8 kg (187 lb)   BMI 29 29 kg/m²     Constitutional: normal, well developed, well nourished, alert, in no distress and non-toxic and no overt pain behavior    Eyes: anicteric  HEENT: grossly intact  Neck: supple, symmetric, trachea midline and no masses   Pulmonary:even and unlabored  Cardiovascular:No edema or pitting edema present  Skin:Normal without rashes or lesions and well hydrated  Psychiatric:Mood and affect appropriate  Neurologic:Cranial Nerves II-XII grossly intact  Musculoskeletal:normalGait  Right cervical paraspinals and trapezius tender to palpation ropy in texture  Slightly limited range of motion with extension of the cervical spine and right side bending  Bilateral biceps, triceps, and brachioradialis reflexes were 1/4 and symmetrical   Negative Link's reflex bilaterally  Bilateral upper extremity strength 5/5 in all muscle groups  Sensation intact to light touch in C5 through T1 dermatomes bilaterally  Negative Spurling's bilaterally      Imaging    Study Result    Narrative & Impression   CERVICAL SPINE     INDICATION:   M54 2: Cervicalgia      COMPARISON:  None     VIEWS:  XR SPINE CERVICAL 2 OR 3 VW INJURY         FINDINGS:     There is 3 mm of retrolisthesis of C5 upon C6       Degenerative disc disease noted with disc space narrowing and endplate spurring at S8-K9      There is mid cervical spine facet joint arthritis      The prevertebral soft tissues are within normal limits        The lung apices are clear      IMPRESSION:     Degenerative disc disease at C5-C6 with 3 mm of retrolisthesis at this level      Facet joint osteoarthritis of the mid cervical spine        Workstation performed: [id number]

## 2021-07-19 NOTE — PATIENT INSTRUCTIONS
Neck Pain   WHAT YOU NEED TO KNOW:   What do I need to know about neck pain? You may have sudden neck pain that increases quickly  You may instead feel pain build slowly over time  Neck pain may go away in a few days or weeks, or it may continue for months  The pain may come and go, or be worse with certain movements  The pain may be only in your neck, or it may move to your arms, back, or shoulders  You may also have pain that starts in another body area and moves to your neck  Some types of neck pain are permanent  What causes or increases my risk for neck pain? · Stenosis (narrowing) of your spinal column, or degeneration (breakdown) or inflammation of the discs in your neck    · Inflammation from a condition such as rheumatoid arthritis, polymyalgia rheumatica, or rotator cuff tendinitis    · A condition that affects neck to arm nerves, such as thoracic outlet syndrome or brachial neuritis     · Trauma or injury to your neck, such as being hit from behind in a car (whiplash) or sleeping in a bad position    · A fracture of a neck bone that causes nerve damage    · A medical condition, such as shingles, meningitis, a tumor, or coronary artery disease (CAD)    How is the cause of neck pain diagnosed? Your healthcare provider will ask about your symptoms and when they began  Tell him if you were recently in an accident or had another injury to your neck  He will examine your neck and shoulders  He may also have you move your head and arms in certain ways to see if any position causes or relieves the pain  · Blood tests  may be used to measure the amount of inflammation or to check for signs of an infection  · X-ray or MRI  pictures may show a neck injury or medical condition  Do not enter the MRI room with anything metal  Metal can cause serious damage  Tell the healthcare provider if you have any metal in or on your body  How is neck pain treated?   Treatment will depend on what is causing your pain   · Medicines  may be prescribed or recommended by your healthcare provider for pain  You may need medicine to treat nerve pain or to stop muscle spasms  Medicines may also be given to reduce inflammation  Your healthcare provider may inject medicine into a nerve to block pain  Over-the-counter NSAID medicine or acetaminophen may be recommended to help treat minor pain or inflammation  · Traction  is used to relieve pressure from nerves  Your head is gently pulled up and away from your neck  This stretches muscles and ligaments and makes more room for the spine  Your healthcare provider will tell you the kind of traction that will help your neck pain  Do not use traction devices at home unless directed by your healthcare provider  · Surgery  may be needed if the pain is severe or other treatments do not work  Surgery will not help every kind of neck pain  You may need surgery to stabilize a fractured bone or to remove a tumor  Surgery may also be used to widen a narrowed spinal column or to remove a disc from between neck bones  What can I do to manage or prevent neck pain? · Rest your neck as directed  Do not make sudden movements, such as turning your head quickly  Your healthcare provider may recommend you wear a cervical collar for a short time  The collar will prevent you from moving your head  This will give your neck time to heal if an injury is causing your neck pain  Ask your healthcare provider when you can return to sports or other normal daily activities  · Apply heat as directed  Heat helps relieve pain and swelling  Use a heat wrap, or soak a small towel in warm water  Wring out the extra water  Apply the heat wrap or towel for 20 minutes every hour, or as directed  · Apply ice as directed  Ice helps relieve pain and swelling, and can help prevent tissue damage  Use an ice pack, or put ice in a bag  Cover the ice pack or back with a towel before you apply it to your neck   Apply the ice pack or ice for 15 minutes every hour, or as directed  Your healthcare provider can tell you how often to apply ice  · Do neck exercises as directed  Neck exercises help strengthen the muscles and increase range of motion  Your healthcare provider will tell you which exercises are right for you  He may give you instructions, or he may recommend that you work with a physical therapist  Your healthcare provider or therapist can make sure you are doing the exercises correctly  · Maintain good posture  Try to keep your head and shoulders lifted when you sit  If you work in front of a computer, make sure the monitor is at the right level  You should not need to look up down to see the screen  You should also not have to lean forward to be able to read what is on the screen  Make sure your keyboard, mouse, and other computer items are placed where you do not have to extend your shoulder to reach them  Get up often if you work in front of a computer or sit for long periods of time  Stretch or walk around to keep your neck muscles loose  When should I seek immediate care? · You have an injury that causes neck pain and shooting pain down your arms or legs  · Your neck pain suddenly becomes severe  · You have neck pain along with numbness, tingling, or weakness in your arms or legs  · You have a stiff neck, a headache, and a fever  When should I contact my healthcare provider? · You have new or worsening symptoms  · Your symptoms continue even after treatment  · You have questions or concerns about your condition or care  CARE AGREEMENT:   You have the right to help plan your care  Learn about your health condition and how it may be treated  Discuss treatment options with your healthcare providers to decide what care you want to receive  You always have the right to refuse treatment  The above information is an  only   It is not intended as medical advice for individual conditions or treatments  Talk to your doctor, nurse or pharmacist before following any medical regimen to see if it is safe and effective for you  © Copyright [address] Information is for End User's use only and may not be sold, redistributed or otherwise used for commercial purposes   All illustrations and images included in CareNotes® are the copyrighted property of [employer] D [employer] M [employer] Inc  or [address] [place] [address]

## 2021-07-21 DIAGNOSIS — I10 ESSENTIAL HYPERTENSION: ICD-10-CM

## 2021-07-21 RX ORDER — LOSARTAN POTASSIUM 100 MG/1
100 TABLET ORAL DAILY
Qty: 30 TABLET | Refills: 5 | Status: SHIPPED | OUTPATIENT
Start: 2021-07-21 | End: 2022-02-02 | Stop reason: SDUPTHER

## 2021-08-04 ENCOUNTER — EVALUATION (OUTPATIENT)
Dept: PHYSICAL THERAPY | Facility: MEDICAL CENTER | Age: 61
End: 2021-08-04
Payer: COMMERCIAL

## 2021-08-04 DIAGNOSIS — M50.30 DDD (DEGENERATIVE DISC DISEASE), CERVICAL: ICD-10-CM

## 2021-08-04 DIAGNOSIS — M54.2 NECK PAIN: Primary | ICD-10-CM

## 2021-08-04 PROCEDURE — 97161 PT EVAL LOW COMPLEX 20 MIN: CPT | Performed by: PHYSICAL MEDICINE & REHABILITATION

## 2021-08-04 PROCEDURE — 97112 NEUROMUSCULAR REEDUCATION: CPT | Performed by: PHYSICAL MEDICINE & REHABILITATION

## 2021-08-04 NOTE — PROGRESS NOTES
PT Evaluation     Today's date: 2021  Patient name: Chantelle Carrasco  : 1960  MRN: [id number]  Referring provider: Satish Bhagat DO  Dx:   Encounter Diagnosis     ICD-10-CM    1  Neck pain  M54 2 Ambulatory referral to Physical Therapy   2  DDD (degenerative disc disease), cervical  M50 30 Ambulatory referral to Physical Therapy                  Assessment  Assessment details: Chantelle Carrasco is a pleasant 57 y o  y/o male who reports with cervical pain and decreased ROM  The patient's greatest concerns are  worry over not knowing what's wrong and fear of not being able to keep active  No further referral appears necessary at this time based upon examination results  Primary movement impairment diagnosis of hypomobility resulting in pathoanatomical symptoms of Neck pain  DDD (degenerative disc disease), cervical and limiting his ability to drive, exercise or recreation, look up and turn to look over shoulder  Impairments include:  1) cervical hypomobility being addressed with mobilization and stretching  2) decreased muscular coordination being addressed with strengthening and neuromuscular reeducation    Etiologic factors include none recalled by the patient  Impairments: abnormal coordination, abnormal muscle firing, abnormal muscle tone, abnormal or restricted ROM, abnormal movement, activity intolerance, difficulty understanding, impaired physical strength, lacks appropriate home exercise program and pain with function  Understanding of Dx/Px/POC: good   Prognosis: good    Goals  Patient will be independent with home exercise program    Patient will be able to manage symptoms independently  Patient will report a 50% reduction in pain while at work within 4 weeks  [facility]  Plan  Plan details: Prognosis above is given PT services 2x/week tapering to 1x/week over the next 2 months and home program adherence    Patient would benefit from: skilled PT  Referral necessary: No  Planned modality interventions: thermotherapy: hydrocollator packs  Planned therapy interventions: abdominal trunk stabilization, activity modification, joint mobilization, manual therapy, motor coordination training, neuromuscular re-education, patient education, self care, therapeutic activities, therapeutic exercise, graded activity, home exercise program and behavior modification  Treatment plan discussed with: patient        Subjective Evaluation    History of Present Illness  Mechanism of injury: Hobbies/exercise: working on cars  Pain location: runs from the base of the skull to the right AC joint,   HPI: Patient reported he had a massive headache middle of   He reported his blood pressure went up significantly  Following this he also reported having notable neck pain  He reports pain increases based on weather  Looking to the right is painful, looking up is painful to see the computer screens  Sleep has been painful as well and he is having a hard time sleeping  Aggravating factors: looking to the right, looking up  Relieving factors: prednisone pack was very helpful,   PMH: HTN,     Patient denies having any new issues with gait or balance  Patient denies changes to bowel and bladder following the neck pain  Pain  Current pain rating: 3  At best pain ratin  At worst pain ratin    Patient Goals  Patient goal: get out of pain, make looking at computers more easily        Objective     Static Posture     Comments  MMTs:  Flex: R: 4/5, L: 4+/5; Abd: R: 4+/5, L: 4+/5;  ER: R: 4+/5 L: 4+/5;  IR: R: 4+/5, L: 4+/5  ROM:  Cervical:  Flex: WNL  Ext: 25% limited,   Right Side Bend: 25% limited  Left Side Bend: 25% limited  Right Rotation: 25% limited, painful  Left Rotation: 25% limited    Shoulder:  WNL    Clinical Examination Tests:  Cervical Compression: -  Spurling A: R: - L: +  Distraction Test: +             Precautions: none      Manuals 2021 Neuro Re-Ed                                                                                           HEP and Return Demo 10'            Ther Ex                                                                                                                     Ther Activity                                       Gait Training                                       Modalities

## 2021-08-25 ENCOUNTER — OFFICE VISIT (OUTPATIENT)
Dept: PHYSICAL THERAPY | Facility: MEDICAL CENTER | Age: 61
End: 2021-08-25
Payer: COMMERCIAL

## 2021-08-25 ENCOUNTER — OFFICE VISIT (OUTPATIENT)
Dept: PAIN MEDICINE | Facility: CLINIC | Age: 61
End: 2021-08-25
Payer: COMMERCIAL

## 2021-08-25 ENCOUNTER — OFFICE VISIT (OUTPATIENT)
Dept: OBGYN CLINIC | Facility: HOSPITAL | Age: 61
End: 2021-08-25
Payer: COMMERCIAL

## 2021-08-25 VITALS
HEART RATE: 58 BPM | DIASTOLIC BLOOD PRESSURE: 80 MMHG | HEIGHT: 67 IN | BODY MASS INDEX: 29.51 KG/M2 | WEIGHT: 188 LBS | SYSTOLIC BLOOD PRESSURE: 138 MMHG

## 2021-08-25 VITALS
DIASTOLIC BLOOD PRESSURE: 91 MMHG | BODY MASS INDEX: 29.35 KG/M2 | WEIGHT: 187 LBS | SYSTOLIC BLOOD PRESSURE: 153 MMHG | HEIGHT: 67 IN | HEART RATE: 65 BPM

## 2021-08-25 DIAGNOSIS — M18.12 ARTHRITIS OF CARPOMETACARPAL (CMC) JOINT OF LEFT THUMB: Primary | ICD-10-CM

## 2021-08-25 DIAGNOSIS — M54.2 NECK PAIN: Primary | ICD-10-CM

## 2021-08-25 DIAGNOSIS — M50.30 DDD (DEGENERATIVE DISC DISEASE), CERVICAL: ICD-10-CM

## 2021-08-25 DIAGNOSIS — M62.838 NECK MUSCLE SPASM: ICD-10-CM

## 2021-08-25 PROCEDURE — 97110 THERAPEUTIC EXERCISES: CPT | Performed by: PHYSICAL MEDICINE & REHABILITATION

## 2021-08-25 PROCEDURE — 20600 DRAIN/INJ JOINT/BURSA W/O US: CPT | Performed by: ORTHOPAEDIC SURGERY

## 2021-08-25 PROCEDURE — 99214 OFFICE O/P EST MOD 30 MIN: CPT | Performed by: NURSE PRACTITIONER

## 2021-08-25 PROCEDURE — 97112 NEUROMUSCULAR REEDUCATION: CPT | Performed by: PHYSICAL MEDICINE & REHABILITATION

## 2021-08-25 PROCEDURE — 99213 OFFICE O/P EST LOW 20 MIN: CPT | Performed by: ORTHOPAEDIC SURGERY

## 2021-08-25 PROCEDURE — 97140 MANUAL THERAPY 1/> REGIONS: CPT | Performed by: PHYSICAL MEDICINE & REHABILITATION

## 2021-08-25 RX ORDER — LIDOCAINE HYDROCHLORIDE 10 MG/ML
0.5 INJECTION, SOLUTION EPIDURAL; INFILTRATION; INTRACAUDAL; PERINEURAL
Status: COMPLETED | OUTPATIENT
Start: 2021-08-25 | End: 2021-08-25

## 2021-08-25 RX ORDER — TRIAMCINOLONE ACETONIDE 40 MG/ML
20 INJECTION, SUSPENSION INTRA-ARTICULAR; INTRAMUSCULAR
Status: COMPLETED | OUTPATIENT
Start: 2021-08-25 | End: 2021-08-25

## 2021-08-25 RX ORDER — IBUPROFEN 200 MG
TABLET ORAL EVERY 6 HOURS PRN
COMMUNITY

## 2021-08-25 RX ADMIN — LIDOCAINE HYDROCHLORIDE 0.5 ML: 10 INJECTION, SOLUTION EPIDURAL; INFILTRATION; INTRACAUDAL; PERINEURAL at 08:37

## 2021-08-25 RX ADMIN — TRIAMCINOLONE ACETONIDE 20 MG: 40 INJECTION, SUSPENSION INTRA-ARTICULAR; INTRAMUSCULAR at 08:47

## 2021-08-25 NOTE — PROGRESS NOTES
Assessment:  1  Neck pain    2  DDD (degenerative disc disease), cervical    3  Neck muscle spasm        Plan:  1  I will schedule the patient for trigger point injections into the right cervical paraspinal and trapezii musculature to address the inflammatory component the patient's pain  Complete risks and benefits including bleeding, infection, tissue reaction, nerve injury and allergic reaction were discussed  The patient was agreeable and verbalized an understanding  2  Patient may continue ibuprofen 800 mg q 8 hours p r n  He does not wish to try different anti-inflammatory at this time   3  Patient may continue tizanidine as prescribed  He is not interested in trying a different muscle relaxant at this time   4  Patient will continue with physical therapy and his home exercise program   5  I will follow up with the patient after his procedure or sooner if needed     M*Modal software was used to dictate this note  It may contain errors with dictating incorrect words or incorrect spelling  Please contact the provider directly with any questions  History of Present Illness: The patient is a 57 y o  male last seen on 7/19/21 who presents for a follow up office visit in regards to chronic  Right-sided neck pain the radiate to the trapezius region but not further into the upper extremities  The patient denies bowel or bladder incontinence or balance issues  X-ray of the cervical spine reveals degenerative disc disease and facet arthrosis most pronounced at C5-6  He has completed only 1 session of physical therapy secondary to his work schedule  He does do his exercises at home and has been doing so for the past 3 weeks with minimal improvement  He takes Motrin with moderate relief  He was given oral steroids which provided significant relief    He has tried cyclobenzaprine in the past which did not provide relief and he is currently taking tizanidine 2 mg p r n  which she also did not feel provided any significant relief  He rates his pain as 3/10 on the numeric pain rating scale  He states his pain is constant nature and bothersome at night  He characterizes the pain as cramping and pressure like    I have personally reviewed and/or updated the patient's past medical history, past surgical history, family history, social history, current medications, allergies, and vital signs today  Review of Systems:    Review of Systems   Respiratory: Negative for shortness of breath  Cardiovascular: Negative for chest pain  Gastrointestinal: Negative for constipation, diarrhea, nausea and vomiting  Musculoskeletal: Positive for gait problem  Negative for arthralgias, joint swelling and myalgias  Skin: Negative for rash  Neurological: Negative for dizziness, seizures and weakness  All other systems reviewed and are negative  Past Medical History:   Diagnosis Date    Cat scratch fever     Colon polyp     Diverticulitis     GERD (gastroesophageal reflux disease)     Hyperlipidemia     Hypertension     IFG (impaired fasting glucose)     L3 vertebral fracture (HCC) 07/2018    Lumbar compression fracture (HCC)     Patella-femoral syndrome     Thyroid nodule        Past Surgical History:   Procedure Laterality Date    CLOSED REDUCTION WRIST FRACTURE      COLONOSCOPY      MI COLONOSCOPY FLX DX W/COLLJ SPEC WHEN PFRMD N/A 5/30/2018    Procedure: COLONOSCOPY;  Surgeon: Tomasa Boateng MD;  Location: [facility];   Service: Colorectal    UMBILICAL GRANULOMA EXCISION Left     groin    US GUIDED THYROID BIOPSY  9/20/2018    US GUIDED THYROID BIOPSY  2/13/2019    WISDOM TOOTH EXTRACTION      WRIST GANGLION EXCISION  1969    closed reduction        Family History   Problem Relation Age of Onset    Hypertension Mother     Melanoma Father     Diabetes type II Father     Hypertension Father     Colon cancer Maternal Grandmother     Colon cancer Maternal Uncle     Hypertension Brother Social History     Occupational History    Not on file   Tobacco Use    Smoking status: Never Smoker    Smokeless tobacco: Never Used   Vaping Use    Vaping Use: Never used   Substance and Sexual Activity    Alcohol use: Yes     Alcohol/week: 3 0 standard drinks     Types: 3 Glasses of wine per week     Comment: social    Drug use: No    Sexual activity: Not on file         Current Outpatient Medications:     amLODIPine (NORVASC) 5 mg tablet, Take 1 tablet (5 mg total) by mouth daily, Disp: 30 tablet, Rfl: 5    Cholecalciferol (VITAMIN D3) 5000 units CAPS, Take 1 capsule by mouth daily, Disp: , Rfl:     famotidine (PEPCID) 20 mg tablet, Take 1 tablet twice daily before meals, Disp: 60 tablet, Rfl: 5    glucosamine 500 MG CAPS capsule, Take by mouth daily  , Disp: , Rfl:     losartan (COZAAR) 100 MG tablet, Take 1 tablet (100 mg total) by mouth daily, Disp: 30 tablet, Rfl: 5    metoprolol succinate (TOPROL-XL) 100 mg 24 hr tablet, Take 1 tablet (100 mg total) by mouth daily, Disp: 90 tablet, Rfl: 3    multivitamin (THERAGRAN) TABS, Take 1 tablet by mouth daily, Disp: , Rfl:     Omega-3 Fatty Acids (FISH OIL) 1200 MG CAPS, Take by mouth 2 (two) times a day , Disp: , Rfl:     rosuvastatin (CRESTOR) 5 mg tablet, Take 1 tablet (5 mg total) by mouth daily, Disp: 90 tablet, Rfl: 3    methylPREDNISolone 4 MG tablet therapy pack, Take as directed with food (Patient not taking: Reported on 7/19/2021), Disp: 21 each, Rfl: 0    No Known Allergies    Physical Exam:    /91   Pulse 65   Ht 5' 7" (1 702 m)   Wt 84 8 kg (187 lb)   BMI 29 29 kg/m²     Constitutional:normal, well developed, well nourished, alert, in no distress and non-toxic and no overt pain behavior    Eyes:anicteric  HEENT:grossly intact  Neck:supple, symmetric, trachea midline and no masses   Pulmonary:even and unlabored  Cardiovascular:No edema or pitting edema present  Skin:Normal without rashes or lesions and well hydrated  Psychiatric:Mood and affect appropriate  Neurologic:Cranial Nerves II-XII grossly intact  Musculoskeletal:normal gait  Right cervical paraspinal and trapezium musculature tender to  Palpation  Decreased range of motion with extension and right bending  Bilateral upper extremity strength 5/5 in all muscle groups      Imaging  No orders to display   CERVICAL SPINE   INDICATION: M54 2: Cervicalgia  COMPARISON: None   VIEWS: XR SPINE CERVICAL 2 OR 3 VW INJURY   FINDINGS:   There is 3 mm of retrolisthesis of C5 upon C6  Degenerative disc disease noted with disc space narrowing and endplate spurring at H0-O3  There is mid cervical spine facet joint arthritis   The prevertebral soft tissues are within normal limits  The lung apices are clear  IMPRESSION:   Degenerative disc disease at C5-C6 with 3 mm of retrolisthesis at this level  Facet joint osteoarthritis of the mid cervical spine         No orders of the defined types were placed in this encounter

## 2021-08-25 NOTE — PROGRESS NOTES
Daily Note     Today's date: 2021  Patient name: Tej Woods  : 1960  MRN: [id number]  Referring provider: Alden Deleon DO  Dx:   Encounter Diagnosis     ICD-10-CM    1  Neck pain  M54 2    2  DDD (degenerative disc disease), cervical  M50 30                   Subjective: Steve Oliva reported "      Objective: See treatment diary below      Assessment: Tolerated treatment well  Patient would benefit from continued PT  Tej Woods was able to initiate his therapy program which shows progress towards his goals  He reported slight relief and an increase in ROM following manual therapy  Skin integrity was checked pre and post modalities with no abnormal findings  Plan: Continue per plan of care        Precautions: none      Manuals 2021           KARLOS torres JR                                     Neuro Re-Ed             Standing Straight Arm PD  3x10           Standing Rows  3x10           Cervical Snags  3x10x2                                                  HEP and Return Demo 10'            Ther Ex             Scapular Retraction  3x10           UTS  4x30"           LSS  4x30"                                                                            Ther Activity                                       Gait Training                                       Modalities

## 2021-08-25 NOTE — PATIENT INSTRUCTIONS
What is it [facility] Arthritis? In a normal joint, cartilage covers the end of the bones and serves as a shock absorber to allow smooth, pain-free movement  In osteoarthritis (OA, or degenerative arthritis) the cartilage layer wears out, resulting in direct contact between the bones and producing pain and deformity  One of the most common joints to develop OA in the hand is the base of the thumb  The thumb basal joint, also called the carpometacarpal Hutchinson) joint, is a specialized saddle shaped joint that is formed by a small bone of the wrist (trapezium) and the first bone of the thumb (metacarpal)  The saddle shaped joint allows the thumb to have a wide range of motions, including up, down, across the palm, and the ability to pinch (see Figure 1)  Who gets it? OA at the base of the thumb is more commonly seen in women over the age of 36  The exact cause is unknown, but genetics, previous injuries such as fractures or dislocations, and generalized joint laxity may predispose towards development of this type of arthritis  What are the symptoms and signs? The most common symptom is pain at the base of the thumb  The pain can be aggravated by activities that require pinching, such as opening jars, turning door knobs or keys, and writing  Severity can also progress to pain at rest and pain at night  In more severe cases, progressive destruction and mal-alignment of the joint occurs, and a bump develops at the base of the thumb as the metacarpal moves out of the saddle joint  This shift in the joint can cause limited motion and weakness, making pinch difficult (see Figure 2)  The next joint above the [facility] may compensate by loosening, causing it to bend further back (hyperextension)  How is the diagnosis made? The diagnosis is made by history and physical evaluation  Pressure and movement such as twisting will produce pain at the joint  A grinding sensation may also be present at the joint (see Figure 3)  X-rays are used to confirm the diagnosis, although symptom severity often does not correlate with x-ray findings  What are the treatment options? Less severe thumb arthritis will usually respond to non-surgical care  Arthritis medication, splinting and limited cortisone injections may help alleviate pain  A hand therapist might provide a variety of rigid and non-rigid splints which can be used while sleeping or during activities  Patients with advanced disease or who fail non-surgical treatment may be candidates for surgical reconstruction  A variety of surgical techniques are available that can successfully reduce or eliminate pain  Surgical procedures include removal of arthritic bone and joint reconstruction (arthroplasty), joint fusion, bone realignment, and even arthroscopy in select cases  A consultation with your hand surgeon can help decide the best option for you (see Figure 4)  © 2012 [employer] Hand  www handcare  org

## 2021-08-25 NOTE — PROGRESS NOTES
ASSESSMENT/PLAN:    Assessment:   Thumb CMC Arthritis  left    Plan:   Patient provided with a left CMC injection (Kenalog)   Continue Comfort Cool splint as required    Follow Up:  3  month(s)    To Do Next Visit:  recheck    _____________________________________________________  CHIEF COMPLAINT:  Chief Complaint   Patient presents with    Left Thumb - Follow-up     CMC inj beta given 5/19/21         SUBJECTIVE:  Kala Orteag is a 57 y o  male who presents for follow up regarding  Left thumb CMC arthritis  Patient reports he did not get as much relief with the last injection  He uses his Comfort Cool splint when needed at work  He denies any other acute complaints  PAST MEDICAL HISTORY:  Past Medical History:   Diagnosis Date    Cat scratch fever     Colon polyp     Diverticulitis     GERD (gastroesophageal reflux disease)     Hyperlipidemia     Hypertension     IFG (impaired fasting glucose)     L3 vertebral fracture (HCC) 07/2018    Lumbar compression fracture (HCC)     Patella-femoral syndrome     Thyroid nodule        PAST SURGICAL HISTORY:  Past Surgical History:   Procedure Laterality Date    CLOSED REDUCTION WRIST FRACTURE      COLONOSCOPY      MS COLONOSCOPY FLX DX W/COLLJ SPEC WHEN PFRMD N/A 5/30/2018    Procedure: COLONOSCOPY;  Surgeon: Ivan Leihg MD;  Location: [facility];   Service: Colorectal    UMBILICAL GRANULOMA EXCISION Left     groin    US GUIDED THYROID BIOPSY  9/20/2018    US GUIDED THYROID BIOPSY  2/13/2019    WISDOM TOOTH EXTRACTION      WRIST GANGLION EXCISION  1969    closed reduction        FAMILY HISTORY:  Family History   Problem Relation Age of Onset    Hypertension Mother    [facility] Melanoma Father     Diabetes type II Father     Hypertension Father     Colon cancer Maternal Grandmother     Colon cancer Maternal Uncle     Hypertension Brother        SOCIAL HISTORY:  Social History     Tobacco Use    Smoking status: Never Smoker    Smokeless tobacco: Never Used   Vaping Use    Vaping Use: Never used   Substance Use Topics    Alcohol use: Yes     Alcohol/week: 3 0 standard drinks     Types: 3 Glasses of wine per week     Comment: social    Drug use: No       MEDICATIONS:    Current Outpatient Medications:     ibuprofen (MOTRIN) 200 mg tablet, Take by mouth every 6 (six) hours as needed for mild pain, Disp: , Rfl:     amLODIPine (NORVASC) 5 mg tablet, Take 1 tablet (5 mg total) by mouth daily, Disp: 30 tablet, Rfl: 5    Cholecalciferol (VITAMIN D3) 5000 units CAPS, Take 1 capsule by mouth daily, Disp: , Rfl:     famotidine (PEPCID) 20 mg tablet, Take 1 tablet twice daily before meals, Disp: 60 tablet, Rfl: 5    glucosamine 500 MG CAPS capsule, Take by mouth daily  , Disp: , Rfl:     losartan (COZAAR) 100 MG tablet, Take 1 tablet (100 mg total) by mouth daily, Disp: 30 tablet, Rfl: 5    metoprolol succinate (TOPROL-XL) 100 mg 24 hr tablet, Take 1 tablet (100 mg total) by mouth daily, Disp: 90 tablet, Rfl: 3    multivitamin (THERAGRAN) TABS, Take 1 tablet by mouth daily, Disp: , Rfl:     Omega-3 Fatty Acids (FISH OIL) 1200 MG CAPS, Take by mouth 2 (two) times a day , Disp: , Rfl:     rosuvastatin (CRESTOR) 5 mg tablet, Take 1 tablet (5 mg total) by mouth daily, Disp: 90 tablet, Rfl: 3    ALLERGIES:  No Known Allergies    REVIEW OF SYSTEMS:  Pertinent items are noted in HPI  A comprehensive review of systems was negative      LABS:  HgA1c:   Lab Results   Component Value Date    HGBA1C 5 9 (H) 03/15/2020     BMP:   Lab Results   Component Value Date    GLUCOSE 104 07/27/2018    CALCIUM 8 6 05/21/2021     (U) 08/22/2014    K 4 5 05/21/2021    CO2 28 05/21/2021     05/21/2021    BUN 23 05/21/2021    CREATININE 1 13 05/21/2021           _____________________________________________________  PHYSICAL EXAMINATION:  Vital signs: /80   Pulse 58   Ht 5' 7" (1 702 m)   Wt 85 3 kg (188 lb)   BMI 29 44 kg/m²   General: well developed and well nourished, alert, oriented times 3 and appears comfortable  Psychiatric: Normal  HEENT: Trachea Midline, No torticollis  Cardiovascular: No discernable arrhythmia  Pulmonary: No wheezing or stridor  Abdomen: No rebound or guarding  Extremities: No peripheral edema  Skin: No masses, erythema, lacerations, fluctation, ulcerations  Neurovascular: Sensation Intact to the Median, Ulnar, Radial Nerve, Motor Intact to the Median, Ulnar, Radial Nerve and Pulses Intact    MUSCULOSKELETAL EXAMINATION:  LEFT SIDE:  CMC: No Instability, Positive tendnerness CMC, Positive Shoulder Sign and no MP hyperextension    _____________________________________________________  STUDIES REVIEWED:  No Studies to review      PROCEDURES PERFORMED:  Small joint arthrocentesis: L thumb CMC  [place] Protocol:  Consent given by: patient  Patient understanding: patient states understanding of the procedure being performed  Site marked: the operative site was marked  Patient identity confirmed: verbally with patient    Supporting Documentation  Indications: pain   Procedure Details  Location: thumb - L thumb CMC  Preparation: Patient was prepped and draped in the usual sterile fashion  Needle size: 25 G  Ultrasound guidance: no  Approach: radial  Medications administered: 0 5 mL lidocaine (PF) 1 %; 20 mg triamcinolone acetonide 40 mg/mL    Patient tolerance: patient tolerated the procedure well with no immediate complications  Dressing:  Sterile dressing applied               Scribe Attestation    I,:  Joselito Magallon am acting as a scribe while in the presence of the attending physician :       I,:  Magdalena Sheth MD personally performed the services described in this documentation    as scribed in my presence :

## 2021-08-25 NOTE — PATIENT INSTRUCTIONS
Trigger Point Injection   AMBULATORY CARE:   A trigger point injection  is used to relax a muscle knot  This helps relieve pain  You may be able to have more than one trigger point treated during a session  How to prepare for a trigger point injection:   · Your healthcare provider will tell you how to prepare  Arrange to have someone drive you home after the injection  · Tell your provider about all medicines you take, including pain medicine, blood thinners, and muscle relaxers  He or she will tell you if you need to stop any medicine for the injection, and when to stop  He or she will tell you which medicines to take or not take on the day of the injection  · Tell your provider about all your allergies, including to any pain medicine  What will happen during a trigger point injection:   · You may be sitting or lying, depending on where the trigger point is located  Your healthcare provider will feel for a knot in the muscle  He or she may meaghan your skin over the knot  · Your provider will put a needle through your skin and into the trigger point  Saline (salt solution), pain relievers, or other medicines may be pushed through the needle into the trigger point  Your provider may use only a dry needle (no medicine)  He or she will pull the needle almost all the way out and then push it in again  He or she will repeat this several times until the muscle stops twitching or feels relaxed  · Your provider will remove the needle and stretch the muscle area  He or she may apply pressure to the area for 2 minutes  A bandage will be put over the injection site to prevent bleeding or an infection  What to expect after a trigger point injection:   · You may feel pain relief right away  It is normal for some pain to start again 2 hours later  An ice pack or over-the-counter pain medicine can help lower the pain  · You may feel sore in the injection site for a few days   If you need another injection in the same area, wait until the area is not sore  · Your healthcare provider may give you specific activity instructions to follow at home or recommend physical therapy  In general, you should try to stay active  Avoid strenuous activity for the first 3 or 4 days after the injection  · Do not have more injections if you still have trigger point pain after 2 or 3 injections  Risks of a trigger point injection:  You may have a severe allergic reaction to pain medicine injected  The injection may be painful, or you may be sore where you got the injection  You may bleed, bruise, or develop an infection in the injection area  The injection may cause you to feel faint  Rarely, the needle may cause muscle or blood vessel damage or your lung may collapse if you get the injection near your chest   Call your local emergency number (911 in the [address] [place] [address],[address]), or have someone call if:   · Your mouth and throat are swollen  · You are wheezing or have trouble breathing  · You have chest pain or your heart is beating faster than usual     · You feel like you are going to faint  When should I seek immediate care? · Your face is red or swollen  · You have hives that spread over your body  · You feel weak or dizzy  Call your doctor or pain specialist if:   · You have a fever within 1 week of the injection  · You have redness or swelling within 1 week of the injection  · You have new or worsening pain near the injection site  · You have questions or concerns about your condition or care  Self-care:   · Stay active after you have trigger point injections  Gently move your joints through their full range of motion during the first week  Avoid strenuous activity for 3 or 4 days  · Do regular stretches of the trigger point muscle  Place gentle pressure on the trigger point, and then stretch the muscle  Ask your healthcare provider for more information about how to stretch and apply pressure      · Apply ice to the injection site  Use an ice pack, or put ice in a plastic bag  Cover the bag with a towel before you apply it  Apply ice for 15 to 20 minutes every hour, or as directed  · Apply heat to trigger point sites  Heat can help relax muscles and relieve trigger point pain  Use a heat pack or a heating pad set on low  Apply heat for 15 minutes every hour, or as directed  Follow up with your doctor or pain specialist as directed:  Write down your questions so you remember to ask them during your visits  © Copyright [employer] 2021 Information is for End User's use only and may not be sold, redistributed or otherwise used for commercial purposes  All illustrations and images included in CareNotes® are the copyrighted property of A D A M , Inc  or [facility] Teddy Robbins  The above information is an  only  It is not intended as medical advice for individual conditions or treatments  Talk to your doctor, nurse or pharmacist before following any medical regimen to see if it is safe and effective for you

## 2021-09-01 ENCOUNTER — OFFICE VISIT (OUTPATIENT)
Dept: PHYSICAL THERAPY | Facility: MEDICAL CENTER | Age: 61
End: 2021-09-01
Payer: COMMERCIAL

## 2021-09-01 DIAGNOSIS — M54.2 NECK PAIN: Primary | ICD-10-CM

## 2021-09-01 DIAGNOSIS — M50.30 DDD (DEGENERATIVE DISC DISEASE), CERVICAL: ICD-10-CM

## 2021-09-01 PROCEDURE — 97140 MANUAL THERAPY 1/> REGIONS: CPT | Performed by: PHYSICAL MEDICINE & REHABILITATION

## 2021-09-01 PROCEDURE — 97112 NEUROMUSCULAR REEDUCATION: CPT | Performed by: PHYSICAL MEDICINE & REHABILITATION

## 2021-09-01 PROCEDURE — 97110 THERAPEUTIC EXERCISES: CPT | Performed by: PHYSICAL MEDICINE & REHABILITATION

## 2021-09-01 NOTE — PROGRESS NOTES
Daily Note     Today's date: 2021  Patient name: Héctor Ch  : 1960  MRN: [id number]  Referring provider: ANNA Krause*  Dx:   Encounter Diagnosis     ICD-10-CM    1  Neck pain  M54 2    2  DDD (degenerative disc disease), cervical  M50 30                   Subjective: Елена Marston reported "I felt better after last time for a few hours, but it came back pretty quickly "      Objective: See treatment diary below      Assessment: Tolerated treatment well  Patient would benefit from continued PT  Елена Mccartney was able to add no monies to his therapy program which shows progress towards his goals  He demonstrated improved motion following manual therapy  Plan: Continue per plan of care        Precautions: none      Manuals 2021          KARLOS BOO JR          Side melissa BOO JR                                    Neuro Re-Ed             Standing Straight Arm PD  3x10 3x10          Standing Rows  3x10 3x10          Cervical Snags  3x10x2 3x10x2          No Monies   3x10                                    HEP and Return Demo 10'            Ther Ex             Scapular Retraction  3x10 3x10          UTS  4x30" 4x30"          LSS  4x30" 4x30"                                                                           Ther Activity                                       Gait Training                                       Modalities

## 2021-09-04 ENCOUNTER — LAB (OUTPATIENT)
Dept: LAB | Facility: HOSPITAL | Age: 61
End: 2021-09-04
Attending: INTERNAL MEDICINE
Payer: COMMERCIAL

## 2021-09-04 ENCOUNTER — APPOINTMENT (OUTPATIENT)
Dept: LAB | Facility: HOSPITAL | Age: 61
End: 2021-09-04

## 2021-09-04 DIAGNOSIS — Z00.8 HEALTH EXAMINATION IN POPULATION SURVEY: ICD-10-CM

## 2021-09-04 DIAGNOSIS — I10 ESSENTIAL HYPERTENSION: ICD-10-CM

## 2021-09-04 DIAGNOSIS — E78.2 MIXED HYPERLIPIDEMIA: ICD-10-CM

## 2021-09-04 DIAGNOSIS — R73.01 IMPAIRED FASTING GLUCOSE: ICD-10-CM

## 2021-09-04 DIAGNOSIS — E55.9 VITAMIN D DEFICIENCY: ICD-10-CM

## 2021-09-04 LAB
25(OH)D3 SERPL-MCNC: 61.2 NG/ML (ref 30–100)
ALBUMIN SERPL BCP-MCNC: 3.7 G/DL (ref 3.5–5)
ALP SERPL-CCNC: 61 U/L (ref 46–116)
ALT SERPL W P-5'-P-CCNC: 30 U/L (ref 12–78)
ANION GAP SERPL CALCULATED.3IONS-SCNC: 4 MMOL/L (ref 4–13)
AST SERPL W P-5'-P-CCNC: 12 U/L (ref 5–45)
BILIRUB SERPL-MCNC: 0.6 MG/DL (ref 0.2–1)
BUN SERPL-MCNC: 20 MG/DL (ref 5–25)
CALCIUM SERPL-MCNC: 9.5 MG/DL (ref 8.3–10.1)
CHLORIDE SERPL-SCNC: 108 MMOL/L (ref 100–108)
CHOLEST SERPL-MCNC: 195 MG/DL (ref 50–200)
CO2 SERPL-SCNC: 28 MMOL/L (ref 21–32)
CREAT SERPL-MCNC: 1.01 MG/DL (ref 0.6–1.3)
EST. AVERAGE GLUCOSE BLD GHB EST-MCNC: 131 MG/DL
GFR SERPL CREATININE-BSD FRML MDRD: 79 ML/MIN/1.73SQ M
GLUCOSE P FAST SERPL-MCNC: 122 MG/DL (ref 65–99)
HBA1C MFR BLD: 6.2 %
HDLC SERPL-MCNC: 47 MG/DL
LDLC SERPL CALC-MCNC: 118 MG/DL (ref 0–100)
NONHDLC SERPL-MCNC: 148 MG/DL
POTASSIUM SERPL-SCNC: 4.3 MMOL/L (ref 3.5–5.3)
PROT SERPL-MCNC: 7.5 G/DL (ref 6.4–8.2)
SODIUM SERPL-SCNC: 140 MMOL/L (ref 136–145)
TRIGL SERPL-MCNC: 148 MG/DL

## 2021-09-04 PROCEDURE — 82306 VITAMIN D 25 HYDROXY: CPT

## 2021-09-04 PROCEDURE — 83036 HEMOGLOBIN GLYCOSYLATED A1C: CPT

## 2021-09-04 PROCEDURE — 36415 COLL VENOUS BLD VENIPUNCTURE: CPT

## 2021-09-04 PROCEDURE — 80053 COMPREHEN METABOLIC PANEL: CPT

## 2021-09-04 PROCEDURE — 80061 LIPID PANEL: CPT

## 2021-09-05 NOTE — RESULT ENCOUNTER NOTE
Please call the patient regarding labs - labs ok- vitamin D normal, fasting glucose in prediabetes range , continue dietary modifications

## 2021-09-07 ENCOUNTER — OFFICE VISIT (OUTPATIENT)
Dept: PHYSICAL THERAPY | Facility: MEDICAL CENTER | Age: 61
End: 2021-09-07
Payer: COMMERCIAL

## 2021-09-07 DIAGNOSIS — M54.2 NECK PAIN: Primary | ICD-10-CM

## 2021-09-07 DIAGNOSIS — M50.30 DDD (DEGENERATIVE DISC DISEASE), CERVICAL: ICD-10-CM

## 2021-09-07 PROBLEM — M79.18 MYOFASCIAL PAIN SYNDROME: Status: ACTIVE | Noted: 2021-09-07

## 2021-09-07 PROCEDURE — 97110 THERAPEUTIC EXERCISES: CPT | Performed by: PHYSICAL MEDICINE & REHABILITATION

## 2021-09-07 PROCEDURE — 97140 MANUAL THERAPY 1/> REGIONS: CPT | Performed by: PHYSICAL MEDICINE & REHABILITATION

## 2021-09-07 PROCEDURE — 97112 NEUROMUSCULAR REEDUCATION: CPT | Performed by: PHYSICAL MEDICINE & REHABILITATION

## 2021-09-08 ENCOUNTER — PROCEDURE VISIT (OUTPATIENT)
Dept: PAIN MEDICINE | Facility: CLINIC | Age: 61
End: 2021-09-08
Payer: COMMERCIAL

## 2021-09-08 VITALS
HEIGHT: 67 IN | WEIGHT: 188 LBS | HEART RATE: 64 BPM | SYSTOLIC BLOOD PRESSURE: 140 MMHG | BODY MASS INDEX: 29.51 KG/M2 | DIASTOLIC BLOOD PRESSURE: 87 MMHG

## 2021-09-08 DIAGNOSIS — M79.18 MYOFASCIAL PAIN SYNDROME: Primary | ICD-10-CM

## 2021-09-08 PROCEDURE — 20552 NJX 1/MLT TRIGGER POINT 1/2: CPT | Performed by: NURSE PRACTITIONER

## 2021-09-08 RX ORDER — METHYLPREDNISOLONE ACETATE 40 MG/ML
40 INJECTION, SUSPENSION INTRA-ARTICULAR; INTRALESIONAL; INTRAMUSCULAR; SOFT TISSUE ONCE
Status: COMPLETED | OUTPATIENT
Start: 2021-09-08 | End: 2021-09-08

## 2021-09-08 RX ORDER — BUPIVACAINE HYDROCHLORIDE 2.5 MG/ML
10 INJECTION, SOLUTION EPIDURAL; INFILTRATION; INTRACAUDAL ONCE
Status: COMPLETED | OUTPATIENT
Start: 2021-09-08 | End: 2021-09-08

## 2021-09-08 RX ADMIN — METHYLPREDNISOLONE ACETATE 40 MG: 40 INJECTION, SUSPENSION INTRA-ARTICULAR; INTRALESIONAL; INTRAMUSCULAR; SOFT TISSUE at 07:51

## 2021-09-08 RX ADMIN — BUPIVACAINE HYDROCHLORIDE 10 ML: 2.5 INJECTION, SOLUTION EPIDURAL; INFILTRATION; INTRACAUDAL at 07:50

## 2021-09-08 NOTE — PROGRESS NOTES
Daily Note     Today's date: 2021  Patient name: Cole Doss  : 1960  MRN: [id number]  Referring provider: ANNA Nascimento*  Dx:   Encounter Diagnosis     ICD-10-CM    1  Neck pain  M54 2    2  DDD (degenerative disc disease), cervical  M50 30                   Subjective: Christina Aguilar reported "I had less pain for a few hours and then it tightened back up, I am going for an injection in a week "      Objective: See treatment diary below      Assessment: Tolerated treatment well  Patient would benefit from continued PT  Christina Aguilar reported less pain following manual therapy  He demonstrated increased cervical rotation ROM which shows progress towards his goals  Thoracic pistol was performed following which cervical ROM increased  Patient reported a decrease in pain following the session  Plan: Continue per plan of care        Precautions: none      Manuals 2021         IASTM  JR JR JR         Side glides  JR JR JR         pistol                          Neuro Re-Ed             Standing Straight Arm PD  3x10 3x10 3x10         Standing Rows  3x10 3x10 3x10         Cervical Snags  3x10x2 3x10x2 3x10x2         No Monies   3x10 3x10                                   HEP and Return Demo 10'            Ther Ex             Scapular Retraction  3x10 3x10 3x10         UTS  4x30" 4x30" 4x30"         LSS  4x30" 4x30" 4x30"                                                                          Ther Activity                                       Gait Training                                       Modalities

## 2021-09-08 NOTE — PROGRESS NOTES
79-year-old male presents to the office today for trigger point injections into the right cervical paraspinal and trapezii musculature  After discussing the risks of the procedure including, but not limited to: unchanged or increased pain, bleeding, infection, allergic reaction, soft-tissue injury, nerve damage, pneumothorax, informed consent was obtained  Procedural pause conducted to verify:  correct patient identity, procedure to be performed  The appropriate hyper irritable musculoskeletal tender points were marked with a sterile pen, prepped with alcohol and sterilely draped  After appropriate reproduction of pain at each of the tender points marked, a total of 7mL of 0 25% bupivacaine and 28mg of Depo-Medrol was injected after negative aspiration of air, CSF, heme, or other bodily fluids with a 1 5 in 25-gauge needle  A total number of 2 muscle groups were injected  Additionally, dry-needling in a fanlike distribution was performed at each site  The patient was discharged with no apparent complications on their own power after an appropriate observation period  Disposition: Motor function was intact  The patient was discharged home  The discharge instruction sheet was given to the patient  The patient was discharged with instructions to call immediately if there are any complications  I did review the trigger point injection discharge instructions with the patient  I explained that the best results from the injections typically occur within the next 3-5 days  I did explain that it is normal to feel an increase in soreness and/or pain, and even bruising   I did encourage heat/cold regiments, which ever decreases pain, as well as continuing a home stretching program

## 2021-09-08 NOTE — PATIENT INSTRUCTIONS
Trigger Point Injection   WHAT YOU NEED TO KNOW:   A trigger point injection is used to relax a muscle knot  This helps relieve pain  DISCHARGE INSTRUCTIONS:   Call your local emergency number (911 in the [place]), or have someone call if:   · Your mouth and throat are swollen  · You are wheezing or have trouble breathing  · You have chest pain or your heart is beating faster than usual     · You feel like you are going to faint  When should I seek immediate care? · Your face is red or swollen  · You have hives that spread over your body  · You feel weak or dizzy  Call your doctor or pain specialist if:   · You have a fever within 1 week of the injection  · You have redness or swelling within 1 week of the injection  · You have new or worsening pain near the injection site  · You have questions or concerns about your condition or care  Self-care:   · Stay active after you have trigger point injections  Gently move your joints through their full range of motion during the first week  Avoid strenuous activity for 3 or 4 days  · Do regular stretches of the trigger point muscle  Place gentle pressure on the trigger point, and then stretch the muscle  Ask your healthcare provider for more information about how to stretch and apply pressure  · Apply ice to the injection site  Use an ice pack, or put ice in a plastic bag  Cover the bag with a towel before you apply it  Apply ice for 15 to 20 minutes every hour, or as directed  · Apply heat to trigger point sites  Heat can help relax muscles and relieve trigger point pain  Use a heat pack or a heating pad set on low  Apply heat for 15 minutes every hour, or as directed  Follow up with your doctor or pain specialist as directed:  Write down your questions so you remember to ask them during your visits    © Copyright [employer] 2021 Information is for End User's use only and may not be sold, redistributed or otherwise used for commercial purposes  All illustrations and images included in CareNotes® are the copyrighted property of A D A M , Inc  or Atul Robbins  The above information is an  only  It is not intended as medical advice for individual conditions or treatments  Talk to your doctor, nurse or pharmacist before following any medical regimen to see if it is safe and effective for you

## 2021-09-15 ENCOUNTER — OFFICE VISIT (OUTPATIENT)
Dept: PHYSICAL THERAPY | Facility: MEDICAL CENTER | Age: 61
End: 2021-09-15
Payer: COMMERCIAL

## 2021-09-15 DIAGNOSIS — M54.2 NECK PAIN: Primary | ICD-10-CM

## 2021-09-15 DIAGNOSIS — M50.30 DDD (DEGENERATIVE DISC DISEASE), CERVICAL: ICD-10-CM

## 2021-09-15 PROCEDURE — 97110 THERAPEUTIC EXERCISES: CPT | Performed by: PHYSICAL THERAPIST

## 2021-09-15 PROCEDURE — 97012 MECHANICAL TRACTION THERAPY: CPT | Performed by: PHYSICAL THERAPIST

## 2021-09-15 PROCEDURE — 97140 MANUAL THERAPY 1/> REGIONS: CPT | Performed by: PHYSICAL THERAPIST

## 2021-09-15 NOTE — PROGRESS NOTES
Daily Note     Today's date: 2021  Patient name: Ina Castro  : 1960  MRN: [id number]  Referring provider: Marisue Paget, PA*  Dx:   Encounter Diagnosis     ICD-10-CM    1  Neck pain  M54 2    2  DDD (degenerative disc disease), cervical  M50 30                   Subjective: Patient stated moderate soreness in his C/S prior to treatment session; stated no change in pain level after receiving injections  Objective: See treatment diary below      Assessment: Patient stated significant reduction in pain level after completion of mechanical traction; demonstrated improved ability to perform AROM C/S extension after completion of mechanical traction; patient advised to consider purchasing home traction unit for management of chronic C/S symptoms  Plan: Continue per plan of care  Precautions: none      Manuals 2021 2021 2021 2021 9/15/21        IASTM  JR JR JR KK        Side glides  JR JR JR         pistol             SNAGs for bilat   rotation     KK        Neuro Re-Ed             Standing Straight Arm PD  3x10 3x10 3x10         Standing Rows  3x10 3x10 3x10         Cervical Snags  3x10x2 3x10x2 3x10x2         No Monies   3x10 3x10                                   HEP and Return Demo 10'            Ther Ex             Scapular Retraction  3x10 3x10 3x10 3x10        UTS  4x30" 4x30" 4x30" 4x30"        LSS  4x30" 4x30" 4x30" 4x30"                                                                         Ther Activity                                       Gait Training                                       Modalities             Mechanical traction     20# max x 15'

## 2021-09-21 ENCOUNTER — OFFICE VISIT (OUTPATIENT)
Dept: PHYSICAL THERAPY | Facility: MEDICAL CENTER | Age: 61
End: 2021-09-21
Payer: COMMERCIAL

## 2021-09-21 DIAGNOSIS — M50.30 DDD (DEGENERATIVE DISC DISEASE), CERVICAL: ICD-10-CM

## 2021-09-21 DIAGNOSIS — M54.2 NECK PAIN: Primary | ICD-10-CM

## 2021-09-21 PROCEDURE — 97140 MANUAL THERAPY 1/> REGIONS: CPT | Performed by: PHYSICAL MEDICINE & REHABILITATION

## 2021-09-21 PROCEDURE — 97112 NEUROMUSCULAR REEDUCATION: CPT | Performed by: PHYSICAL MEDICINE & REHABILITATION

## 2021-09-23 PROCEDURE — 97012 MECHANICAL TRACTION THERAPY: CPT | Performed by: PHYSICAL MEDICINE & REHABILITATION

## 2021-09-23 NOTE — PROGRESS NOTES
Daily Note     Today's date: 2021  Patient name: Cole Doss  : 1960  MRN: [id number]  Referring provider: ANNA Nascimento*  Dx:   Encounter Diagnosis     ICD-10-CM    1  Neck pain  M54 2    2  DDD (degenerative disc disease), cervical  M50 30                   Subjective: Jayda So reported "The traction was helpful last time, I was able to move my neck much better while I was at work "      Objective: See treatment diary below      Assessment: Tolerated treatment well  Patient would benefit from continued PT  Jayda So demonstrated increased ROM following manual therapy which shows progress towards his goals  He reported a decrease in pain following traction and demonstrated increased extension ROM  Plan: Continue per plan of care  Precautions: none      Manuals 2021 2021 2021 2021 9/15/21 2021       IASTM  [address] JR       Side glides  [place] JR  JR       pistol             SNAGs for bilat   rotation     KK JR       Neuro Re-Ed             Standing Straight Arm PD  3x10 3x10 3x10  3x10       Standing Rows  3x10 3x10 3x10  3x10       Cervical Snags  3x10x2 3x10x2 3x10x2  3x10       No Monies   3x10 3x10  3x10                                 HEP and Return Demo 10'            Ther Ex             Scapular Retraction  3x10 3x10 3x10 3x10 3x10       UTS  4x30" 4x30" 4x30" 4x30" 4x30"       LSS  4x30" 4x30" 4x30" 4x30" 4x30"                                                                        Ther Activity                                       Gait Training                                       Modalities             Mechanical traction     20# max x 15' 22# inter x 15'

## 2021-09-27 PROBLEM — Z00.00 WELL ADULT EXAM: Status: ACTIVE | Noted: 2021-09-27

## 2021-09-27 PROBLEM — R73.03 PREDIABETES: Status: ACTIVE | Noted: 2018-09-04

## 2021-09-28 ENCOUNTER — OFFICE VISIT (OUTPATIENT)
Dept: PHYSICAL THERAPY | Facility: MEDICAL CENTER | Age: 61
End: 2021-09-28
Payer: COMMERCIAL

## 2021-09-28 DIAGNOSIS — M54.2 NECK PAIN: Primary | ICD-10-CM

## 2021-09-28 DIAGNOSIS — M50.30 DDD (DEGENERATIVE DISC DISEASE), CERVICAL: ICD-10-CM

## 2021-09-28 PROCEDURE — 97140 MANUAL THERAPY 1/> REGIONS: CPT | Performed by: PHYSICAL MEDICINE & REHABILITATION

## 2021-09-28 PROCEDURE — 97012 MECHANICAL TRACTION THERAPY: CPT | Performed by: PHYSICAL MEDICINE & REHABILITATION

## 2021-09-28 PROCEDURE — 97110 THERAPEUTIC EXERCISES: CPT | Performed by: PHYSICAL MEDICINE & REHABILITATION

## 2021-09-28 PROCEDURE — 97112 NEUROMUSCULAR REEDUCATION: CPT | Performed by: PHYSICAL MEDICINE & REHABILITATION

## 2021-09-28 NOTE — PROGRESS NOTES
Daily Note     Today's date: 2021  Patient name: Ivett Rich  : 1960  MRN: [id number]  Referring provider: ANNA Amezcua*  Dx:   Encounter Diagnosis     ICD-10-CM    1  Neck pain  M54 2    2  DDD (degenerative disc disease), cervical  M50 30                   Subjective: Tej Alejandra reported "Stiff today "      Objective: See treatment diary below      Assessment: Tolerated treatment well  Patient would benefit from continued PT  Following manual therapy Tej Alejandra demonstrate WNL RRot which shows progress towards his goals  This included C7 mobs, C5, C6 right rotation mobs and UT STM bilaterally  He also demonstrated an increase in ROM following traction  Plan: Continue per plan of care  Precautions: none      Manuals 2021 2021 2021 2021 9/15/21 2021 2021      IASTM  [address] JR JR      Side glides  BJ VZ AX  VT EQ      pistol             SNAGs for bilat   rotation     KK JR JR      Neuro Re-Ed             Standing Straight Arm PD  3x10 3x10 3x10  3x10       Standing Rows  3x10 3x10 3x10  3x10       Cervical Snags  3x10x2 3x10x2 3x10x2  3x10 3x10      No Monies   3x10 3x10  3x10       DNF retraction       3x10      Resisted Rotation       3x10 ea      HEP and Return Demo 10'            Ther Ex             Scapular Retraction  3x10 3x10 3x10 3x10 3x10 3x10      UTS  4x30" 4x30" 4x30" 4x30" 4x30" 4x30"      LSS  4x30" 4x30" 4x30" 4x30" 4x30" 4x30"                                                                       Ther Activity                                       Gait Training                                       Modalities             Mechanical traction     20# max x 15' 22# inter x 15'

## 2021-09-29 ENCOUNTER — OFFICE VISIT (OUTPATIENT)
Dept: FAMILY MEDICINE CLINIC | Facility: CLINIC | Age: 61
End: 2021-09-29
Payer: COMMERCIAL

## 2021-09-29 VITALS
HEART RATE: 80 BPM | SYSTOLIC BLOOD PRESSURE: 136 MMHG | HEIGHT: 67 IN | RESPIRATION RATE: 14 BRPM | WEIGHT: 189 LBS | TEMPERATURE: 97.4 F | OXYGEN SATURATION: 97 % | DIASTOLIC BLOOD PRESSURE: 70 MMHG | BODY MASS INDEX: 29.66 KG/M2

## 2021-09-29 DIAGNOSIS — E78.2 MIXED HYPERLIPIDEMIA: ICD-10-CM

## 2021-09-29 DIAGNOSIS — R73.03 PREDIABETES: ICD-10-CM

## 2021-09-29 DIAGNOSIS — K21.9 GASTROESOPHAGEAL REFLUX DISEASE, UNSPECIFIED WHETHER ESOPHAGITIS PRESENT: ICD-10-CM

## 2021-09-29 DIAGNOSIS — Z23 ENCOUNTER FOR IMMUNIZATION: ICD-10-CM

## 2021-09-29 DIAGNOSIS — Z00.00 WELL ADULT EXAM: Primary | ICD-10-CM

## 2021-09-29 DIAGNOSIS — M79.18 MYOFASCIAL PAIN SYNDROME: ICD-10-CM

## 2021-09-29 DIAGNOSIS — I10 ESSENTIAL HYPERTENSION: ICD-10-CM

## 2021-09-29 DIAGNOSIS — E04.1 THYROID NODULE: ICD-10-CM

## 2021-09-29 DIAGNOSIS — Z23 NEED FOR INFLUENZA VACCINATION: ICD-10-CM

## 2021-09-29 DIAGNOSIS — E55.9 VITAMIN D DEFICIENCY: ICD-10-CM

## 2021-09-29 PROCEDURE — 90682 RIV4 VACC RECOMBINANT DNA IM: CPT

## 2021-09-29 PROCEDURE — 90472 IMMUNIZATION ADMIN EACH ADD: CPT

## 2021-09-29 PROCEDURE — 99396 PREV VISIT EST AGE 40-64: CPT | Performed by: FAMILY MEDICINE

## 2021-09-29 PROCEDURE — 90471 IMMUNIZATION ADMIN: CPT

## 2021-09-29 PROCEDURE — 90715 TDAP VACCINE 7 YRS/> IM: CPT

## 2021-09-29 NOTE — PROGRESS NOTES
Chief Complaint   Patient presents with    Physical Exam     Annual Physical    Follow-up     6 month follow up     Health Maintenance   Topic Date Due    HIV Screening  Never done    Annual Physical  Never done    PT PLAN OF CARE  09/03/2021    BMI: Followup Plan  03/24/2022    Depression Screening  09/29/2022    BMI: Adult  09/29/2022    Colorectal Cancer Screening  05/30/2028    DTaP,Tdap,and Td Vaccines (3 - Td or Tdap) 09/29/2031    Hepatitis C Screening  Completed    Influenza Vaccine  Completed    COVID-19 Vaccine  Completed    Pneumococcal Vaccine: Pediatrics (0 to 5 Years) and At-Risk Patients (6 to 59 Years)  Aged Out    HIB Vaccine  Aged Out    Hepatitis B Vaccine  Aged Out    IPV Vaccine  Aged Out    Hepatitis A Vaccine  Aged Out    Meningococcal ACWY Vaccine  Aged Out    HPV Vaccine  Aged Out             Depression Screening and Follow-up Plan:   Patient was screened for depression during today's encounter  They screened negative with a PHQ-2 score of 0  Assessment/Plan:    Well adult exam  Recommended to follow a well-balanced diet, regular exercise, regular dentist visits  Colonoscopy done in May 2018, 1 polyp was remove  Colorectal surgeon Dr Gloria Hanna recommended repeat colonoscopy in 5 years  Tdap and Flublok administered today  Essential hypertension  BP improved  Continue Losartan 100 mg daily, Amlodipine 5 mg daily, Metoprolol  mg daily  Mixed hyperlipidemia  Continue Crestor 5 mg daily, Omega 3  Follow a low-cholesterol, low-fat diet, regular exercise  Recheck lipid panel in 6 months  Prediabetes  Discussed dietary modifications with patient  Recommended to follow a low carb diet, avoid starchy foods, lose weight  GERD (gastroesophageal reflux disease)  Symptoms controlled on Famotidine 20 mg 1 tablet twice daily before meals  Myofascial pain syndrome  Continue physical therapy for neck pain      Follow-up with pain management Dr Dottie Wagoner  Vitamin D deficiency  Improved  Continue Vit D 5000 IU daily  Thyroid nodule  Check TSH with next blood test in 6 months  Follow- up with endocrinology Dr Nova Prado  Schedule follow-up office visit in 6 months  Check labs prior to next visit  Diagnoses and all orders for this visit:    Well adult exam    Essential hypertension  -     CBC and differential; Future  -     Comprehensive metabolic panel; Future    Mixed hyperlipidemia  -     Comprehensive metabolic panel; Future  -     Lipid panel; Future    Prediabetes  -     Comprehensive metabolic panel; Future  -     Hemoglobin A1C; Future    Gastroesophageal reflux disease, unspecified whether esophagitis present    Myofascial pain syndrome    Encounter for immunization  -     TDAP VACCINE GREATER THAN OR EQUAL TO 8YO IM    Vitamin D deficiency    Thyroid nodule  -     TSH, 3rd generation with Free T4 reflex; Future    Need for influenza vaccination  -     influenza vaccine, quadrivalent, recombinant, PF, 0 5 mL, for patients 18 yr+ (FLUBLOK)    Other orders  -     Cancel: influenza vaccine, quadrivalent, recombinant, PF, 0 5 mL, for patients 18 yr+ (FLUBLOK)          Subjective:      Patient ID: Mily Venegas is a 57 y o  male  HPI     Patient presents for physical exam, 6 month follow-up visit  Patient works full-time at [employer] [address] [place] [address]      PMHx: HTN, Hyperlipidemia, Prediabetes, Vit D deficiency, right thyroid nodule, BPH, right renal cyst, DDD of the cervical spine  Reviewed current medications, blood test results from September 4, 2021  Hb A1C 6 2 ( previously 5 9 in March 2020), Vit -D 25 Hydroxy 61 2  Fasting blood sugar 122  Cholesterol 195, triglycerides 148, LDL 47,   Potassium 4 3, creatinine 1 0, LFTs - within normal range  HTN - BP improved  Patient denies chest pain, shortness of breath, dizziness    Currently taking Metoprolol  mg daily, Amlodipine 5 mg daily, Losartan 100 mg daily  Hyperlipidemia - on Crestor 5 mg daily, Omega 3 1000 mg 1 capsule twice daily  GERD --symptoms are stable  Patient takes Pepcid 20 mg 1 tablet twice daily  Reports no heartburn, abdominal pain  Patient continues with right-sided neck pain, decreased range of motion, neck spasms  He attends PT once a week  Follows with pain management Dr Emily Ovalles  Reports no improvement with trigger point injections performed on September 8, 2021  Patient has been followed by endocrinology Dr Everett Arredondo for  right thyroid nodule  FNA biopsy performed in February 2019 showed atypia of undetermined significance  Affirma testing was benign  Patient had colonoscopy in May 2018, 1 polyp was removed  Colorectal surgeon Dr Adriana Friedman recommended to repeat colonoscopy in 5 years  Completed COVID vaccination in January 2021  Denies tobacco use  Drinks wine with dinner  Family history is positive for HTN in his parents and his brother  The following portions of the patient's history were reviewed and updated as appropriate: allergies, past family history, past medical history, past social history, past surgical history and problem list     Review of Systems   Constitutional: Negative for activity change, appetite change, chills, fatigue and fever  HENT: Negative for congestion, ear pain, hearing loss, mouth sores, nosebleeds, sore throat, tinnitus and trouble swallowing  Eyes: Negative for pain, discharge, redness, itching and visual disturbance  Respiratory: Negative for cough, chest tightness, shortness of breath and wheezing  Cardiovascular: Negative for chest pain, palpitations and leg swelling  Gastrointestinal: Negative for abdominal pain, blood in stool, constipation, diarrhea, nausea and vomiting  Denies heartburn   Genitourinary: Negative for difficulty urinating, dysuria, flank pain, frequency and hematuria     Musculoskeletal: Positive for neck pain  Negative for back pain and joint swelling  Skin: Negative for rash  Neurological: Negative for dizziness, syncope and headaches  Hematological: Negative  Psychiatric/Behavioral: Negative for dysphoric mood and sleep disturbance  The patient is not nervous/anxious  Objective:      /70 (BP Location: Left arm, Patient Position: Sitting, Cuff Size: Standard)   Pulse 80   Temp (!) 97 4 °F (36 3 °C) (Tympanic)   Resp 14   Ht 5' 7" (1 702 m)   Wt 85 7 kg (189 lb)   SpO2 97%   BMI 29 60 kg/m²          Physical Exam  Vitals and nursing note reviewed  Constitutional:       Appearance: Normal appearance  Comments: Overweight   HENT:      Head: Normocephalic and atraumatic  Right Ear: Tympanic membrane and external ear normal       Left Ear: Tympanic membrane and external ear normal       Nose: No congestion  Eyes:      Conjunctiva/sclera: Conjunctivae normal       Pupils: Pupils are equal, round, and reactive to light  Neck:      Vascular: No carotid bruit  Cardiovascular:      Rate and Rhythm: Normal rate and regular rhythm  Heart sounds: No murmur heard  Pulmonary:      Effort: Pulmonary effort is normal       Breath sounds: Normal breath sounds  Abdominal:      General: There is no distension  Palpations: Abdomen is soft  Tenderness: There is no abdominal tenderness  Musculoskeletal:      Cervical back: Normal range of motion and neck supple  Right lower leg: No edema  Left lower leg: No edema  Comments: Neck exam: decreased ROM with lateral rotation to right side  No spinal tenderness  Paraspinal tenderness in cervical area on right side  Skin:     General: Skin is warm and dry  Findings: No rash  Neurological:      General: No focal deficit present  Mental Status: He is alert  Motor: No weakness        Coordination: Coordination normal       Gait: Gait normal    Psychiatric:         Mood and Affect: Mood normal

## 2021-09-30 NOTE — ASSESSMENT & PLAN NOTE
Continue Crestor 5 mg daily, Omega 3  Follow a low-cholesterol, low-fat diet, regular exercise  Recheck lipid panel in 6 months

## 2021-09-30 NOTE — ASSESSMENT & PLAN NOTE
Recommended to follow a well-balanced diet, regular exercise, regular dentist visits  Colonoscopy done in May 2018, 1 polyp was remove  Colorectal surgeon Dr Valeria Bright recommended repeat colonoscopy in 5 years  Tdap and Flublok administered today

## 2021-09-30 NOTE — ASSESSMENT & PLAN NOTE
Discussed dietary modifications with patient  Recommended to follow a low carb diet, avoid starchy foods, lose weight

## 2021-10-06 ENCOUNTER — OFFICE VISIT (OUTPATIENT)
Dept: PAIN MEDICINE | Facility: CLINIC | Age: 61
End: 2021-10-06
Payer: COMMERCIAL

## 2021-10-06 VITALS
SYSTOLIC BLOOD PRESSURE: 142 MMHG | HEART RATE: 66 BPM | BODY MASS INDEX: 29.66 KG/M2 | HEIGHT: 67 IN | DIASTOLIC BLOOD PRESSURE: 88 MMHG | WEIGHT: 189 LBS

## 2021-10-06 DIAGNOSIS — M54.2 NECK PAIN: ICD-10-CM

## 2021-10-06 DIAGNOSIS — M79.18 MYOFASCIAL PAIN SYNDROME: ICD-10-CM

## 2021-10-06 DIAGNOSIS — M50.30 DDD (DEGENERATIVE DISC DISEASE), CERVICAL: ICD-10-CM

## 2021-10-06 DIAGNOSIS — G89.4 CHRONIC PAIN SYNDROME: ICD-10-CM

## 2021-10-06 DIAGNOSIS — M47.812 CERVICAL SPONDYLOSIS: Primary | ICD-10-CM

## 2021-10-06 DIAGNOSIS — M62.838 NECK MUSCLE SPASM: ICD-10-CM

## 2021-10-06 PROCEDURE — 99214 OFFICE O/P EST MOD 30 MIN: CPT | Performed by: NURSE PRACTITIONER

## 2021-10-26 ENCOUNTER — HOSPITAL ENCOUNTER (OUTPATIENT)
Dept: RADIOLOGY | Facility: HOSPITAL | Age: 61
Discharge: HOME | End: 2021-10-26
Payer: COMMERCIAL

## 2021-10-26 DIAGNOSIS — M54.2 NECK PAIN: ICD-10-CM

## 2021-10-26 PROCEDURE — 72141 MRI NECK SPINE W/O DYE: CPT

## 2021-10-26 PROCEDURE — G1004 CDSM NDSC: HCPCS

## 2021-11-02 ENCOUNTER — HOSPITAL ENCOUNTER (OUTPATIENT)
Dept: RADIOLOGY | Facility: CLINIC | Age: 61
Discharge: HOME | End: 2021-11-02
Attending: ANESTHESIOLOGY | Admitting: ANESTHESIOLOGY
Payer: COMMERCIAL

## 2021-11-02 VITALS
DIASTOLIC BLOOD PRESSURE: 82 MMHG | RESPIRATION RATE: 18 BRPM | HEART RATE: 66 BPM | TEMPERATURE: 99.6 F | SYSTOLIC BLOOD PRESSURE: 148 MMHG | OXYGEN SATURATION: 96 %

## 2021-11-02 DIAGNOSIS — M47.812 CERVICAL SPONDYLOSIS: ICD-10-CM

## 2021-11-02 PROCEDURE — 64491 INJ PARAVERT F JNT C/T 2 LEV: CPT | Performed by: ANESTHESIOLOGY

## 2021-11-02 PROCEDURE — 64490 INJ PARAVERT F JNT C/T 1 LEV: CPT | Performed by: ANESTHESIOLOGY

## 2021-11-02 RX ORDER — LIDOCAINE HYDROCHLORIDE 10 MG/ML
5 INJECTION, SOLUTION EPIDURAL; INFILTRATION; INTRACAUDAL; PERINEURAL ONCE
Status: COMPLETED | OUTPATIENT
Start: 2021-11-02 | End: 2021-11-02

## 2021-11-02 RX ADMIN — LIDOCAINE HYDROCHLORIDE 3 ML: 10 INJECTION, SOLUTION EPIDURAL; INFILTRATION; INTRACAUDAL; PERINEURAL at 09:47

## 2021-11-02 RX ADMIN — LIDOCAINE HYDROCHLORIDE 2 ML: 20 INJECTION, SOLUTION EPIDURAL; INFILTRATION; INTRACAUDAL; PERINEURAL at 09:47

## 2021-11-04 ENCOUNTER — TELEPHONE (OUTPATIENT)
Dept: RADIOLOGY | Facility: CLINIC | Age: 61
End: 2021-11-04

## 2021-11-10 ENCOUNTER — OFFICE VISIT (OUTPATIENT)
Dept: ENDOCRINOLOGY | Facility: CLINIC | Age: 61
End: 2021-11-10
Payer: COMMERCIAL

## 2021-11-10 VITALS
SYSTOLIC BLOOD PRESSURE: 140 MMHG | HEIGHT: 67 IN | BODY MASS INDEX: 29.35 KG/M2 | HEART RATE: 66 BPM | WEIGHT: 187 LBS | DIASTOLIC BLOOD PRESSURE: 80 MMHG

## 2021-11-10 DIAGNOSIS — E55.9 VITAMIN D DEFICIENCY: ICD-10-CM

## 2021-11-10 DIAGNOSIS — M47.812 CERVICAL SPONDYLOSIS: Primary | ICD-10-CM

## 2021-11-10 DIAGNOSIS — R73.03 PREDIABETES: ICD-10-CM

## 2021-11-10 DIAGNOSIS — E04.1 THYROID NODULE: Primary | ICD-10-CM

## 2021-11-10 PROCEDURE — 99214 OFFICE O/P EST MOD 30 MIN: CPT | Performed by: PHYSICIAN ASSISTANT

## 2021-11-24 ENCOUNTER — OFFICE VISIT (OUTPATIENT)
Dept: OBGYN CLINIC | Facility: HOSPITAL | Age: 61
End: 2021-11-24
Payer: COMMERCIAL

## 2021-11-24 VITALS
SYSTOLIC BLOOD PRESSURE: 144 MMHG | BODY MASS INDEX: 29.98 KG/M2 | HEIGHT: 67 IN | WEIGHT: 191 LBS | HEART RATE: 71 BPM | DIASTOLIC BLOOD PRESSURE: 84 MMHG

## 2021-11-24 DIAGNOSIS — M18.12 ARTHRITIS OF CARPOMETACARPAL (CMC) JOINT OF LEFT THUMB: Primary | ICD-10-CM

## 2021-11-24 PROCEDURE — 99213 OFFICE O/P EST LOW 20 MIN: CPT | Performed by: ORTHOPAEDIC SURGERY

## 2021-11-24 PROCEDURE — 20600 DRAIN/INJ JOINT/BURSA W/O US: CPT | Performed by: ORTHOPAEDIC SURGERY

## 2021-11-24 RX ORDER — TRIAMCINOLONE ACETONIDE 40 MG/ML
20 INJECTION, SUSPENSION INTRA-ARTICULAR; INTRAMUSCULAR
Status: COMPLETED | OUTPATIENT
Start: 2021-11-24 | End: 2021-11-24

## 2021-11-24 RX ORDER — LIDOCAINE HYDROCHLORIDE 10 MG/ML
0.5 INJECTION, SOLUTION INFILTRATION; PERINEURAL
Status: COMPLETED | OUTPATIENT
Start: 2021-11-24 | End: 2021-11-24

## 2021-11-24 RX ADMIN — LIDOCAINE HYDROCHLORIDE 0.5 ML: 10 INJECTION, SOLUTION INFILTRATION; PERINEURAL at 09:27

## 2021-11-24 RX ADMIN — TRIAMCINOLONE ACETONIDE 20 MG: 40 INJECTION, SUSPENSION INTRA-ARTICULAR; INTRAMUSCULAR at 09:27

## 2021-12-14 ENCOUNTER — IMMUNIZATIONS (OUTPATIENT)
Dept: FAMILY MEDICINE CLINIC | Facility: HOSPITAL | Age: 61
End: 2021-12-14

## 2021-12-14 DIAGNOSIS — Z23 ENCOUNTER FOR IMMUNIZATION: Primary | ICD-10-CM

## 2021-12-14 PROCEDURE — 91306 COVID-19 MODERNA VACC 0.25 ML BOOSTER: CPT

## 2021-12-14 PROCEDURE — 0064A COVID-19 MODERNA VACC 0.25 ML BOOSTER: CPT

## 2021-12-22 ENCOUNTER — HOSPITAL ENCOUNTER (OUTPATIENT)
Dept: RADIOLOGY | Facility: CLINIC | Age: 61
Discharge: HOME | End: 2021-12-22
Attending: ANESTHESIOLOGY
Payer: COMMERCIAL

## 2021-12-22 VITALS
HEART RATE: 88 BPM | TEMPERATURE: 98.1 F | OXYGEN SATURATION: 97 % | SYSTOLIC BLOOD PRESSURE: 150 MMHG | DIASTOLIC BLOOD PRESSURE: 87 MMHG | RESPIRATION RATE: 20 BRPM

## 2021-12-22 DIAGNOSIS — M47.812 CERVICAL SPONDYLOSIS: ICD-10-CM

## 2021-12-22 PROCEDURE — 64491 INJ PARAVERT F JNT C/T 2 LEV: CPT | Performed by: ANESTHESIOLOGY

## 2021-12-22 PROCEDURE — 64490 INJ PARAVERT F JNT C/T 1 LEV: CPT | Performed by: ANESTHESIOLOGY

## 2021-12-22 RX ORDER — LIDOCAINE HYDROCHLORIDE 10 MG/ML
5 INJECTION, SOLUTION EPIDURAL; INFILTRATION; INTRACAUDAL; PERINEURAL ONCE
Status: COMPLETED | OUTPATIENT
Start: 2021-12-22 | End: 2021-12-22

## 2021-12-22 RX ORDER — BUPIVACAINE HYDROCHLORIDE 5 MG/ML
30 INJECTION, SOLUTION EPIDURAL; INTRACAUDAL ONCE
Status: COMPLETED | OUTPATIENT
Start: 2021-12-22 | End: 2021-12-22

## 2021-12-22 RX ADMIN — BUPIVACAINE HYDROCHLORIDE 1.5 ML: 5 INJECTION, SOLUTION EPIDURAL; INTRACAUDAL at 08:57

## 2021-12-22 RX ADMIN — LIDOCAINE HYDROCHLORIDE 1 ML: 10 INJECTION, SOLUTION EPIDURAL; INFILTRATION; INTRACAUDAL; PERINEURAL at 08:57

## 2021-12-26 DIAGNOSIS — I10 ESSENTIAL HYPERTENSION: ICD-10-CM

## 2021-12-27 RX ORDER — AMLODIPINE BESYLATE 5 MG/1
TABLET ORAL
Qty: 90 TABLET | Refills: 0 | Status: SHIPPED | OUTPATIENT
Start: 2021-12-27 | End: 2022-03-30 | Stop reason: SDUPTHER

## 2022-01-11 DIAGNOSIS — M47.812 CERVICAL SPONDYLOSIS: Primary | ICD-10-CM

## 2022-02-02 ENCOUNTER — TELEPHONE (OUTPATIENT)
Dept: PAIN MEDICINE | Facility: CLINIC | Age: 62
End: 2022-02-02

## 2022-02-02 ENCOUNTER — HOSPITAL ENCOUNTER (OUTPATIENT)
Dept: RADIOLOGY | Facility: CLINIC | Age: 62
Discharge: HOME | End: 2022-02-02
Attending: ANESTHESIOLOGY | Admitting: ANESTHESIOLOGY
Payer: COMMERCIAL

## 2022-02-02 VITALS
HEART RATE: 57 BPM | OXYGEN SATURATION: 93 % | TEMPERATURE: 97.2 F | SYSTOLIC BLOOD PRESSURE: 157 MMHG | RESPIRATION RATE: 20 BRPM | DIASTOLIC BLOOD PRESSURE: 92 MMHG

## 2022-02-02 DIAGNOSIS — M47.812 CERVICAL SPONDYLOSIS: ICD-10-CM

## 2022-02-02 DIAGNOSIS — I10 ESSENTIAL HYPERTENSION: ICD-10-CM

## 2022-02-02 PROCEDURE — 64633 DESTROY CERV/THOR FACET JNT: CPT | Performed by: ANESTHESIOLOGY

## 2022-02-02 PROCEDURE — 64634 DESTROY C/TH FACET JNT ADDL: CPT | Performed by: ANESTHESIOLOGY

## 2022-02-02 RX ORDER — LOSARTAN POTASSIUM 100 MG/1
100 TABLET ORAL DAILY
Qty: 30 TABLET | Refills: 5 | Status: SHIPPED | OUTPATIENT
Start: 2022-02-02 | End: 2022-05-05 | Stop reason: SDUPTHER

## 2022-02-02 RX ORDER — BUPIVACAINE HYDROCHLORIDE 5 MG/ML
30 INJECTION, SOLUTION EPIDURAL; INTRACAUDAL ONCE
Status: COMPLETED | OUTPATIENT
Start: 2022-02-02 | End: 2022-02-02

## 2022-02-02 RX ORDER — LIDOCAINE HYDROCHLORIDE 10 MG/ML
10 INJECTION, SOLUTION EPIDURAL; INFILTRATION; INTRACAUDAL; PERINEURAL ONCE
Status: COMPLETED | OUTPATIENT
Start: 2022-02-02 | End: 2022-02-02

## 2022-02-02 RX ADMIN — BUPIVACAINE HYDROCHLORIDE 3 ML: 5 INJECTION, SOLUTION EPIDURAL; INTRACAUDAL at 13:48

## 2022-02-02 RX ADMIN — LIDOCAINE HYDROCHLORIDE 3 ML: 20 INJECTION, SOLUTION EPIDURAL; INFILTRATION; INTRACAUDAL; PERINEURAL at 13:48

## 2022-02-02 RX ADMIN — LIDOCAINE HYDROCHLORIDE 8 ML: 10 INJECTION, SOLUTION EPIDURAL; INFILTRATION; INTRACAUDAL; PERINEURAL at 13:48

## 2022-02-02 NOTE — H&P
History of Present Illness: The patient is a 57 y o  male who presents with complaints of neck pain  Patient Active Problem List   Diagnosis    Closed compression fracture of L3 lumbar vertebra    Thyroid nodule    Primary osteoarthritis of left knee    Primary osteoarthritis of right knee    Essential hypertension    Mixed hyperlipidemia    Prediabetes    Renal cyst    DDD (degenerative disc disease), lumbar    Spondylolisthesis of lumbosacral region    Chronic low back pain    Peyronie disease    Vitamin D deficiency    GERD (gastroesophageal reflux disease)    Hand pain, left    Atypical chest pain    Neck pain    Neck muscle spasm    Cervical spondylosis    Myofascial pain syndrome    Well adult exam       Past Medical History:   Diagnosis Date    Cat scratch fever     Colon polyp     Diverticulitis     GERD (gastroesophageal reflux disease)     Hyperlipidemia     Hypertension     IFG (impaired fasting glucose)     L3 vertebral fracture (HCC) 07/2018    Lumbar compression fracture (HCC)     Patella-femoral syndrome     Thyroid nodule        Past Surgical History:   Procedure Laterality Date    CLOSED REDUCTION WRIST FRACTURE      COLONOSCOPY      NE COLONOSCOPY FLX DX W/COLLJ SPEC WHEN PFRMD N/A 5/30/2018    Procedure: COLONOSCOPY;  Surgeon: Darin Quinn MD;  Location: [facility];   Service: Colorectal    UMBILICAL GRANULOMA EXCISION Left     groin    US GUIDED THYROID BIOPSY  9/20/2018    US GUIDED THYROID BIOPSY  2/13/2019    WISDOM TOOTH EXTRACTION      WRIST GANGLION EXCISION  1969    closed reduction          Current Outpatient Medications:     amLODIPine (NORVASC) 5 mg tablet, TAKE ONE TABLET BY MOUTH EVERY DAY, Disp: 90 tablet, Rfl: 0    Cholecalciferol (VITAMIN D3) 5000 units CAPS, Take 1 capsule by mouth daily, Disp: , Rfl:     famotidine (PEPCID) 20 mg tablet, Take 1 tablet twice daily before meals, Disp: 60 tablet, Rfl: 5    glucosamine 500 MG CAPS capsule, Take by mouth daily  , Disp: , Rfl:     ibuprofen (MOTRIN) 200 mg tablet, Take by mouth every 6 (six) hours as needed for mild pain, Disp: , Rfl:     losartan (COZAAR) 100 MG tablet, Take 1 tablet (100 mg total) by mouth daily, Disp: 30 tablet, Rfl: 5    metoprolol succinate (TOPROL-XL) 100 mg 24 hr tablet, Take 1 tablet (100 mg total) by mouth daily, Disp: 90 tablet, Rfl: 3    multivitamin (THERAGRAN) TABS, Take 1 tablet by mouth daily, Disp: , Rfl:     Omega-3 Fatty Acids (FISH OIL) 1200 MG CAPS, Take by mouth 2 (two) times a day , Disp: , Rfl:     rosuvastatin (CRESTOR) 5 mg tablet, Take 1 tablet (5 mg total) by mouth daily, Disp: 90 tablet, Rfl: 3    Current Facility-Administered Medications:     bupivacaine (PF) (MARCAINE) 0 5 % injection 30 mL, 30 mL, Injection, Once, Rojas Rock, DO    lidocaine (PF) (XYLOCAINE-MPF) 1 % injection 10 mL, 10 mL, Other, Once, Rojas Rock, DO    lidocaine (PF) (XYLOCAINE-MPF) 2 % injection 4 mL, 4 mL, Other, Once, Rojas Rock, DO    No Known Allergies    Physical Exam:   Vitals:    02/02/22 1322   BP: 147/81   Pulse: 60   Resp: 20   Temp: (!) 97 2 °F (36 2 °C)   SpO2: 97%     General: Awake, Alert, Oriented x 3, Mood and affect appropriate  Respiratory: Respirations even and unlabored  Cardiovascular: Peripheral pulses intact; no edema  Musculoskeletal Exam:  Right cervical paraspinals tender to palpation  ASA Score: 2    Patient/Chart Verification  Patient ID Verified: Verbal  ID Band Applied: No  Consents Confirmed: Procedural  H&P( within 30 days) Verified: To be obtained in the Pre-Procedure area  Interval H&P(within 24 hr) Complete (required for Outpatients and Surgery Admit only): To be obtained in the Pre-Procedure area  Allergies Reviewed: Yes  Anticoag/NSAID held?: NA  Currently on antibiotics?: No  Pre-op Lab/Test Results Available: N/A    Assessment:   1   Cervical spondylosis        Plan: RIGHT C4-6 RFA

## 2022-02-02 NOTE — DISCHARGE INSTRUCTIONS

## 2022-02-02 NOTE — TELEPHONE ENCOUNTER
Patient is S/P a Right C4-C6 RFA c/ JW on 2/2/22  No OVS scheduled as of yet  Please check because  might schedule  Please call on 2/3/22 post RFA   Thanks

## 2022-02-03 NOTE — TELEPHONE ENCOUNTER
RN s/w pt  Pt states that he has had minimal pain that he treated with motrin and tylenol  Pt advised he can use ice any time and  heat 24 hrs after the procedure  Pt denies fever, signs of infection or sunburn like sensation  Pt aware it may take 2 weeks to notice a difference and 4-6 weeks for the full effect    Confirmed f/u OVS

## 2022-02-23 ENCOUNTER — OFFICE VISIT (OUTPATIENT)
Dept: OBGYN CLINIC | Facility: HOSPITAL | Age: 62
End: 2022-02-23
Payer: COMMERCIAL

## 2022-02-23 VITALS
SYSTOLIC BLOOD PRESSURE: 137 MMHG | DIASTOLIC BLOOD PRESSURE: 75 MMHG | HEIGHT: 67 IN | WEIGHT: 193 LBS | HEART RATE: 70 BPM | BODY MASS INDEX: 30.29 KG/M2

## 2022-02-23 DIAGNOSIS — M18.12 ARTHRITIS OF CARPOMETACARPAL (CMC) JOINT OF LEFT THUMB: Primary | ICD-10-CM

## 2022-02-23 PROCEDURE — 99214 OFFICE O/P EST MOD 30 MIN: CPT | Performed by: PHYSICIAN ASSISTANT

## 2022-02-23 PROCEDURE — 20600 DRAIN/INJ JOINT/BURSA W/O US: CPT | Performed by: PHYSICIAN ASSISTANT

## 2022-02-23 RX ORDER — METHYLPREDNISOLONE ACETATE 40 MG/ML
0.5 INJECTION, SUSPENSION INTRA-ARTICULAR; INTRALESIONAL; INTRAMUSCULAR; SOFT TISSUE
Status: COMPLETED | OUTPATIENT
Start: 2022-02-23 | End: 2022-02-23

## 2022-02-23 RX ORDER — LIDOCAINE HYDROCHLORIDE 10 MG/ML
0.5 INJECTION, SOLUTION EPIDURAL; INFILTRATION; INTRACAUDAL; PERINEURAL
Status: COMPLETED | OUTPATIENT
Start: 2022-02-23 | End: 2022-02-23

## 2022-02-23 RX ADMIN — METHYLPREDNISOLONE ACETATE 0.5 ML: 40 INJECTION, SUSPENSION INTRA-ARTICULAR; INTRALESIONAL; INTRAMUSCULAR; SOFT TISSUE at 08:41

## 2022-02-23 RX ADMIN — LIDOCAINE HYDROCHLORIDE 0.5 ML: 10 INJECTION, SOLUTION EPIDURAL; INFILTRATION; INTRACAUDAL; PERINEURAL at 08:41

## 2022-02-23 NOTE — PROGRESS NOTES
ASSESSMENT/PLAN:    Assessment:   Thumb CMC Arthritis  left    Plan:   Left CMC injection provided today with Depomedrol  Continue comfort cool    Follow Up:  3  month(s)    To Do Next Visit:  recheck    General Discussions:     [facility] Arthritis: The anatomy and physiology of carpometacarpal joint arthritis was discussed with the patient today in the office  Deterioration of the articular cartilage eventually leads to hypermobility at the thumb [facility] joint, resulting in joint subluxation, osteophyte formation, cystic changes within the trapezium and base of the first metacarpal, as well as subchondral sclerosis  Eventually, pain, limited mobility, and compensatory hyperextension at the metacarpophalangeal joint may develop  While normal activity and usage of the thumb joint may provide a painful experience to the patient, this typically does not result in damage to the thumb or hand  Treatment options include resting thumb spica splints to decreased joint edema, pain, and inflammation  Therapy exercises to strengthen the thenar musculature may relieve pain, but do not alter the overall continued development of osteoarthritis  Oral medications, topical medications, corticosteroid injections may decrease pain and increase overall function  Eventually, approximately 5% of patients may require surgical intervention  _____________________________________________________  CHIEF COMPLAINT:  Chief Complaint   Patient presents with    Left Thumb - Follow-up     [facility] injection given Kenalog 11/24/21         SUBJECTIVE:  Tucker Houston is a 57 y o  male who presents for follow up regarding Thumb CMC Arthritis  left  Since last visit, Tucker Houston has tried steroid injections with only partial relief  Today there is Pain  Moderate  Intermittant  Dull and Aching to the left thumb      Radiation: None  Associated symptoms: Pain  Moderate  Intermittant  Dull and Aching  Handedness: right  Work status: [employer]    PAST MEDICAL HISTORY:  Past Medical History:   Diagnosis Date    Cat scratch fever     Colon polyp     Diverticulitis     GERD (gastroesophageal reflux disease)     Hyperlipidemia     Hypertension     IFG (impaired fasting glucose)     L3 vertebral fracture ([address] ) 07/2018    Lumbar compression fracture (HCC)     Patella-femoral syndrome     Thyroid nodule        PAST SURGICAL HISTORY:  Past Surgical History:   Procedure Laterality Date    CLOSED REDUCTION WRIST FRACTURE      COLONOSCOPY      [place] COLONOSCOPY FLX DX W/COLLJ SPEC WHEN PFRMD N/A 5/30/2018    Procedure: COLONOSCOPY;  Surgeon: Gigi Bueno MD;  [facility]: [facility]; Service: Colorectal    UMBILICAL GRANULOMA EXCISION Left     groin    US GUIDED THYROID BIOPSY  9/20/2018    US GUIDED THYROID BIOPSY  2/13/2019    WISDOM TOOTH EXTRACTION      WRIST GANGLION EXCISION  1969    closed reduction        FAMILY HISTORY:  Family History   Problem Relation Age of Onset    Hypertension Mother     Melanoma Father     Diabetes type II Father     Hypertension Father     Colon cancer Maternal Grandmother     Colon cancer Maternal Uncle     Hypertension Brother        SOCIAL HISTORY:  Social History     Tobacco Use    Smoking status: Never Smoker    Smokeless tobacco: Never Used   Vaping Use    Vaping Use: Never used   Substance Use Topics    Alcohol use:  Yes     Alcohol/week: 3 0 standard drinks     Types: 3 Glasses of wine per week     Comment: social    Drug use: No       MEDICATIONS:    Current Outpatient Medications:     amLODIPine (NORVASC) 5 mg tablet, TAKE ONE TABLET BY MOUTH EVERY DAY, Disp: 90 tablet, Rfl: 0    Cholecalciferol (VITAMIN D3) 5000 units CAPS, Take 1 capsule by mouth daily, Disp: , Rfl:     famotidine (PEPCID) 20 mg tablet, Take 1 tablet twice daily before meals, Disp: 60 tablet, Rfl: 5    glucosamine 500 MG CAPS capsule, Take by mouth daily  , Disp: , Rfl:     ibuprofen (MOTRIN) 200 mg tablet, Take by mouth every 6 (six) hours as needed for mild pain, Disp: , Rfl:     losartan (COZAAR) 100 MG tablet, Take 1 tablet (100 mg total) by mouth daily, Disp: 30 tablet, Rfl: 5    metoprolol succinate (TOPROL-XL) 100 mg 24 hr tablet, Take 1 tablet (100 mg total) by mouth daily, Disp: 90 tablet, Rfl: 3    multivitamin (THERAGRAN) TABS, Take 1 tablet by mouth daily, Disp: , Rfl:     Omega-3 Fatty Acids (FISH OIL) 1200 MG CAPS, Take by mouth 2 (two) times a day , Disp: , Rfl:     rosuvastatin (CRESTOR) 5 mg tablet, Take 1 tablet (5 mg total) by mouth daily, Disp: 90 tablet, Rfl: 3    ALLERGIES:  No Known Allergies    REVIEW OF SYSTEMS:  Pertinent items are noted in HPI  A comprehensive review of systems was negative      LABS:  HgA1c:   Lab Results   Component Value Date    HGBA1C 6 2 (H) 09/04/2021     BMP:   Lab Results   Component Value Date    GLUCOSE 104 07/27/2018    CALCIUM 9 5 09/04/2021     (U) 08/22/2014    K 4 3 09/04/2021    CO2 28 09/04/2021     09/04/2021    BUN 20 09/04/2021    CREATININE 1 01 09/04/2021           _____________________________________________________  PHYSICAL EXAMINATION:  Vital signs: /75   Pulse 70   Ht 5' 7" (1 702 m)   Wt 87 5 kg (193 lb)   BMI 30 23 kg/m²   General: well developed and well nourished, alert, oriented times 3 and appears comfortable  Psychiatric: Normal  HEENT: Trachea Midline, No torticollis  Cardiovascular: No discernable arrhythmia  Pulmonary: No wheezing or stridor  Abdomen: No rebound or guarding  Extremities: No peripheral edema  Skin: No masses, erythema, lacerations, fluctation, ulcerations  Neurovascular: Sensation Intact to the Median, Ulnar, Radial Nerve, Motor Intact to the Median, Ulnar, Radial Nerve and Pulses Intact    MUSCULOSKELETAL EXAMINATION:  LEFT SIDE:  CMC: Positive grind, Positive tendnerness CMC, Positive Shoulder Sign and Positive Hyperextension MP 5°    _____________________________________________________  STUDIES REVIEWED:  No Studies to review      PROCEDURES PERFORMED:  Small joint arthrocentesis: L thumb CMC  [place] Protocol:  Consent given by: patient  Patient understanding: patient states understanding of the procedure being performed  Site marked: the operative site was marked  Patient identity confirmed: verbally with patient    Supporting Documentation  Indications: pain   Procedure Details  Location: thumb - L thumb CMC  Preparation: Patient was prepped and draped in the usual sterile fashion  Needle size: 25 G  Ultrasound guidance: no  Approach: radial  Medications administered: 0 5 mL lidocaine (PF) 1 %; 0 5 mL methylPREDNISolone acetate 40 mg/mL    Patient tolerance: patient tolerated the procedure well with no immediate complications  Dressing:  Sterile dressing applied

## 2022-03-13 DIAGNOSIS — E78.2 MIXED HYPERLIPIDEMIA: ICD-10-CM

## 2022-03-13 RX ORDER — ROSUVASTATIN CALCIUM 5 MG/1
TABLET, COATED ORAL
Qty: 90 TABLET | Refills: 0 | Status: SHIPPED | OUTPATIENT
Start: 2022-03-13 | End: 2022-06-15 | Stop reason: SDUPTHER

## 2022-03-13 RX ORDER — ROSUVASTATIN CALCIUM 5 MG/1
TABLET, COATED ORAL
Qty: 90 TABLET | Refills: 0 | Status: SHIPPED | OUTPATIENT
Start: 2022-03-13 | End: 2022-03-13

## 2022-03-16 ENCOUNTER — OFFICE VISIT (OUTPATIENT)
Dept: PAIN MEDICINE | Facility: CLINIC | Age: 62
End: 2022-03-16
Payer: COMMERCIAL

## 2022-03-16 VITALS
BODY MASS INDEX: 29.98 KG/M2 | DIASTOLIC BLOOD PRESSURE: 78 MMHG | WEIGHT: 191 LBS | HEIGHT: 67 IN | HEART RATE: 62 BPM | SYSTOLIC BLOOD PRESSURE: 134 MMHG

## 2022-03-16 DIAGNOSIS — M51.36 DDD (DEGENERATIVE DISC DISEASE), LUMBAR: Primary | ICD-10-CM

## 2022-03-16 DIAGNOSIS — M47.812 CERVICAL SPONDYLOSIS: ICD-10-CM

## 2022-03-16 DIAGNOSIS — M43.17 SPONDYLOLISTHESIS OF LUMBOSACRAL REGION: ICD-10-CM

## 2022-03-16 DIAGNOSIS — E78.2 MIXED HYPERLIPIDEMIA: ICD-10-CM

## 2022-03-16 DIAGNOSIS — M79.18 MYOFASCIAL PAIN SYNDROME: ICD-10-CM

## 2022-03-16 PROCEDURE — 99213 OFFICE O/P EST LOW 20 MIN: CPT | Performed by: NURSE PRACTITIONER

## 2022-03-16 RX ORDER — ROSUVASTATIN CALCIUM 5 MG/1
5 TABLET, COATED ORAL DAILY
Qty: 90 TABLET | Refills: 0 | OUTPATIENT
Start: 2022-03-16

## 2022-03-16 NOTE — PROGRESS NOTES
Assessment:  1  DDD (degenerative disc disease), lumbar    2  Spondylolisthesis of lumbosacral region    3  Cervical spondylosis    4  Myofascial pain syndrome        Plan:  1  We can repeat right C4-6 RFA p r n     I discussed with the patient that since there has been moderate to significant improvement in the pain symptoms, we will hold off on any repeat injections at this point in time  However, I reviewed with the patient that if their symptoms should return or worsen,  they should call our office to schedule to discuss repeating the injection  2  Patient will continue with home exercise program  3  Patient will follow up on a p r n  basis at this time     M*OnHand software was used to dictate this note  It may contain errors with dictating incorrect words or incorrect spelling  Please contact the provider directly with any questions  History of Present Illness: The patient is a 57 y o  male last seen on 10/6/21 who presents for a follow up office visit in regards to chronic right-sided neck pain is nonradiating into the upper extremities secondary to will degenerative disc disease, cervical spondylosis and chronic pain syndrome  He unfortunately did not find relief with trigger point injections  He is now status post right C4-6 RFA completed on February 2, 2022 and continues with ongoing relief of his neck pain from this procedure  He has not found much relief in the past and muscle relaxants  He will very rarely take Tylenol p r n  since the ablation  He rates his pain a 2/10 on the numeric pain rating scale  He intermittently has pain in the morning which is described as dull aching    I have personally reviewed and/or updated the patient's past medical history, past surgical history, family history, social history, current medications, allergies, and vital signs today  Review of Systems:    Review of Systems   Respiratory: Negative for shortness of breath      Cardiovascular: Negative for chest pain  Gastrointestinal: Negative for constipation, diarrhea, nausea and vomiting  Musculoskeletal: Negative for arthralgias, gait problem, joint swelling and myalgias  Skin: Negative for rash  Neurological: Negative for dizziness, seizures and weakness  All other systems reviewed and are negative  Past Medical History:   Diagnosis Date    Cat scratch fever     Colon polyp     Diverticulitis     GERD (gastroesophageal reflux disease)     Hyperlipidemia     Hypertension     IFG (impaired fasting glucose)     L3 vertebral fracture (HCC) 07/2018    Lumbar compression fracture (HCC)     Patella-femoral syndrome     Thyroid nodule        Past Surgical History:   Procedure Laterality Date    CLOSED REDUCTION WRIST FRACTURE      COLONOSCOPY      [place] COLONOSCOPY FLX DX W/COLLJ SPEC WHEN PFRMD N/A 5/30/2018    Procedure: COLONOSCOPY;  Surgeon: Laird Baumgarten, MD;  [facility]; Service: Colorectal    UMBILICAL GRANULOMA EXCISION Left     groin    US GUIDED THYROID BIOPSY  9/20/2018    US GUIDED THYROID BIOPSY  2/13/2019    WISDOM TOOTH EXTRACTION      WRIST GANGLION EXCISION  1969    closed reduction        Family History   Problem Relation Age of Onset    Hypertension Mother     Melanoma Father     Diabetes type II Father     Hypertension Father     Colon cancer Maternal Grandmother     Colon cancer Maternal Uncle     Hypertension Brother        Social History     Occupational History    Not on file   Tobacco Use    Smoking status: Never Smoker    Smokeless tobacco: Never Used   Vaping Use    Vaping Use: Never used   Substance and Sexual Activity    Alcohol use:  Yes     Alcohol/week: 3 0 standard drinks     Types: 3 Glasses of wine per week     Comment: social    Drug use: No    Sexual activity: Not on file         Current Outpatient Medications:     amLODIPine (NORVASC) 5 mg tablet, TAKE ONE TABLET BY MOUTH EVERY DAY, Disp: 90 tablet, Rfl: 0   Cholecalciferol (VITAMIN D3) 5000 units CAPS, Take 1 capsule by mouth daily, Disp: , Rfl:     famotidine (PEPCID) 20 mg tablet, Take 1 tablet twice daily before meals, Disp: 60 tablet, Rfl: 5    glucosamine 500 MG CAPS capsule, Take by mouth daily  , Disp: , Rfl:     ibuprofen (MOTRIN) 200 mg tablet, Take by mouth every 6 (six) hours as needed for mild pain, Disp: , Rfl:     losartan (COZAAR) 100 MG tablet, Take 1 tablet (100 mg total) by mouth daily, Disp: 30 tablet, Rfl: 5    metoprolol succinate (TOPROL-XL) 100 mg 24 hr tablet, Take 1 tablet (100 mg total) by mouth daily, Disp: 90 tablet, Rfl: 3    multivitamin (THERAGRAN) TABS, Take 1 tablet by mouth daily, Disp: , Rfl:     Omega-3 Fatty Acids (FISH OIL) 1200 MG CAPS, Take by mouth 2 (two) times a day , Disp: , Rfl:     rosuvastatin (CRESTOR) 5 mg tablet, TAKE 1 TABLET BY MOUTH DAILY, Disp: 90 tablet, Rfl: 0    No Known Allergies    Physical Exam:    /78   Pulse 62   Ht 5' 7" (1 702 m)   Wt 86 6 kg (191 lb)   BMI 29 91 kg/m²     Constitutional:normal, well developed, well nourished, alert, in no distress and non-toxic and no overt pain behavior  Eyes:anicteric  HEENT:grossly intact  Neck:supple, symmetric, trachea midline and no masses   Pulmonary:even and unlabored  Cardiovascular:No edema or pitting edema present  Skin:Normal without rashes or lesions and well hydrated  Psychiatric:Mood and affect appropriate  Neurologic:Cranial Nerves II-XII grossly intact  Musculoskeletal:normal gait      Imaging  No orders to display         No orders of the defined types were placed in this encounter

## 2022-03-30 DIAGNOSIS — I10 ESSENTIAL HYPERTENSION: ICD-10-CM

## 2022-03-31 RX ORDER — AMLODIPINE BESYLATE 5 MG/1
5 TABLET ORAL DAILY
Qty: 30 TABLET | Refills: 0 | Status: SHIPPED | OUTPATIENT
Start: 2022-03-31 | End: 2022-05-04

## 2022-04-07 NOTE — TELEPHONE ENCOUNTER
Lmom for patient to call/schedule a follow-up appointment prior to next medication refill; also mailed postcard to patient with this information

## 2022-04-13 ENCOUNTER — TELEPHONE (OUTPATIENT)
Dept: FAMILY MEDICINE CLINIC | Facility: CLINIC | Age: 62
End: 2022-04-13

## 2022-04-13 NOTE — TELEPHONE ENCOUNTER
Patient ([phone]) rescheduled routine follow up for 8/24/22  Asks if he needs update lab work ordered to have done prior to visit  Is so, request call to notify when order has been placed in epic

## 2022-04-15 DIAGNOSIS — Z13.6 SCREENING FOR CARDIOVASCULAR CONDITION: ICD-10-CM

## 2022-04-15 DIAGNOSIS — E04.1 THYROID NODULE: ICD-10-CM

## 2022-04-15 DIAGNOSIS — R73.03 PREDIABETES: ICD-10-CM

## 2022-04-15 DIAGNOSIS — Z12.5 SCREENING FOR PROSTATE CANCER: ICD-10-CM

## 2022-04-15 DIAGNOSIS — I10 ESSENTIAL HYPERTENSION: Primary | ICD-10-CM

## 2022-04-15 NOTE — TELEPHONE ENCOUNTER
Please call patient  Orders placed in chart for blood work  Last blood work done in 9/2021  Recommend to go for blood test next month

## 2022-05-05 DIAGNOSIS — I10 ESSENTIAL HYPERTENSION: ICD-10-CM

## 2022-05-05 RX ORDER — AMLODIPINE BESYLATE 5 MG/1
5 TABLET ORAL DAILY
Qty: 30 TABLET | Refills: 0 | Status: CANCELLED | OUTPATIENT
Start: 2022-05-05

## 2022-05-05 RX ORDER — LOSARTAN POTASSIUM 100 MG/1
100 TABLET ORAL DAILY
Qty: 30 TABLET | Refills: 0 | Status: SHIPPED | OUTPATIENT
Start: 2022-05-05 | End: 2022-08-10 | Stop reason: SDUPTHER

## 2022-05-25 ENCOUNTER — OFFICE VISIT (OUTPATIENT)
Dept: OBGYN CLINIC | Facility: HOSPITAL | Age: 62
End: 2022-05-25
Payer: COMMERCIAL

## 2022-05-25 VITALS
SYSTOLIC BLOOD PRESSURE: 134 MMHG | WEIGHT: 188.8 LBS | HEIGHT: 67 IN | BODY MASS INDEX: 29.63 KG/M2 | HEART RATE: 71 BPM | DIASTOLIC BLOOD PRESSURE: 73 MMHG

## 2022-05-25 DIAGNOSIS — M18.12 ARTHRITIS OF CARPOMETACARPAL (CMC) JOINT OF LEFT THUMB: Primary | ICD-10-CM

## 2022-05-25 PROCEDURE — 99213 OFFICE O/P EST LOW 20 MIN: CPT | Performed by: ORTHOPAEDIC SURGERY

## 2022-05-25 PROCEDURE — 20600 DRAIN/INJ JOINT/BURSA W/O US: CPT | Performed by: ORTHOPAEDIC SURGERY

## 2022-05-25 RX ORDER — LIDOCAINE HYDROCHLORIDE 10 MG/ML
0.5 INJECTION, SOLUTION INFILTRATION; PERINEURAL
Status: COMPLETED | OUTPATIENT
Start: 2022-05-25 | End: 2022-05-25

## 2022-05-25 RX ORDER — METHYLPREDNISOLONE ACETATE 40 MG/ML
0.5 INJECTION, SUSPENSION INTRA-ARTICULAR; INTRALESIONAL; INTRAMUSCULAR; SOFT TISSUE
Status: COMPLETED | OUTPATIENT
Start: 2022-05-25 | End: 2022-05-25

## 2022-05-25 RX ADMIN — METHYLPREDNISOLONE ACETATE 0.5 ML: 40 INJECTION, SUSPENSION INTRA-ARTICULAR; INTRALESIONAL; INTRAMUSCULAR; SOFT TISSUE at 09:16

## 2022-05-25 RX ADMIN — LIDOCAINE HYDROCHLORIDE 0.5 ML: 10 INJECTION, SOLUTION INFILTRATION; PERINEURAL at 09:16

## 2022-05-25 NOTE — PROGRESS NOTES
ASSESSMENT/PLAN:    Assessment:   Thumb CMC Arthritis  left    Plan:   Patient will proceed with a steroid injection with depo to his left thumb cmc jt  No formal restrictions  Follow Up:  3  month(s)    To Do Next Visit:       General Discussions:     CMC Arthritis: The anatomy and physiology of carpometacarpal joint arthritis was discussed with the patient today in the office  Deterioration of the articular cartilage eventually leads to hypermobility at the thumb [facility] joint, resulting in joint subluxation, osteophyte formation, cystic changes within the trapezium and base of the first metacarpal, as well as subchondral sclerosis  Eventually, pain, limited mobility, and compensatory hyperextension at the metacarpophalangeal joint may develop  While normal activity and usage of the thumb joint may provide a painful experience to the patient, this typically does not result in damage to the thumb or hand  Treatment options include resting thumb spica splints to decreased joint edema, pain, and inflammation  Therapy exercises to strengthen the thenar musculature may relieve pain, but do not alter the overall continued development of osteoarthritis  Oral medications, topical medications, corticosteroid injections may decrease pain and increase overall function  Eventually, approximately 5% of patients may require surgical intervention  Operative Discussions:       _____________________________________________________  CHIEF COMPLAINT:  Chief Complaint   Patient presents with    Left Thumb - Follow-up     [facility]- Depo          SUBJECTIVE:  Katt Pereira is a 57 y o  male who presents for follow up regarding Thumb CMC Arthritis  left  Since last visit, Katt Pereira has tried steroid injections with only partial relief  Today there is Pain  Moderate  Intermittant  Sharp and Aching to the left thumb      Radiation: Yes to the  thumb  Associated symptoms: No Complaints    PAST MEDICAL HISTORY:  Past Medical History:   Diagnosis Date    Cat scratch fever     Colon polyp     Diverticulitis     GERD (gastroesophageal reflux disease)     Hyperlipidemia     Hypertension     IFG (impaired fasting glucose)     L3 vertebral fracture (HCC) 07/2018    Lumbar compression fracture (HCC)     Patella-femoral syndrome     Thyroid nodule        PAST SURGICAL HISTORY:  Past Surgical History:   Procedure Laterality Date    CLOSED REDUCTION WRIST FRACTURE      COLONOSCOPY      [place] COLONOSCOPY FLX DX W/COLLJ SPEC WHEN PFRMD N/A 5/30/2018    Procedure: COLONOSCOPY;  Surgeon: Rufino Gonzalez MD;  [facility]; Service: Colorectal    UMBILICAL GRANULOMA EXCISION Left     groin    US GUIDED THYROID BIOPSY  9/20/2018    US GUIDED THYROID BIOPSY  2/13/2019    WISDOM TOOTH EXTRACTION      WRIST GANGLION EXCISION  1969    closed reduction        FAMILY HISTORY:  Family History   Problem Relation Age of Onset    Hypertension Mother     Melanoma Father     Diabetes type II Father     Hypertension Father     Colon cancer Maternal Grandmother     Colon cancer Maternal Uncle     Hypertension Brother        SOCIAL HISTORY:  Social History     Tobacco Use    Smoking status: Never Smoker    Smokeless tobacco: Never Used   Vaping Use    Vaping Use: Never used   Substance Use Topics    Alcohol use:  Yes     Alcohol/week: 3 0 standard drinks     Types: 3 Glasses of wine per week     Comment: social    Drug use: No       MEDICATIONS:    Current Outpatient Medications:     amLODIPine (NORVASC) 5 mg tablet, TAKE ONE TABLET BY MOUTH EVERY DAY, Disp: 30 tablet, Rfl: 0    Cholecalciferol (VITAMIN D3) 5000 units CAPS, Take 1 capsule by mouth daily, Disp: , Rfl:     famotidine (PEPCID) 20 mg tablet, Take 1 tablet twice daily before meals, Disp: 60 tablet, Rfl: 5    glucosamine 500 MG CAPS capsule, Take by mouth daily  , Disp: , Rfl:     ibuprofen (MOTRIN) 200 mg tablet, Take by mouth every 6 (six) hours as needed for mild pain, Disp: , Rfl:     losartan (COZAAR) 100 MG tablet, Take 1 tablet (100 mg total) by mouth daily, Disp: 30 tablet, Rfl: 0    metoprolol succinate (TOPROL-XL) 100 mg 24 hr tablet, Take 1 tablet (100 mg total) by mouth daily, Disp: 90 tablet, Rfl: 3    multivitamin (THERAGRAN) TABS, Take 1 tablet by mouth daily, Disp: , Rfl:     Omega-3 Fatty Acids (FISH OIL) 1200 MG CAPS, Take by mouth 2 (two) times a day , Disp: , Rfl:     rosuvastatin (CRESTOR) 5 mg tablet, TAKE 1 TABLET BY MOUTH DAILY, Disp: 90 tablet, Rfl: 0    ALLERGIES:  No Known Allergies    REVIEW OF SYSTEMS:  Pertinent items are noted in HPI      LABS:  HgA1c:   Lab Results   Component Value Date    HGBA1C 6 2 (H) 09/04/2021     BMP:   Lab Results   Component Value Date    GLUCOSE 104 07/27/2018    CALCIUM 9 5 09/04/2021     (U) 08/22/2014    K 4 3 09/04/2021    CO2 28 09/04/2021     09/04/2021    BUN 20 09/04/2021    CREATININE 1 01 09/04/2021           _____________________________________________________  PHYSICAL EXAMINATION:  Vital signs: /73   Pulse 71   Ht 5' 7" (1 702 m)   Wt 85 6 kg (188 lb 12 8 oz)   BMI 29 57 kg/m²   General: well developed and well nourished, alert, oriented times 3 and appears comfortable  Psychiatric: Normal  HEENT: Trachea Midline, No torticollis  Cardiovascular: No discernable arrhythmia  Pulmonary: No wheezing or stridor  Abdomen: No rebound or guarding  Extremities: No peripheral edema  Skin: No Erythema  Neurovascular: Sensation Intact to the Median, Ulnar, Radial Nerve, Motor Intact to the Median, Ulnar, Radial Nerve and Pulses Intact    MUSCULOSKELETAL EXAMINATION:  LEFT SIDE:  CMC: No Instability, Positive grind, Positive tendnerness CMC and Positive Shoulder Sign    _____________________________________________________  STUDIES REVIEWED:  No Studies to review      PROCEDURES PERFORMED:  Small joint arthrocentesis: L thumb CMC  [place] Protocol:  Consent: Verbal consent obtained    Risks and benefits: risks, benefits and alternatives were discussed  Consent given by: patient  Site marked: the operative site was marked  Patient identity confirmed: verbally with patient    Supporting Documentation  Indications: pain   Procedure Details  Location: thumb - L thumb CMC  Medications administered: 0 5 mL lidocaine 1 %; 0 5 mL methylPREDNISolone acetate 40 mg/mL    Patient tolerance: patient tolerated the procedure well with no immediate complications  Dressing:  Sterile dressing applied            Scribe Attestation    I,:  Tram Leong am acting as a scribe while in the presence of the attending physician :       I,:  Konstantin Yoon MD personally performed the services described in this documentation    as scribed in my presence :

## 2022-06-04 ENCOUNTER — OFFICE VISIT (OUTPATIENT)
Dept: URGENT CARE | Facility: MEDICAL CENTER | Age: 62
End: 2022-06-04
Payer: COMMERCIAL

## 2022-06-04 VITALS
SYSTOLIC BLOOD PRESSURE: 121 MMHG | DIASTOLIC BLOOD PRESSURE: 66 MMHG | BODY MASS INDEX: 28.25 KG/M2 | TEMPERATURE: 96.6 F | HEART RATE: 70 BPM | RESPIRATION RATE: 20 BRPM | WEIGHT: 180 LBS | HEIGHT: 67 IN | OXYGEN SATURATION: 96 %

## 2022-06-04 DIAGNOSIS — S05.02XA ABRASION OF LEFT CORNEA, INITIAL ENCOUNTER: Primary | ICD-10-CM

## 2022-06-04 PROCEDURE — 99213 OFFICE O/P EST LOW 20 MIN: CPT | Performed by: EMERGENCY MEDICINE

## 2022-06-04 RX ORDER — ERYTHROMYCIN 5 MG/G
0.5 OINTMENT OPHTHALMIC EVERY 8 HOURS SCHEDULED
Qty: 15 G | Refills: 0 | Status: SHIPPED | OUTPATIENT
Start: 2022-06-04 | End: 2022-06-09

## 2022-06-04 NOTE — PATIENT INSTRUCTIONS
Corneal Abrasion   WHAT YOU NEED TO KNOW:   A corneal abrasion is a scratch on the cornea of your eye  The cornea is the clear layer that covers the front of your eye  A small scratch may heal in 1 to 2 days  Deeper or larger scratches may take longer to heal         DISCHARGE INSTRUCTIONS:   Call your doctor or ophthalmologist if:   Your eye pain or vision gets worse  You have yellow or green drainage from your eye  You have questions or concerns about your condition or care  Medicines:   Medicines  may be given in the form of eyedrops or ointment to help prevent an eye infection  You may also be given eyedrops to decrease pain  Take your medicine as directed  Contact your healthcare provider if you think your medicine is not helping or if you have side effects  Tell him or her if you are allergic to any medicine  Keep a list of the medicines, vitamins, and herbs you take  Include the amounts, and when and why you take them  Bring the list or the pill bottles to follow-up visits  Carry your medicine list with you in case of an emergency  Care for your eyes:   Get regular eye exams  Get your eyes checked at least every year  Eat healthy foods  Fresh fruits and vegetables that are rich in vitamins A and C may help with your vision  Foods such as sweet potatoes, apricots, and carrots are rich in nutrients for the eyes  Take care of your contacts or glasses  Store, clean, and use your contacts or glasses as directed  Replace your glasses or contact lenses as often as your healthcare provider suggests  Decrease eye strain  Rest your eyes, especially after you read or use a computer for long periods of time  Get plenty of sleep at night  Use lights that reduce glare in your home, school, or workplace  Wear dark sunglasses  This will help prevent pain and light sensitivity  Make sure the sunglasses have UVA and UVB protection  This will protect your eyes when you go outside      Use eyedrops safely  If your treatment plan includes eyedrops, it is important to use them as directed  Your provider may give you detailed instructions to follow  The eyedrops may also come with safety instructions  Follow all instructions to help prevent an infection  Do not touch the tip of the bottle to your eye  Germs from your eye can spread to the medicine bottle  Help prevent corneal abrasions:   Remove your contact lenses if your eyes feel dry or irritated  Wash your hands if you need to touch your eyes or your face  Trim your fingernails so you cannot scratch your eye  Wear protective eyewear when you work with chemicals, wood, dust, or metal     Wear protective eyewear when you play sports  Do not wear your contacts for longer than you should  Do not sleep with your contacts in  Do not wear glitter makeup  Glitter can easily get into your eyes and under contact lenses  Follow up with your doctor or ophthalmologist as directed:  Write down your questions so you remember to ask them during your visits  © [employer] 2022 Information is for End User's use only and may not be sold, redistributed or otherwise used for commercial purposes  All illustrations and images included in CareNotes® are the copyrighted property of A D A M , Inc  or [facility] [employer]   The above information is an  only  It is not intended as medical advice for individual conditions or treatments  Talk to your doctor, nurse or pharmacist before following any medical regimen to see if it is safe and effective for you

## 2022-06-04 NOTE — PROGRESS NOTES
[facility]        NAME: Zina Astorga is a 57 y o  male  : 1960    MRN: [id number]  DATE: 2022  TIME: 12:34 PM    Assessment and Plan   Abrasion of left cornea, initial encounter [S05  02XA]  1  Abrasion of left cornea, initial encounter  erythromycin (ILOTYCIN) ophthalmic ointment         Patient Instructions     Corneal Abrasion   WHAT YOU NEED TO KNOW:   A corneal abrasion is a scratch on the cornea of your eye  The cornea is the clear layer that covers the front of your eye  A small scratch may heal in 1 to 2 days  Deeper or larger scratches may take longer to heal         DISCHARGE INSTRUCTIONS:   Call your doctor or ophthalmologist if:   · Your eye pain or vision gets worse  · You have yellow or green drainage from your eye  · You have questions or concerns about your condition or care  Medicines:   · Medicines  may be given in the form of eyedrops or ointment to help prevent an eye infection  You may also be given eyedrops to decrease pain  · Take your medicine as directed  Contact your healthcare provider if you think your medicine is not helping or if you have side effects  Tell him or her if you are allergic to any medicine  Keep a list of the medicines, vitamins, and herbs you take  Include the amounts, and when and why you take them  Bring the list or the pill bottles to follow-up visits  Carry your medicine list with you in case of an emergency  Care for your eyes:   · Get regular eye exams  Get your eyes checked at least every year  · Eat healthy foods  Fresh fruits and vegetables that are rich in vitamins A and C may help with your vision  Foods such as sweet potatoes, apricots, and carrots are rich in nutrients for the eyes  · Take care of your contacts or glasses  Store, clean, and use your contacts or glasses as directed  Replace your glasses or contact lenses as often as your healthcare provider suggests  · Decrease eye strain  Rest your eyes, especially after you read or use a computer for long periods of time  Get plenty of sleep at night  Use lights that reduce glare in your home, school, or workplace  · Wear dark sunglasses  This will help prevent pain and light sensitivity  Make sure the sunglasses have UVA and UVB protection  This will protect your eyes when you go outside  · Use eyedrops safely  If your treatment plan includes eyedrops, it is important to use them as directed  Your provider may give you detailed instructions to follow  The eyedrops may also come with safety instructions  Follow all instructions to help prevent an infection  Do not touch the tip of the bottle to your eye  Germs from your eye can spread to the medicine bottle  Help prevent corneal abrasions:   · Remove your contact lenses if your eyes feel dry or irritated  · Wash your hands if you need to touch your eyes or your face  · Trim your fingernails so you cannot scratch your eye  · Wear protective eyewear when you work with chemicals, wood, dust, or metal     · Wear protective eyewear when you play sports  · Do not wear your contacts for longer than you should  · Do not sleep with your contacts in  · Do not wear glitter makeup  Glitter can easily get into your eyes and under contact lenses  Follow up with your doctor or ophthalmologist as directed:  Write down your questions so you remember to ask them during your visits  © Copyright [employer] 2022 Information is for End User's use only and may not be sold, redistributed or otherwise used for commercial purposes  All illustrations and images included in CareNotes® are the copyrighted property of A D A M , Inc  or Atul Robbins  The above information is an  only  It is not intended as medical advice for individual conditions or treatments   Talk to your doctor, nurse or pharmacist before following any medical regimen to see if it is safe and effective for you  Follow up with PCP in 3-5 days  Proceed to  ER if symptoms worsen  Chief Complaint     Chief Complaint   Patient presents with    Eye Problem     Patient states he was getting out of the car last night and it felt like something in his L eye, he irrigated it but still feels like something in his L eye         History of Present Illness       69-year-old male presents today for evaluation of left eye irritation since last night  He states he feels like there is something in it but was not doing anything specific that he can think of the material that would be in his eye  Wears glasses  Does not were contact lenses  Review of Systems   Review of Systems   Constitutional: Negative for chills, fatigue and fever  HENT: Negative for postnasal drip, sore throat and trouble swallowing  Eyes: Positive for photophobia, discharge and redness  Respiratory: Negative for chest tightness and shortness of breath  Gastrointestinal: Negative for abdominal pain  Genitourinary: Negative for dysuria  Skin: Negative for rash  Allergic/Immunologic: Negative for immunocompromised state  Neurological: Negative for dizziness, light-headedness and headaches           Current Medications       Current Outpatient Medications:     amLODIPine (NORVASC) 5 mg tablet, TAKE ONE TABLET BY MOUTH EVERY DAY, Disp: 30 tablet, Rfl: 0    Cholecalciferol (VITAMIN D3) 5000 units CAPS, Take 1 capsule by mouth daily, Disp: , Rfl:     erythromycin (ILOTYCIN) ophthalmic ointment, Administer 0 5 inches to both eyes every 8 (eight) hours for 5 days, Disp: 15 g, Rfl: 0    famotidine (PEPCID) 20 mg tablet, Take 1 tablet twice daily before meals, Disp: 60 tablet, Rfl: 5    glucosamine 500 MG CAPS capsule, Take by mouth daily  , Disp: , Rfl:     ibuprofen (MOTRIN) 200 mg tablet, Take by mouth every 6 (six) hours as needed for mild pain, Disp: , Rfl:     losartan (COZAAR) 100 MG tablet, Take 1 tablet (100 mg total) by mouth daily, Disp: 30 tablet, Rfl: 0    metoprolol succinate (TOPROL-XL) 100 mg 24 hr tablet, Take 1 tablet (100 mg total) by mouth daily, Disp: 90 tablet, Rfl: 3    multivitamin (THERAGRAN) TABS, Take 1 tablet by mouth daily, Disp: , Rfl:     Omega-3 Fatty Acids (FISH OIL) 1200 MG CAPS, Take by mouth 2 (two) times a day , Disp: , Rfl:     rosuvastatin (CRESTOR) 5 mg tablet, TAKE 1 TABLET BY MOUTH DAILY, Disp: 90 tablet, Rfl: 0    Current Allergies     Allergies as of 06/04/2022    (No Known Allergies)            The following portions of the patient's history were reviewed and updated as appropriate: allergies, current medications, past family history, past medical history, past social history, past surgical history and problem list      Past Medical History:   Diagnosis Date    Cat scratch fever     Colon polyp     Diverticulitis     GERD (gastroesophageal reflux disease)     Hyperlipidemia     Hypertension     IFG (impaired fasting glucose)     L3 vertebral fracture ([address] ) 07/2018    Lumbar compression fracture (HCC)     Patella-femoral syndrome     Thyroid nodule        Past Surgical History:   Procedure Laterality Date    CLOSED REDUCTION WRIST FRACTURE      COLONOSCOPY      ID COLONOSCOPY FLX DX W/COLLJ SPEC WHEN PFRMD N/A 5/30/2018    Procedure: COLONOSCOPY;  Surgeon: Lexii Blanton MD;  [facility]; Service: Colorectal    UMBILICAL GRANULOMA EXCISION Left     groin    US GUIDED THYROID BIOPSY  9/20/2018    US GUIDED THYROID BIOPSY  2/13/2019    WISDOM TOOTH EXTRACTION      WRIST GANGLION EXCISION  1969    closed reduction        Family History   Problem Relation Age of Onset    Hypertension Mother     Melanoma Father     Diabetes type II Father     Hypertension Father     Colon cancer Maternal Grandmother     Colon cancer Maternal Uncle     Hypertension Brother          Medications have been verified          Objective   /66   Pulse 70   Temp (!) 96 6 °F (35 9 °C)   Resp 20   Ht 5' 7" (1 702 m)   Wt 81 6 kg (180 lb)   SpO2 96%   BMI 28 19 kg/m²        Physical Exam     Physical Exam  Vitals and nursing note reviewed  Constitutional:       Appearance: Normal appearance  HENT:      Head: Normocephalic and atraumatic  Eyes:      General: Lids are normal  Lids are everted, no foreign bodies appreciated  Right eye: No foreign body  Left eye: No foreign body  Conjunctiva/sclera:      Left eye: Left conjunctiva is injected  Pupils: Pupils are equal, round, and reactive to light  Left eye: Corneal abrasion (Small, lateral   Four/5 o'clock position) present  Skin:     General: Skin is warm and dry  Capillary Refill: Capillary refill takes less than 2 seconds  Neurological:      General: No focal deficit present  Mental Status: He is alert and oriented to person, place, and time     Psychiatric:         Mood and Affect: Mood normal          Behavior: Behavior normal

## 2022-06-15 DIAGNOSIS — I10 ESSENTIAL HYPERTENSION: ICD-10-CM

## 2022-06-15 DIAGNOSIS — E78.2 MIXED HYPERLIPIDEMIA: ICD-10-CM

## 2022-06-15 RX ORDER — AMLODIPINE BESYLATE 5 MG/1
5 TABLET ORAL DAILY
Qty: 30 TABLET | Refills: 0 | Status: SHIPPED | OUTPATIENT
Start: 2022-06-15 | End: 2022-08-10 | Stop reason: SDUPTHER

## 2022-06-15 RX ORDER — ROSUVASTATIN CALCIUM 5 MG/1
5 TABLET, COATED ORAL DAILY
Qty: 90 TABLET | Refills: 0 | Status: SHIPPED | OUTPATIENT
Start: 2022-06-15

## 2022-07-06 DIAGNOSIS — I10 ESSENTIAL HYPERTENSION: ICD-10-CM

## 2022-07-06 RX ORDER — METOPROLOL SUCCINATE 100 MG/1
100 TABLET, EXTENDED RELEASE ORAL DAILY
Qty: 90 TABLET | Refills: 3 | Status: SHIPPED | OUTPATIENT
Start: 2022-07-06

## 2022-08-10 RX ORDER — AMLODIPINE BESYLATE 5 MG/1
5 TABLET ORAL DAILY
Qty: 30 TABLET | Refills: 0 | Status: SHIPPED | OUTPATIENT
Start: 2022-08-10 | End: 2022-08-17 | Stop reason: SDUPTHER

## 2022-08-10 RX ORDER — LOSARTAN POTASSIUM 100 MG/1
100 TABLET ORAL DAILY
Qty: 30 TABLET | Refills: 0 | Status: SHIPPED | OUTPATIENT
Start: 2022-08-10 | End: 2022-09-05

## 2022-08-15 ENCOUNTER — APPOINTMENT (OUTPATIENT)
Dept: LAB | Facility: HOSPITAL | Age: 62
End: 2022-08-15
Payer: COMMERCIAL

## 2022-08-15 ENCOUNTER — TRANSCRIBE ORDERS (OUTPATIENT)
Dept: LAB | Facility: HOSPITAL | Age: 62
End: 2022-08-15

## 2022-08-15 DIAGNOSIS — Z00.8 HEALTH EXAMINATION IN POPULATION SURVEY: Primary | ICD-10-CM

## 2022-08-15 DIAGNOSIS — Z13.6 SCREENING FOR CARDIOVASCULAR CONDITION: ICD-10-CM

## 2022-08-15 DIAGNOSIS — Z00.8 HEALTH EXAMINATION IN POPULATION SURVEY: ICD-10-CM

## 2022-08-15 DIAGNOSIS — R73.03 PREDIABETES: ICD-10-CM

## 2022-08-15 DIAGNOSIS — E04.1 THYROID NODULE: ICD-10-CM

## 2022-08-15 DIAGNOSIS — I10 ESSENTIAL HYPERTENSION: ICD-10-CM

## 2022-08-15 DIAGNOSIS — Z12.5 SCREENING FOR PROSTATE CANCER: ICD-10-CM

## 2022-08-15 LAB
ALBUMIN SERPL BCP-MCNC: 3.6 G/DL (ref 3.5–5)
ALP SERPL-CCNC: 62 U/L (ref 46–116)
ALT SERPL W P-5'-P-CCNC: 26 U/L (ref 12–78)
ANION GAP SERPL CALCULATED.3IONS-SCNC: 4 MMOL/L (ref 4–13)
AST SERPL W P-5'-P-CCNC: 19 U/L (ref 5–45)
BASOPHILS # BLD AUTO: 0.04 THOUSANDS/ΜL (ref 0–0.1)
BASOPHILS NFR BLD AUTO: 1 % (ref 0–1)
BILIRUB SERPL-MCNC: 0.43 MG/DL (ref 0.2–1)
BUN SERPL-MCNC: 15 MG/DL (ref 5–25)
CALCIUM SERPL-MCNC: 9.2 MG/DL (ref 8.3–10.1)
CHLORIDE SERPL-SCNC: 107 MMOL/L (ref 96–108)
CHOLEST SERPL-MCNC: 185 MG/DL
CO2 SERPL-SCNC: 25 MMOL/L (ref 21–32)
CREAT SERPL-MCNC: 1.26 MG/DL (ref 0.6–1.3)
EOSINOPHIL # BLD AUTO: 0.03 THOUSAND/ΜL (ref 0–0.61)
EOSINOPHIL NFR BLD AUTO: 0 % (ref 0–6)
ERYTHROCYTE [DISTWIDTH] IN BLOOD BY AUTOMATED COUNT: 13.4 % (ref 11.6–15.1)
EST. AVERAGE GLUCOSE BLD GHB EST-MCNC: 140 MG/DL
GFR SERPL CREATININE-BSD FRML MDRD: 60 ML/MIN/1.73SQ M
GLUCOSE SERPL-MCNC: 148 MG/DL (ref 65–140)
HBA1C MFR BLD: 6.5 %
HCT VFR BLD AUTO: 41.2 % (ref 36.5–49.3)
HDLC SERPL-MCNC: 36 MG/DL
HGB BLD-MCNC: 13.3 G/DL (ref 12–17)
IMM GRANULOCYTES # BLD AUTO: 0.02 THOUSAND/UL (ref 0–0.2)
IMM GRANULOCYTES NFR BLD AUTO: 0 % (ref 0–2)
LDLC SERPL CALC-MCNC: 85 MG/DL (ref 0–100)
LYMPHOCYTES # BLD AUTO: 2.02 THOUSANDS/ΜL (ref 0.6–4.47)
LYMPHOCYTES NFR BLD AUTO: 27 % (ref 14–44)
MCH RBC QN AUTO: 31.5 PG (ref 26.8–34.3)
MCHC RBC AUTO-ENTMCNC: 32.3 G/DL (ref 31.4–37.4)
MCV RBC AUTO: 98 FL (ref 82–98)
MONOCYTES # BLD AUTO: 0.79 THOUSAND/ΜL (ref 0.17–1.22)
MONOCYTES NFR BLD AUTO: 11 % (ref 4–12)
NEUTROPHILS # BLD AUTO: 4.48 THOUSANDS/ΜL (ref 1.85–7.62)
NEUTS SEG NFR BLD AUTO: 61 % (ref 43–75)
NONHDLC SERPL-MCNC: 149 MG/DL
NRBC BLD AUTO-RTO: 0 /100 WBCS
PLATELET # BLD AUTO: 311 THOUSANDS/UL (ref 149–390)
PMV BLD AUTO: 9.7 FL (ref 8.9–12.7)
POTASSIUM SERPL-SCNC: 4.1 MMOL/L (ref 3.5–5.3)
PROT SERPL-MCNC: 7.4 G/DL (ref 6.4–8.4)
PSA SERPL-MCNC: 3.4 NG/ML (ref 0–4)
RBC # BLD AUTO: 4.22 MILLION/UL (ref 3.88–5.62)
SODIUM SERPL-SCNC: 136 MMOL/L (ref 135–147)
TRIGL SERPL-MCNC: 321 MG/DL
TSH SERPL DL<=0.05 MIU/L-ACNC: 2.1 UIU/ML (ref 0.45–4.5)
WBC # BLD AUTO: 7.38 THOUSAND/UL (ref 4.31–10.16)

## 2022-08-15 PROCEDURE — 80061 LIPID PANEL: CPT

## 2022-08-15 PROCEDURE — 85025 COMPLETE CBC W/AUTO DIFF WBC: CPT

## 2022-08-15 PROCEDURE — 83036 HEMOGLOBIN GLYCOSYLATED A1C: CPT

## 2022-08-15 PROCEDURE — 84443 ASSAY THYROID STIM HORMONE: CPT

## 2022-08-15 PROCEDURE — 80053 COMPREHEN METABOLIC PANEL: CPT

## 2022-08-15 PROCEDURE — 36415 COLL VENOUS BLD VENIPUNCTURE: CPT

## 2022-08-15 PROCEDURE — G0103 PSA SCREENING: HCPCS

## 2022-08-16 ENCOUNTER — APPOINTMENT (EMERGENCY)
Dept: CT IMAGING | Facility: HOSPITAL | Age: 62
End: 2022-08-16
Payer: COMMERCIAL

## 2022-08-16 ENCOUNTER — HOSPITAL ENCOUNTER (EMERGENCY)
Facility: HOSPITAL | Age: 62
Discharge: HOME | End: 2022-08-16
Attending: EMERGENCY MEDICINE
Payer: COMMERCIAL

## 2022-08-16 VITALS
DIASTOLIC BLOOD PRESSURE: 73 MMHG | SYSTOLIC BLOOD PRESSURE: 149 MMHG | TEMPERATURE: 97.9 F | RESPIRATION RATE: 16 BRPM | OXYGEN SATURATION: 99 % | HEART RATE: 67 BPM

## 2022-08-16 DIAGNOSIS — N20.1 URETEROLITHIASIS: Primary | ICD-10-CM

## 2022-08-16 DIAGNOSIS — N13.30 HYDROURETERONEPHROSIS: ICD-10-CM

## 2022-08-16 LAB
ANION GAP SERPL CALCULATED.3IONS-SCNC: 7 MMOL/L (ref 4–13)
BACTERIA UR QL AUTO: ABNORMAL /HPF
BASOPHILS # BLD AUTO: 0.04 THOUSANDS/ΜL (ref 0–0.1)
BASOPHILS NFR BLD AUTO: 1 % (ref 0–1)
BILIRUB UR QL STRIP: NEGATIVE
BUN SERPL-MCNC: 16 MG/DL (ref 5–25)
CALCIUM SERPL-MCNC: 9.9 MG/DL (ref 8.4–10.2)
CHLORIDE SERPL-SCNC: 102 MMOL/L (ref 96–108)
CLARITY UR: ABNORMAL
CO2 SERPL-SCNC: 28 MMOL/L (ref 21–32)
COLOR UR: YELLOW
CREAT SERPL-MCNC: 1.2 MG/DL (ref 0.6–1.3)
EOSINOPHIL # BLD AUTO: 0.02 THOUSAND/ΜL (ref 0–0.61)
EOSINOPHIL NFR BLD AUTO: 0 % (ref 0–6)
ERYTHROCYTE [DISTWIDTH] IN BLOOD BY AUTOMATED COUNT: 13.6 % (ref 11.6–15.1)
GFR SERPL CREATININE-BSD FRML MDRD: 63 ML/MIN/1.73SQ M
GLUCOSE SERPL-MCNC: 169 MG/DL (ref 65–140)
GLUCOSE UR STRIP-MCNC: NEGATIVE MG/DL
HCT VFR BLD AUTO: 45.4 % (ref 36.5–49.3)
HGB BLD-MCNC: 15.3 G/DL (ref 12–17)
HGB UR QL STRIP.AUTO: ABNORMAL
HYALINE CASTS #/AREA URNS LPF: ABNORMAL /LPF
IMM GRANULOCYTES # BLD AUTO: 0.02 THOUSAND/UL (ref 0–0.2)
IMM GRANULOCYTES NFR BLD AUTO: 0 % (ref 0–2)
KETONES UR STRIP-MCNC: NEGATIVE MG/DL
LEUKOCYTE ESTERASE UR QL STRIP: NEGATIVE
LYMPHOCYTES # BLD AUTO: 1.22 THOUSANDS/ΜL (ref 0.6–4.47)
LYMPHOCYTES NFR BLD AUTO: 16 % (ref 14–44)
MCH RBC QN AUTO: 32.8 PG (ref 26.8–34.3)
MCHC RBC AUTO-ENTMCNC: 33.7 G/DL (ref 31.4–37.4)
MCV RBC AUTO: 97 FL (ref 82–98)
MONOCYTES # BLD AUTO: 0.59 THOUSAND/ΜL (ref 0.17–1.22)
MONOCYTES NFR BLD AUTO: 8 % (ref 4–12)
MUCOUS THREADS UR QL AUTO: ABNORMAL
NEUTROPHILS # BLD AUTO: 5.72 THOUSANDS/ΜL (ref 1.85–7.62)
NEUTS SEG NFR BLD AUTO: 75 % (ref 43–75)
NITRITE UR QL STRIP: NEGATIVE
NON-SQ EPI CELLS URNS QL MICRO: ABNORMAL /HPF
NRBC BLD AUTO-RTO: 0 /100 WBCS
PH UR STRIP.AUTO: 5 [PH]
PLATELET # BLD AUTO: 333 THOUSANDS/UL (ref 149–390)
PMV BLD AUTO: 9.9 FL (ref 8.9–12.7)
POTASSIUM SERPL-SCNC: 4.5 MMOL/L (ref 3.5–5.3)
PROT UR STRIP-MCNC: NEGATIVE MG/DL
RBC # BLD AUTO: 4.66 MILLION/UL (ref 3.88–5.62)
RBC #/AREA URNS AUTO: ABNORMAL /HPF
SODIUM SERPL-SCNC: 137 MMOL/L (ref 135–147)
SP GR UR STRIP.AUTO: 1.02 (ref 1–1.03)
URATE CRY URNS QL MICRO: ABNORMAL /HPF
UROBILINOGEN UR STRIP-ACNC: <2 MG/DL
WBC # BLD AUTO: 7.61 THOUSAND/UL (ref 4.31–10.16)
WBC #/AREA URNS AUTO: ABNORMAL /HPF

## 2022-08-16 PROCEDURE — 99284 EMERGENCY DEPT VISIT MOD MDM: CPT

## 2022-08-16 PROCEDURE — 81001 URINALYSIS AUTO W/SCOPE: CPT

## 2022-08-16 PROCEDURE — 96374 THER/PROPH/DIAG INJ IV PUSH: CPT

## 2022-08-16 PROCEDURE — 74176 CT ABD & PELVIS W/O CONTRAST: CPT

## 2022-08-16 PROCEDURE — 96375 TX/PRO/DX INJ NEW DRUG ADDON: CPT

## 2022-08-16 PROCEDURE — 85025 COMPLETE CBC W/AUTO DIFF WBC: CPT

## 2022-08-16 PROCEDURE — 80048 BASIC METABOLIC PNL TOTAL CA: CPT

## 2022-08-16 PROCEDURE — G1004 CDSM NDSC: HCPCS

## 2022-08-16 PROCEDURE — 99285 EMERGENCY DEPT VISIT HI MDM: CPT | Performed by: EMERGENCY MEDICINE

## 2022-08-16 PROCEDURE — 36415 COLL VENOUS BLD VENIPUNCTURE: CPT

## 2022-08-16 RX ORDER — TAMSULOSIN HYDROCHLORIDE 0.4 MG/1
0.4 CAPSULE ORAL
Qty: 14 CAPSULE | Refills: 0 | Status: SHIPPED | OUTPATIENT
Start: 2022-08-16 | End: 2022-08-22 | Stop reason: SDUPTHER

## 2022-08-16 RX ORDER — MORPHINE SULFATE 4 MG/ML
4 INJECTION, SOLUTION INTRAMUSCULAR; INTRAVENOUS ONCE
Status: COMPLETED | OUTPATIENT
Start: 2022-08-16 | End: 2022-08-16

## 2022-08-16 RX ORDER — KETOROLAC TROMETHAMINE 30 MG/ML
15 INJECTION, SOLUTION INTRAMUSCULAR; INTRAVENOUS ONCE
Status: COMPLETED | OUTPATIENT
Start: 2022-08-16 | End: 2022-08-16

## 2022-08-16 RX ORDER — OXYCODONE HYDROCHLORIDE 5 MG/1
5 TABLET ORAL EVERY 6 HOURS PRN
Qty: 12 TABLET | Refills: 0 | Status: ON HOLD | OUTPATIENT
Start: 2022-08-16 | End: 2022-08-26 | Stop reason: SDUPTHER

## 2022-08-16 RX ADMIN — MORPHINE SULFATE 4 MG: 4 INJECTION INTRAVENOUS at 10:52

## 2022-08-16 RX ADMIN — KETOROLAC TROMETHAMINE 15 MG: 30 INJECTION, SOLUTION INTRAMUSCULAR at 10:35

## 2022-08-16 NOTE — DISCHARGE INSTRUCTIONS
Return to the emergency department if:   You have severe pain that does not improve, even after you take medicine  You have vomiting that is not relieved by medicine  You develop a fever

## 2022-08-16 NOTE — ED ATTENDING ATTESTATION
8/16/2022  IFlorencio MD, saw and evaluated the patient  I have discussed the patient with the resident/non-physician practitioner and agree with the resident's/non-physician practitioner's findings, Plan of Care, and MDM as documented in the resident's/non-physician practitioner's note, except where noted  All available labs and Radiology studies were reviewed  I was present for key portions of any procedure(s) performed by the resident/non-physician practitioner and I was immediately available to provide assistance  At this point I agree with the current assessment done in the Emergency Department  I have conducted an independent evaluation of this patient a history and physical is as follows:    27-year-old male, presenting with left flank pain starting earlier today  Reports history of kidney stones with similar pain  On exam, patient appears comfortable in no distress, abdomen soft and nontender  ED Course     CT scan shows left mid ureter kidney stone  Patient feels better after receiving pain medication, no signs of acute infection  Will discharge with pain medication use as needed, instructed to follow-up with urology  Instructed to return emergency department for any increasing pain or new concerning symptoms      Critical Care Time  Procedures

## 2022-08-16 NOTE — ED PROVIDER NOTES
History  Chief Complaint   Patient presents with    Flank Pain     Left flank pain since this AM  Hx of kidney stones 2 months ago, passed on his own  Denies urinary complaints  Hx kidney stones  Abdominal pain L flank pain occurred this am around 730  Currently 7/10  Reports had kidney stone this past June on left side as well  Denies fever, chills, abdominal pain  Specifies pain as flank  Denies hematuria  Denies history of vascular issues, denies history atrial fibrillation, denies history of blood clot          Prior to Admission Medications   Prescriptions Last Dose Informant Patient Reported? Taking?    Cholecalciferol (VITAMIN D3) 5000 units CAPS  Self Yes No   Sig: Take 1 capsule by mouth daily   Omega-3 Fatty Acids (FISH OIL) 1200 MG CAPS  Self Yes No   Sig: Take by mouth 2 (two) times a day    amLODIPine (NORVASC) 5 mg tablet   No No   Sig: Take 1 tablet (5 mg total) by mouth daily   famotidine (PEPCID) 20 mg tablet  Self No No   Sig: Take 1 tablet twice daily before meals   glucosamine 500 MG CAPS capsule  Self Yes No   Sig: Take by mouth daily     ibuprofen (MOTRIN) 200 mg tablet  Self Yes No   Sig: Take by mouth every 6 (six) hours as needed for mild pain   losartan (COZAAR) 100 MG tablet   No No   Sig: Take 1 tablet (100 mg total) by mouth daily   metoprolol succinate (TOPROL-XL) 100 mg 24 hr tablet   No No   Sig: Take 1 tablet (100 mg total) by mouth daily   multivitamin (THERAGRAN) TABS  Self Yes No   Sig: Take 1 tablet by mouth daily   rosuvastatin (CRESTOR) 5 mg tablet   No No   Sig: Take 1 tablet (5 mg total) by mouth daily      Facility-Administered Medications: None       Past Medical History:   Diagnosis Date    Cat scratch fever     Colon polyp     Diverticulitis     GERD (gastroesophageal reflux disease)     Hyperlipidemia     Hypertension     IFG (impaired fasting glucose)     L3 vertebral fracture (HCC) 07/2018    Lumbar compression fracture (HCC)     Patella-femoral syndrome  Thyroid nodule        Past Surgical History:   Procedure Laterality Date    CLOSED REDUCTION WRIST FRACTURE      COLONOSCOPY      AR COLONOSCOPY FLX DX W/COLLJ SPEC WHEN PFRMD N/A 5/30/2018    Procedure: COLONOSCOPY;  Surgeon: Kelsey Lala MD;  [facility]; Service: Colorectal    UMBILICAL GRANULOMA EXCISION Left     groin    US GUIDED THYROID BIOPSY  9/20/2018    US GUIDED THYROID BIOPSY  2/13/2019    WISDOM TOOTH EXTRACTION      WRIST GANGLION EXCISION  1969    closed reduction        Family History   Problem Relation Age of Onset    Hypertension Mother     Melanoma Father     Diabetes type II Father     Hypertension Father     Colon cancer Maternal Grandmother     Colon cancer Maternal Uncle     Hypertension Brother      I have reviewed and agree with the history as documented  E-Cigarette/Vaping    E-Cigarette Use Never User      E-Cigarette/Vaping Substances     Social History     Tobacco Use    Smoking status: Never Smoker    Smokeless tobacco: Never Used   Vaping Use    Vaping Use: Never used   Substance Use Topics    Alcohol use: Yes     Alcohol/week: 3 0 standard drinks     Types: 3 Glasses of wine per week     Comment: social    Drug use: No        Review of Systems   Constitutional: Negative  HENT: Negative  Eyes: Negative  Respiratory: Negative  Cardiovascular: Negative  Gastrointestinal: Negative  Endocrine: Negative  Genitourinary: Positive for flank pain  Skin: Negative  Allergic/Immunologic: Negative  Neurological: Negative  Hematological: Negative  Psychiatric/Behavioral: Negative  All other systems reviewed and are negative        Physical Exam  ED Triage Vitals [08/16/22 1010]   Temperature Pulse Respirations Blood Pressure SpO2   97 9 °F (36 6 °C) 62 16 166/87 98 %      Temp Source Heart Rate Source Patient Position - Orthostatic VS BP Location FiO2 (%)   Oral Monitor Sitting Right arm --      Pain Score       7 Orthostatic Vital Signs  Vitals:    08/16/22 1010 08/16/22 1300   BP: 166/87 149/73   Pulse: 62 67   Patient Position - Orthostatic VS: Sitting        Physical Exam  Vitals and nursing note reviewed  Constitutional:       General: He is in acute distress  Appearance: Normal appearance  He is not ill-appearing, toxic-appearing or diaphoretic  HENT:      Head: Normocephalic and atraumatic  Eyes:      General: No scleral icterus  Right eye: No discharge  Left eye: No discharge  Extraocular Movements: Extraocular movements intact  Conjunctiva/sclera: Conjunctivae normal       Pupils: Pupils are equal, round, and reactive to light  Cardiovascular:      Rate and Rhythm: Normal rate  Pulses: Normal pulses  Heart sounds: Normal heart sounds  No murmur heard  No friction rub  No gallop  Pulmonary:      Effort: Pulmonary effort is normal  No respiratory distress  Breath sounds: Normal breath sounds  No stridor  No wheezing, rhonchi or rales  Abdominal:      General: Abdomen is flat  Bowel sounds are normal  There is no distension  Palpations: Abdomen is soft  Tenderness: There is no abdominal tenderness  There is no guarding or rebound  Musculoskeletal:         General: No swelling  Normal range of motion  Cervical back: Normal range of motion  No rigidity  Right lower leg: No edema  Left lower leg: No edema  Skin:     General: Skin is warm and dry  Capillary Refill: Capillary refill takes less than 2 seconds  Coloration: Skin is not jaundiced  Findings: No bruising or lesion  Neurological:      General: No focal deficit present  Mental Status: He is alert and oriented to person, place, and time  Mental status is at baseline  Psychiatric:         Mood and Affect: Mood normal          Behavior: Behavior normal          Thought Content:  Thought content normal          Judgment: Judgment normal          ED Medications  Medications   ketorolac (TORADOL) injection 15 mg (15 mg Intravenous Given 8/16/22 1035)   morphine injection 4 mg (4 mg Intravenous Given 8/16/22 1052)       Diagnostic Studies  Results Reviewed     Procedure Component Value Units Date/Time    Urine Microscopic [[id number]]  (Abnormal) Collected: 08/16/22 1203    Lab Status: Final result Specimen: Urine, Clean Catch Updated: 08/16/22 1334     RBC, UA Innumerable /hpf      WBC, UA 1-2 /hpf      Epithelial Cells None Seen /hpf      Bacteria, UA Occasional /hpf      MUCUS THREADS Occasional     Hyaline Casts, UA 0-3 /lpf      Uric Acid Jessie, UA Innumerable /hpf     UA (URINE) with reflex to Scope [[id number]]  (Abnormal) Collected: 08/16/22 1203    Lab Status: Final result Specimen: Urine, Clean Catch Updated: 08/16/22 1242     Color, UA Yellow     Clarity, UA Turbid     Specific Gravity, UA 1 021     pH, UA 5 0     Leukocytes, UA Negative     Nitrite, UA Negative     Protein, UA Negative mg/dl      Glucose, UA Negative mg/dl      Ketones, UA Negative mg/dl      Urobilinogen, UA <2 0 mg/dl      Bilirubin, UA Negative     Occult Blood, UA Large    Basic metabolic panel [[id number]]  (Abnormal) Collected: 08/16/22 1042    Lab Status: Final result Specimen: Blood from Arm, Left Updated: 08/16/22 1117     Sodium 137 mmol/L      Potassium 4 5 mmol/L      Chloride 102 mmol/L      CO2 28 mmol/L      ANION GAP 7 mmol/L      BUN 16 mg/dL      Creatinine 1 20 mg/dL      Glucose 169 mg/dL      Calcium 9 9 mg/dL      eGFR 63 ml/min/1 73sq m     Narrative:      [place] guidelines for Chronic Kidney Disease (CKD):     Stage 1 with normal or high GFR (GFR > 90 mL/min/1 73 square meters)    Stage 2 Mild CKD (GFR = 60-89 mL/min/1 73 square meters)    Stage 3A Moderate CKD (GFR = 45-59 mL/min/1 73 square meters)    Stage 3B Moderate CKD (GFR = 30-44 mL/min/1 73 square meters)    Stage 4 Severe CKD (GFR = 15-29 mL/min/1 73 square meters)   Stage 5 End Stage CKD (GFR <15 mL/min/1 73 square meters)  Note: GFR calculation is accurate only with a steady state creatinine    CBC and differential [[id number]] Collected: 08/16/22 1042    Lab Status: Final result Specimen: Blood from Arm, Left Updated: 08/16/22 1055     WBC 7 61 Thousand/uL      RBC 4 66 Million/uL      Hemoglobin 15 3 g/dL      Hematocrit 45 4 %      MCV 97 fL      MCH 32 8 pg      MCHC 33 7 g/dL      RDW 13 6 %      MPV 9 9 fL      Platelets 756 Thousands/uL      nRBC 0 /100 WBCs      Neutrophils Relative 75 %      Immat GRANS % 0 %      Lymphocytes Relative 16 %      Monocytes Relative 8 %      Eosinophils Relative 0 %      Basophils Relative 1 %      Neutrophils Absolute 5 72 Thousands/µL      Immature Grans Absolute 0 02 Thousand/uL      Lymphocytes Absolute 1 22 Thousands/µL      Monocytes Absolute 0 59 Thousand/µL      Eosinophils Absolute 0 02 Thousand/µL      Basophils Absolute 0 04 Thousands/µL                  CT renal stone study abdomen pelvis wo contrast   Final Result by Mariah Arnold MD (08/16 1138)      5 mm [id number] left ureteral calculus eliciting mild left hydroureteronephrosis         Workstation performed: [id number]               Procedures  Procedures      ED Course                             SBIRT 22yo+    Flowsheet Row Most Recent Value   SBIRT (25 yo +)    In order to provide better care to our patients, we are screening all of our patients for alcohol and drug use  Would it be okay to ask you these screening questions? No Filed at: 08/16/2022 1014                MDM  Number of Diagnoses or Management Options  Hydroureteronephrosis: new and does not require workup  Ureterolithiasis: new and does not require workup  Diagnosis management comments: -patient presenting for evaluation of left-sided flank pain  -physical exam findings as noted above  Patient marked distress    Pain subsided with administration of Toradol, morphine  -CT demonstrated 5 mm ureteral stone   -urine positive for blood otherwise clean   -will discharge patient with oxycodone, Flomax  -instructions for follow-up with Urology  -instructions were given to patient verbally as Printers were down in the emergency department at time of discharge  I personally reviewed return precautions, instructions for urology follow-up in hand and throat on a piece of paper Urology contact information with address and phone number  Patient verbalized understanding  I further instructed patient that a prescription had been sent in for both were oxycodone and Percocet   -patient discharged emergency department       Amount and/or Complexity of Data Reviewed  Clinical lab tests: ordered and reviewed  Tests in the radiology section of CPT®: ordered and reviewed  Tests in the medicine section of CPT®: ordered and reviewed  Decide to obtain previous medical records or to obtain history from someone other than the patient: yes  Review and summarize past medical records: yes  Discuss the patient with other providers: yes  Independent visualization of images, tracings, or specimens: yes        Disposition  Final diagnoses:   Ureterolithiasis   Hydroureteronephrosis     Time reflects when diagnosis was documented in both MDM as applicable and the Disposition within this note     Time User Action Codes Description Comment    8/16/2022 12:46 PM [employer] Add [N20 1] Ureterolithiasis     8/16/2022 12:46 PM [employer] Add [N13 30] Hydroureteronephrosis       ED Disposition     ED Disposition   Discharge    Condition   Stable    Date/Time   Tue Aug 16, 2022 12:47 PM    Comment   Faith Fanning discharge to home/self care                 Follow-up Information     Follow up With Specialties Details Why Contact Info Additional [address] For [facility] In 2 days  [address] [id number]  4  [facility][1] [address] [facility] [place] [address] [place], [place] [facility] [place], [address]          Discharge Medication List as of 8/16/2022  1:08 PM      START taking these medications    Details   oxyCODONE (Roxicodone) 5 immediate release tablet Take 1 tablet (5 mg total) by mouth every 6 (six) hours as needed for moderate pain for up to 10 days Max Daily Amount: 20 mg, Starting Tue 8/16/2022, Until Fri 8/26/2022 at 2359, Normal      tamsulosin (FLOMAX) 0 4 mg Take 1 capsule (0 4 mg total) by mouth daily with dinner for 14 days, Starting Tue 8/16/2022, Until Tue 8/30/2022, Normal         CONTINUE these medications which have NOT CHANGED    Details   amLODIPine (NORVASC) 5 mg tablet Take 1 tablet (5 mg total) by mouth daily, Starting Wed 8/10/2022, Normal      Cholecalciferol (VITAMIN D3) 5000 units CAPS Take 1 capsule by mouth daily, Historical Med      famotidine (PEPCID) 20 mg tablet Take 1 tablet twice daily before meals, Normal      glucosamine 500 MG CAPS capsule Take by mouth daily  , Historical Med      ibuprofen (MOTRIN) 200 mg tablet Take by mouth every 6 (six) hours as needed for mild pain, Historical Med      losartan (COZAAR) 100 MG tablet Take 1 tablet (100 mg total) by mouth daily, Starting Wed 8/10/2022, Normal      metoprolol succinate (TOPROL-XL) 100 mg 24 hr tablet Take 1 tablet (100 mg total) by mouth daily, Starting Wed 7/6/2022, Normal      multivitamin (THERAGRAN) TABS Take 1 tablet by mouth daily, Historical Med      Omega-3 Fatty Acids (FISH OIL) 1200 MG CAPS Take by mouth 2 (two) times a day , Historical Med      rosuvastatin (CRESTOR) 5 mg tablet Take 1 tablet (5 mg total) by mouth daily, Starting Wed 6/15/2022, Normal           No discharge procedures on file  PDMP Review     None           ED Provider  Attending physically available and evaluated Sureshjon Jenna HELM managed the patient along with the ED Attending      Electronically Signed by         Bryce Guzmán DO  08/16/22 4111

## 2022-08-17 ENCOUNTER — OFFICE VISIT (OUTPATIENT)
Dept: FAMILY MEDICINE CLINIC | Facility: CLINIC | Age: 62
End: 2022-08-17
Payer: COMMERCIAL

## 2022-08-17 VITALS
SYSTOLIC BLOOD PRESSURE: 130 MMHG | WEIGHT: 189.8 LBS | TEMPERATURE: 96.7 F | HEART RATE: 73 BPM | RESPIRATION RATE: 16 BRPM | BODY MASS INDEX: 29.79 KG/M2 | DIASTOLIC BLOOD PRESSURE: 72 MMHG | HEIGHT: 67 IN | OXYGEN SATURATION: 95 %

## 2022-08-17 DIAGNOSIS — N20.0 RENAL STONE: ICD-10-CM

## 2022-08-17 DIAGNOSIS — E04.1 THYROID NODULE: ICD-10-CM

## 2022-08-17 DIAGNOSIS — R73.03 PREDIABETES: ICD-10-CM

## 2022-08-17 DIAGNOSIS — Z00.00 ANNUAL PHYSICAL EXAM: Primary | ICD-10-CM

## 2022-08-17 DIAGNOSIS — K21.9 GASTROESOPHAGEAL REFLUX DISEASE, UNSPECIFIED WHETHER ESOPHAGITIS PRESENT: ICD-10-CM

## 2022-08-17 DIAGNOSIS — Z00.00 WELL ADULT EXAM: ICD-10-CM

## 2022-08-17 DIAGNOSIS — Z11.4 ENCOUNTER FOR SCREENING FOR HUMAN IMMUNODEFICIENCY VIRUS (HIV): ICD-10-CM

## 2022-08-17 DIAGNOSIS — E78.2 MIXED HYPERLIPIDEMIA: ICD-10-CM

## 2022-08-17 DIAGNOSIS — I10 ESSENTIAL HYPERTENSION: ICD-10-CM

## 2022-08-17 DIAGNOSIS — H90.3 ASYMMETRICAL SENSORINEURAL HEARING LOSS: ICD-10-CM

## 2022-08-17 DIAGNOSIS — N18.2 STAGE 2 CHRONIC KIDNEY DISEASE: ICD-10-CM

## 2022-08-17 PROBLEM — H93.19 TINNITUS: Status: ACTIVE | Noted: 2022-08-17

## 2022-08-17 PROCEDURE — 99396 PREV VISIT EST AGE 40-64: CPT | Performed by: NURSE PRACTITIONER

## 2022-08-17 RX ORDER — AMLODIPINE BESYLATE 5 MG/1
10 TABLET ORAL DAILY
Qty: 90 TABLET | Refills: 0 | Status: SHIPPED | OUTPATIENT
Start: 2022-08-17 | End: 2022-10-19 | Stop reason: SDUPTHER

## 2022-08-17 NOTE — ASSESSMENT & PLAN NOTE
The patient continues on his Crestor  Information was provided to the patient regarding a low-fat low-cholesterol diet  The patient has been encouraged to exercise  Weight loss recommended        Component      Latest Ref Rng & Units 8/15/2022   Cholesterol      See Comment mg/dL 185   Triglycerides      See Comment mg/dL 321 (H)   HDL      >=40 mg/dL 36 (L)   LDL Calculated      0 - 100 mg/dL 85   Non-HDL Cholesterol      mg/dl 149

## 2022-08-17 NOTE — ASSESSMENT & PLAN NOTE
The patient continues to follow with endocrinology for his thyroid nodule  This has been biopsied in the past and has been benign  He is due to schedule a follow-up surveillance thyroid ultrasound

## 2022-08-17 NOTE — ASSESSMENT & PLAN NOTE
The patient continues to take his Pepcid as needed for reflux  He does monitor his diet for triggers

## 2022-08-17 NOTE — PROGRESS NOTES
[facility]    NAME: Minus Class  AGE: 60 y o  SEX: male  : 1960     DATE: 2022     Assessment and Plan:     Problem List Items Addressed This Visit        Digestive    GERD (gastroesophageal reflux disease)     The patient continues to take his Pepcid as needed for reflux  He does monitor his diet for triggers  Endocrine    Thyroid nodule     The patient continues to follow with endocrinology for his thyroid nodule  This has been biopsied in the past and has been benign  He is due to schedule a follow-up surveillance thyroid ultrasound  Cardiovascular and Mediastinum    Essential hypertension     Patient's blood pressure in the office today is 130/72  Currently the patient is on amlodipine, metoprolol  mg daily as well as Cozaar 100 mg  He does follow a low-salt diet  His BMI is 29  He does not smoke  He does have an element of chronic kidney disease  I will increase his amlodipine to 10 mg a day  He has been instructed that he should check his blood pressures several times per week and keep a list   He is to contact the office should his blood pressures remain consistently elevated over 248 systolic or 90 diastolic  Relevant Medications    amLODIPine (NORVASC) 5 mg tablet       Nervous and Auditory    Asymmetrical sensorineural hearing loss     Patient has had an audiology exam in the past   He has 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  Genitourinary    Stage 2 chronic kidney disease     Lab Results   Component Value Date    EGFR 63 2022    EGFR 60 08/15/2022    EGFR 79 2021    CREATININE 1 20 2022    CREATININE 1 26 08/15/2022    CREATININE 1 01 2021   Patient with history of chronic kidney disease  His GFR is 63  Information regarding chronic kidney disease was provided to the patient    He has been advised that he should watch his sodium intake as well as intake of NSAIDs  Renal stone     The patient was evaluated in the emergency room yesterday and was noted to have a 5 mm left ureteral calculus with mild left hydronephrosis  Patient was placed on Flomax  I did recommend the patient follow-up with Urology in this order was placed  He was provided a script for pain relief when seen in the ED  he did have a previous kidney stone last December  IMPRESSION:     5 mm [id number] left ureteral calculus eliciting mild left hydroureteronephrosis         Relevant Orders    Ambulatory Referral to Urology       Other    Mixed hyperlipidemia     The patient continues on his Crestor  Information was provided to the patient regarding a low-fat low-cholesterol diet  The patient has been encouraged to exercise  Weight loss recommended  Component      Latest Ref Rng & Units 8/15/2022   Cholesterol      See Comment mg/dL 185   Triglycerides      See Comment mg/dL 321 (H)   HDL      >=40 mg/dL 36 (L)   LDL Calculated      0 - 100 mg/dL 85   Non-HDL Cholesterol      mg/dl 149            Prediabetes     Lab Results   Component Value Date    HGBA1C 6 5 (H) 08/15/2022     The patient's most recent hemoglobin A1c is 6 5 which gives him a diagnosis of diabetes  The patient does state that he has been on steroid injections for his back pain over the past several months  Information was provided to the patient regarding carbohydrate counting  I will not start him on any medication at this time  He will continue to watch the carbohydrates in his diet    He will repeat his A1c prior to his next appointment         Relevant Orders    HEMOGLOBIN A1C W/ EAG ESTIMATION    Well adult exam      Other Visit Diagnoses     Annual physical exam    -  Primary    Encounter for screening for human immunodeficiency virus (HIV)        Relevant Orders    HIV 1/2 Antigen/Antibody (4th Generation) w Reflex [facility]          Immunizations and preventive care screenings were discussed with patient today  Appropriate education was printed on patient's after visit summary  Counseling:  Alcohol/drug use: discussed moderation in alcohol intake, the recommendations for healthy alcohol use, and avoidance of illicit drug use  Dental Health: discussed importance of regular tooth brushing, flossing, and dental visits  Injury prevention: discussed safety/seat belts, safety helmets, smoke detectors, carbon dioxide detectors, and smoking near bedding or upholstery  Sexual health: discussed sexually transmitted diseases, partner selection, use of condoms, avoidance of unintended pregnancy, and contraceptive alternatives  Exercise: the importance of regular exercise/physical activity was discussed  Recommend exercise 3-5 times per week for at least 30 minutes  BMI Counseling: Body mass index is 29 73 kg/m²  The BMI is above normal  Nutrition recommendations include decreasing portion sizes, encouraging healthy choices of fruits and vegetables, limiting drinks that contain sugar, moderation in carbohydrate intake, increasing intake of lean protein, reducing intake of saturated and trans fat and reducing intake of cholesterol  Exercise recommendations include moderate physical activity 150 minutes/week and exercising 3-5 times per week  Rationale for BMI follow-up plan is due to patient being overweight or obese  Return in about 6 months (around 2/17/2023)  Chief Complaint:     Chief Complaint   Patient presents with    Physical Exam     6 month       History of Present Illness:     Adult Annual Physical   Patient here for a comprehensive physical exam  The patient reports no problems  Diet and Physical Activity  Diet/Nutrition: well balanced diet, frequent junk food and consuming 3-5 servings of fruits/vegetables daily  Exercise: no formal exercise        Depression Screening  PHQ-2/9 Depression Screening    Little interest or pleasure in doing things: 0 - not at all  Feeling down, depressed, or hopeless: 0 - not at all  PHQ-2 Score: 0  PHQ-2 Interpretation: Negative depression screen       General Health  Sleep: gets 4-6 hours of sleep on average  Hearing: normal - bilateral   Vision: most recent eye exam >1 year ago and wears glasses  Dental: regular dental visits and brushes teeth twice daily   Health  Symptoms include: none     Review of Systems:     Review of Systems   Constitutional: Negative  Negative for fatigue  HENT: Negative  Negative for congestion, postnasal drip, rhinorrhea and trouble swallowing  Eyes: Negative  Negative for visual disturbance  Respiratory: Negative  Negative for choking and shortness of breath  Cardiovascular: Negative  Negative for chest pain  Gastrointestinal: Negative  Endocrine: Negative  Genitourinary: Positive for flank pain (current kidney stone)  Musculoskeletal: Positive for back pain (chronic)  Negative for arthralgias, myalgias and neck pain  Skin: Negative  Neurological: Negative for dizziness and headaches  Psychiatric/Behavioral: Negative  Past Medical History:     Past Medical History:   Diagnosis Date    Cat scratch fever     Colon polyp     Diverticulitis     GERD (gastroesophageal reflux disease)     Hyperlipidemia     Hypertension     IFG (impaired fasting glucose)     L3 vertebral fracture (HCC) 07/2018    Lumbar compression fracture (HCC)     Patella-femoral syndrome     Thyroid nodule       Past Surgical History:     Past Surgical History:   Procedure Laterality Date    CLOSED REDUCTION WRIST FRACTURE      COLONOSCOPY      VA COLONOSCOPY FLX DX W/COLLJ SPEC WHEN PFRMD N/A 5/30/2018    Procedure: COLONOSCOPY;  Surgeon: Fitz Coppola MD;  Location: [facility];   Service: Colorectal    UMBILICAL GRANULOMA EXCISION Left     groin    US GUIDED THYROID BIOPSY  9/20/2018    US GUIDED THYROID BIOPSY  2/13/2019    WISDOM TOOTH EXTRACTION      WRIST GANGLION EXCISION  1969    closed reduction       Family History:     Family History   Problem Relation Age of Onset    Hypertension Mother     Melanoma Father     Diabetes type II Father     Hypertension Father     Colon cancer Maternal Grandmother     Colon cancer Maternal Uncle     Hypertension Brother       Social History:     Social History     Socioeconomic History    Marital status: /Civil Union     Spouse name: None    Number of children: None    Years of education: None    Highest education level: None   Occupational History    None   Tobacco Use    Smoking status: Never Smoker    Smokeless tobacco: Never Used   Vaping Use    Vaping Use: Never used   Substance and Sexual Activity    Alcohol use:  Yes     Alcohol/week: 3 0 standard drinks     Types: 3 Glasses of wine per week     Comment: social    Drug use: No    Sexual activity: None   Other Topics Concern    None   Social History Narrative    None     Social Determinants of Health     Financial Resource Strain: Not on file   Food Insecurity: Not on file   Transportation Needs: Not on file   Physical Activity: Not on file   Stress: Not on file   Social Connections: Not on file   Intimate Partner Violence: Not on file   Housing Stability: Not on file      Current Medications:     Current Outpatient Medications   Medication Sig Dispense Refill    amLODIPine (NORVASC) 5 mg tablet Take 2 tablets (10 mg total) by mouth daily 90 tablet 0    Cholecalciferol (VITAMIN D3) 5000 units CAPS Take 1 capsule by mouth daily      famotidine (PEPCID) 20 mg tablet Take 1 tablet twice daily before meals 60 tablet 5    glucosamine 500 MG CAPS capsule Take by mouth daily        ibuprofen (MOTRIN) 200 mg tablet Take by mouth every 6 (six) hours as needed for mild pain      losartan (COZAAR) 100 MG tablet Take 1 tablet (100 mg total) by mouth daily 30 tablet 0    metoprolol succinate (TOPROL-XL) 100 mg 24 hr tablet Take 1 tablet (100 mg total) by mouth daily 90 tablet 3    multivitamin (THERAGRAN) TABS Take 1 tablet by mouth daily      Omega-3 Fatty Acids (FISH OIL) 1200 MG CAPS Take by mouth 2 (two) times a day       oxyCODONE (Roxicodone) 5 immediate release tablet Take 1 tablet (5 mg total) by mouth every 6 (six) hours as needed for moderate pain for up to 10 days Max Daily Amount: 20 mg 12 tablet 0    rosuvastatin (CRESTOR) 5 mg tablet Take 1 tablet (5 mg total) by mouth daily 90 tablet 0    tamsulosin (FLOMAX) 0 4 mg Take 1 capsule (0 4 mg total) by mouth daily with dinner for 14 days 14 capsule 0     No current facility-administered medications for this visit  Allergies:     No Known Allergies   Physical Exam:     /72 (BP Location: Left arm, Patient Position: Sitting)   Pulse 73   Temp (!) 96 7 °F (35 9 °C) (Tympanic)   Resp 16   Ht 5' 7" (1 702 m)   Wt 86 1 kg (189 lb 12 8 oz)   SpO2 95%   BMI 29 73 kg/m²     Physical Exam  Vitals and nursing note reviewed  Constitutional:       Appearance: Normal appearance  He is well-developed and normal weight  HENT:      Head: Normocephalic and atraumatic  Right Ear: Tympanic membrane, ear canal and external ear normal  There is no impacted cerumen  Left Ear: Tympanic membrane, ear canal and external ear normal  There is no impacted cerumen  Nose: Nose normal       Mouth/Throat:      Mouth: Mucous membranes are moist       Pharynx: Oropharynx is clear  Eyes:      Conjunctiva/sclera: Conjunctivae normal    Cardiovascular:      Rate and Rhythm: Normal rate and regular rhythm  Pulses: Normal pulses  Heart sounds: Normal heart sounds  No murmur heard  Pulmonary:      Effort: Pulmonary effort is normal  No respiratory distress  Breath sounds: Normal breath sounds  Abdominal:      General: Bowel sounds are normal  There is no distension  Palpations: Abdomen is soft  There is no mass  Tenderness:  There is no abdominal tenderness  There is left CVA tenderness  There is no guarding or rebound  Hernia: No hernia is present  Musculoskeletal:         General: Normal range of motion  Cervical back: Normal range of motion and neck supple  Skin:     General: Skin is warm and dry  Neurological:      General: No focal deficit present  Mental Status: He is alert and oriented to person, place, and time  Psychiatric:         Mood and Affect: Mood normal          Behavior: Behavior normal          Thought Content:  Thought content normal          Judgment: Judgment normal           Ema Moura, [id number] [address]

## 2022-08-17 NOTE — PATIENT INSTRUCTIONS
Check blood pressure at home  If the blood pressure is consistently above 491 systolic or 90 diastolic call the office  Prediabetes   WHAT YOU NEED TO KNOW:   Prediabetes is a blood glucose (sugar) level that is higher than normal  It is not high enough to be considered diabetes  Prediabetes increases your risk for type 2 diabetes and heart disease  DISCHARGE INSTRUCTIONS:   Call your doctor if:   You have more hunger or thirst than usual     You are urinating more often than usual     You are always exhausted  You have blurred vision  You have questions or concerns about your condition or care  Medicines:   Medicine  may be given if you are at high risk for type 2 diabetes  Medicine may also be given to lower high blood pressure and high cholesterol  Take your medicine as directed  Contact your healthcare provider if you think your medicine is not helping or if you have side effects  Tell him or her if you are allergic to any medicine  Keep a list of the medicines, vitamins, and herbs you take  Include the amounts, and when and why you take them  Bring the list or the pill bottles to follow-up visits  Carry your medicine list with you in case of an emergency  Prevent or delay type 2 diabetes:  Healthy choices work best to delay or prevent type 2 diabetes  You can decrease your risk for type 2 diabetes by choosing the following:  Get regular physical activity  Physical activity, such as exercise, can help decrease your blood sugar level  It can also help to decrease your risk for heart disease and help you lose weight  Adults should get at least 150 minutes (2 5 hours) of moderate physical activity every week  Spread the amount of activity over at least 3 days a week  Do not skip more than 2 days in a row  Children should get at least 60 minutes of moderate physical activity on most days of the week  Examples of moderate physical activity include brisk walking, running, and swimming   Do not sit for longer than 30 minutes at a time  Work with your healthcare provider to create a plan for physical activity  Lose weight if you are overweight  A weight loss of 7% of your body weight can help to lower your blood sugar level  Your healthcare provider can tell you what weight is healthy for you  He or she can help you create a weight loss plan  Eat healthy foods  Eat a variety of fruits and vegetables  Eat whole-grain foods more often  Choose dairy foods, meat, and other protein foods that are low in fat  Eat fewer sweets, such as candy, cookies, regular soda, and sweetened drinks  You can also decrease calories by eating smaller portion sizes  Work with your healthcare provider or dietitian to develop a meal plan that is right for you  Take medicine as directed  Your healthcare provider may give diabetes medicine if you are at high risk for diabetes  You may also need medicines for high blood pressure and high cholesterol  Follow up with your healthcare provider as directed  You will need to return every year to get tested for diabetes  Do not smoke  Smoking may increase your risk for type 2 diabetes  Nicotine can damage blood vessels  Other health conditions, such as lung disease, can develop when you smoke  Do not use e-cigarettes or smokeless tobacco in place of cigarettes or to help you quit  They still contain nicotine  Ask your healthcare provider for information if you currently smoke and need help quitting  Follow up with your doctor as directed: You will need to return every year to get tested for diabetes  Write down your questions so you remember to ask them during your visits  © Copyright [address] Information is for End User's use only and may not be sold, redistributed or otherwise used for commercial purposes   All illustrations and images included in CareNotes® are the copyrighted property of A D A M , Inc  or Atul Robbins  The above information is an  only  It is not intended as medical advice for individual conditions or treatments  Talk to your doctor, nurse or pharmacist before following any medical regimen to see if it is safe and effective for you  Chronic Kidney Disease   AMBULATORY CARE:   Chronic kidney disease (CKD)  is the gradual and permanent loss of kidney function  Normally, the kidneys remove fluid, chemicals, and waste from your blood  These wastes are turned into urine by your kidneys  CKD may worsen over time and lead to kidney failure  Common signs and symptoms include the following:   Changes in how often you need to urinate    Swelling in your arms, legs, or feet    Shortness of breath    Fatigue or weakness    Bad or bitter taste in your mouth    Nausea, vomiting, or loss of appetite    Call your local emergency number (911 in the [address] [facility]) if:   You have a seizure  You have shortness of breath  Seek care immediately if:   You are confused and very drowsy  Call your doctor or nephrologist if:   You suddenly gain or lose more weight than your healthcare provider has told you is okay  You have itchy skin or a rash  You urinate more or less than you normally do  You have blood in your urine  You have nausea and are vomiting  You have fatigue or muscle weakness  You have hiccups that will not stop  You have questions or concerns about your condition or care  How CKD is diagnosed:  CKD has 5 stages  Your healthcare provider will use results from the following tests to find the stage of CKD you have:  Blood and urine tests  show how well your kidneys are working  They may also show the cause of your CKD  Ultrasound, CT scan, or MRI  pictures may be used to check your kidneys  You may be given contrast liquid to help your kidneys show up better in the pictures  Tell the healthcare provider if you have ever had an allergic reaction to contrast liquid   Do not enter the MRI room with anything metal  Metal can cause serious injury  Tell the healthcare provider if you have any metal in or on your body  A biopsy  is a procedure to remove a small piece of tissue from your kidney  It is done to find the cause of your CKD  Treatment  can help control signs and symptoms, and prevent a worse stage of CKD  Your care team may include specialists, such as a dietitian or a heart specialist  This depends on the stage of your CKD and if you have other health conditions to manage  Healthcare providers will work with you to create a plan based on your decisions for treatment  Your treatment plan may include any of the following:  Medicines  may be given to decrease your blood pressure and get rid of extra fluid  You may also receive medicine to manage health conditions that may occur with CKD, such as anemia, diabetes, and heart disease  Dialysis  is a treatment to remove chemicals and waste from your blood when your kidneys can no longer do this  Surgery  may be needed to create an arteriovenous fistula (AVF) in your arm or insert a catheter into your abdomen  This is done so you can receive dialysis  A kidney transplant  may be done if your CKD becomes severe  What you can do to manage CKD: Management may include making some lifestyle changes  Tell your healthcare provider if you have any concerns about being able to make changes  He or she can help you find solutions, including working with specialists  Ask for help creating a plan to break large goals into smaller steps  Your plan may include any of the following:  Manage other health conditions  Your healthcare provider will work with you to make a care plan that meets your needs  You will be checked regularly for heart disease or other conditions that can make CKD worse, such as diabetes  Your blood pressure will be closely monitored  You will also get a target blood pressure and help making a plan to reach your target   This may include taking your blood pressure at home  Maintain a healthy weight  Your weight and body mass index (BMI) will be checked regularly  BMI helps find if your weight is healthy for your height  Your healthcare provider will use other tests to check your muscle and protein levels  Extra weight can strain your kidneys  A low weight or low muscle mass can make you feel more tired  You may have trouble doing your daily activities  Ask your provider what a healthy weight is for you  He or she can help you create a plan to lose or gain weight safely, if needed  The plan may include keeping a food diary  This is a list of foods and liquids you have each day  Your provider will use the diary to help you make changes, if needed  Changes are based on your health and any other conditions you have, such as diabetes  Create an exercise plan  Regular exercise can help you manage CKD, high blood pressure, and diabetes  Exercise also helps control weight  Your provider can help you create exercise goals and a plan to reach those goals  For example, your goal may be to exercise for 30 minutes in a day  Your plan can include breaking exercise into 10 minute sessions, 3 times during the day  Create a healthy eating plan  Your provider may tell you to eat food low in potassium, phosphorus, or protein  Your provider may also recommend vitamin or mineral supplements  Do not take any supplements without talking to your provider  A dietitian can help you plan meals if needed  Ask how much liquid to drink each day and which liquids are best for you  Limit sodium (salt) as directed  You may need to limit sodium to less than 2,300 milligrams (mg) each day  Ask your dietitian or healthcare provider how much sodium you can have each day  The amount depends on your stage of kidney disease  Table salt, canned foods, soups, salted snacks, and processed meats, like deli meats and sausage, are high in sodium   Your provider or a dietitian can show you how to read food labels for sodium  Limit alcohol as directed  Alcohol can cause fluid retention and can affect your kidneys  Ask how much alcohol is safe for you  A drink of alcohol is 12 ounces of beer, 5 ounces of wine, or 1½ ounces of liquor  Do not smoke  Nicotine and other chemicals in cigarettes and cigars can cause kidney damage  Ask your provider for information if you currently smoke and need help to quit  E-cigarettes or smokeless tobacco still contain nicotine  Talk to your provider before you use these products  Ask about over-the-counter medicines  Medicines such as NSAIDs and laxatives may harm your kidneys  Some cough and cold medicines can raise your blood pressure  Always ask if a medicine is safe before you take it  Ask about vaccines you may need  CKD can increase your risk for infections such as pneumonia, influenza, and hepatitis  Vaccines lower your risk for infection  Your healthcare provider will tell you which vaccines you need and when to get them  Follow up with your doctor or nephrologist as directed: You will need to return for tests to monitor your kidney and nerve function, and your parathyroid hormone level  Your medicines may be changed, based on certain test results  Write down your questions so you remember to ask them during your visits  © Copyright [employer] 2022 Information is for End User's use only and may not be sold, redistributed or otherwise used for commercial purposes  All illustrations and images included in CareNotes® are the copyrighted property of [employer]  or Atul Cheek   The above information is an  only  It is not intended as medical advice for individual conditions or treatments  Talk to your doctor, nurse or pharmacist before following any medical regimen to see if it is safe and effective for you  Good fat  Good fats come mainly from vegetables, nuts, seeds, and fish  They differ from saturated fats by having fewer hydrogen atoms bonded to their carbon chains  Healthy fats are liquid at room temperature, not solid  There are two broad categories of beneficial fats: monounsaturated and polyunsaturated fats  Monounsaturated fats  When you dip your bread in olive oil at an [place], you're getting mostly monounsaturated fat  Monounsaturated fats have a single carbon-to-carbon double bond  The result is that it has two fewer hydrogen atoms than a saturated fat and a bend at the double bond  This structure keeps monounsaturated fats liquid at room temperature  Good sources of monounsaturated fats are olive oil, peanut oil, canola oil, avocados, and most nuts, as well as high-oleic safflower and sunflower oils  The discovery that monounsaturated fat could be healthful came from the [place] Study during the 1960s  It revealed that people in [place] and other parts of the [facility] enjoyed a low rate of heart disease despite a high-fat diet  The main fat in their diet, though, was not the saturated animal fat common in countries with higher rates of heart disease  It was olive oil, which contains mainly monounsaturated fat  This finding produced a surge of interest in olive oil and the "Mediterranean diet," a style of eating regarded as a healthful choice today  Although there's no recommended daily intake of monounsaturated fats, the [place] [employer] recommends using them as much as possible along with polyunsaturated fats to replace saturated and trans fats  Polyunsaturated fats  When you pour liquid cooking oil into a pan, there's a good chance you're using polyunsaturated fat  Corn oil, sunflower oil, and safflower oil are common examples  Polyunsaturated fats are essential fats  That means they're required for normal body functions but your body can't make them  So you must get them from food   Polyunsaturated fats are used to build cell membranes and the covering of nerves  They are needed for blood clotting, muscle movement, and inflammation  A polyunsaturated fat has two or more double bonds in its carbon chain  There are two main types of polyunsaturated fats: omega-3 fatty acids and omega-6 fatty acids  The numbers refer to the distance between the beginning of the carbon chain and the first double bond  Both types offer health benefits  Eating polyunsaturated fats in place of saturated fats or highly refined carbohydrates reduces harmful LDL cholesterol and improves the cholesterol profile  It also lowers triglycerides  Good sources of omega-3 fatty acids include fatty fish such as salmon, mackerel, and sardines, flaxseeds, walnuts, canola oil, and unhydrogenated soybean oil  Omega-3 fatty acids may help prevent and even treat heart disease and stroke  In addition to reducing blood pressure, raising HDL, and lowering triglycerides, polyunsaturated fats may help prevent lethal heart rhythms from arising  Evidence also suggests they may help reduce the need for corticosteroid medications in people with rheumatoid arthritis  Studies linking omega-3s to a wide range of other health improvements, including reducing risk of dementia, are inconclusive, and some of them have major flaws, according to a systematic review of the evidence by the Agency for Healthcare Research and Quality  Omega-6 fatty acids have also been linked to protection against heart disease  Foods rich in linoleic acid and other omega-6 fatty acids include vegetable oils such as safflower, soybean, sunflower, walnut, and corn oils  Cholesterol and Your Health   AMBULATORY CARE:   Cholesterol  is a waxy, fat-like substance  Your body uses cholesterol to make hormones and new cells, and to protect nerves  Cholesterol is made by your body  It also comes from certain foods you eat, such as meat and dairy products   Your healthcare provider can help you set goals for your cholesterol levels  He or she can help you create a plan to meet your goals  Cholesterol level goals: Your cholesterol level goals depend on your risk for heart disease, your age, and your other health conditions  The following are general guidelines: Total cholesterol  includes low-density lipoprotein (LDL), high-density lipoprotein (HDL), and triglyceride levels  The total cholesterol level should be lower than 200 mg/dL and is best at about 150 mg/dL  LDL cholesterol  is called bad cholesterol  because it forms plaque in your arteries  As plaque builds up, your arteries become narrow, and less blood flows through  When plaque decreases blood flow to your heart, you may have chest pain  If plaque completely blocks an artery that brings blood to your heart, you may have a heart attack  Plaque can break off and form blood clots  Blood clots may block arteries in your brain and cause a stroke  The level should be less than 130 mg/dL and is best at about 100 mg/dL  HDL cholesterol  is called good cholesterol  because it helps remove LDL cholesterol from your arteries  It does this by attaching to LDL cholesterol and carrying it to your liver  Your liver breaks down LDL cholesterol so your body can get rid of it  High levels of HDL cholesterol can help prevent a heart attack and stroke  Low levels of HDL cholesterol can increase your risk for heart disease, heart attack, and stroke  The level should be 60 mg/dL or higher  Triglycerides  are a type of fat that store energy from foods you eat  High levels of triglycerides also cause plaque buildup  This can increase your risk for a heart attack or stroke  If your triglyceride level is high, your LDL cholesterol level may also be high  The level should be less than 150 mg/dL      Any of the following can increase your risk for high cholesterol:   Smoking cigarettes    Being overweight or obese, or not getting enough exercise    Drinking large amounts of alcohol    A medical condition such as hypertension (high blood pressure) or diabetes    Certain genes passed from your parents to you    Age older than 65 years    What you need to know about having your cholesterol levels checked: Adults 21to 39years of age should have their cholesterol levels checked every 4 to 6 years  Adults 45 years or older should have their cholesterol checked every 1 to 2 years  You may need your cholesterol checked more often, or at a younger age, if you have risk factors for heart disease  You may also need to have your cholesterol checked more often if you have other health conditions, such as diabetes  Blood tests are used to check cholesterol levels  Blood tests measure your levels of triglycerides, LDL cholesterol, and HDL cholesterol  How healthy fats affect your cholesterol levels:  Healthy fats, also called unsaturated fats, help lower LDL cholesterol and triglyceride levels  Healthy fats include the following:  Monounsaturated fats  are found in foods such as olive oil, canola oil, avocado, nuts, and olives  Polyunsaturated fats,  such as omega 3 fats, are found in fish, such as salmon, trout, and tuna  They can also be found in plant foods such as flaxseed, walnuts, and soybeans  How unhealthy fats affect your cholesterol levels:  Unhealthy fats increase LDL cholesterol and triglyceride levels  They are found in foods high in cholesterol, saturated fat, and trans fat:  Cholesterol  is found in eggs, dairy, and meat  Saturated fat  is found in butter, cheese, ice cream, whole milk, and coconut oil  Saturated fat is also found in meat, such as sausage, hot dogs, and bologna  Trans fat  is found in liquid oils and is used in fried and baked foods  Foods that contain trans fats include chips, crackers, muffins, sweet rolls, microwave popcorn, and cookies  Treatment  for high cholesterol will also decrease your risk of heart disease, heart attack, and stroke  Treatment may include any of the following:  Lifestyle changes  may include food, exercise, weight loss, and quitting smoking  You may also need to decrease the amount of alcohol you drink  Your healthcare provider will want you to start with lifestyle changes  Other treatment may be added if lifestyle changes are not enough  Your healthcare provider may recommend you work with a team to manage hyperlipidemia  The team may include medical experts such as a dietitian, an exercise or physical therapist, and a behavior therapist  Your family members may be included in helping you create lifestyle changes  Medicines  may be given to lower your LDL cholesterol, triglyceride levels, or total cholesterol level  You may need medicines to lower your cholesterol if any of the following is true:    You have a history of stroke, TIA, unstable angina, or a heart attack  Your LDL cholesterol level is 190 mg/dL or higher  You are age 36 to 76 years, have diabetes or heart disease risk factors, and your LDL cholesterol is 70 mg/dL or higher  Supplements  include fish oil, red yeast rice, and garlic  Fish oil may help lower your triglyceride and LDL cholesterol levels  It may also increase your HDL cholesterol level  Red yeast rice may help decrease your total cholesterol level and LDL cholesterol level  Garlic may help lower your total cholesterol level  Do not take any supplements without talking to your healthcare provider  Food changes you can make to lower your cholesterol levels:  A dietitian can help you create a healthy eating plan  He or she can show you how to read food labels and choose foods low in saturated fat, trans fats, and cholesterol  Decrease the total amount of fat you eat  Choose lean meats, fat-free or 1% fat milk, and low-fat dairy products, such as yogurt and cheese  Try to limit or avoid red meats  Limit or do not eat fried foods or baked goods, such as cookies      Replace unhealthy fats with healthy fats  Cook foods in olive oil or canola oil  Choose soft margarines that are low in saturated fat and trans fat  Seeds, nuts, and avocados are other examples of healthy fats  Eat foods with omega-3 fats  Examples include salmon, tuna, mackerel, walnuts, and flaxseed  Eat fish 2 times per week  Pregnant women should not eat fish that have high levels of mercury, such as shark, swordfish, and allen mackerel  Increase the amount of high-fiber foods you eat  High-fiber foods can help lower your LDL cholesterol  Aim to get between 20 and 30 grams of fiber each day  Fruits and vegetables are high in fiber  Eat at least 5 servings each day  Other high-fiber foods are whole-grain or whole-wheat breads, pastas, or cereals, and brown rice  Eat 3 ounces of whole-grain foods each day  Increase fiber slowly  You may have abdominal discomfort, bloating, and gas if you add fiber to your diet too quickly  Eat healthy protein foods  Examples include low-fat dairy products, skinless chicken and turkey, fish, and nuts  Limit foods and drinks that are high in sugar  Your dietitian or healthcare provider can help you create daily limits for high-sugar foods and drinks  The limit may be lower if you have diabetes or another health condition  Limits can also help you lose weight if needed  Lifestyle changes you can make to lower your cholesterol levels:   Maintain a healthy weight  Ask your healthcare provider what a healthy weight is for you  Ask him or her to help you create a weight loss plan if needed  Weight loss can decrease your total cholesterol and triglyceride levels  Weight loss may also help keep your blood pressure at a healthy level  Be physically active throughout the day  Physical activity, such as exercise, can help lower your total cholesterol level and maintain a healthy weight  Physical activity can also help increase your HDL cholesterol level   Work with your healthcare provider to create an program that is right for you  Get at least 30 to 40 minutes of moderate physical activity most days of the week  Examples of exercise include brisk walking, swimming, or biking  Also include strength training at least 2 times each week  Your healthcare providers can help you create a physical activity plan  Do not smoke  Nicotine and other chemicals in cigarettes and cigars can raise your cholesterol levels  Ask your healthcare provider for information if you currently smoke and need help to quit  E-cigarettes or smokeless tobacco still contain nicotine  Talk to your healthcare provider before you use these products  Limit or do not drink alcohol  Alcohol can increase your triglyceride levels  Ask your healthcare provider before you drink alcohol  Ask how much is okay for you to drink in 24 hours or 1 week  Follow up with your doctor as directed:  Write down your questions so you remember to ask them during your visits  © [employer] 2022 Information is for End User's use only and may not be sold, redistributed or otherwise used for commercial purposes  All illustrations and images included in CareNotes® are the copyrighted property of A D A M , Inc  or [address] [place] [address]  The above information is an  only  It is not intended as medical advice for individual conditions or treatments  Talk to your doctor, nurse or pharmacist before following any medical regimen to see if it is safe and effective for you  DASH Eating Plan   WHAT YOU NEED TO KNOW:   The DASH (Dietary Approaches to Stop Hypertension) Eating Plan is designed to help prevent or lower high blood pressure  It can also help to lower LDL (bad) cholesterol and decrease your risk for heart disease  The plan is low in sodium, sugar, unhealthy fats, and total fat  It is high in potassium, calcium, magnesium, and fiber   These nutrients are added when you eat more fruits, vegetables, and whole grains  With the DASH eating plan, you need to eat a certain number of servings from each food group  This will help you get enough of certain nutrients and limit others  The amount of servings you should eat depends on how many calories you need  Your dietitian can help you create meal plans with the right number of servings for each food group  DISCHARGE INSTRUCTIONS:   What you need to know about sodium:  Your dietitian will tell you how much sodium is safe for you to have each day  People with high blood pressure should have no more than 1,500 to 2,300 mg of sodium in a day  A teaspoon (tsp) of salt has 2,300 mg of sodium  This may seem like a difficult goal, but small changes to the foods you eat can make a big difference  Your healthcare provider or dietitian can help you create a meal plan that follows your sodium limit  Read food labels  Food labels can help you choose foods that are low in sodium  The amount of sodium is listed in milligrams (mg)  The % Daily Value (DV) column tells you how much of your daily needs are met by 1 serving of the food for each nutrient listed  Choose foods that have less than 5% of the DV of sodium  These foods are considered low in sodium  Foods that have 20% or more of the DV of sodium are considered high in sodium  Avoid foods that have more than 300 mg of sodium in each serving  Choose foods that say low-sodium, reduced-sodium, or no salt added on the food label  Limit added salt  Do not salt food at the table if you add salt when you cook  Use herbs and spices, such as onions, garlic, and salt-free seasonings to add flavor  Try lemon or lime juice or vinegar to add a tart flavor  Use hot peppers or a small amount of hot pepper sauce to add a spicy flavor   Limit foods high in added salt, such as the following:    Seasonings made with salt, such as garlic salt, celery salt, onion salt, seasoned salt, meat tenderizers, and monosodium glutamate (MSG)    Miso soup and canned or dried soup mixes    Regular soy sauce, barbecue sauce, teriyaki sauce, steak sauce, Worcestershire sauce, and most flavored vinegars    Snack foods, such as salted chips, popcorn, pretzels, pork rinds, salted crackers, and salted nuts    Frozen foods, such as dinners, entrees, vegetables with sauces, and breaded meats    Ask about salt substitutes  Ask your healthcare provider if you may use salt substitutes  Some salt substitutes have ingredients that can be harmful if you have certain health conditions  Choose foods carefully at restaurants  Meals from restaurants, especially fast food restaurants, are often high in sodium  Some restaurants have nutrition information that tells you the amount of sodium in their foods  Ask to have your food prepared with less, or no salt  What you need to know about fats:  Healthy fats include unsaturated fats and omega-3 fatty acids  Unhealthy fats include saturated fats and trans fats  Include healthy fats, such as the following:      Cooking oils, such as soybean, canola, olive, or sunflower    Fatty fish, such as salmon, tuna, mackerel, or sardines    Flaxseed oil or ground flaxseed    ½ cup of cooked beans, such as black beans, kidney beans, or lopez beans    1½ ounces of low-sodium nuts, such as almonds or walnuts    Low-sugar, low-sodium peanut butter    Seeds such as kandi seeds or sunflower seeds       Limit or do not have unhealthy fats, such as the following:      Foods that contain fat from animals, such as fatty meats, whole milk, butter, and cream    Shortening, stick margarine, palm oil, and coconut oil    Full-fat or creamy salad dressing    Creamy soup    Crackers, chips, and baked goods made with margarine or shortening    Foods that are fried in unhealthy fats    Gravy and sauces, such as Juan A or cheese sauces    What you need to know about carbohydrates (carbs): All carbs break down into sugar   Complex carbs contain more fiber than simple carbs  This means complex carbs go into the bloodstream more slowly and cause less of a blood sugar spike  Try to include more complex carbs and fewer simple carbs  Include complex carbs, such as the followin slice of whole-grain bread    1 ounce of dry cereal that does not contain added sugar    ½ cup of cooked oatmeal    2 ounces of cooked whole-grain pasta    ½ cup of cooked brown rice    Limit or do not have simple carbs, such as the following:      [employer], such as doughnuts, pastries, and cookies    Mixes for cornbread and biscuits    White rice and pasta mixes, such as boxed macaroni and cheese    Instant and cold cereals that contain sugar    Jelly, jam, and ice cream that contain sugar    Condiments such as ketchup    Drinks high in sugar, such as soft drinks, lemonade, and fruit juice    What you need to know about vegetables and fruits:  Vegetables and fruits can be fresh, frozen, or canned  If possible, try to choose low-sodium canned options  Include a variety of vegetables and fruits, such as the followin medium apple, pear, or peach (about ½ cup chopped)    ½ small banana    ½ cup berries, such as blueberries, strawberries, or blackberries    1 cup of raw leafy greens, such as lettuce, spinach, kale, or radha greens    ½ cup of frozen or canned (no added salt) vegetables, such as green beans    ½ cup of fresh, frozen, or canned fruit (canned in light syrup or fruit juice)    ½ cup of vegetable or fruit juice    Limit or do not have vegetables and fruits made in the following ways:      Frozen fruit such as cherries that have added sugar    Fruit in cream or butter sauce    Canned vegetables that are high in sodium    Sauerkraut, pickled vegetables, and other foods prepared in brine    Fried vegetables or vegetables in butter or high-fat sauces    What you need to know about protein foods:    Include lean or low-fat protein foods, such as the following: Poultry (chicken, turkey) with no skin    Fish (especially fatty fish, such as salmon, fresh tuna, or mackerel)    Lean beef and pork (loin, round, extra lean hamburger)    Egg whites and egg substitutes    1 cup of nonfat (skim) or 1% milk    1½ ounces of fat-free or low-fat cheese    6 ounces of nonfat or low-fat yogurt    Limit or do not have high-fat protein foods, such as the following:      Smoked or cured meat, such as corned beef, peña, ham, hot dogs, and sausage    Canned beans and canned meats or spreads, such as potted meats, sardines, anchovies, and imitation seafood    Deli or lunch meats, such as bologna, ham, turkey, and roast beef    High-fat meat (T-bone steak, regular hamburger, and ribs)    Whole eggs and egg yolks    Whole milk, 2% milk, and cream    Regular cheese and processed cheese    Other guidelines to follow:   Maintain a healthy weight  Your risk for heart disease is higher if you are overweight  Your healthcare provider may suggest that you lose weight if you are overweight  You can lose weight by eating fewer calories and foods that have added sugars and fat  The DASH meal plan can help you do this  Decrease calories by eating smaller portions at each meal and fewer snacks  Ask your healthcare provider for more information about how to lose weight  Exercise regularly  Regular exercise can help you reach or maintain a healthy weight  Regular exercise can also help decrease your blood pressure and improve your cholesterol levels  Get 30 minutes or more of moderate exercise each day of the week  To lose weight, get at least 60 minutes of exercise  Talk to your healthcare provider about the best exercise program for you  Limit alcohol  Women should limit alcohol to 1 drink a day  Men should limit alcohol to 2 drinks a day  A drink of alcohol is 12 ounces of beer, 5 ounces of wine, or 1½ ounces of liquor      For more information:   [employer] [facility] [facility] [address]  [address] [address]  [place]anne [place] [address]  Phone: [phone]  Web Address: [facility] no    © [facility] 2022 Information is for End User's use only and may not be sold, redistributed or otherwise used for commercial purposes  All illustrations and images included in CareNotes® are the copyrighted property of A D A M , Inc  or [facility] [employer] Cheek   The above information is an  only  It is not intended as medical advice for individual conditions or treatments  Talk to your doctor, nurse or pharmacist before following any medical regimen to see if it is safe and effective for you  Wellness Visit for Adults   AMBULATORY CARE:   A wellness visit  is when you see your healthcare provider to get screened for health problems  Your healthcare provider will also give you advice on how to stay healthy  Write down your questions so you remember to ask them  Ask your healthcare provider how often you should have a wellness visit  What happens at a wellness visit:  Your healthcare provider will ask about your health, and your family history of health problems  This includes high blood pressure, heart disease, and cancer  He or she will ask if you have symptoms that concern you, if you smoke, and about your mood  You may also be asked about your intake of medicines, supplements, food, and alcohol  Any of the following may be done: Your weight  will be checked  Your height may also be checked so your body mass index (BMI) can be calculated  Your BMI shows if you are at a healthy weight  Your blood pressure  and heart rate will be checked  Your temperature may also be checked  Blood and urine tests  may be done  Blood tests may be done to check your cholesterol levels  Abnormal cholesterol levels increase your risk for heart disease and stroke   You may also need a blood or urine test to check for diabetes if you are at increased risk  Urine tests may be done to look for signs of an infection or kidney disease  A physical exam  includes checking your heartbeat and lungs with a stethoscope  Your healthcare provider may also check your skin to look for sun damage  Screening tests  may be recommended  A screening test is done to check for diseases that may not cause symptoms  The screening tests you may need depend on your age, gender, family history, and lifestyle habits  For example, colorectal screening may be recommended if you are 48years old or older  Screening tests you need if you are a woman:   A Pap smear  is used to screen for cervical cancer  Pap smears are usually done every 3 to 5 years depending on your age  You may need them more often if you have had abnormal Pap smear test results in the past  Ask your healthcare provider how often you should have a Pap smear  A mammogram  is an x-ray of your breasts to screen for breast cancer  Experts recommend mammograms every 2 years starting at age 48 years  You may need a mammogram at age 52 years or younger if you have an increased risk for breast cancer  Talk to your healthcare provider about when you should start having mammograms and how often you need them  Vaccines you may need:   Get an influenza vaccine  every year  The influenza vaccine protects you from the flu  Several types of viruses cause the flu  The viruses change over time, so new vaccines are made each year  Get a tetanus-diphtheria (Td) booster vaccine  every 10 years  This vaccine protects you against tetanus and diphtheria  Tetanus is a severe infection that may cause painful muscle spasms and lockjaw  Diphtheria is a severe bacterial infection that causes a thick covering in the back of your mouth and throat  Get a human papillomavirus (HPV) vaccine  if you are female and aged 23 to 32 or male 23 to 24 and never received it  This vaccine protects you from HPV infection   HPV is the most common infection spread by sexual contact  HPV may also cause vaginal, penile, and anal cancers  Get a pneumococcal vaccine  if you are aged 72 years or older  The pneumococcal vaccine is an injection given to protect you from pneumococcal disease  Pneumococcal disease is an infection caused by pneumococcal bacteria  The infection may cause pneumonia, meningitis, or an ear infection  Get a shingles vaccine  if you are 60 or older, even if you have had shingles before  The shingles vaccine is an injection to protect you from the varicella-zoster virus  This is the same virus that causes chickenpox  Shingles is a painful rash that develops in people who had chickenpox or have been exposed to the virus  How to eat healthy:  My Plate is a model for planning healthy meals  It shows the types and amounts of foods that should go on your plate  Fruits and vegetables make up about half of your plate, and grains and protein make up the other half  A serving of dairy is included on the side of your plate  The amount of calories and serving sizes you need depends on your age, gender, weight, and height  Examples of healthy foods are listed below:  Eat a variety of vegetables  such as dark green, red, and orange vegetables  You can also include canned vegetables low in sodium (salt) and frozen vegetables without added butter or sauces  Eat a variety of fresh fruits , canned fruit in 100% juice, frozen fruit, and dried fruit  Include whole grains  At least half of the grains you eat should be whole grains  Examples include whole-wheat bread, wheat pasta, brown rice, and whole-grain cereals such as oatmeal     Eat a variety of protein foods such as seafood (fish and shellfish), lean meat, and poultry without skin (turkey and chicken)  Examples of lean meats include pork leg, shoulder, or tenderloin, and beef round, sirloin, tenderloin, and extra lean ground beef   Other protein foods include eggs and egg substitutes, beans, peas, soy products, nuts, and seeds  Choose low-fat dairy products such as skim or 1% milk or low-fat yogurt, cheese, and cottage cheese  Limit unhealthy fats  such as butter, hard margarine, and shortening  Exercise:  Exercise at least 30 minutes per day on most days of the week  Some examples of exercise include walking, biking, dancing, and swimming  You can also fit in more physical activity by taking the stairs instead of the elevator or parking farther away from stores  Include muscle strengthening activities 2 days each week  Regular exercise provides many health benefits  It helps you manage your weight, and decreases your risk for type 2 diabetes, heart disease, stroke, and high blood pressure  Exercise can also help improve your mood  Ask your healthcare provider about the best exercise plan for you  General health and safety guidelines:   Do not smoke  Nicotine and other chemicals in cigarettes and cigars can cause lung damage  Ask your healthcare provider for information if you currently smoke and need help to quit  E-cigarettes or smokeless tobacco still contain nicotine  Talk to your healthcare provider before you use these products  Limit alcohol  A drink of alcohol is 12 ounces of beer, 5 ounces of wine, or 1½ ounces of liquor  Lose weight, if needed  Being overweight increases your risk of certain health conditions  These include heart disease, high blood pressure, type 2 diabetes, and certain types of cancer  Protect your skin  Do not sunbathe or use tanning beds  Use sunscreen with a SPF 15 or higher  Apply sunscreen at least 15 minutes before you go outside  Reapply sunscreen every 2 hours  Wear protective clothing, hats, and sunglasses when you are outside  Drive safely  Always wear your seatbelt  Make sure everyone in your car wears a seatbelt  A seatbelt can save your life if you are in an accident   Do not use your cell phone when you are driving  This could distract you and cause an accident  Pull over if you need to make a call or send a text message  Practice safe sex  Use latex condoms if are sexually active and have more than one partner  Your healthcare provider may recommend screening tests for sexually transmitted infections (STIs)  Wear helmets, lifejackets, and protective gear  Always wear a helmet when you ride a bike or motorcycle, go skiing, or play sports that could cause a head injury  Wear protective equipment when you play sports  Wear a lifejacket when you are on a boat or doing water sports  © Copyright [employer] 2022 Information is for End User's use only and may not be sold, redistributed or otherwise used for commercial purposes  All illustrations and images included in CareNotes® are the copyrighted property of [employer]  or [facility] Teddy Cheek   The above information is an  only  It is not intended as medical advice for individual conditions or treatments  Talk to your doctor, nurse or pharmacist before following any medical regimen to see if it is safe and effective for you

## 2022-08-17 NOTE — ASSESSMENT & PLAN NOTE
The patient was evaluated in the emergency room yesterday and was noted to have a 5 mm left ureteral calculus with mild left hydronephrosis  Patient was placed on Flomax  I did recommend the patient follow-up with Urology in this order was placed  He was provided a script for pain relief when seen in the ED  he did have a previous kidney stone last December        IMPRESSION:     5 mm [id number] left ureteral calculus eliciting mild left hydroureteronephrosis

## 2022-08-17 NOTE — ASSESSMENT & PLAN NOTE
Lab Results   Component Value Date    EGFR 63 08/16/2022    EGFR 60 08/15/2022    EGFR 79 09/04/2021    CREATININE 1 20 08/16/2022    CREATININE 1 26 08/15/2022    CREATININE 1 01 09/04/2021   Patient with history of chronic kidney disease  His GFR is 63  Information regarding chronic kidney disease was provided to the patient  He has been advised that he should watch his sodium intake as well as intake of NSAIDs

## 2022-08-17 NOTE — ASSESSMENT & PLAN NOTE
Lab Results   Component Value Date    HGBA1C 6 5 (H) 08/15/2022     The patient's most recent hemoglobin A1c is 6 5 which gives him a diagnosis of diabetes  The patient does state that he has been on steroid injections for his back pain over the past several months  Information was provided to the patient regarding carbohydrate counting  I will not start him on any medication at this time  He will continue to watch the carbohydrates in his diet    He will repeat his A1c prior to his next appointment

## 2022-08-17 NOTE — ASSESSMENT & PLAN NOTE
Patient's blood pressure in the office today is 130/72  Currently the patient is on amlodipine, metoprolol  mg daily as well as Cozaar 100 mg  He does follow a low-salt diet  His BMI is 29  He does not smoke  He does have an element of chronic kidney disease  I will increase his amlodipine to 10 mg a day  He has been instructed that he should check his blood pressures several times per week and keep a list   He is to contact the office should his blood pressures remain consistently elevated over 693 systolic or 90 diastolic

## 2022-08-19 ENCOUNTER — TELEPHONE (OUTPATIENT)
Dept: UROLOGY | Facility: AMBULATORY SURGERY CENTER | Age: 62
End: 2022-08-19

## 2022-08-19 NOTE — TELEPHONE ENCOUNTER
Patient previously managed by Dr Saul Gunderson in the [place] office  Last seen October 2019  Patient recently seen in the ER on 8/16/22 for left sided flank pain, CT showing 5 mm proximal to mid left ureteral stone with mild left hydro  Returned call to patient  Patient reports intermittent left sided flank pain, up to 7/10  Patient reports tolerable with alternating between Tylenol and Advil  Taking Roxicodone as needed for severe pain  Denies nausea or vomiting  Denies fever  Reports chills when pain becomes severe  Patient also reports he hasn't had a BM in a few days  Patient attributes this to his lack of appetite and limited PO intake over the past few days  Also discussed with patient risk of constipation due to his use of prescription pain medication  Advised on bowel regime  Offered patient appointment today with Bobby Richmond in the [facility] office  Patient declined  Requesting an appointment on Monday  Appointment scheduled for Monday 8/22 at 7:30 in the [place] office with Maxim Bhatti  Patient is aware of office address and location  ER precautions given for the weekend  Instructed to go to [place] ER if needed due to on call weekend coverage

## 2022-08-19 NOTE — TELEPHONE ENCOUNTER
Established pt last seen 10/23/19,presented [facility] ED 08/16/22 in need of appointment ASAP or [facility] will travel states pain unbearable ,please contact pt directly

## 2022-08-19 NOTE — PROGRESS NOTES
8/22/2022    Sally Lindsey  1960  [id number]      Assessment  -BPH with lower urinary tract symptoms  -Renal cyst  -Nephrolithiasis     Discussion/Plan  Gunnar Tucker is a 60 y o  male being managed by our office    1  Nephrolithiasis- reviewed the results of his recent CT renal stone study from 08/16/2022 which identified a 5 mm left proximal to mid ureteral calculus with mild hydronephrosis  He continues to report left-sided flank pain, and states he has not passed stone despite tamsulosin  We discussed proceeding with surgical intervention which he is amenable to  Informed consent for cystoscopy, left-sided ureteroscopy, holmium laser lithotripsy, retrograde pyelogram, and ureteral stent placement were reviewed  Case requested  I did discuss with patient that based on final date of surgery, he may require repeat imaging to confirm stone  He verbalizes understanding  Prescription refill for tamsulosin was electronically sent to his pharmacy  He was encouraged to increase his water intake and strain all urine  Urine specimen in the office today sent for urine culture  ER precautions were reviewed  Continue to re-evaluate renal cyst on ultrasound and repeat PSA after acute stone episode is resolved  Proceed with ureteroscopy for 5 mm left proximal ureteral stone burden  He was advised to call sooner with any questions or issues       -All questions answered, patient agrees with plan      History of Present Illness  60 y o  male with a history of BPH, renal cyst, and nephrolithiasis presents today for follow up  Patient last seen in the office in October 2019  He has a prior history of 5 7 cm right renal cyst categorized as Bosniak 2  Patient recently presented to the emergency department for acute onset left-sided flank pain on 08/16/2022  CT renal stone study identified a 5 mm left proximal ureteral calculus with mild hydronephrosis  WBC 7 3 and creatinine 1 26    He was discharged home on tamsulosin for medical expulsive therapy  Patient states he has not passed a stone  He continues to report intermittent left-sided flank discomfort and has been taking OTC NSAIDs, and prescribed narcotic  He reports decreased urinary flow, but denies any gross hematuria or dysuria  Patient has had episodes of night sweats and T-max 99°  He reports a prior history of spontaneously passed calculus while on vacation on 06/21/2022  Patient otherwise denies any prior urologic history, surgical intervention, or instrumentation  Recent PSA from 08/15/2022 was PSA 3 4, previously 1 8 in 2020  However, recent PSA was obtained during acute stone episode  He denies any strong family history of prostate or urologic malignancy  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  Review of Systems  Review of Systems   Constitutional: Negative  HENT: Negative  Respiratory: Negative  Cardiovascular: Negative  Gastrointestinal: Negative  Genitourinary: Positive for flank pain (left side)  Negative for decreased urine volume, difficulty urinating, dysuria, frequency, hematuria and urgency  Skin: Negative  Neurological: Negative  Psychiatric/Behavioral: Negative  Past Medical History  Past Medical History:   Diagnosis Date    Cat scratch fever     Colon polyp     Diverticulitis     GERD (gastroesophageal reflux disease)     Hyperlipidemia     Hypertension     IFG (impaired fasting glucose)     L3 vertebral fracture (HCC) 07/2018    Lumbar compression fracture (HCC)     Patella-femoral syndrome     Thyroid nodule        Past Social History  Past Surgical History:   Procedure Laterality Date    CLOSED REDUCTION WRIST FRACTURE      COLONOSCOPY      NV COLONOSCOPY FLX DX W/COLLJ SPEC WHEN PFRMD N/A 5/30/2018    Procedure: COLONOSCOPY;  Surgeon: Nelli Bentley MD;  Location: [facility];   Service: Colorectal    UMBILICAL GRANULOMA EXCISION Left     groin    US GUIDED THYROID BIOPSY  9/20/2018    US GUIDED THYROID BIOPSY  2/13/2019    WISDOM TOOTH EXTRACTION      WRIST GANGLION EXCISION  1969    closed reduction        Past Family History  Family History   Problem Relation Age of Onset    Hypertension Mother     Melanoma Father     Diabetes type II Father     Hypertension Father     Colon cancer Maternal Grandmother     Colon cancer Maternal Uncle     Hypertension Brother        Past Social history  Social History     Socioeconomic History    Marital status: /Civil Union     Spouse name: Not on file    Number of children: Not on file    Years of education: Not on file    Highest education level: Not on file   Occupational History    Not on file   Tobacco Use    Smoking status: Never Smoker    Smokeless tobacco: Never Used   Vaping Use    Vaping Use: Never used   Substance and Sexual Activity    Alcohol use:  Yes     Alcohol/week: 3 0 standard drinks     Types: 3 Glasses of wine per week     Comment: social    Drug use: No    Sexual activity: Not on file   Other Topics Concern    Not on file   Social History Narrative    Not on file     Social Determinants of Health     Financial Resource Strain: Not on file   Food Insecurity: Not on file   Transportation Needs: Not on file   Physical Activity: Not on file   Stress: Not on file   Social Connections: Not on file   Intimate Partner Violence: Not on file   Housing Stability: Not on file       Current Medications  Current Outpatient Medications   Medication Sig Dispense Refill    amLODIPine (NORVASC) 5 mg tablet Take 2 tablets (10 mg total) by mouth daily 90 tablet 0    Cholecalciferol (VITAMIN D3) 5000 units CAPS Take 1 capsule by mouth daily      famotidine (PEPCID) 20 mg tablet Take 1 tablet twice daily before meals 60 tablet 5    glucosamine 500 MG CAPS capsule Take by mouth daily        ibuprofen (MOTRIN) 200 mg tablet Take by mouth every 6 (six) hours as needed for mild pain      losartan (COZAAR) 100 MG tablet Take 1 tablet (100 mg total) by mouth daily 30 tablet 0    metoprolol succinate (TOPROL-XL) 100 mg 24 hr tablet Take 1 tablet (100 mg total) by mouth daily 90 tablet 3    multivitamin (THERAGRAN) TABS Take 1 tablet by mouth daily      Omega-3 Fatty Acids (FISH OIL) 1200 MG CAPS Take by mouth 2 (two) times a day       oxyCODONE (Roxicodone) 5 immediate release tablet Take 1 tablet (5 mg total) by mouth every 6 (six) hours as needed for moderate pain for up to 10 days Max Daily Amount: 20 mg 12 tablet 0    rosuvastatin (CRESTOR) 5 mg tablet Take 1 tablet (5 mg total) by mouth daily 90 tablet 0    tamsulosin (FLOMAX) 0 4 mg Take 1 capsule (0 4 mg total) by mouth daily with dinner for 14 days 14 capsule 0     No current facility-administered medications for this visit  Allergies  No Known Allergies    Past Medical History, Social History, Family History, medications and allergies were reviewed  Vitals  There were no vitals filed for this visit  Physical Exam  Physical Exam  Constitutional:       Appearance: Normal appearance  He is well-developed  HENT:      Head: Normocephalic  Eyes:      Pupils: Pupils are equal, round, and reactive to light  Cardiovascular:      Rate and Rhythm: Normal rate and regular rhythm  Pulses: Normal pulses  Heart sounds: Normal heart sounds  No murmur heard  No friction rub  No gallop  Pulmonary:      Effort: Pulmonary effort is normal  No respiratory distress  Breath sounds: Normal breath sounds  No stridor  No wheezing, rhonchi or rales  Chest:      Chest wall: No tenderness  Abdominal:      General: Abdomen is flat  There is no distension  Palpations: Abdomen is soft  There is no mass  Tenderness: There is no abdominal tenderness  There is no right CVA tenderness, left CVA tenderness, guarding or rebound  Hernia: No hernia is present  Musculoskeletal:         General: Normal range of motion  Cervical back: Normal range of motion  Right lower leg: No edema  Left lower leg: No edema  Skin:     General: Skin is warm and dry  Capillary Refill: Capillary refill takes less than 2 seconds  Neurological:      General: No focal deficit present  Mental Status: He is alert and oriented to person, place, and time  Psychiatric:         Mood and Affect: Mood normal          Behavior: Behavior normal          Thought Content: Thought content normal          Judgment: Judgment normal          Results    I have personally reviewed all pertinent lab results and reviewed with patient  Lab Results   Component Value Date    PSA 3 4 08/15/2022    PSA 1 8 10/20/2020    PSA 2 4 03/09/2019     Lab Results   Component Value Date    GLUCOSE 104 07/27/2018    CALCIUM 9 9 08/16/2022     (U) 08/22/2014    K 4 5 08/16/2022    CO2 28 08/16/2022     08/16/2022    BUN 16 08/16/2022    CREATININE 1 20 08/16/2022     Lab Results   Component Value Date    WBC 7 61 08/16/2022    HGB 15 3 08/16/2022    HCT 45 4 08/16/2022    MCV 97 08/16/2022     08/16/2022     No results found for this or any previous visit (from the past 1 hour(s))

## 2022-08-19 NOTE — H&P (VIEW-ONLY)
8/22/2022    Teddygerardo Lewis  1960  [id number]      Assessment  -BPH with lower urinary tract symptoms  -Renal cyst  -Nephrolithiasis     Discussion/Plan  Guadalupe Skiff is a 60 y o  male being managed by our office    1  Nephrolithiasis- reviewed the results of his recent CT renal stone study from 08/16/2022 which identified a 5 mm left proximal to mid ureteral calculus with mild hydronephrosis  He continues to report left-sided flank pain, and states he has not passed stone despite tamsulosin  We discussed proceeding with surgical intervention which he is amenable to  Informed consent for cystoscopy, left-sided ureteroscopy, holmium laser lithotripsy, retrograde pyelogram, and ureteral stent placement were reviewed  Case requested  I did discuss with patient that based on final date of surgery, he may require repeat imaging to confirm stone  He verbalizes understanding  Prescription refill for tamsulosin was electronically sent to his pharmacy  He was encouraged to increase his water intake and strain all urine  Urine specimen in the office today sent for urine culture  ER precautions were reviewed  Continue to re-evaluate renal cyst on ultrasound and repeat PSA after acute stone episode is resolved  Proceed with ureteroscopy for 5 mm left proximal ureteral stone burden  He was advised to call sooner with any questions or issues       -All questions answered, patient agrees with plan      History of Present Illness  60 y o  male with a history of BPH, renal cyst, and nephrolithiasis presents today for follow up  Patient last seen in the office in October 2019  He has a prior history of 5 7 cm right renal cyst categorized as Bosniak 2  Patient recently presented to the emergency department for acute onset left-sided flank pain on 08/16/2022  CT renal stone study identified a 5 mm left proximal ureteral calculus with mild hydronephrosis  WBC 7 3 and creatinine 1 26    He was discharged home on tamsulosin for medical expulsive therapy  Patient states he has not passed a stone  He continues to report intermittent left-sided flank discomfort and has been taking OTC NSAIDs, and prescribed narcotic  He reports decreased urinary flow, but denies any gross hematuria or dysuria  Patient has had episodes of night sweats and T-max 99°  He reports a prior history of spontaneously passed calculus while on vacation on 06/21/2022  Patient otherwise denies any prior urologic history, surgical intervention, or instrumentation  Recent PSA from 08/15/2022 was PSA 3 4, previously 1 8 in 2020  However, recent PSA was obtained during acute stone episode  He denies any strong family history of prostate or urologic malignancy  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  Review of Systems  Review of Systems   Constitutional: Negative  HENT: Negative  Respiratory: Negative  Cardiovascular: Negative  Gastrointestinal: Negative  Genitourinary: Positive for flank pain (left side)  Negative for decreased urine volume, difficulty urinating, dysuria, frequency, hematuria and urgency  Skin: Negative  Neurological: Negative  Psychiatric/Behavioral: Negative  Past Medical History  Past Medical History:   Diagnosis Date    Cat scratch fever     Colon polyp     Diverticulitis     GERD (gastroesophageal reflux disease)     Hyperlipidemia     Hypertension     IFG (impaired fasting glucose)     L3 vertebral fracture (HCC) 07/2018    Lumbar compression fracture (HCC)     Patella-femoral syndrome     Thyroid nodule        Past Social History  Past Surgical History:   Procedure Laterality Date    CLOSED REDUCTION WRIST FRACTURE      COLONOSCOPY      NM COLONOSCOPY FLX DX W/COLLJ SPEC WHEN PFRMD N/A 5/30/2018    Procedure: COLONOSCOPY;  Surgeon: Dick Valiente MD;  Location: [facility];   Service: Colorectal    UMBILICAL GRANULOMA EXCISION Left     groin    US GUIDED THYROID BIOPSY  9/20/2018    US GUIDED THYROID BIOPSY  2/13/2019    WISDOM TOOTH EXTRACTION      WRIST GANGLION EXCISION  1969    closed reduction        Past Family History  Family History   Problem Relation Age of Onset    Hypertension Mother     Melanoma Father     Diabetes type II Father     Hypertension Father     Colon cancer Maternal Grandmother     Colon cancer Maternal Uncle     Hypertension Brother        Past Social history  Social History     Socioeconomic History    Marital status: /Civil Union     Spouse name: Not on file    Number of children: Not on file    Years of education: Not on file    Highest education level: Not on file   Occupational History    Not on file   Tobacco Use    Smoking status: Never Smoker    Smokeless tobacco: Never Used   Vaping Use    Vaping Use: Never used   Substance and Sexual Activity    Alcohol use:  Yes     Alcohol/week: 3 0 standard drinks     Types: 3 Glasses of wine per week     Comment: social    Drug use: No    Sexual activity: Not on file   Other Topics Concern    Not on file   Social History Narrative    Not on file     Social Determinants of Health     Financial Resource Strain: Not on file   Food Insecurity: Not on file   Transportation Needs: Not on file   Physical Activity: Not on file   Stress: Not on file   Social Connections: Not on file   Intimate Partner Violence: Not on file   Housing Stability: Not on file       Current Medications  Current Outpatient Medications   Medication Sig Dispense Refill    amLODIPine (NORVASC) 5 mg tablet Take 2 tablets (10 mg total) by mouth daily 90 tablet 0    Cholecalciferol (VITAMIN D3) 5000 units CAPS Take 1 capsule by mouth daily      famotidine (PEPCID) 20 mg tablet Take 1 tablet twice daily before meals 60 tablet 5    glucosamine 500 MG CAPS capsule Take by mouth daily        ibuprofen (MOTRIN) 200 mg tablet Take by mouth every 6 (six) hours as needed for mild pain      losartan (COZAAR) 100 MG tablet Take 1 tablet (100 mg total) by mouth daily 30 tablet 0    metoprolol succinate (TOPROL-XL) 100 mg 24 hr tablet Take 1 tablet (100 mg total) by mouth daily 90 tablet 3    multivitamin (THERAGRAN) TABS Take 1 tablet by mouth daily      Omega-3 Fatty Acids (FISH OIL) 1200 MG CAPS Take by mouth 2 (two) times a day       oxyCODONE (Roxicodone) 5 immediate release tablet Take 1 tablet (5 mg total) by mouth every 6 (six) hours as needed for moderate pain for up to 10 days Max Daily Amount: 20 mg 12 tablet 0    rosuvastatin (CRESTOR) 5 mg tablet Take 1 tablet (5 mg total) by mouth daily 90 tablet 0    tamsulosin (FLOMAX) 0 4 mg Take 1 capsule (0 4 mg total) by mouth daily with dinner for 14 days 14 capsule 0     No current facility-administered medications for this visit  Allergies  No Known Allergies    Past Medical History, Social History, Family History, medications and allergies were reviewed  Vitals  There were no vitals filed for this visit  Physical Exam  Physical Exam  Constitutional:       Appearance: Normal appearance  He is well-developed  HENT:      Head: Normocephalic  Eyes:      Pupils: Pupils are equal, round, and reactive to light  Cardiovascular:      Rate and Rhythm: Normal rate and regular rhythm  Pulses: Normal pulses  Heart sounds: Normal heart sounds  No murmur heard  No friction rub  No gallop  Pulmonary:      Effort: Pulmonary effort is normal  No respiratory distress  Breath sounds: Normal breath sounds  No stridor  No wheezing, rhonchi or rales  Chest:      Chest wall: No tenderness  Abdominal:      General: Abdomen is flat  There is no distension  Palpations: Abdomen is soft  There is no mass  Tenderness: There is no abdominal tenderness  There is no right CVA tenderness, left CVA tenderness, guarding or rebound  Hernia: No hernia is present  Musculoskeletal:         General: Normal range of motion  Cervical back: Normal range of motion  Right lower leg: No edema  Left lower leg: No edema  Skin:     General: Skin is warm and dry  Capillary Refill: Capillary refill takes less than 2 seconds  Neurological:      General: No focal deficit present  Mental Status: He is alert and oriented to person, place, and time  Psychiatric:         Mood and Affect: Mood normal          Behavior: Behavior normal          Thought Content: Thought content normal          Judgment: Judgment normal          Results    I have personally reviewed all pertinent lab results and reviewed with patient  Lab Results   Component Value Date    PSA 3 4 08/15/2022    PSA 1 8 10/20/2020    PSA 2 4 03/09/2019     Lab Results   Component Value Date    GLUCOSE 104 07/27/2018    CALCIUM 9 9 08/16/2022     (U) 08/22/2014    K 4 5 08/16/2022    CO2 28 08/16/2022     08/16/2022    BUN 16 08/16/2022    CREATININE 1 20 08/16/2022     Lab Results   Component Value Date    WBC 7 61 08/16/2022    HGB 15 3 08/16/2022    HCT 45 4 08/16/2022    MCV 97 08/16/2022     08/16/2022     No results found for this or any previous visit (from the past 1 hour(s))

## 2022-08-22 ENCOUNTER — OFFICE VISIT (OUTPATIENT)
Dept: UROLOGY | Facility: AMBULATORY SURGERY CENTER | Age: 62
End: 2022-08-22
Payer: COMMERCIAL

## 2022-08-22 VITALS
HEART RATE: 78 BPM | SYSTOLIC BLOOD PRESSURE: 146 MMHG | OXYGEN SATURATION: 97 % | HEIGHT: 67 IN | BODY MASS INDEX: 29.66 KG/M2 | DIASTOLIC BLOOD PRESSURE: 74 MMHG | WEIGHT: 189 LBS

## 2022-08-22 DIAGNOSIS — N20.0 RENAL STONE: ICD-10-CM

## 2022-08-22 DIAGNOSIS — N20.0 NEPHROLITHIASIS: Primary | ICD-10-CM

## 2022-08-22 DIAGNOSIS — N20.1 URETEROLITHIASIS: ICD-10-CM

## 2022-08-22 LAB
SL AMB  POCT GLUCOSE, UA: NORMAL
SL AMB LEUKOCYTE ESTERASE,UA: NORMAL
SL AMB POCT BILIRUBIN,UA: NORMAL
SL AMB POCT BLOOD,UA: NORMAL
SL AMB POCT CLARITY,UA: CLEAR
SL AMB POCT COLOR,UA: YELLOW
SL AMB POCT KETONES,UA: NORMAL
SL AMB POCT NITRITE,UA: NORMAL
SL AMB POCT PH,UA: 5
SL AMB POCT SPECIFIC GRAVITY,UA: 1.01
SL AMB POCT URINE PROTEIN: NORMAL
SL AMB POCT UROBILINOGEN: 0.2

## 2022-08-22 PROCEDURE — 99213 OFFICE O/P EST LOW 20 MIN: CPT | Performed by: NURSE PRACTITIONER

## 2022-08-22 PROCEDURE — 81002 URINALYSIS NONAUTO W/O SCOPE: CPT | Performed by: NURSE PRACTITIONER

## 2022-08-22 PROCEDURE — 87086 URINE CULTURE/COLONY COUNT: CPT | Performed by: NURSE PRACTITIONER

## 2022-08-22 RX ORDER — TAMSULOSIN HYDROCHLORIDE 0.4 MG/1
0.4 CAPSULE ORAL
Qty: 30 CAPSULE | Refills: 0 | Status: ON HOLD | OUTPATIENT
Start: 2022-08-22 | End: 2022-08-26 | Stop reason: SDUPTHER

## 2022-08-22 NOTE — PATIENT INSTRUCTIONS
Ureteroscopy   AMBULATORY CARE:   What you need to know about a ureteroscopy:  A ureteroscopy is a procedure to examine in the inside of your urinary tract  The urinary tract your urethra, bladder, ureters, and kidneys  A ureteroscope is a small, thin tube with a light and camera on the end  Ureteroscopy can help identify problems in your urinary tract, such as kidney stones  How to prepare for a ureteroscopy: Your healthcare provider will talk to you about how to prepare for your procedure  He or she may tell you not to eat or drink anything after midnight on the day of your surgery  He or she will tell you what medicines to take or not take on the day of your surgery  Arrange for someone to take you home and stay with you  You may need blood and urine tests before your procedure  What will happen during a ureteroscopy: You may be given general anesthesia to keep you asleep and free from pain during surgery  You may instead be given regional anesthesia to numb the area  With regional anesthesia, you may still feel pressure or pushing, but you should not feel any pain  Your healthcare provider will place the ureteroscope into your urethra  He or she will pass it through your bladder and into your ureters and kidneys  Your healthcare provider may place tools through the scope that will help him or her remove tissue or stones  The tools may also help him or her place stents or sheaths to help keep your ureters open  What will happen after a ureteroscopy: You will be taken to a room to rest until you are fully awake  Healthcare providers will monitor you closely for any problems  When your healthcare provider sees that you are okay, you will be able to go home  Risks of a ureteroscopy: You may bleed more than expected or get an infection  One of your ureters may be injured  You may have a blockage in one of your ureters  You may need another procedure or surgery     Call your doctor if:   You have a fever      You cannot urinate  You have blood in your urine  You are vomiting  You have pain in your abdomen or side  You have questions or concerns about your condition or care  Medicines: You may need any of the following:  NSAIDs , such as ibuprofen, help decrease swelling, pain, and fever  NSAIDs can cause stomach bleeding or kidney problems in certain people  If you take blood thinner medicine, always ask your healthcare provider if NSAIDs are safe for you  Always read the medicine label and follow directions  Antibiotics  may be given to prevent an infection  Take your medicine as directed  Contact your healthcare provider if you think your medicine is not helping or if you have side effects  Tell him or her if you are allergic to any medicine  Keep a list of the medicines, vitamins, and herbs you take  Include the amounts, and when and why you take them  Bring the list or the pill bottles to follow-up visits  Carry your medicine list with you in case of an emergency  Drink liquids as directed:  Liquids can help prevent kidney stones and urinary tract infections  Drink water and limit the amount of caffeine you drink  Caffeine may be found in coffee, tea, soda, sports drinks, and foods  Ask your healthcare provider how much liquid to drink each day and which liquids are best for you  Follow up with your healthcare provider as directed:  Write down your questions so you remember to ask them during your visits  © Copyright [employer] 2022 Information is for End User's use only and may not be sold, redistributed or otherwise used for commercial purposes  All illustrations and images included in CareNotes® are the copyrighted property of A D [employer] M [employer]  or Atul Robbisn  The above information is an  only  It is not intended as medical advice for individual conditions or treatments   Talk to your doctor, nurse or pharmacist before following any medical regimen to see if it is safe and effective for you

## 2022-08-22 NOTE — Clinical Note
Case requested, not fast track as patient does not have any acute symptoms of infection  Based on timeframe of surgery, MD will likely request repeat CT scan to confirm

## 2022-08-23 LAB — BACTERIA UR CULT: NORMAL

## 2022-08-24 ENCOUNTER — TELEPHONE (OUTPATIENT)
Dept: UROLOGY | Facility: MEDICAL CENTER | Age: 62
End: 2022-08-24

## 2022-08-24 NOTE — TELEPHONE ENCOUNTER
Patient left VM  Was told he should be getting a call from Oswald Kussmaul  Waiting on to schedule surgery   Stated that he is out of work right now and wants to get this scheduled so he can return

## 2022-08-24 NOTE — TELEPHONE ENCOUNTER
I spoke with pt this afternoon to inform him that I am working on scheduling his procedure with one of our provider  Pt verbalized understanding  Pt stated that he is having a lot of pain and would like this done as soon as possible considering he has not been able to work due to his pain  I explained that I will call him back tomorrow so we can get him scheduled

## 2022-08-25 ENCOUNTER — TELEPHONE (OUTPATIENT)
Dept: UROLOGY | Facility: MEDICAL CENTER | Age: 62
End: 2022-08-25

## 2022-08-25 ENCOUNTER — ANESTHESIA EVENT (OUTPATIENT)
Dept: PERIOP | Facility: AMBULARY SURGERY CENTER | Age: 62
End: 2022-08-25
Payer: COMMERCIAL

## 2022-08-25 NOTE — TELEPHONE ENCOUNTER
I spoke with pt this morning and offered to schedule him for his procedure tomorrow at the [facility] with Dr Doroteo Crooks  Pt was very pleased and confirmed that this will work for him  I then verbally went over all of pt 's pre op instructions and prep information with him  Pt is aware that he needs a  on the day of surgery

## 2022-08-25 NOTE — ANESTHESIA PREPROCEDURE EVALUATION
Procedure:  CYSTOSCOPY URETEROSCOPY WITH LITHOTRIPSY HOLMIUM LASER, RETROGRADE PYELOGRAM AND INSERTION STENT URETERAL (Left Bladder)    Relevant Problems   CARDIO   (+) Essential hypertension   (+) Mixed hyperlipidemia      GI/HEPATIC   (+) GERD (gastroesophageal reflux disease)      /RENAL   (+) Renal cyst   (+) Renal stone   (+) Stage 2 chronic kidney disease      MUSCULOSKELETAL   (+) Cervical spondylosis   (+) Chronic low back pain   (+) DDD (degenerative disc disease), lumbar   (+) Myofascial pain syndrome   (+) Primary osteoarthritis of left knee   (+) Primary osteoarthritis of right knee      NEURO/PSYCH   (+) Chronic low back pain   (+) Myofascial pain syndrome        Physical Exam    Airway    Mallampati score: II  TM Distance: >3 FB  Neck ROM: full     Dental   No notable dental hx     Cardiovascular  Rhythm: regular, Rate: normal,     Pulmonary  Breath sounds clear to auscultation,     Other Findings  Intercisor Distance > 3cm          Anesthesia Plan  ASA Score- 2     Anesthesia Type- general with ASA Monitors  Additional Monitors:   Airway Plan: LMA  Comment: Discussed benefits/risks of general anesthesia including possibility of mouth/throat pain, injury to lips/teeth, nausea/vomiting, and surgical pain along with more rare complications such as stroke, MI, pneumonia, aspiration, and injury to blood vessels  Patient understands and wishes to proceed  All questions answered          Plan Factors-Exercise tolerance (METS): >4 METS  Chart reviewed  EKG reviewed  Existing labs reviewed  Induction- intravenous  Postoperative Plan- Plan for postoperative opioid use  Planned trial extubation    Informed Consent- Anesthetic plan and risks discussed with patient  I personally reviewed this patient with the CRNA  Discussed and agreed on the Anesthesia Plan with the CRNA  Dara Soulier

## 2022-08-26 ENCOUNTER — ANESTHESIA (OUTPATIENT)
Dept: PERIOP | Facility: AMBULARY SURGERY CENTER | Age: 62
End: 2022-08-26
Payer: COMMERCIAL

## 2022-08-26 ENCOUNTER — TELEPHONE (OUTPATIENT)
Dept: UROLOGY | Facility: CLINIC | Age: 62
End: 2022-08-26

## 2022-08-26 ENCOUNTER — APPOINTMENT (OUTPATIENT)
Dept: RADIOLOGY | Facility: AMBULARY SURGERY CENTER | Age: 62
End: 2022-08-26
Payer: COMMERCIAL

## 2022-08-26 ENCOUNTER — HOSPITAL ENCOUNTER (OUTPATIENT)
Facility: AMBULARY SURGERY CENTER | Age: 62
Setting detail: OUTPATIENT SURGERY
Discharge: HOME | End: 2022-08-26
Attending: UROLOGY | Admitting: UROLOGY
Payer: COMMERCIAL

## 2022-08-26 VITALS
WEIGHT: 187 LBS | RESPIRATION RATE: 16 BRPM | DIASTOLIC BLOOD PRESSURE: 80 MMHG | HEIGHT: 67 IN | BODY MASS INDEX: 29.35 KG/M2 | SYSTOLIC BLOOD PRESSURE: 129 MMHG | TEMPERATURE: 96.8 F | HEART RATE: 71 BPM | OXYGEN SATURATION: 96 %

## 2022-08-26 DIAGNOSIS — N20.1 URETEROLITHIASIS: ICD-10-CM

## 2022-08-26 DIAGNOSIS — N20.0 NEPHROLITHIASIS: Primary | ICD-10-CM

## 2022-08-26 DIAGNOSIS — N20.0 RENAL STONE: Primary | ICD-10-CM

## 2022-08-26 PROCEDURE — 52332 CYSTOSCOPY AND TREATMENT: CPT | Performed by: UROLOGY

## 2022-08-26 PROCEDURE — C2617 STENT, NON-COR, TEM W/O DEL: HCPCS | Performed by: UROLOGY

## 2022-08-26 PROCEDURE — 74420 UROGRAPHY RTRGR +-KUB: CPT

## 2022-08-26 PROCEDURE — C1758 CATHETER, URETERAL: HCPCS | Performed by: UROLOGY

## 2022-08-26 PROCEDURE — 82360 CALCULUS ASSAY QUANT: CPT | Performed by: UROLOGY

## 2022-08-26 PROCEDURE — C1894 INTRO/SHEATH, NON-LASER: HCPCS | Performed by: UROLOGY

## 2022-08-26 PROCEDURE — C1769 GUIDE WIRE: HCPCS | Performed by: UROLOGY

## 2022-08-26 PROCEDURE — 52352 CYSTOURETERO W/STONE REMOVE: CPT | Performed by: UROLOGY

## 2022-08-26 DEVICE — INLAY OPTIMA URETERAL STENT W/O GUIDEWIRE
Type: IMPLANTABLE DEVICE | Site: URETER | Status: FUNCTIONAL
Brand: BARD® INLAY OPTIMA® URETERAL STENT

## 2022-08-26 RX ORDER — DOCUSATE SODIUM 100 MG/1
100 CAPSULE, LIQUID FILLED ORAL 2 TIMES DAILY
Qty: 30 CAPSULE | Refills: 0 | Status: SHIPPED | OUTPATIENT
Start: 2022-08-26 | End: 2022-09-10

## 2022-08-26 RX ORDER — ONDANSETRON 2 MG/ML
INJECTION INTRAMUSCULAR; INTRAVENOUS AS NEEDED
Status: DISCONTINUED | OUTPATIENT
Start: 2022-08-26 | End: 2022-08-26

## 2022-08-26 RX ORDER — CEFAZOLIN SODIUM 2 G/50ML
SOLUTION INTRAVENOUS AS NEEDED
Status: DISCONTINUED | OUTPATIENT
Start: 2022-08-26 | End: 2022-08-26

## 2022-08-26 RX ORDER — MIDAZOLAM HYDROCHLORIDE 2 MG/2ML
INJECTION, SOLUTION INTRAMUSCULAR; INTRAVENOUS AS NEEDED
Status: DISCONTINUED | OUTPATIENT
Start: 2022-08-26 | End: 2022-08-26

## 2022-08-26 RX ORDER — TAMSULOSIN HYDROCHLORIDE 0.4 MG/1
0.4 CAPSULE ORAL
Qty: 14 CAPSULE | Refills: 0 | Status: SHIPPED | OUTPATIENT
Start: 2022-08-26 | End: 2022-09-09

## 2022-08-26 RX ORDER — OXYCODONE HYDROCHLORIDE 5 MG/1
5 TABLET ORAL EVERY 6 HOURS PRN
Qty: 5 TABLET | Refills: 0 | Status: SHIPPED | OUTPATIENT
Start: 2022-08-26 | End: 2022-09-05

## 2022-08-26 RX ORDER — EPHEDRINE SULFATE 50 MG/ML
INJECTION INTRAVENOUS AS NEEDED
Status: DISCONTINUED | OUTPATIENT
Start: 2022-08-26 | End: 2022-08-26

## 2022-08-26 RX ORDER — OXYCODONE HYDROCHLORIDE 5 MG/1
5 TABLET ORAL EVERY 4 HOURS PRN
Status: DISCONTINUED | OUTPATIENT
Start: 2022-08-26 | End: 2022-08-26 | Stop reason: HOSPADM

## 2022-08-26 RX ORDER — SODIUM CHLORIDE, SODIUM LACTATE, POTASSIUM CHLORIDE, CALCIUM CHLORIDE 600; 310; 30; 20 MG/100ML; MG/100ML; MG/100ML; MG/100ML
INJECTION, SOLUTION INTRAVENOUS CONTINUOUS PRN
Status: DISCONTINUED | OUTPATIENT
Start: 2022-08-26 | End: 2022-08-26

## 2022-08-26 RX ORDER — ONDANSETRON 2 MG/ML
4 INJECTION INTRAMUSCULAR; INTRAVENOUS ONCE AS NEEDED
Status: DISCONTINUED | OUTPATIENT
Start: 2022-08-26 | End: 2022-08-26 | Stop reason: HOSPADM

## 2022-08-26 RX ORDER — PROPOFOL 10 MG/ML
INJECTION, EMULSION INTRAVENOUS AS NEEDED
Status: DISCONTINUED | OUTPATIENT
Start: 2022-08-26 | End: 2022-08-26

## 2022-08-26 RX ORDER — PHENAZOPYRIDINE HYDROCHLORIDE 200 MG/1
200 TABLET, FILM COATED ORAL 3 TIMES DAILY PRN
Qty: 10 TABLET | Refills: 0 | Status: SHIPPED | OUTPATIENT
Start: 2022-08-26 | End: 2022-08-29

## 2022-08-26 RX ORDER — CEFAZOLIN SODIUM 2 G/50ML
2000 SOLUTION INTRAVENOUS ONCE
Status: DISCONTINUED | OUTPATIENT
Start: 2022-08-26 | End: 2022-08-26 | Stop reason: HOSPADM

## 2022-08-26 RX ORDER — CEPHALEXIN 500 MG/1
500 CAPSULE ORAL EVERY 6 HOURS SCHEDULED
Qty: 3 CAPSULE | Refills: 0 | Status: SHIPPED | OUTPATIENT
Start: 2022-08-26 | End: 2022-08-27

## 2022-08-26 RX ORDER — PROMETHAZINE HYDROCHLORIDE 25 MG/ML
25 INJECTION, SOLUTION INTRAMUSCULAR; INTRAVENOUS ONCE AS NEEDED
Status: DISCONTINUED | OUTPATIENT
Start: 2022-08-26 | End: 2022-08-26 | Stop reason: HOSPADM

## 2022-08-26 RX ORDER — FENTANYL CITRATE 50 UG/ML
INJECTION, SOLUTION INTRAMUSCULAR; INTRAVENOUS AS NEEDED
Status: DISCONTINUED | OUTPATIENT
Start: 2022-08-26 | End: 2022-08-26

## 2022-08-26 RX ORDER — LIDOCAINE HYDROCHLORIDE 10 MG/ML
INJECTION, SOLUTION EPIDURAL; INFILTRATION; INTRACAUDAL; PERINEURAL AS NEEDED
Status: DISCONTINUED | OUTPATIENT
Start: 2022-08-26 | End: 2022-08-26

## 2022-08-26 RX ORDER — OXYBUTYNIN CHLORIDE 5 MG/1
5 TABLET ORAL EVERY 6 HOURS PRN
Qty: 30 TABLET | Refills: 0 | Status: SHIPPED | OUTPATIENT
Start: 2022-08-26

## 2022-08-26 RX ORDER — FENTANYL CITRATE/PF 50 MCG/ML
25 SYRINGE (ML) INJECTION
Status: DISCONTINUED | OUTPATIENT
Start: 2022-08-26 | End: 2022-08-26 | Stop reason: HOSPADM

## 2022-08-26 RX ORDER — MAGNESIUM HYDROXIDE 1200 MG/15ML
LIQUID ORAL AS NEEDED
Status: DISCONTINUED | OUTPATIENT
Start: 2022-08-26 | End: 2022-08-26 | Stop reason: HOSPADM

## 2022-08-26 RX ORDER — KETOROLAC TROMETHAMINE 30 MG/ML
INJECTION, SOLUTION INTRAMUSCULAR; INTRAVENOUS AS NEEDED
Status: DISCONTINUED | OUTPATIENT
Start: 2022-08-26 | End: 2022-08-26

## 2022-08-26 RX ORDER — DEXAMETHASONE SODIUM PHOSPHATE 10 MG/ML
INJECTION, SOLUTION INTRAMUSCULAR; INTRAVENOUS AS NEEDED
Status: DISCONTINUED | OUTPATIENT
Start: 2022-08-26 | End: 2022-08-26

## 2022-08-26 RX ORDER — ACETAMINOPHEN 325 MG/1
650 TABLET ORAL EVERY 4 HOURS PRN
Qty: 30 TABLET | Refills: 0
Start: 2022-08-26

## 2022-08-26 RX ORDER — HYDROMORPHONE HCL/PF 1 MG/ML
0.2 SYRINGE (ML) INJECTION
Status: DISCONTINUED | OUTPATIENT
Start: 2022-08-26 | End: 2022-08-26 | Stop reason: HOSPADM

## 2022-08-26 RX ORDER — ACETAMINOPHEN 325 MG/1
975 TABLET ORAL ONCE
Status: COMPLETED | OUTPATIENT
Start: 2022-08-26 | End: 2022-08-26

## 2022-08-26 RX ADMIN — PROPOFOL 250 MG: 10 INJECTION, EMULSION INTRAVENOUS at 11:51

## 2022-08-26 RX ADMIN — FENTANYL CITRATE 25 MCG: 50 INJECTION, SOLUTION INTRAMUSCULAR; INTRAVENOUS at 11:55

## 2022-08-26 RX ADMIN — DEXAMETHASONE SODIUM PHOSPHATE 10 MG: 10 INJECTION, SOLUTION INTRAMUSCULAR; INTRAVENOUS at 11:53

## 2022-08-26 RX ADMIN — LIDOCAINE HYDROCHLORIDE 50 MG: 10 INJECTION, SOLUTION EPIDURAL; INFILTRATION; INTRACAUDAL at 11:51

## 2022-08-26 RX ADMIN — MIDAZOLAM HYDROCHLORIDE 2 MG: 1 INJECTION, SOLUTION INTRAMUSCULAR; INTRAVENOUS at 11:46

## 2022-08-26 RX ADMIN — CEFAZOLIN SODIUM 2000 MG: 2 SOLUTION INTRAVENOUS at 11:48

## 2022-08-26 RX ADMIN — EPHEDRINE SULFATE 10 MG: 50 INJECTION, SOLUTION INTRAVENOUS at 12:00

## 2022-08-26 RX ADMIN — SODIUM CHLORIDE, SODIUM LACTATE, POTASSIUM CHLORIDE, AND CALCIUM CHLORIDE: .6; .31; .03; .02 INJECTION, SOLUTION INTRAVENOUS at 11:00

## 2022-08-26 RX ADMIN — ONDANSETRON 4 MG: 2 INJECTION INTRAMUSCULAR; INTRAVENOUS at 11:55

## 2022-08-26 RX ADMIN — KETOROLAC TROMETHAMINE 15 MG: 30 INJECTION, SOLUTION INTRAMUSCULAR at 12:22

## 2022-08-26 RX ADMIN — EPHEDRINE SULFATE 10 MG: 50 INJECTION, SOLUTION INTRAVENOUS at 12:13

## 2022-08-26 RX ADMIN — FENTANYL CITRATE 25 MCG: 50 INJECTION, SOLUTION INTRAMUSCULAR; INTRAVENOUS at 12:04

## 2022-08-26 RX ADMIN — EPHEDRINE SULFATE 15 MG: 50 INJECTION, SOLUTION INTRAVENOUS at 12:09

## 2022-08-26 RX ADMIN — ACETAMINOPHEN 975 MG: 325 TABLET ORAL at 11:09

## 2022-08-26 NOTE — INTERVAL H&P NOTE
H&P reviewed  After examining the patient I find no changes in the patients condition since the H&P had been written  Vitals:    08/26/22 1103   BP: 156/84   Pulse: 81   Resp: 18   Temp: (!) 97 2 °F (36 2 °C)   SpO2: 97%       Discussed options for management of the patient's ureteral stone  We discussed surgical options including ureteroscopy and shock wave lithotripsy  In addition we discussed conservative management with medical expulsive therapy  The patient has elected to undergo ureteroscopy  I discussed with the patients risks and benefits and alternatives to ureteroscopy with laser lithotripsy  The risks include bleeding, infection, injury to the urethra, bladder, ureter or kidney, risk of a staged procedure, risk of stricture, risk of residual fragments, risk of loss of kidney, risks of anesthesia including DVT, PE, MI and death  The patient understands that a ureteral stent will likely be left in place at the time of the procedure  We reviewed the expected postoperative care  The patient understands these risks and has provided informed consent for  LEFT ureteroscopy

## 2022-08-26 NOTE — OP NOTE
Operative Note     PATIENT:  Irena Zamora (MRN [id number])    DATE OF PROCEDURE:   8/26/2022    PRE-OP DIAGNOSIS:   1) Left ureteral calculus    POST-OP DIAGNOSIS:   1) Left ureteral calculus    PROCEDURES PERFORMED:  1) Cystoscopy  2) Left retrograde pyelography with fluoroscopic interpretation  3) Left ureteroscopy with basket extraction of stone  4) Left ureteral stent placement    SURGEON:  Dayami Wright MD    NOTE:  There were no qualified teaching residents to assist with this case    ANESTHESIA: General     COMPLICATIONS:   None    ANTIBIOTICS:  Ancef    INTRAOPERATIVE THROMBOEMBOLISM PROPHYLAXIS:  Pneumatic compression stockings     FINDINGS:  The ureteral calculus had retropulsed into the renal pelvis  It was ultimately identified using a flexible endoscope, and basket extracted  Postoperative stent left in place with an external string    INDICATIONS FOR PROCEDURE:  Irena Zamora is an 60 y o  old male with Left ureteral nephrolithiasis  After discussing the options, the patient elected to undergo ureteroscopy and ureteral stent placement  We discussed the procedure in detail, the alternatives, and the risks, and they signed informed consent to proceed  PROCEDURE IN DETAIL:   The patient was identified and brought to the OR  Antibiotic prophylaxis and DVT prophylaxis were administered  They were placed in the comfortable dorsal lithotomy position with care to pad all pressure points  They were prepped and draped in the usual sterile fashion using hibiclens  A surgical time out was performed with all in the room in agreement with the correct patient, procedure, indications, and laterality  A 21-Iranian rigid cystoscope was used to enter the bladder  The bladder was inspected in its entirety and there were no lesions noted  The ureteral orifices were identified in their normal orthotopic positions       The Left ureteral orifice was identified and a 5 Fr open ended catheter was placed into the ureteral orifice    A retrograde pyelogram was performed with the findings as described above  A Sensor wire was advanced up to the kidney under fluoroscopic guidance  Leaving this safety wire in place, the bladder was drained  A   7 5 Wallisian semi-rigid ureteroscope was advanced up the ureter under vision   I was able to advance the ureteral scope to the proximal ureter  No stone was visualized  I suspected that the stone had retropulsed into the renal pelvis  I therefore placed a 2nd wire under vision  Over this a 10/12 Wallisian ureteral access was placed under fluoroscopic guidance  Through this a 5 3 Western Dariana flexible ureteral scope was passed  I conducted pan pyeloscopy I inspected the renal pelvis and all calyceal systems  Ultimately a very small stone was identified in the upper pole which is successfully engaged with a 1 9 Wallisian 0 tip stone basket and removed under vision in atraumatic fashion  I reintroduced the endoscope confirmed the patient was completely stone free prior to terminating the procedure  The ureteroscope was backed down the ureter under vision and there were no residual fragments and the ureter was noted to be intact with no injury and mild edema where the stone was located  A JJ stent was then passed up the wire  under fluoroscopic guidance into the kidney with a good curl noted in the kidney and in the bladder  An externalized tethering string was left in place and secured to the skin  The bladder was drained  The patient was placed back supine, awakened from general anesthesia and brought to recovery room in stable condition  ESTIMATED BLOOD LOSS:  Minimal      SPECIMENS:   Order Name Source Comment Collection Info Order Time   STONE ANALYSIS Ureter, Left Left ureteral stone Collected By: Donte Young MD 8/26/2022 12:16 PM        IMPLANTS:   Implant Name Type Inv   Item Serial No   Lot No  LRB No  Used Action   STENT URETERAL 6FR 24CML Sami Lawrence  [address] [place] [address]  [id number] Left 1 Implanted        COMPLICATIONS: None    DISPOSITION: PACU    PLAN:  The patient was instructed to remove their stent on postoperative day 4  They will then follow up in 6 weeks with a postoperative KUB and renal ultrasound

## 2022-08-26 NOTE — ANESTHESIA POSTPROCEDURE EVALUATION
Post-Op Assessment Note    CV Status:  Stable    Pain management: adequate     Mental Status:  Arousable and sleepy   Hydration Status:  Euvolemic   PONV Controlled:  Controlled   Airway Patency:  Patent      Post Op Vitals Reviewed: Yes            No complications documented      BP   113/70   Temp  97 0   Pulse  71   Resp   15   SpO2   98

## 2022-08-26 NOTE — TELEPHONE ENCOUNTER
Patient is status post ureteroscopy  The patient was instructed to remove their stent at home on postoperative day 4     Please schedule follow-up in 6-8 weeks with advanced practitioner, KUB ultrasound prior

## 2022-08-26 NOTE — DISCHARGE INSTRUCTIONS
Jesus West: Your surgery went very well  Your stone was fragmented to small pieces which were then removed with a basket  and     Please remove your stent at home following the directions below on Tuesday   We will then see you back in 2 months with an x-ray and ultrasound  Please take your medications as prescribed with caution for comfort  Most importantly please drink 6-8 glasses of water per day    Please call with any questions or concerns  Bryant Pierson MD,PhD  [facility]  [phone]            WHAT IS A STENT? At the end of the procedure, your doctor may place a stent into your ureter  A stent is a thin, flexible piece of plastic that will hold open your ureter while the remaining small pieces of stone pass  This allows your kidney to drain easily and prevents you from having to pass these small stone pieces on your own, which could be painful  The stent is about 12 inches long and looks and feels like a thin piece of spaghetti  AFTER THE PROCEDURE  After the procedure you may experience the following symptoms  All of these are normal and should resolve within 1 or 2 days after your stent is removed  Urinary frequency (urinating more often than usual)  Urinary urgency (the sensation that you need to urinate right away)  Painful urination (this can be pain in your bladder or in your back when  you urinate)  Blood in your urine ( a stent can irritate the lining of your bladder causing it to bleed)  Back/Flank pain, especially with urination  You will receive a prescription for narcotic pain medication after the procedure  You will also receive a prescription for tamsulosin which you will take once a day for 2 weeks to help relax your ureter and decrease stent discomfort  You will also need to purchase a stool softener (i e  Colace) or mild laxative (i e  Miralax) as the narcotic pain medication can make you constipated   This is important as constipation can exacerbate stent related symptoms  STENT REMOVAL  In some cases, your doctor will leave strings attached to your stent  The strings will be taped to your skin after the procedure  The strings will allow you to remove the thin flexible stent while you are at home  Normally, the stent can be removed 3-5 days after your procedure; your physician will tell you the specific date after your procedure  On the day you are supposed to remove your stent, do the following:  When you wake up in the morning, take 1-2 pain pills with food  Start your antibiotic pill the morning of schedule stent removal if prescribed  One hour later sit on the toilet or in the bath tub  Take a deep breath in and while exhaling, pull the string  Dispose of the stent in the garbage  Alternatively, you will come back for an office procedure to remove the stent by placing a small camera into your bladder to remove the stent  During the next 4-8 hours after removing your stent, you may experience additional blood in your urine, pain with urination or back/side pain  You should take the pain medication you were prescribed to help you with the pain, as well as continue the Flomax  If the pain is severe, you are vomiting, and/or have a fever > 101 4 please call the clinic

## 2022-08-29 NOTE — TELEPHONE ENCOUNTER
Post Op Note    Cb Hirsch is a 60 y o  male s/p CYSTOSCOPY URETEROSCOPY, BASKET STONE EXTRACTION, RETROGRADE PYELOGRAM AND INSERTION STENT URETERAL (Left Bladder) performed 8/26/22  Cb Hirsch is a patient of the [place] office, Dr Doroteo Crooks performed surgery  Called and left detailed message per comm consent  Advised I was checking in to see how he was feeling, that he understood stent removal instructions for tomorrow and to schedule recommended follow up  Asking for a call back, number provided

## 2022-08-30 NOTE — TELEPHONE ENCOUNTER
Called and spoke with patient  Reviewed stent removal instructions for today and post stent removal instructions  He feels comfortable removing stent this morning  Follow up scheduled at his preferred office and he knows to schedule US and have KUB done as well  Reviewed he should call if any questions/concerns in the meantime  He verbalized understanding

## 2022-08-31 ENCOUNTER — OFFICE VISIT (OUTPATIENT)
Dept: OBGYN CLINIC | Facility: HOSPITAL | Age: 62
End: 2022-08-31
Payer: COMMERCIAL

## 2022-08-31 VITALS
WEIGHT: 188 LBS | SYSTOLIC BLOOD PRESSURE: 132 MMHG | HEART RATE: 68 BPM | BODY MASS INDEX: 29.51 KG/M2 | DIASTOLIC BLOOD PRESSURE: 80 MMHG | OXYGEN SATURATION: 96 % | HEIGHT: 67 IN

## 2022-08-31 DIAGNOSIS — M18.12 ARTHRITIS OF CARPOMETACARPAL (CMC) JOINT OF LEFT THUMB: Primary | ICD-10-CM

## 2022-08-31 LAB
COLOR STONE: NORMAL
COM MFR STONE: 10 %
COMMENT-STONE3: NORMAL
COMPOSITION: NORMAL
LABORATORY COMMENT REPORT: NORMAL
PHOTO: NORMAL
SIZE STONE: <1 MM
SPEC SOURCE SUBJ: NORMAL
STONE ANALYSIS-IMP: NORMAL
STONE ANALYSIS-IMP: NORMAL
URATE MFR STONE: 90 %
WT STONE: <1 MG

## 2022-08-31 PROCEDURE — 99214 OFFICE O/P EST MOD 30 MIN: CPT | Performed by: PHYSICIAN ASSISTANT

## 2022-08-31 PROCEDURE — 20600 DRAIN/INJ JOINT/BURSA W/O US: CPT | Performed by: PHYSICIAN ASSISTANT

## 2022-08-31 RX ORDER — TRIAMCINOLONE ACETONIDE 40 MG/ML
20 INJECTION, SUSPENSION INTRA-ARTICULAR; INTRAMUSCULAR
Status: COMPLETED | OUTPATIENT
Start: 2022-08-31 | End: 2022-08-31

## 2022-08-31 RX ORDER — LIDOCAINE HYDROCHLORIDE 10 MG/ML
0.5 INJECTION, SOLUTION INFILTRATION; PERINEURAL
Status: COMPLETED | OUTPATIENT
Start: 2022-08-31 | End: 2022-08-31

## 2022-08-31 RX ADMIN — TRIAMCINOLONE ACETONIDE 20 MG: 40 INJECTION, SUSPENSION INTRA-ARTICULAR; INTRAMUSCULAR at 08:45

## 2022-08-31 RX ADMIN — LIDOCAINE HYDROCHLORIDE 0.5 ML: 10 INJECTION, SOLUTION INFILTRATION; PERINEURAL at 08:45

## 2022-08-31 NOTE — PATIENT INSTRUCTIONS
What is it [facility] Arthritis? In a normal joint, cartilage covers the end of the bones and serves as a shock absorber to allow smooth, pain-free movement  In osteoarthritis (OA, or degenerative arthritis) the cartilage layer wears out, resulting in direct contact between the bones and producing pain and deformity  One of the most common joints to develop OA in the hand is the base of the thumb  The thumb basal joint, also called the carpometacarpal [place]) joint, is a specialized saddle shaped joint that is formed by a small bone of the wrist (trapezium) and the first bone of the thumb (metacarpal)  The saddle shaped joint allows the thumb to have a wide range of motions, including up, down, across the palm, and the ability to pinch (see Figure 1)  Who gets it? OA at the base of the thumb is more commonly seen in women over the age of 36  The exact cause is unknown, but genetics, previous injuries such as fractures or dislocations, and generalized joint laxity may predispose towards development of this type of arthritis  What are the symptoms and signs? The most common symptom is pain at the base of the thumb  The pain can be aggravated by activities that require pinching, such as opening jars, turning door knobs or keys, and writing  Severity can also progress to pain at rest and pain at night  In more severe cases, progressive destruction and mal-alignment of the joint occurs, and a bump develops at the base of the thumb as the metacarpal moves out of the saddle joint  This shift in the joint can cause limited motion and weakness, making pinch difficult (see Figure 2)  The next joint above the [facility] may compensate by loosening, causing it to bend further back (hyperextension)  How is the diagnosis made? The diagnosis is made by history and physical evaluation  Pressure and movement such as twisting will produce pain at the joint  A grinding sensation may also be present at the joint (see Figure 3)  X-rays are used to confirm the diagnosis, although symptom severity often does not correlate with x-ray findings  What are the treatment options? Less severe thumb arthritis will usually respond to non-surgical care  Arthritis medication, splinting and limited cortisone injections may help alleviate pain  A hand therapist might provide a variety of rigid and non-rigid splints which can be used while sleeping or during activities  Patients with advanced disease or who fail non-surgical treatment may be candidates for surgical reconstruction  A variety of surgical techniques are available that can successfully reduce or eliminate pain  Surgical procedures include removal of arthritic bone and joint reconstruction (arthroplasty), joint fusion, bone realignment, and even arthroscopy in select cases  A consultation with your hand surgeon can help decide the best option for you (see Figure 4)  © 2012 American Society for [employer] of the Hand  www handcare  org

## 2022-08-31 NOTE — PROGRESS NOTES
ASSESSMENT/PLAN:    Assessment:   Thumb CMC Arthritis  left    Plan:   Patient will proceed with a steroid injection into his left thumb cmc jt with kenalog today  He will monitor relief of this injection  Surgical intervention was briefly discussed  Follow Up:  3  month(s)    To Do Next Visit:       General Discussions:     CMC Arthritis: The anatomy and physiology of carpometacarpal joint arthritis was discussed with the patient today in the office  Deterioration of the articular cartilage eventually leads to hypermobility at the thumb [facility] joint, resulting in joint subluxation, osteophyte formation, cystic changes within the trapezium and base of the first metacarpal, as well as subchondral sclerosis  Eventually, pain, limited mobility, and compensatory hyperextension at the metacarpophalangeal joint may develop  While normal activity and usage of the thumb joint may provide a painful experience to the patient, this typically does not result in damage to the thumb or hand  Treatment options include resting thumb spica splints to decreased joint edema, pain, and inflammation  Therapy exercises to strengthen the thenar musculature may relieve pain, but do not alter the overall continued development of osteoarthritis  Oral medications, topical medications, corticosteroid injections may decrease pain and increase overall function  Eventually, approximately 5% of patients may require surgical intervention  Operative Discussions:       _____________________________________________________  CHIEF COMPLAINT:  Chief Complaint   Patient presents with    Left Thumb - Follow-up     [facility]- Depo         SUBJECTIVE:  Lexa Khan is a 60 y o  male who presents for follow up regarding Thumb CMC Arthritis  left  Since last visit, Lexa Khan has tried steroid injections with only partial relief  He has a injection with depo at his last visit which is he unsure if it helped him   Today there is Pain Mild  Intermittant  Sharp and Aching to the left thumb  Radiation: Yes to the  thumb  Associated symptoms: No Complaints    PAST MEDICAL HISTORY:  Past Medical History:   Diagnosis Date    Cat scratch fever     Colon polyp     Diverticulitis     GERD (gastroesophageal reflux disease)     Hyperlipidemia     Hypertension     IFG (impaired fasting glucose)     L3 vertebral fracture (HCC) 07/2018    Lumbar compression fracture (HCC)     Patella-femoral syndrome     Thyroid nodule        PAST SURGICAL HISTORY:  Past Surgical History:   Procedure Laterality Date    CLOSED REDUCTION WRIST FRACTURE      COLONOSCOPY      [facility] COLONOSCOPY FLX DX W/COLLJ SPEC WHEN PFRMD N/A 5/30/2018    Procedure: COLONOSCOPY;  Surgeon: Kendrick Ahumada, MD;  Location: [facility]; Service: Colorectal    [facility] CYSTO/URETERO W/LITHOTRIPSY &INDWELL STENT INSRT Left 8/26/2022    Procedure: CYSTOSCOPY URETEROSCOPY, BASKET STONE EXTRACTION, RETROGRADE PYELOGRAM AND INSERTION STENT URETERAL;  Surgeon: Bryant Pierson MD;  Location: [facility];  Service: Urology    UMBILICAL GRANULOMA EXCISION Left     groin    US GUIDED THYROID BIOPSY  9/20/2018    US GUIDED THYROID BIOPSY  2/13/2019    WISDOM TOOTH EXTRACTION      WRIST GANGLION EXCISION  1969    closed reduction        FAMILY HISTORY:  Family History   Problem Relation Age of Onset    Hypertension Mother     Melanoma Father     Diabetes type II Father     Hypertension Father     Colon cancer Maternal Grandmother     Colon cancer Maternal Uncle     Hypertension Brother        SOCIAL HISTORY:  Social History     Tobacco Use    Smoking status: Never Smoker    Smokeless tobacco: Never Used   Vaping Use    Vaping Use: Never used   Substance Use Topics    Alcohol use:  Yes     Alcohol/week: 3 0 standard drinks     Types: 3 Glasses of wine per week     Comment: social    Drug use: No       MEDICATIONS:    Current Outpatient Medications:     acetaminophen (TYLENOL) 325 mg tablet, Take 2 tablets (650 mg total) by mouth every 4 (four) hours as needed for mild pain, Disp: 30 tablet, Rfl: 0    amLODIPine (NORVASC) 5 mg tablet, Take 2 tablets (10 mg total) by mouth daily, Disp: 90 tablet, Rfl: 0    Cholecalciferol (VITAMIN D3) 5000 units CAPS, Take 1 capsule by mouth daily, Disp: , Rfl:     diclofenac sodium (VOLTAREN) 50 mg EC tablet, Take 1 tablet (50 mg total) by mouth 3 (three) times a day for 7 days, Disp: 21 tablet, Rfl: 0    famotidine (PEPCID) 20 mg tablet, Take 1 tablet twice daily before meals, Disp: 60 tablet, Rfl: 5    glucosamine 500 MG CAPS capsule, Take by mouth daily  , Disp: , Rfl:     ibuprofen (MOTRIN) 200 mg tablet, Take by mouth every 6 (six) hours as needed for mild pain, Disp: , Rfl:     losartan (COZAAR) 100 MG tablet, Take 1 tablet (100 mg total) by mouth daily, Disp: 30 tablet, Rfl: 0    metoprolol succinate (TOPROL-XL) 100 mg 24 hr tablet, Take 1 tablet (100 mg total) by mouth daily, Disp: 90 tablet, Rfl: 3    multivitamin (THERAGRAN) TABS, Take 1 tablet by mouth daily, Disp: , Rfl:     Omega-3 Fatty Acids (FISH OIL) 1200 MG CAPS, Take by mouth 2 (two) times a day , Disp: , Rfl:     oxybutynin (DITROPAN) 5 mg tablet, Take 1 tablet (5 mg total) by mouth every 6 (six) hours as needed (bladder spasms), Disp: 30 tablet, Rfl: 0    oxyCODONE (Roxicodone) 5 immediate release tablet, Take 1 tablet (5 mg total) by mouth every 6 (six) hours as needed for moderate pain for up to 10 days Max Daily Amount: 20 mg, Disp: 5 tablet, Rfl: 0    rosuvastatin (CRESTOR) 5 mg tablet, Take 1 tablet (5 mg total) by mouth daily, Disp: 90 tablet, Rfl: 0    tamsulosin (FLOMAX) 0 4 mg, Take 1 capsule (0 4 mg total) by mouth daily with dinner for 14 days, Disp: 14 capsule, Rfl: 0    docusate sodium (COLACE) 100 mg capsule, Take 1 capsule (100 mg total) by mouth 2 (two) times a day for 15 days (Patient not taking: Reported on 8/31/2022), Disp: 30 capsule, Rfl: 0    ALLERGIES:  No Known Allergies    REVIEW OF SYSTEMS:  Pertinent items are noted in HPI  LABS:  HgA1c:   Lab Results   Component Value Date    HGBA1C 6 5 (H) 08/15/2022     BMP:   Lab Results   Component Value Date    GLUCOSE 104 07/27/2018    CALCIUM 9 9 08/16/2022     (U) 08/22/2014    K 4 5 08/16/2022    CO2 28 08/16/2022     08/16/2022    BUN 16 08/16/2022    CREATININE 1 20 08/16/2022           _____________________________________________________  PHYSICAL EXAMINATION:  Vital signs: /80   Pulse 68   Ht 5' 7" (1 702 m)   Wt 85 3 kg (188 lb)   SpO2 96%   BMI 29 44 kg/m²   General: well developed and well nourished, alert, oriented times 3 and appears comfortable  Psychiatric: Normal  HEENT: Trachea Midline, No torticollis  Cardiovascular: No discernable arrhythmia  Pulmonary: No wheezing or stridor  Abdomen: No rebound or guarding  Extremities: No peripheral edema  Skin: No masses, erythema, lacerations, fluctation, ulcerations  Neurovascular: Sensation Intact to the Median, Ulnar, Radial Nerve, Motor Intact to the Median, Ulnar, Radial Nerve and Pulses Intact    MUSCULOSKELETAL EXAMINATION:  LEFT SIDE:  CMC: No Instability, Positive grind, Positive tendnerness CMC and Positive Shoulder Sign full digital ROM    _____________________________________________________  STUDIES REVIEWED:  No Studies to review      PROCEDURES PERFORMED:  Small joint arthrocentesis: L thumb CMC  [place] Protocol:  Consent: Verbal consent obtained    Risks and benefits: risks, benefits and alternatives were discussed  Consent given by: patient  Site marked: the operative site was marked  Supporting Documentation  Indications: pain   Procedure Details  Location: thumb - L thumb CMC  Medications administered: 0 5 mL lidocaine 1 %; 20 mg triamcinolone acetonide 40 mg/mL    Patient tolerance: patient tolerated the procedure well with no immediate complications  Dressing:  Sterile dressing applied

## 2022-09-05 DIAGNOSIS — I10 ESSENTIAL HYPERTENSION: ICD-10-CM

## 2022-09-05 RX ORDER — LOSARTAN POTASSIUM 100 MG/1
TABLET ORAL
Qty: 30 TABLET | Refills: 0 | Status: SHIPPED | OUTPATIENT
Start: 2022-09-05 | End: 2022-10-12 | Stop reason: SDUPTHER

## 2022-09-20 DIAGNOSIS — E78.2 MIXED HYPERLIPIDEMIA: ICD-10-CM

## 2022-09-20 RX ORDER — ROSUVASTATIN CALCIUM 5 MG/1
5 TABLET, COATED ORAL DAILY
Qty: 90 TABLET | Refills: 0 | Status: SHIPPED | OUTPATIENT
Start: 2022-09-20

## 2022-10-12 DIAGNOSIS — I10 ESSENTIAL HYPERTENSION: ICD-10-CM

## 2022-10-12 PROBLEM — Z00.00 WELL ADULT EXAM: Status: RESOLVED | Noted: 2021-09-27 | Resolved: 2022-10-12

## 2022-10-12 RX ORDER — LOSARTAN POTASSIUM 100 MG/1
100 TABLET ORAL DAILY
Qty: 30 TABLET | Refills: 0 | Status: SHIPPED | OUTPATIENT
Start: 2022-10-12

## 2022-10-19 DIAGNOSIS — I10 ESSENTIAL HYPERTENSION: ICD-10-CM

## 2022-10-19 RX ORDER — AMLODIPINE BESYLATE 5 MG/1
10 TABLET ORAL DAILY
Qty: 90 TABLET | Refills: 0 | Status: SHIPPED | OUTPATIENT
Start: 2022-10-19

## 2022-10-22 ENCOUNTER — HOSPITAL ENCOUNTER (OUTPATIENT)
Dept: RADIOLOGY | Facility: HOSPITAL | Age: 62
Discharge: HOME | End: 2022-10-22
Payer: COMMERCIAL

## 2022-10-22 DIAGNOSIS — N20.0 NEPHROLITHIASIS: ICD-10-CM

## 2022-10-22 PROCEDURE — 76775 US EXAM ABDO BACK WALL LIM: CPT

## 2022-10-22 PROCEDURE — 74018 RADEX ABDOMEN 1 VIEW: CPT

## 2022-10-26 ENCOUNTER — OFFICE VISIT (OUTPATIENT)
Dept: UROLOGY | Facility: AMBULATORY SURGERY CENTER | Age: 62
End: 2022-10-26

## 2022-10-26 ENCOUNTER — TELEPHONE (OUTPATIENT)
Dept: UROLOGY | Facility: AMBULATORY SURGERY CENTER | Age: 62
End: 2022-10-26

## 2022-10-26 VITALS
BODY MASS INDEX: 29.51 KG/M2 | SYSTOLIC BLOOD PRESSURE: 140 MMHG | HEART RATE: 58 BPM | WEIGHT: 188 LBS | OXYGEN SATURATION: 96 % | RESPIRATION RATE: 18 BRPM | HEIGHT: 67 IN | DIASTOLIC BLOOD PRESSURE: 84 MMHG

## 2022-10-26 DIAGNOSIS — N20.0 NEPHROLITHIASIS: Primary | ICD-10-CM

## 2022-10-26 DIAGNOSIS — D17.9 ANGIOMYOLIPOMA: Primary | ICD-10-CM

## 2022-10-27 ENCOUNTER — TELEPHONE (OUTPATIENT)
Dept: UROLOGY | Facility: AMBULATORY SURGERY CENTER | Age: 62
End: 2022-10-27

## 2022-10-27 NOTE — TELEPHONE ENCOUNTER
----- Message from Vikramhenrietta Guanako 78  sent at 10/26/2022  3:24 PM EDT -----  Please call to notify patient that I received the results of his ultrasound  He looks like he has a small 5 millimeters stone in the lower pole of the right kidney that is not obstructing anyway  He also has a cyst in the right kidney measuring 6 4   centimeters  He also has a 7 millimeter cyst in the left kidney that I would like to get a CT scan on antibiotics 6 months  We will have him come back into the office in 6 months    Thank you

## 2022-10-27 NOTE — TELEPHONE ENCOUNTER
LM per communication consent with AP's note  Left central scheduling's number and our number to call after the CT is scheduled for follow up appt to review results

## 2022-11-15 DIAGNOSIS — I10 ESSENTIAL HYPERTENSION: ICD-10-CM

## 2022-11-15 RX ORDER — LOSARTAN POTASSIUM 100 MG/1
100 TABLET ORAL DAILY
Qty: 30 TABLET | Refills: 0 | Status: SHIPPED | OUTPATIENT
Start: 2022-11-15

## 2022-11-16 ENCOUNTER — OFFICE VISIT (OUTPATIENT)
Dept: ENDOCRINOLOGY | Facility: CLINIC | Age: 62
End: 2022-11-16

## 2022-11-16 VITALS
WEIGHT: 190.38 LBS | HEART RATE: 64 BPM | SYSTOLIC BLOOD PRESSURE: 122 MMHG | DIASTOLIC BLOOD PRESSURE: 60 MMHG | HEIGHT: 67 IN | BODY MASS INDEX: 29.88 KG/M2

## 2022-11-16 DIAGNOSIS — E04.1 THYROID NODULE: Primary | ICD-10-CM

## 2022-11-16 DIAGNOSIS — I10 ESSENTIAL HYPERTENSION: ICD-10-CM

## 2022-11-16 DIAGNOSIS — E55.9 VITAMIN D DEFICIENCY: ICD-10-CM

## 2022-11-16 DIAGNOSIS — E11.9 TYPE 2 DIABETES MELLITUS WITHOUT COMPLICATION, WITHOUT LONG-TERM CURRENT USE OF INSULIN (HCC): ICD-10-CM

## 2022-11-16 NOTE — ASSESSMENT & PLAN NOTE
Lab Results   Component Value Date    HGBA1C 6 5 (H) 08/15/2022   Focus on dietary and lifestyle modifications and weight loss-referred for medical nutrition therapy

## 2022-11-16 NOTE — PROGRESS NOTES
Lopez Cota 60 y o  male MRN: [id number]    Encounter: [id number]      Assessment/Plan     Problem List Items Addressed This Visit        Endocrine    Thyroid nodule - Primary     He still did not have the repeat thyroid ultrasound done-has been ordered twice in the past couple of years  Discussed with him the rationale for repeating a thyroid ultrasound to monitor for any changes in the size or characteristics of the thyroid nodules-ordered again  Encouraged him to set up  now         Relevant Orders    US thyroid    Type 2 diabetes mellitus without complication, without long-term current use of insulin (HCC)       Lab Results   Component Value Date    HGBA1C 6 5 (H) 08/15/2022   Focus on dietary and lifestyle modifications and weight loss-referred for medical nutrition therapy         Relevant Orders    Comprehensive metabolic panel Lab Collect    HEMOGLOBIN A1C W/ EAG ESTIMATION Lab Collect    Ambulatory referral to Diabetic Education       Cardiovascular and Mediastinum    Essential hypertension    Relevant Orders    Ambulatory referral to Diabetic Education       Other    Vitamin D deficiency     Continue supplementations         Relevant Orders    Vitamin D 25 hydroxy Lab Collect     CC:   Thyroid nodule    History of Present Illness     HPI:  60year old male with history of thyroid nodules seen in follow-up  He underwent fine-needle aspiration biopsy of a right-sided thyroid nodule in December 2019 which showed atypia of undetermined significance  Afirma testing was benign      He denies any family history of thyroid cancer, no history of radiation to his neck  No obstructive symptoms     Has had kidney stones     Steroid injections in hands and spine     Weight stable , no constipation   Keeping well hydrated     Review of Systems    Historical Information   Past Medical History:   Diagnosis Date   • Cat scratch fever    • Colon polyp    • Diverticulitis    • GERD (gastroesophageal reflux disease)    • Hyperlipidemia    • Hypertension    • IFG (impaired fasting glucose)    • L3 vertebral fracture (HCC) 07/2018   • Lumbar compression fracture (HCC)    • Patella-femoral syndrome    • Thyroid nodule      Past Surgical History:   Procedure Laterality Date   • CLOSED REDUCTION WRIST FRACTURE     • COLONOSCOPY     • FL RETROGRADE PYELOGRAM  8/26/2022   • ID COLONOSCOPY FLX DX W/COLLJ SPEC WHEN PFRMD N/A 5/30/2018    Procedure: COLONOSCOPY;  Surgeon: Pastor Acevedo MD;  Location: [facility];   Service: Colorectal   • ID CYSTO/URETERO W/LITHOTRIPSY &INDWELL STENT INSRT Left 8/26/2022    Procedure: CYSTOSCOPY URETEROSCOPY, BASKET STONE EXTRACTION, RETROGRADE PYELOGRAM AND INSERTION STENT URETERAL;  Surgeon: Kat Perales MD;  [facility];  Service: Urology   • UMBILICAL GRANULOMA EXCISION Left     groin   • US GUIDED THYROID BIOPSY  9/20/2018   • US GUIDED THYROID BIOPSY  2/13/2019   • WISDOM TOOTH EXTRACTION     • WRIST GANGLION EXCISION  1969    closed reduction      Social History   Social History     Substance and Sexual Activity   Alcohol Use Yes   • Alcohol/week: 3 0 standard drinks   • Types: 3 Glasses of wine per week    Comment: social     Social History     Substance and Sexual Activity   Drug Use No     Social History     Tobacco Use   Smoking Status Never   Smokeless Tobacco Never     Family History:   Family History   Problem Relation Age of Onset   • Hypertension Mother    • Melanoma Father    • Diabetes type II Father    • Hypertension Father    • Colon cancer Maternal Grandmother    • Colon cancer Maternal Uncle    • Hypertension Brother        Meds/Allergies   Current Outpatient Medications   Medication Sig Dispense Refill   • amLODIPine (NORVASC) 5 mg tablet Take 2 tablets (10 mg total) by mouth daily 90 tablet 0   • famotidine (PEPCID) 20 mg tablet Take 1 tablet twice daily before meals 60 tablet 5   • glucosamine 500 MG CAPS capsule Take by mouth daily       • ibuprofen (MOTRIN) 200 mg tablet Take by mouth every 6 (six) hours as needed for mild pain     • losartan (COZAAR) 100 MG tablet Take 1 tablet (100 mg total) by mouth daily 30 tablet 0   • metoprolol succinate (TOPROL-XL) 100 mg 24 hr tablet Take 1 tablet (100 mg total) by mouth daily 90 tablet 3   • multivitamin (THERAGRAN) TABS Take 1 tablet by mouth daily     • oxybutynin (DITROPAN) 5 mg tablet Take 1 tablet (5 mg total) by mouth every 6 (six) hours as needed (bladder spasms) 30 tablet 0   • rosuvastatin (CRESTOR) 5 mg tablet Take 1 tablet (5 mg total) by mouth daily 90 tablet 0   • acetaminophen (TYLENOL) 325 mg tablet Take 2 tablets (650 mg total) by mouth every 4 (four) hours as needed for mild pain (Patient not taking: Reported on 11/16/2022) 30 tablet 0   • Omega-3 Fatty Acids (FISH OIL) 1200 MG CAPS Take by mouth 2 (two) times a day        No current facility-administered medications for this visit  No Known Allergies    Objective   Vitals: Blood pressure 122/60, pulse 64, height 5' 7" (1 702 m), weight 86 4 kg (190 lb 6 oz)  Physical Exam  Vitals reviewed  Constitutional:       General: He is not in acute distress  Appearance: Normal appearance  He is not ill-appearing, toxic-appearing or diaphoretic  HENT:      Head: Normocephalic and atraumatic  Eyes:      General: No scleral icterus  Extraocular Movements: Extraocular movements intact  Cardiovascular:      Rate and Rhythm: Normal rate and regular rhythm  Heart sounds: Normal heart sounds  No murmur heard  Pulmonary:      Effort: Pulmonary effort is normal  No respiratory distress  Breath sounds: Normal breath sounds  No wheezing  Abdominal:      General: There is no distension  Palpations: Abdomen is soft  Tenderness: There is no abdominal tenderness  There is no guarding  Musculoskeletal:      Cervical back: Neck supple  Right lower leg: No edema  Left lower leg: No edema     Lymphadenopathy: Cervical: No cervical adenopathy  Skin:     General: Skin is warm and dry  Neurological:      General: No focal deficit present  Mental Status: He is alert and oriented to person, place, and time  Psychiatric:         Mood and Affect: Mood normal          Behavior: Behavior normal          Thought Content: Thought content normal          Judgment: Judgment normal          The history was obtained from the review of the chart, patient  Lab Results:   Lab Results   Component Value Date/Time    TSH 3RD Lydia Morelos 2 100 08/15/2022 05:48 PM       Imaging Studies:   Results for orders placed during the hospital encounter of 09/11/18    US thyroid    Impression  The following meet current ACR criteria for recommending ultrasound guided biopsy:    The 2 9 cm right lower pole nodule  (Image number [id number]) (CRITERIA: TR 3, Mildly suspicious  FNA if >2 5 cm  Reference: ACR Thyroid Imaging, Reporting and Data System (TI-RADS): White Paper of the [employer]  J AM Mayur Radiol 9163;29:670-560  (additional recommendations based on American Thyroid Association 2015 guidelines )      Workstation performed: [id number]      I have personally reviewed pertinent reports  Portions of the record may have been created with voice recognition software  Occasional wrong word or "sound a like" substitutions may have occurred due to the inherent limitations of voice recognition software  Read the chart carefully and recognize, using context, where substitutions have occurred

## 2022-11-16 NOTE — ASSESSMENT & PLAN NOTE
He still did not have the repeat thyroid ultrasound done-has been ordered twice in the past couple of years  Discussed with him the rationale for repeating a thyroid ultrasound to monitor for any changes in the size or characteristics of the thyroid nodules-ordered again    Encouraged him to set up  now

## 2022-11-17 LAB
LEFT EYE DIABETIC RETINOPATHY: NORMAL
RIGHT EYE DIABETIC RETINOPATHY: NORMAL

## 2022-12-09 ENCOUNTER — HOSPITAL ENCOUNTER (OUTPATIENT)
Dept: RADIOLOGY | Facility: HOSPITAL | Age: 62
Discharge: HOME | End: 2022-12-09
Attending: INTERNAL MEDICINE

## 2022-12-09 DIAGNOSIS — E04.1 THYROID NODULE: ICD-10-CM

## 2022-12-13 DIAGNOSIS — I10 ESSENTIAL HYPERTENSION: ICD-10-CM

## 2022-12-13 RX ORDER — LOSARTAN POTASSIUM 100 MG/1
100 TABLET ORAL DAILY
Qty: 30 TABLET | Refills: 0 | Status: SHIPPED | OUTPATIENT
Start: 2022-12-13

## 2022-12-13 RX ORDER — AMLODIPINE BESYLATE 5 MG/1
10 TABLET ORAL DAILY
Qty: 90 TABLET | Refills: 0 | Status: SHIPPED | OUTPATIENT
Start: 2022-12-13

## 2022-12-15 PROBLEM — E11.9 TYPE 2 DIABETES MELLITUS WITHOUT COMPLICATION, WITHOUT LONG-TERM CURRENT USE OF INSULIN (HCC): Status: RESOLVED | Noted: 2022-11-16 | Resolved: 2022-12-15

## 2022-12-19 ENCOUNTER — TELEPHONE (OUTPATIENT)
Dept: ENDOCRINOLOGY | Facility: CLINIC | Age: 62
End: 2022-12-19

## 2022-12-19 DIAGNOSIS — E04.1 THYROID NODULE: Primary | ICD-10-CM

## 2022-12-19 NOTE — RESULT ENCOUNTER NOTE
Please call the patient regarding thyroid ultrasound-nodule has increased in size-suggest repeat biopsy with option for Afirma if he is agreeable    If so let me know and I will order

## 2022-12-19 NOTE — TELEPHONE ENCOUNTER
----- Message from Tee Miller MD sent at 12/18/2022 11:23 PM EST -----  Please call the patient regarding thyroid ultrasound-nodule has increased in size-suggest repeat biopsy with option for Afirma if he is agreeable    If so let me know and I will order

## 2022-12-21 ENCOUNTER — OFFICE VISIT (OUTPATIENT)
Dept: DIABETES SERVICES | Facility: CLINIC | Age: 62
End: 2022-12-21

## 2022-12-21 VITALS — WEIGHT: 190.4 LBS | BODY MASS INDEX: 29.82 KG/M2

## 2022-12-21 DIAGNOSIS — I10 ESSENTIAL HYPERTENSION: ICD-10-CM

## 2022-12-21 DIAGNOSIS — E11.9 TYPE 2 DIABETES MELLITUS WITHOUT COMPLICATION, WITHOUT LONG-TERM CURRENT USE OF INSULIN (HCC): Primary | ICD-10-CM

## 2022-12-21 NOTE — PROGRESS NOTES
Medical Nutrition Therapy        Assessment    Visit Type: Initial visit  Chief complaint/Medical Diagnosis/reason for visit E11 9 S6KC without complication, without long-term current use of insulin  HPI Alberta Argueta was seen today for his initial MNT visit  Patient informed that it's a recent diagnosis for him and was prediabetic for a while  He wants to learn more about his health condition and needs assistance with healthy food options  Patient is not monitoring his BG levels at home  Problems identified in food recall include inconsistent carbohydrate intake and poorly timed meals  Provided patient with a 1500 calorie meal plan to assist with consistency, balance and portion control  Encouraged the consumption of regular meals at regular times  Advised patient to keep carbohydrate intake to 45 grams per meal and 15 grams per snack to assist with glycemic control  Suggested keeping protein intake to 6 ounces a day and fat to 4 servings daily to assist with lipid management and calorie control  Portion booklet and food labels were used to teach basic carbohydrate counting  Patient agreed to keep daily food logs and return them in one week for assessment  RD will remain available for further dietary questions/concerns  Ht Readings from Last 1 Encounters:   11/16/22 5' 7" (1 702 m)     Wt Readings from Last 3 Encounters:   12/21/22 86 4 kg (190 lb 6 4 oz)   11/16/22 86 4 kg (190 lb 6 oz)   10/26/22 85 3 kg (188 lb)     Weight Change: No    Barriers to Learning: no barriers    Do you follow any special diet presently?: No  Who shops: patient  Who cooks: patient    Food Log: Completed via the method of food recall    Wake up:7:00am  Breakfast 8:30 am or skips 2-3 times in a week: sweetened lemonade 8oz, 1 toast with butter, beef or pork leftover from last night    Morning Snack: none  Lunch: no set time can vary from 11am -3pm: 1 sandwich (ham and cheese)/ leftover from dinner -  Beef/ pork/ chicken/ fish, potato/ rice   with V8 juice  Afternoon Snack: 2 small swiss roll   Dinner 7:00pm: turkey chili 2 cups, veggies, salad greens,   Evening Snack:occasionally popcorn, baby carrots  Beverages: water, sweetened lemonade, fruit juice sometimes  Eating out/Take out: 2-3 times a week - Mongolian/   Exercise not beyond daily activities    Calorie needs 1500 kcals/day Carbs: 45 g/meal, 15 g/snack     Fat: 4 servings/day    Protein:6 ounces/day    Nutrition Diagnosis:  Food and nutrition related knowledge deficit  related to Lack of prior exposure to accurate nutrition related information as evidenced by New medical diagnosis or change in existing diagnosis or condition    Intervention: reduced fat intake, increased fiber intake, label reading, behavior modification strategies, carbohydrate counting, meal timing, weight/ BMI goals, individualized meal plan and monitoring portion control     Treatment Goals: Patient will consume 3 meals a day, Patient will monitor portion control, Patient will consume snacks, Patient will consume 25-35 grams of fiber a day and Patient will count carbohydrates    Monitoring and evaluation:    Term code indicator  FH 4 4 Mealtime Behavior Criteria: Consume 3 meals a day, 4-5 hours apart  Try not to skip any meals  Term code indicator  FH 1 6 3 Carbohydrate Intake Criteria: Aim for 45 grams of carbohydrates per meal and 15 grams of carbohydrates per snack  Term code indicator  FH 1 6 4 Fiber Intake Criteria: Include whole grains, non starchy vegetables and have at least 2 servings of fruits in a day  Materials Provided: portion booklet, 3 foods record    Patient’s Response to Instruction:  Jane Patel  Expected Compliancegood    Start- Stop: 8:48-9:48  Total Minutes: 61 Minutes  Group or Individual Instruction: MNT-I  Other: Vy Farrell MD    Thank you for coming to the [facility] [address] [place] [address] for education today    Please feel free to call with any questions or concerns      Sally Pascual  [address] [place] [address]  [address] [id number]

## 2022-12-21 NOTE — PATIENT INSTRUCTIONS
Consume 3 meals a day, 4-5 hours apart  Try not to skip any meals  Aim for 45 grams of carbohydrates per meal and 15 grams of carbohydrates per snack  Include whole grains, non starchy vegetables and have at least 2 servings of fruits in a day

## 2022-12-25 DIAGNOSIS — E78.2 MIXED HYPERLIPIDEMIA: ICD-10-CM

## 2022-12-27 RX ORDER — ROSUVASTATIN CALCIUM 5 MG/1
5 TABLET, COATED ORAL DAILY
Qty: 90 TABLET | Refills: 0 | Status: SHIPPED | OUTPATIENT
Start: 2022-12-27

## 2023-01-11 DIAGNOSIS — I10 ESSENTIAL HYPERTENSION: ICD-10-CM

## 2023-01-11 RX ORDER — METOPROLOL SUCCINATE 100 MG/1
100 TABLET, EXTENDED RELEASE ORAL DAILY
Qty: 90 TABLET | Refills: 0 | Status: SHIPPED | OUTPATIENT
Start: 2023-01-11

## 2023-01-11 RX ORDER — LOSARTAN POTASSIUM 100 MG/1
100 TABLET ORAL DAILY
Qty: 30 TABLET | Refills: 0 | Status: SHIPPED | OUTPATIENT
Start: 2023-01-11

## 2023-02-08 ENCOUNTER — OFFICE VISIT (OUTPATIENT)
Dept: OBGYN CLINIC | Facility: HOSPITAL | Age: 63
End: 2023-02-08

## 2023-02-08 VITALS
OXYGEN SATURATION: 96 % | SYSTOLIC BLOOD PRESSURE: 128 MMHG | HEART RATE: 71 BPM | DIASTOLIC BLOOD PRESSURE: 60 MMHG | HEIGHT: 67 IN | BODY MASS INDEX: 29.35 KG/M2 | WEIGHT: 187 LBS

## 2023-02-08 DIAGNOSIS — M18.12 ARTHRITIS OF CARPOMETACARPAL (CMC) JOINT OF LEFT THUMB: Primary | ICD-10-CM

## 2023-02-08 RX ORDER — TRIAMCINOLONE ACETONIDE 40 MG/ML
20 INJECTION, SUSPENSION INTRA-ARTICULAR; INTRAMUSCULAR
Status: COMPLETED | OUTPATIENT
Start: 2023-02-08 | End: 2023-02-08

## 2023-02-08 RX ORDER — LIDOCAINE HYDROCHLORIDE 10 MG/ML
0.5 INJECTION, SOLUTION INFILTRATION; PERINEURAL
Status: COMPLETED | OUTPATIENT
Start: 2023-02-08 | End: 2023-02-08

## 2023-02-08 RX ADMIN — LIDOCAINE HYDROCHLORIDE 0.5 ML: 10 INJECTION, SOLUTION INFILTRATION; PERINEURAL at 09:31

## 2023-02-08 RX ADMIN — TRIAMCINOLONE ACETONIDE 20 MG: 40 INJECTION, SUSPENSION INTRA-ARTICULAR; INTRAMUSCULAR at 09:31

## 2023-02-08 NOTE — PROGRESS NOTES
ASSESSMENT/PLAN:    Assessment:   Thumb CMC Arthritis  left    Plan:   Patient was given a left thumb CMC cortisone injection today with Kenalog  He tolerated well  He is advised that we can repeat the cortisone injections every 3 to 4 months as needed for pain  He will follow-up with us in 3 months for reevaluation    Follow Up:  3  month(s)    To Do Next Visit:       General Discussions:     [facility] Arthritis: The anatomy and physiology of carpometacarpal joint arthritis was discussed with the patient today in the office  Deterioration of the articular cartilage eventually leads to hypermobility at the thumb [facility] joint, resulting in joint subluxation, osteophyte formation, cystic changes within the trapezium and base of the first metacarpal, as well as subchondral sclerosis  Eventually, pain, limited mobility, and compensatory hyperextension at the metacarpophalangeal joint may develop  While normal activity and usage of the thumb joint may provide a painful experience to the patient, this typically does not result in damage to the thumb or hand  Treatment options include resting thumb spica splints to decreased joint edema, pain, and inflammation  Therapy exercises to strengthen the thenar musculature may relieve pain, but do not alter the overall continued development of osteoarthritis  Oral medications, topical medications, corticosteroid injections may decrease pain and increase overall function  Eventually, approximately 5% of patients may require surgical intervention  Operative Discussions:       _____________________________________________________  CHIEF COMPLAINT:  Chief Complaint   Patient presents with   • Left Thumb - Follow-up     CMC- Kenalog          SUBJECTIVE:  Genoveva Rojas is a 60 y o  male who presents for follow up regarding Thumb CMC Arthritis  left  Patient states that he previously had an injection in August 2022  He states that the pain started to return about 1 week ago  He states that he would like to try repeat cortisone injection for this issue  PAST MEDICAL HISTORY:  Past Medical History:   Diagnosis Date   • Cat scratch fever    • Colon polyp    • Diverticulitis    • GERD (gastroesophageal reflux disease)    • Hyperlipidemia    • Hypertension    • IFG (impaired fasting glucose)    • L3 vertebral fracture ([address] ) 07/2018   • Lumbar compression fracture (HCC)    • Patella-femoral syndrome    • Thyroid nodule        PAST SURGICAL HISTORY:  Past Surgical History:   Procedure Laterality Date   • CLOSED REDUCTION WRIST FRACTURE     • COLONOSCOPY     • FL RETROGRADE PYELOGRAM  8/26/2022   • KS COLONOSCOPY FLX DX W/COLLJ SPEC WHEN PFRMD N/A 5/30/2018    Procedure: COLONOSCOPY;  Surgeon: Catina Mercado MD;  [facility]; Service: Colorectal   • [place] CYSTO/URETERO W/LITHOTRIPSY &INDWELL STENT INSRT Left 8/26/2022    Procedure: CYSTOSCOPY URETEROSCOPY, BASKET STONE EXTRACTION, RETROGRADE PYELOGRAM AND INSERTION STENT URETERAL;  Surgeon: Carlita Spaulding MD;  [facility];  Service: Urology   • UMBILICAL GRANULOMA EXCISION Left     groin   • US GUIDED THYROID BIOPSY  9/20/2018   • US GUIDED THYROID BIOPSY  2/13/2019   • WISDOM TOOTH EXTRACTION     • WRIST GANGLION EXCISION  1969    closed reduction        FAMILY HISTORY:  Family History   Problem Relation Age of Onset   • Hypertension Mother    • Melanoma Father    • Diabetes type II Father    • Hypertension Father    • Colon cancer Maternal Grandmother    • Colon cancer Maternal Uncle    • Hypertension Brother        SOCIAL HISTORY:  Social History     Tobacco Use   • Smoking status: Never   • Smokeless tobacco: Never   Vaping Use   • Vaping Use: Never used   Substance Use Topics   • Alcohol use:  Yes     Alcohol/week: 3 0 standard drinks     Types: 3 Glasses of wine per week     Comment: social   • Drug use: No       MEDICATIONS:    Current Outpatient Medications:   •  acetaminophen (TYLENOL) 325 mg tablet, Take 2 tablets (650 mg total) by mouth every 4 (four) hours as needed for mild pain (Patient not taking: Reported on 11/16/2022), Disp: 30 tablet, Rfl: 0  •  amLODIPine (NORVASC) 5 mg tablet, Take 2 tablets (10 mg total) by mouth daily, Disp: 90 tablet, Rfl: 0  •  famotidine (PEPCID) 20 mg tablet, Take 1 tablet twice daily before meals, Disp: 60 tablet, Rfl: 5  •  glucosamine 500 MG CAPS capsule, Take by mouth daily  , Disp: , Rfl:   •  ibuprofen (MOTRIN) 200 mg tablet, Take by mouth every 6 (six) hours as needed for mild pain, Disp: , Rfl:   •  losartan (COZAAR) 100 MG tablet, Take 1 tablet (100 mg total) by mouth daily, Disp: 30 tablet, Rfl: 0  •  metoprolol succinate (TOPROL-XL) 100 mg 24 hr tablet, Take 1 tablet (100 mg total) by mouth daily, Disp: 90 tablet, Rfl: 0  •  multivitamin (THERAGRAN) TABS, Take 1 tablet by mouth daily, Disp: , Rfl:   •  Omega-3 Fatty Acids (FISH OIL) 1200 MG CAPS, Take by mouth 2 (two) times a day , Disp: , Rfl:   •  oxybutynin (DITROPAN) 5 mg tablet, Take 1 tablet (5 mg total) by mouth every 6 (six) hours as needed (bladder spasms), Disp: 30 tablet, Rfl: 0  •  rosuvastatin (CRESTOR) 5 mg tablet, Take 1 tablet (5 mg total) by mouth daily, Disp: 90 tablet, Rfl: 0    ALLERGIES:  No Known Allergies    REVIEW OF SYSTEMS:  Pertinent items are noted in HPI      LABS:  HgA1c:   Lab Results   Component Value Date    HGBA1C 6 5 (H) 08/15/2022     BMP:   Lab Results   Component Value Date    GLUCOSE 104 07/27/2018    CALCIUM 9 9 08/16/2022     (U) 08/22/2014    K 4 5 08/16/2022    CO2 28 08/16/2022     08/16/2022    BUN 16 08/16/2022    CREATININE 1 20 08/16/2022           _____________________________________________________  PHYSICAL EXAMINATION:  Vital signs: /60   Pulse 71   Ht 5' 7" (1 702 m)   Wt 84 8 kg (187 lb)   SpO2 96%   BMI 29 29 kg/m²   General: well developed and well nourished, alert, oriented times 3 and appears comfortable  Psychiatric: Normal  HEENT: Trachea Midline, No torticollis  Cardiovascular: No discernable arrhythmia  Pulmonary: No wheezing or stridor  Abdomen: No rebound or guarding  Extremities: No peripheral edema  Skin: No masses, erythema, lacerations, fluctation, ulcerations  Neurovascular: Sensation Intact to the Median, Ulnar, Radial Nerve, Motor Intact to the Median, Ulnar, Radial Nerve and Pulses Intact    MUSCULOSKELETAL EXAMINATION:  left CMC Exam:  No adduction contracture  No hyperextension deformity of MCP joint  Positive localized tenderness over radial and dorsal aspect of thumb (CMC joint)  Grind test is positive for pain and Positive for crepitus  No triggering or tenderness over the A1 pulley  No pain with Finkelstein’s maneuver       _____________________________________________________  STUDIES REVIEWED:  No Studies to review      PROCEDURES PERFORMED:  Small joint arthrocentesis: L thumb CMC  [place] Protocol:  Consent: Verbal consent obtained  Risks and benefits: risks, benefits and alternatives were discussed  Consent given by: patient  Time out: Immediately prior to procedure a "time out" was called to verify the correct patient, procedure, equipment, support staff and site/side marked as required    Patient understanding: patient states understanding of the procedure being performed  Patient consent: the patient's understanding of the procedure matches consent given  Site marked: the operative site was marked  Patient identity confirmed: verbally with patient    Supporting Documentation  Indications: pain   Procedure Details  Location: thumb - L thumb CMC  Preparation: Patient was prepped and draped in the usual sterile fashion  Needle size: 25 G  Approach: dorsal  Medications administered: 20 mg triamcinolone acetonide 40 mg/mL; 0 5 mL lidocaine 1 %    Patient tolerance: patient tolerated the procedure well with no immediate complications  Dressing:  Sterile dressing applied

## 2023-02-13 ENCOUNTER — HOSPITAL ENCOUNTER (OUTPATIENT)
Dept: RADIOLOGY | Facility: HOSPITAL | Age: 63
Discharge: HOME | End: 2023-02-13
Attending: INTERNAL MEDICINE

## 2023-02-13 DIAGNOSIS — E04.1 THYROID NODULE: ICD-10-CM

## 2023-02-13 RX ORDER — LIDOCAINE HYDROCHLORIDE 10 MG/ML
2 INJECTION, SOLUTION EPIDURAL; INFILTRATION; INTRACAUDAL; PERINEURAL ONCE
Status: COMPLETED | OUTPATIENT
Start: 2023-02-13 | End: 2023-02-13

## 2023-02-13 RX ADMIN — LIDOCAINE HYDROCHLORIDE 2 ML: 10 INJECTION, SOLUTION EPIDURAL; INFILTRATION; INTRACAUDAL; PERINEURAL at 09:43

## 2023-02-14 DIAGNOSIS — I10 ESSENTIAL HYPERTENSION: ICD-10-CM

## 2023-02-14 RX ORDER — AMLODIPINE BESYLATE 5 MG/1
10 TABLET ORAL DAILY
Qty: 90 TABLET | Refills: 0 | Status: SHIPPED | OUTPATIENT
Start: 2023-02-14

## 2023-02-14 RX ORDER — LOSARTAN POTASSIUM 100 MG/1
100 TABLET ORAL DAILY
Qty: 30 TABLET | Refills: 0 | Status: SHIPPED | OUTPATIENT
Start: 2023-02-14

## 2023-02-15 ENCOUNTER — OFFICE VISIT (OUTPATIENT)
Dept: DIABETES SERVICES | Facility: CLINIC | Age: 63
End: 2023-02-15

## 2023-02-15 VITALS — BODY MASS INDEX: 29.19 KG/M2 | WEIGHT: 186.4 LBS

## 2023-02-15 DIAGNOSIS — E11.9 TYPE 2 DIABETES MELLITUS WITHOUT COMPLICATION, WITHOUT LONG-TERM CURRENT USE OF INSULIN (HCC): Primary | ICD-10-CM

## 2023-02-15 NOTE — RESULT ENCOUNTER NOTE
Please call the patient regarding biopsy -showed some atypia-specimen were sent out for Afirma which will take another 2 weeks to come back    Should be back prior to his follow-up appointment

## 2023-02-15 NOTE — PROGRESS NOTES
Medical Nutrition Therapy        Assessment    Visit Type: Follow-up visit  Chief complaint/Medical Diagnosis/reason for visit E11 9 D4OT without complication, without long-term current use of insulin  HPI Dalia Manley was present today for his 6 weeks follow up visit  Patient informed that he is no more skipping any meals and is adhering to 3 meals a day and a small carb snack at post dinner  Patient is reading labels very closely and is trying to control his portion sizes and have lost 4 lbs in 6 weeks  Dalia Manley is drinking more water and has cut back on his sweetened beverage intake  Dalia Manley will continue with a 1500 calorie meal plan to assist with consistency, balance and portion control  Encouraged the consumption of regular meals at regular times  Advised patient to keep carbohydrate intake to 45 grams per meal and 15 grams HS snack to assist with glycemic control  Suggested keeping protein intake to 6 ounces a day and fat to 4 servings daily to assist with lipid management and calorie control  Patient will return in 3 months for another follow up appointment  RD will remain available for further dietary questions/concerns  Ht Readings from Last 1 Encounters:   02/08/23 5' 7" (1 702 m)     Wt Readings from Last 3 Encounters:   02/15/23 84 6 kg (186 lb 6 4 oz)   02/08/23 84 8 kg (187 lb)   12/21/22 86 4 kg (190 lb 6 4 oz)     Weight Change: Yes , lost 4 lbs in 6 weeks  Calorie needs 1500 kcals/day Carbs: 45 g/meal, 15g HS snack     Fat: 4 servings/day    Protein:6 ounces/day    Monitoring and evaluation:    Term code indicator  FH 4 4 Mealtime Behavior Criteria: Consume 3 meals a day, 4-5 hours apart  Try not to skip any meals  Term code indicator  FH 1 6 3 Carbohydrate Intake Criteria: Aim for 45 grams of carbohydrates per meal and 15 grams of carbohydrates per snack    Term code indicator  FH 1 6 4 Fiber Intake Criteria: Include whole grains, non starchy vegetables and have at least 2 servings of fruits in a day  Patient’s Response to Instruction:  [facility]  Expected Compliancegood    Start- Stop: 8:43-9:03  Total Minutes: 21 Minutes  Group or Individual Instruction: RO BRUNSON  Other: Amanda Miller MD      Thank you for coming to the [address] [facility] for education today  Please feel free to call with any questions or concerns      Ignacio [address] [id number]

## 2023-02-22 ENCOUNTER — OFFICE VISIT (OUTPATIENT)
Dept: FAMILY MEDICINE CLINIC | Facility: CLINIC | Age: 63
End: 2023-02-22

## 2023-02-22 VITALS
OXYGEN SATURATION: 96 % | RESPIRATION RATE: 16 BRPM | HEART RATE: 66 BPM | SYSTOLIC BLOOD PRESSURE: 130 MMHG | DIASTOLIC BLOOD PRESSURE: 80 MMHG | BODY MASS INDEX: 29.6 KG/M2 | HEIGHT: 67 IN | WEIGHT: 188.6 LBS | TEMPERATURE: 97.6 F

## 2023-02-22 DIAGNOSIS — Z12.5 PROSTATE CANCER SCREENING: ICD-10-CM

## 2023-02-22 DIAGNOSIS — E78.2 MIXED HYPERLIPIDEMIA: ICD-10-CM

## 2023-02-22 DIAGNOSIS — M17.12 PRIMARY OSTEOARTHRITIS OF LEFT KNEE: ICD-10-CM

## 2023-02-22 DIAGNOSIS — K21.9 GASTROESOPHAGEAL REFLUX DISEASE, UNSPECIFIED WHETHER ESOPHAGITIS PRESENT: ICD-10-CM

## 2023-02-22 DIAGNOSIS — M17.11 PRIMARY OSTEOARTHRITIS OF RIGHT KNEE: ICD-10-CM

## 2023-02-22 DIAGNOSIS — N18.2 STAGE 2 CHRONIC KIDNEY DISEASE: ICD-10-CM

## 2023-02-22 DIAGNOSIS — I10 ESSENTIAL HYPERTENSION: ICD-10-CM

## 2023-02-22 DIAGNOSIS — Z00.00 ANNUAL PHYSICAL EXAM: Primary | ICD-10-CM

## 2023-02-22 DIAGNOSIS — H90.3 ASYMMETRICAL SENSORINEURAL HEARING LOSS: ICD-10-CM

## 2023-02-22 DIAGNOSIS — Z11.4 SCREENING FOR HIV (HUMAN IMMUNODEFICIENCY VIRUS): ICD-10-CM

## 2023-02-22 DIAGNOSIS — E04.1 THYROID NODULE: ICD-10-CM

## 2023-02-22 DIAGNOSIS — R73.03 PREDIABETES: ICD-10-CM

## 2023-02-22 DIAGNOSIS — E55.9 VITAMIN D DEFICIENCY: ICD-10-CM

## 2023-02-22 PROBLEM — S32.030A CLOSED COMPRESSION FRACTURE OF L3 VERTEBRA (HCC): Status: RESOLVED | Noted: 2018-07-27 | Resolved: 2023-02-22

## 2023-02-22 RX ORDER — SODIUM FLUORIDE1.1%, POTASSIUM NITRATE 5% 5.8; 57.5 MG/ML; MG/ML
GEL, DENTIFRICE DENTAL
COMMUNITY
Start: 2022-12-17

## 2023-02-22 NOTE — ASSESSMENT & PLAN NOTE
Recent thyroid biopsy, sent for [facility] testing which is pending  Final Diagnosis   A  & B  Thyroid, Right, lower (ThinPrep and smears): Atypia of undetermined significance ([place] Category III) - See note  Follicular cells with mild to moderate cytological and architectural atypia  Scant colloid

## 2023-02-22 NOTE — PROGRESS NOTES
[facility]    NAME: Jaimie Nepalese  AGE: 60 y o  SEX: male  : 1960     DATE: 2023     Assessment and Plan:     Problem List Items Addressed This Visit        Digestive    GERD (gastroesophageal reflux disease)     Continue pepcid            Endocrine    Thyroid nodule     Recent thyroid biopsy, sent for Afirrma testing which is pending  Final Diagnosis   A  & B  Thyroid, Right, lower (ThinPrep and smears): Atypia of undetermined significance ([place] Category III) - See note  Follicular cells with mild to moderate cytological and architectural atypia  Scant colloid  Cardiovascular and Mediastinum    Essential hypertension     Blood pressure in the office today is 130/80  He continues on amlodipine, metoprolol xl and cozaar  Low salt diet and exercise recommended  Nervous and Auditory    Asymmetrical sensorineural hearing loss     Audiology exam in the past, hearing loss since discharged from the service  Musculoskeletal and Integument    Primary osteoarthritis of left knee    Primary osteoarthritis of right knee       Genitourinary    Stage 2 chronic kidney disease     Lab Results   Component Value Date    EGFR 63 2022    EGFR 60 08/15/2022    EGFR 79 2021    CREATININE 1 20 2022    CREATININE 1 26 08/15/2022    CREATININE 1 01 2021   Blood work due  NSAID use and low salt diet discussed  Other    Mixed hyperlipidemia     Continues crestor  Low fat diet recommended  Exercise and weight loss recommended  Blood work due  Prediabetes     The patient is following with endocrinology and has seen a dietician  He has an upcoming appointment  Blood work ordered  Vitamin D deficiency    BMI 29 0-29 9,adult     Lifestyle modifications discussed  Weight loss recommended           Other Visit Diagnoses     Annual physical exam    -  Primary    Screening for HIV (human immunodeficiency virus)        Relevant Orders    : HIV 1/2 AB/AG w Reflex [facility] for 2 yr old and above    Prostate cancer screening        Relevant Orders    PSA, Total Screen          Immunizations and preventive care screenings were discussed with patient today  Appropriate education was printed on patient's after visit summary  Discussed risks and benefits of prostate cancer screening  We discussed the controversial history of PSA screening for prostate cancer in the [place] as well as the risk of over detection and over treatment of prostate cancer by way of PSA screening  The patient understands that PSA blood testing is an imperfect way to screen for prostate cancer and that elevated PSA levels in the blood may also be caused by infection, inflammation, prostatic trauma or manipulation, urological procedures, or by benign prostatic enlargement  The role of the digital rectal examination in prostate cancer screening was also discussed and I discussed with him that there is large interobserver variability in the findings of digital rectal examination  Counseling:  Alcohol/drug use: discussed moderation in alcohol intake, the recommendations for healthy alcohol use, and avoidance of illicit drug use  Dental Health: discussed importance of regular tooth brushing, flossing, and dental visits  Injury prevention: discussed safety/seat belts, safety helmets, smoke detectors, carbon dioxide detectors, and smoking near bedding or upholstery  Sexual health: discussed sexually transmitted diseases, partner selection, use of condoms, avoidance of unintended pregnancy, and contraceptive alternatives  Exercise: the importance of regular exercise/physical activity was discussed  Recommend exercise 3-5 times per week for at least 30 minutes  BMI Counseling: Body mass index is 29 54 kg/m²   The BMI is above normal  Nutrition recommendations include decreasing portion sizes, encouraging healthy choices of fruits and vegetables, limiting drinks that contain sugar, moderation in carbohydrate intake, increasing intake of lean protein, reducing intake of saturated and trans fat and reducing intake of cholesterol  Exercise recommendations include moderate physical activity 150 minutes/week and exercising 3-5 times per week  Rationale for BMI follow-up plan is due to patient being overweight or obese  Depression Screening and Follow-up Plan: Patient was screened for depression during today's encounter  They screened negative with a PHQ-2 score of 0  Return in about 6 months (around 8/22/2023) for harlan Aviles  Chief Complaint:     Chief Complaint   Patient presents with   • Physical Exam      History of Present Illness:     Adult Annual Physical   Patient here for a comprehensive physical exam  The patient reports no problems  Diet and Physical Activity  Diet/Nutrition: well balanced diet, limited junk food and limited fruits/vegetables  Exercise: no formal exercise  Depression Screening  PHQ-2/9 Depression Screening    Little interest or pleasure in doing things: 0 - not at all  Feeling down, depressed, or hopeless: 0 - not at all  PHQ-2 Score: 0  PHQ-2 Interpretation: Negative depression screen       General Health  Sleep: sleeps well and gets 7-8 hours of sleep on average  Hearing: decreased - bilateral   Vision: most recent eye exam <1 year ago and wears glasses  Dental: regular dental visits and brushes teeth twice daily   Health  Symptoms include: none     Review of Systems:     Review of Systems   Constitutional: Negative  Negative for fatigue  HENT: Negative  Negative for congestion, postnasal drip, rhinorrhea and trouble swallowing  Eyes: Negative  Negative for visual disturbance  Respiratory: Negative  Negative for choking and shortness of breath  Cardiovascular: Negative  Negative for chest pain  Gastrointestinal: Negative  Endocrine: Negative  Genitourinary: Negative  Musculoskeletal: Negative  Negative for arthralgias, back pain, myalgias and neck pain  Skin: Negative  Neurological: Negative for dizziness and headaches  Psychiatric/Behavioral: Negative  Past Medical History:     Past Medical History:   Diagnosis Date   • Cat scratch fever    • Colon polyp    • Diverticulitis    • GERD (gastroesophageal reflux disease)    • Hyperlipidemia    • Hypertension    • IFG (impaired fasting glucose)    • L3 vertebral fracture (HCC) 07/2018   • Lumbar compression fracture (HCC)    • Patella-femoral syndrome    • Thyroid nodule       Past Surgical History:     Past Surgical History:   Procedure Laterality Date   • CLOSED REDUCTION WRIST FRACTURE     • COLONOSCOPY     • FL RETROGRADE PYELOGRAM  8/26/2022   • [place] COLONOSCOPY FLX DX W/COLLJ SPEC WHEN PFRMD N/A 5/30/2018    Procedure: COLONOSCOPY;  Surgeon: Abraham Adams MD;  Location: [facility];   Service: Colorectal   • KY CYSTO/URETERO W/LITHOTRIPSY &INDWELL STENT INSRT Left 8/26/2022    Procedure: CYSTOSCOPY URETEROSCOPY, BASKET STONE EXTRACTION, RETROGRADE PYELOGRAM AND INSERTION STENT URETERAL;  Surgeon: Jevon Wright MD;  Location[facility];  Service: Urology   • UMBILICAL GRANULOMA EXCISION Left     groin   • US GUIDED THYROID BIOPSY  9/20/2018   • Jose 634 THYROID BIOPSY  2/13/2019   • US GUIDED THYROID BIOPSY  2/13/2023   • WISDOM TOOTH EXTRACTION     • WRIST GANGLION EXCISION  1969    closed reduction       Family History:     Family History   Problem Relation Age of Onset   • Hypertension Mother    • Melanoma Father    • Diabetes type II Father    • Hypertension Father    • Colon cancer Maternal Grandmother    • Colon cancer Maternal Uncle    • Hypertension Brother       Social History:     Social History     Socioeconomic History   • Marital status: /Civil Union     Spouse name: None   • Number of children: None   • Years of education: None   • Highest education level: None   Occupational History   • None   Tobacco Use   • Smoking status: Never   • Smokeless tobacco: Never   Vaping Use   • Vaping Use: Never used   Substance and Sexual Activity   • Alcohol use: Yes     Alcohol/week: 3 0 standard drinks     Types: 3 Glasses of wine per week     Comment: social   • Drug use: No   • Sexual activity: None   Other Topics Concern   • None   Social History Narrative   • None     Social Determinants of Health     Financial Resource Strain: Not on file   Food Insecurity: Not on file   Transportation Needs: Not on file   Physical Activity: Not on file   Stress: Not on file   Social Connections: Not on file   Intimate Partner Violence: Not on file   Housing Stability: Not on file      Current Medications:     Current Outpatient Medications   Medication Sig Dispense Refill   • amLODIPine (NORVASC) 5 mg tablet Take 2 tablets (10 mg total) by mouth daily 90 tablet 0   • famotidine (PEPCID) 20 mg tablet Take 1 tablet twice daily before meals 60 tablet 5   • glucosamine 500 MG CAPS capsule Take by mouth daily       • ibuprofen (MOTRIN) 200 mg tablet Take by mouth every 6 (six) hours as needed for mild pain     • losartan (COZAAR) 100 MG tablet Take 1 tablet (100 mg total) by mouth daily 30 tablet 0   • metoprolol succinate (TOPROL-XL) 100 mg 24 hr tablet Take 1 tablet (100 mg total) by mouth daily 90 tablet 0   • multivitamin (THERAGRAN) TABS Take 1 tablet by mouth daily     • Omega-3 Fatty Acids (FISH OIL) 1200 MG CAPS Take by mouth 2 (two) times a day      • oxybutynin (DITROPAN) 5 mg tablet Take 1 tablet (5 mg total) by mouth every 6 (six) hours as needed (bladder spasms) 30 tablet 0   • rosuvastatin (CRESTOR) 5 mg tablet Take 1 tablet (5 mg total) by mouth daily 90 tablet 0   • Sodium Fluoride 5000 Sensitive 1 1-5 % GEL        No current facility-administered medications for this visit        Allergies:     No Known Allergies Physical Exam:     /80 (BP Location: Right arm, Patient Position: Sitting, Cuff Size: Adult)   Pulse 66   Temp 97 6 °F (36 4 °C) (Tympanic)   Resp 16   Ht 5' 7" (1 702 m)   Wt 85 5 kg (188 lb 9 6 oz)   SpO2 96%   BMI 29 54 kg/m²     Physical Exam  Vitals and nursing note reviewed  Constitutional:       Appearance: Normal appearance  He is well-developed  He is obese  HENT:      Head: Normocephalic and atraumatic  Right Ear: Tympanic membrane, ear canal and external ear normal  There is impacted cerumen  Left Ear: Tympanic membrane, ear canal and external ear normal  There is no impacted cerumen  Nose: Nose normal       Mouth/Throat:      Mouth: Mucous membranes are moist       Pharynx: Oropharynx is clear  Eyes:      Conjunctiva/sclera: Conjunctivae normal    Cardiovascular:      Rate and Rhythm: Normal rate and regular rhythm  Pulses: Normal pulses  Heart sounds: Normal heart sounds  No murmur heard  Pulmonary:      Effort: Pulmonary effort is normal  No respiratory distress  Breath sounds: Normal breath sounds  Abdominal:      General: Bowel sounds are normal  There is no distension  Palpations: Abdomen is soft  There is no mass  Tenderness: There is no abdominal tenderness  There is no guarding or rebound  Hernia: No hernia is present  Musculoskeletal:         General: Normal range of motion  Cervical back: Normal range of motion and neck supple  Skin:     General: Skin is warm and dry  Neurological:      General: No focal deficit present  Mental Status: He is alert and oriented to person, place, and time  Psychiatric:         Mood and Affect: Mood normal          Behavior: Behavior normal          Thought Content:  Thought content normal          Judgment: Judgment normal           Satnam Mccall, [id number] [address]

## 2023-02-22 NOTE — PATIENT INSTRUCTIONS
Physical Therapy Discharge Summary    Referred by: John Everett MD  Medical Diagnosis (from order):   728.89 (ICD-9-CM) - M76.31 (ICD-10-CM) - Iliotibial band tendinitis of right side      781.2 (ICD-9-CM) - R26.9 (ICD-10-CM) - Gait disturbance     727.09 (ICD-9-CM) - M76.899 (ICD-10-CM) - Quadriceps tendonitis       Current Functional Limitations: back pain hindering mobility, improvement in knee pain    OBJECTIVE   remeasurements as noted in attached daily treatment note    Outcome Measure: (Outcome Scoring)   Lower Extremity Functional Scale: Initial Score: 22; Discharge Outcome Score: 26    ASSESSMENT   Patient has been complaining of minimal knee pain.  She demonstrates ease of movement and her walking tolerance is not limited by her knees. To date the patient has made gains not as expected due to exacerbation of back pain as reported.   Discharge from skilled therapy with instructions/recommendations: Patient is leaving out of states.  She has been instructed to continue with her home exercise program.    PLAN    Discharge from therapy    Goals: To be obtained by end of this plan of care:  1. Patient will be independent with progressed and modified home exercise program   · Goal met  2. Decrease pain/symptoms to 0-4/10  · Goal met  3. Improve involved strength to 4+/5-5/5  4. Improve lumbar active range of motion to at least 75%  · Goal not met  through improvements listed above patient will:  5. Demonstrate ability to negotiate level and unlevel surfaces at variable velocities, including change of direction without increased pain or instability to return to age appropriate and community activities at prior level of function  · Goal not met  6. Be able to ambulate for 20 minutes with minimal pain/difficulty  · Goal not met  7. Be able to ascend and descend 1 flight of stairs using reciprocal pattern with minimal pain/difficulty  · Goal met  8. Be able to complete independent transfers with minimal  Wellness Visit for Adults   AMBULATORY CARE:   A wellness visit  is when you see your healthcare provider to get screened for health problems  Your healthcare provider will also give you advice on how to stay healthy  Write down your questions so you remember to ask them  Ask your healthcare provider how often you should have a wellness visit  What happens at a wellness visit:  Your healthcare provider will ask about your health, and your family history of health problems  This includes high blood pressure, heart disease, and cancer  He or she will ask if you have symptoms that concern you, if you smoke, and about your mood  You may also be asked about your intake of medicines, supplements, food, and alcohol  Any of the following may be done:  • Your weight  will be checked  Your height may also be checked so your body mass index (BMI) can be calculated  Your BMI shows if you are at a healthy weight  • Your blood pressure  and heart rate will be checked  Your temperature may also be checked  • Blood and urine tests  may be done  Blood tests may be done to check your cholesterol levels  Abnormal cholesterol levels increase your risk for heart disease and stroke  You may also need a blood or urine test to check for diabetes if you are at increased risk  Urine tests may be done to look for signs of an infection or kidney disease  • A physical exam  includes checking your heartbeat and lungs with a stethoscope  Your healthcare provider may also check your skin to look for sun damage  • Screening tests  may be recommended  A screening test is done to check for diseases that may not cause symptoms  The screening tests you may need depend on your age, gender, family history, and lifestyle habits  For example, colorectal screening may be recommended if you are 48years old or older  Screening tests you need if you are a woman:   • A Pap smear  is used to screen for cervical cancer   Pap smears are usually done every 3 to 5 years depending on your age  You may need them more often if you have had abnormal Pap smear test results in the past  Ask your healthcare provider how often you should have a Pap smear  • A mammogram  is an x-ray of your breasts to screen for breast cancer  Experts recommend mammograms every 2 years starting at age 48 years  You may need a mammogram at age 52 years or younger if you have an increased risk for breast cancer  Talk to your healthcare provider about when you should start having mammograms and how often you need them  Vaccines you may need:   • Get an influenza vaccine  every year  The influenza vaccine protects you from the flu  Several types of viruses cause the flu  The viruses change over time, so new vaccines are made each year  • Get a tetanus-diphtheria (Td) booster vaccine  every 10 years  This vaccine protects you against tetanus and diphtheria  Tetanus is a severe infection that may cause painful muscle spasms and lockjaw  Diphtheria is a severe bacterial infection that causes a thick covering in the back of your mouth and throat  • Get a human papillomavirus (HPV) vaccine  if you are female and aged 23 to 32 or male 23 to 24 and never received it  This vaccine protects you from HPV infection  HPV is the most common infection spread by sexual contact  HPV may also cause vaginal, penile, and anal cancers  • Get a pneumococcal vaccine  if you are aged 72 years or older  The pneumococcal vaccine is an injection given to protect you from pneumococcal disease  Pneumococcal disease is an infection caused by pneumococcal bacteria  The infection may cause pneumonia, meningitis, or an ear infection  • Get a shingles vaccine  if you are 60 or older, even if you have had shingles before  The shingles vaccine is an injection to protect you from the varicella-zoster virus  This is the same virus that causes chickenpox   Shingles is a painful rash that develops in people pain/difficulty  · Goal met  9. Be able to sit for unlimited minutes with minimal pain/difficulty to improve age appropriate activities, completion of household tasks  · Goal met  10. Be able to stand for 45 minutes with minimal pain/difficulty to improve activity tolerance, age appropriate activities, completion of household tasks  · Goal not met  11. Be able to lift   with minimal pain/difficulty to improve activity tolerance, completion of household tasks, house cleaning and care  · Goal not met  12. Be able to bend/squat with minimal pain/difficulty to improve activities of independent daily living, activity tolerance, completion of household tasks, completion of self-care tasks/dressing  · Goal not met  13. Lower Extremity Functional Scale: Patient will complete form to reflect an improved score from initial score of 22 to greater than or equal to 31 to indicate patient reported improvement in function.  · Goal not met    Discharge Measures:   Total Number of Visits: 8  Treatment Category: Knee Osteoarthritis  Outcome Measure: above  Primary Clinician: Wing Kenyon      Physical Therapy Daily Treatment    Visit Count: 8  Plan of Care: 7/24/2019 Through: 10/16/2019  Insurance Information: Payor: [employer]  Visit Limit: 20 visits per calendar year per discipline, hard maximum (8 previous visits this year)  Authorization Needed: No  CoPay: $0  Paid Through Date: 08/31/19  Precautions: Fall Precautions    SUBJECTIVE   Knees are feeling good, but she continues to be bothered by her back pain.  Pain is keeping her from standing very long at all.  Current Pain (0-10 scale): 6-7/10 - right lumbar pain  Functional Change: Feels much better after the massage last visit    OBJECTIVE:  Range of Motion (degrees)    Right    Date Initial 8/21/18   Knee Flexion (135) 125 125   Knee Extension (0-5) -5 -2         Lumbar Range of Motion (%) - active range of motion   Date  Initial 8/21/18   Flexion 75 50   Extension 50 25    Lateral Flexion Left 75    Lateral Flexion Right 50    Rotation Left  50 30   Rotation Right  50 30   standard testing positions unless otherwise noted; ranges are reported in active range of motion unless noted AA=active assistive range of motion and P=passive range of motion; * =pain     Strength (out of 5)    Left Right Right   Date Initial Initial    Hip Flexors 4 5    Hip Internal Rotators 4 4    Hip External Rotators 4 <3    Knee Flexors 4+ 4+ 4+   Knee Extensors 4+ 4- 4+      5 degree extensor lag with right straight leg raises      Treatment   Therapeutic Exercise:  Recumbent bike 5 mintues  Heel toe raise 10x  Mini squats 10x  Date collection for discharge    Manual Therapy: 20 minutes  Soft tissue mobilization at right quadratus lumborum, right posterior hip region in left sidelying    Un-attended Electrical Stimulation / Interferential Current (IFC)  (23057/):   Location: bilateral lumbar paraspinals  Position: sidelying   Pulse rate: high low sweep Hz  Intensity: patient comfort  Duration: 20 minutes   Results: no change in symptoms immediately following modality; no adverse reaction to treatment     Skilled input: Manual therapy to address soft tissue restrictions at right lumbar and hip regions.     Home Program: [id number]   Biking  Gait with cane  Bridging   Posterior pelvic tilt (5 sec. hold)    Sidelying hip abduction   Sidelying clam shell    Straight leg raises     Writer verbally educated the patient and received verbal consent from the patient on hand placement, positioning of patient, and techniques to be performed today including therapist position for techniques, modality application as described above and how they are pertinent to the patient's plan of care.      Suggestions for next session as indicated: Discharge from physical therapy    ASSESSMENT   Patients functional scoring is not showing the improvement in her knee.  Her back pain is currently making mobility difficult.    Pain  who had chickenpox or have been exposed to the virus  How to eat healthy:  My Plate is a model for planning healthy meals  It shows the types and amounts of foods that should go on your plate  Fruits and vegetables make up about half of your plate, and grains and protein make up the other half  A serving of dairy is included on the side of your plate  The amount of calories and serving sizes you need depends on your age, gender, weight, and height  Examples of healthy foods are listed below:  • Eat a variety of vegetables  such as dark green, red, and orange vegetables  You can also include canned vegetables low in sodium (salt) and frozen vegetables without added butter or sauces  • Eat a variety of fresh fruits , canned fruit in 100% juice, frozen fruit, and dried fruit  • Include whole grains  At least half of the grains you eat should be whole grains  Examples include whole-wheat bread, wheat pasta, brown rice, and whole-grain cereals such as oatmeal     • Eat a variety of protein foods such as seafood (fish and shellfish), lean meat, and poultry without skin (turkey and chicken)  Examples of lean meats include pork leg, shoulder, or tenderloin, and beef round, sirloin, tenderloin, and extra lean ground beef  Other protein foods include eggs and egg substitutes, beans, peas, soy products, nuts, and seeds  • Choose low-fat dairy products such as skim or 1% milk or low-fat yogurt, cheese, and cottage cheese  • Limit unhealthy fats  such as butter, hard margarine, and shortening  Exercise:  Exercise at least 30 minutes per day on most days of the week  Some examples of exercise include walking, biking, dancing, and swimming  You can also fit in more physical activity by taking the stairs instead of the elevator or parking farther away from stores  Include muscle strengthening activities 2 days each week  Regular exercise provides many health benefits   It helps you manage your weight, and after treatment (patient reported, 0-10 scale): not assessed  Result of above outlined education: Verbalizes understanding and Demonstrates understanding    THERAPY DAILY BILLING   Insurance: [employer] EXCHANGE 2. N/A    Evaluation Procedures:  No evaluation codes were used on this date of service    Timed Procedures:  Manual Therapy, 20 minutes  Therapeutic Exercise, 14 minutes    Untimed Procedures:  Electrostimulation Unattended    Total Treatment Time: 55 minutes   decreases your risk for type 2 diabetes, heart disease, stroke, and high blood pressure  Exercise can also help improve your mood  Ask your healthcare provider about the best exercise plan for you  General health and safety guidelines:   • Do not smoke  Nicotine and other chemicals in cigarettes and cigars can cause lung damage  Ask your healthcare provider for information if you currently smoke and need help to quit  E-cigarettes or smokeless tobacco still contain nicotine  Talk to your healthcare provider before you use these products  • Limit alcohol  A drink of alcohol is 12 ounces of beer, 5 ounces of wine, or 1½ ounces of liquor  • Lose weight, if needed  Being overweight increases your risk of certain health conditions  These include heart disease, high blood pressure, type 2 diabetes, and certain types of cancer  • Protect your skin  Do not sunbathe or use tanning beds  Use sunscreen with a SPF 15 or higher  Apply sunscreen at least 15 minutes before you go outside  Reapply sunscreen every 2 hours  Wear protective clothing, hats, and sunglasses when you are outside  • Drive safely  Always wear your seatbelt  Make sure everyone in your car wears a seatbelt  A seatbelt can save your life if you are in an accident  Do not use your cell phone when you are driving  This could distract you and cause an accident  Pull over if you need to make a call or send a text message  • Practice safe sex  Use latex condoms if are sexually active and have more than one partner  Your healthcare provider may recommend screening tests for sexually transmitted infections (STIs)  • Wear helmets, lifejackets, and protective gear  Always wear a helmet when you ride a bike or motorcycle, go skiing, or play sports that could cause a head injury  Wear protective equipment when you play sports  Wear a lifejacket when you are on a boat or doing water sports      © Copyright [employer] 2022 Information is for End User's use only and may not be sold, redistributed or otherwise used for commercial purposes  The above information is an  only  It is not intended as medical advice for individual conditions or treatments  Talk to your doctor, nurse or pharmacist before following any medical regimen to see if it is safe and effective for you  Weight Management   AMBULATORY CARE:   Why it is important to manage your weight:  Being overweight increases your risk of health conditions such as heart disease, high blood pressure, type 2 diabetes, and certain types of cancer  It can also increase your risk for osteoarthritis, sleep apnea, and other respiratory problems  Aim for a slow, steady weight loss  Even a small amount of weight loss can lower your risk of health problems  Risks of being overweight:  Extra weight can cause many health problems, including the following:  • Diabetes (high blood sugar level)    • High blood pressure or high cholesterol    • Heart disease    • Stroke    • Gallbladder or liver disease    • Cancer of the colon, breast, prostate, liver, or kidney    • Sleep apnea    • Arthritis or gout    Screening  is done to check for health conditions before you have signs or symptoms  If you are 28to 79years old, your blood sugar level may be checked every 3 years for signs of prediabetes or diabetes  Your healthcare provider will check your blood pressure at each visit  High blood pressure can lead to a stroke or other problems  Your provider may check for signs of heart disease, cancer, or other health problems  How to lose weight safely:  A safe and healthy way to lose weight is to eat fewer calories and get regular exercise  • You can lose up about 1 pound a week by decreasing the number of calories you eat by 500 calories each day  You can decrease calories by eating smaller portion sizes or by cutting out high-calorie foods   Read labels to find out how many calories are in the foods you eat          • You can also burn calories with exercise such as walking, swimming, or biking  You will be more likely to keep weight off if you make these changes part of your lifestyle  Exercise at least 30 minutes per day on most days of the week  You can also fit in more physical activity by taking the stairs instead of the elevator or parking farther away from stores  Ask your healthcare provider about the best exercise plan for you  Healthy meal plan for weight management:  A healthy meal plan includes a variety of foods, contains fewer calories, and helps you stay healthy  A healthy meal plan includes the following:     • Eat whole-grain foods more often  A healthy meal plan should contain fiber  Fiber is the part of grains, fruits, and vegetables that is not broken down by your body  Whole-grain foods are healthy and provide extra fiber in your diet  Some examples of whole-grain foods are whole-wheat breads and pastas, oatmeal, brown rice, and bulgur  • Eat a variety of vegetables every day  Include dark, leafy greens such as spinach, kale, radha greens, and mustard greens  Eat yellow and orange vegetables such as carrots, sweet potatoes, and winter squash  • Eat a variety of fruits every day  Choose fresh or canned fruit (canned in its own juice or light syrup) instead of juice  Fruit juice has very little or no fiber  • Eat low-fat dairy foods  Drink fat-free (skim) milk or 1% milk  Eat fat-free yogurt and low-fat cottage cheese  Try low-fat cheeses such as mozzarella and other reduced-fat cheeses  • Choose meat and other protein foods that are low in fat  Choose beans or other legumes such as split peas or lentils  Choose fish, skinless poultry (chicken or turkey), or lean cuts of red meat (beef or pork)  Before you cook meat or poultry, cut off any visible fat  • Use less fat and oil  Try baking foods instead of frying them   Add less fat, such as margarine, sour cream, regular salad dressing and mayonnaise to foods  Eat fewer high-fat foods  Some examples of high-fat foods include french fries, doughnuts, ice cream, and cakes  • Eat fewer sweets  Limit foods and drinks that are high in sugar  This includes candy, cookies, regular soda, and sweetened drinks  Ways to decrease calories:   • Eat smaller portions  ? Use a small plate with smaller servings  ? Do not eat second helpings  ? When you eat at a restaurant, ask for a box and place half of your meal in the box before you eat  ? Share an entrée with someone else  • Replace high-calorie snacks with healthy, low-calorie snacks  ? Choose fresh fruit, vegetables, fat-free rice cakes, or air-popped popcorn instead of potato chips, nuts, or chocolate  ? Choose water or calorie-free drinks instead of soda or sweetened drinks  • Do not shop for groceries when you are hungry  You may be more likely to make unhealthy food choices  Take a grocery list of healthy foods and shop after you have eaten  • Eat regular meals  Do not skip meals  Skipping meals can lead to overeating later in the day  This can make it harder for you to lose weight  Eat a healthy snack in place of a meal if you do not have time to eat a regular meal  Talk with a dietitian to help you create a meal plan and schedule that is right for you  Other things to consider as you try to lose weight:   • Be aware of situations that may give you the urge to overeat, such as eating while watching television  Find ways to avoid these situations  For example, read a book, go for a walk, or do crafts  • Meet with a weight loss support group or friends who are also trying to lose weight  This may help you stay motivated to continue working on your weight loss goals  © Copyright Sam[employer] 2022 Information is for End User's use only and may not be sold, redistributed or otherwise used for commercial purposes    The above information is an  only  It is not intended as medical advice for individual conditions or treatments  Talk to your doctor, nurse or pharmacist before following any medical regimen to see if it is safe and effective for you  Cholesterol and Your Health   AMBULATORY CARE:   Cholesterol  is a waxy, fat-like substance  Your body uses cholesterol to make hormones and new cells, and to protect nerves  Cholesterol is made by your body  It also comes from certain foods you eat, such as meat and dairy products  Your healthcare provider can help you set goals for your cholesterol levels  He or she can help you create a plan to meet your goals  Cholesterol level goals: Your cholesterol level goals depend on your risk for heart disease, your age, and your other health conditions  The following are general guidelines:  • Total cholesterol  includes low-density lipoprotein (LDL), high-density lipoprotein (HDL), and triglyceride levels  The total cholesterol level should be lower than 200 mg/dL and is best at about 150 mg/dL  • LDL cholesterol  is called bad cholesterol  because it forms plaque in your arteries  As plaque builds up, your arteries become narrow, and less blood flows through  When plaque decreases blood flow to your heart, you may have chest pain  If plaque completely blocks an artery that brings blood to your heart, you may have a heart attack  Plaque can break off and form blood clots  Blood clots may block arteries in your brain and cause a stroke  The level should be less than 130 mg/dL and is best at about 100 mg/dL  • HDL cholesterol  is called good cholesterol  because it helps remove LDL cholesterol from your arteries  It does this by attaching to LDL cholesterol and carrying it to your liver  Your liver breaks down LDL cholesterol so your body can get rid of it  High levels of HDL cholesterol can help prevent a heart attack and stroke   Low levels of HDL cholesterol can increase your risk for heart disease, heart attack, and stroke  The level should be 60 mg/dL or higher  • Triglycerides  are a type of fat that store energy from foods you eat  High levels of triglycerides also cause plaque buildup  This can increase your risk for a heart attack or stroke  If your triglyceride level is high, your LDL cholesterol level may also be high  The level should be less than 150 mg/dL  Any of the following can increase your risk for high cholesterol:   • Smoking cigarettes    • Being overweight or obese, or not getting enough exercise    • Drinking large amounts of alcohol    • A medical condition such as hypertension (high blood pressure) or diabetes    • Certain genes passed from your parents to you    • Age older than 65 years    What you need to know about having your cholesterol levels checked: Adults 21to 39years of age should have their cholesterol levels checked every 4 to 6 years  Adults 45 years or older should have their cholesterol checked every 1 to 2 years  You may need your cholesterol checked more often, or at a younger age, if you have risk factors for heart disease  You may also need to have your cholesterol checked more often if you have other health conditions, such as diabetes  Blood tests are used to check cholesterol levels  Blood tests measure your levels of triglycerides, LDL cholesterol, and HDL cholesterol  How healthy fats affect your cholesterol levels:  Healthy fats, also called unsaturated fats, help lower LDL cholesterol and triglyceride levels  Healthy fats include the following:  • Monounsaturated fats  are found in foods such as olive oil, canola oil, avocado, nuts, and olives  • Polyunsaturated fats,  such as omega 3 fats, are found in fish, such as salmon, trout, and tuna  They can also be found in plant foods such as flaxseed, walnuts, and soybeans      How unhealthy fats affect your cholesterol levels:  Unhealthy fats increase LDL cholesterol and triglyceride levels  They are found in foods high in cholesterol, saturated fat, and trans fat:  • Cholesterol  is found in eggs, dairy, and meat  • Saturated fat  is found in butter, cheese, ice cream, whole milk, and coconut oil  Saturated fat is also found in meat, such as sausage, hot dogs, and bologna  • Trans fat  is found in liquid oils and is used in fried and baked foods  Foods that contain trans fats include chips, crackers, muffins, sweet rolls, microwave popcorn, and cookies  Treatment  for high cholesterol will also decrease your risk of heart disease, heart attack, and stroke  Treatment may include any of the following:  • Lifestyle changes  may include food, exercise, weight loss, and quitting smoking  You may also need to decrease the amount of alcohol you drink  Your healthcare provider will want you to start with lifestyle changes  Other treatment may be added if lifestyle changes are not enough  Your healthcare provider may recommend you work with a team to manage hyperlipidemia  The team may include medical experts such as a dietitian, an exercise or physical therapist, and a behavior therapist  Your family members may be included in helping you create lifestyle changes  • Medicines  may be given to lower your LDL cholesterol, triglyceride levels, or total cholesterol level  You may need medicines to lower your cholesterol if any of the following is true:    ? You have a history of stroke, TIA, unstable angina, or a heart attack  ? Your LDL cholesterol level is 190 mg/dL or higher  ? You are age 36 to 76 years, have diabetes or heart disease risk factors, and your LDL cholesterol is 70 mg/dL or higher  • Supplements  include fish oil, red yeast rice, and garlic  Fish oil may help lower your triglyceride and LDL cholesterol levels  It may also increase your HDL cholesterol level  Red yeast rice may help decrease your total cholesterol level and LDL cholesterol level   Garlic may help lower your total cholesterol level  Do not take any supplements without talking to your healthcare provider  Food changes you can make to lower your cholesterol levels:  A dietitian can help you create a healthy eating plan  He or she can show you how to read food labels and choose foods low in saturated fat, trans fats, and cholesterol  • Decrease the total amount of fat you eat  Choose lean meats, fat-free or 1% fat milk, and low-fat dairy products, such as yogurt and cheese  Try to limit or avoid red meats  Limit or do not eat fried foods or baked goods, such as cookies  • Replace unhealthy fats with healthy fats  Cook foods in olive oil or canola oil  Choose soft margarines that are low in saturated fat and trans fat  Seeds, nuts, and avocados are other examples of healthy fats  • Eat foods with omega-3 fats  Examples include salmon, tuna, mackerel, walnuts, and flaxseed  Eat fish 2 times per week  Pregnant women should not eat fish that have high levels of mercury, such as shark, swordfish, and allen mackerel  • Increase the amount of high-fiber foods you eat  High-fiber foods can help lower your LDL cholesterol  Aim to get between 20 and 30 grams of fiber each day  Fruits and vegetables are high in fiber  Eat at least 5 servings each day  Other high-fiber foods are whole-grain or whole-wheat breads, pastas, or cereals, and brown rice  Eat 3 ounces of whole-grain foods each day  Increase fiber slowly  You may have abdominal discomfort, bloating, and gas if you add fiber to your diet too quickly  • Eat healthy protein foods  Examples include low-fat dairy products, skinless chicken and turkey, fish, and nuts  • Limit foods and drinks that are high in sugar  Your dietitian or healthcare provider can help you create daily limits for high-sugar foods and drinks  The limit may be lower if you have diabetes or another health condition   Limits can also help you lose weight if needed  Lifestyle changes you can make to lower your cholesterol levels:   • Maintain a healthy weight  Ask your healthcare provider what a healthy weight is for you  Ask him or her to help you create a weight loss plan if needed  Weight loss can decrease your total cholesterol and triglyceride levels  Weight loss may also help keep your blood pressure at a healthy level  • Be physically active throughout the day  Physical activity, such as exercise, can help lower your total cholesterol level and maintain a healthy weight  Physical activity can also help increase your HDL cholesterol level  Work with your healthcare provider to create an program that is right for you  Get at least 30 to 40 minutes of moderate physical activity most days of the week  Examples of exercise include brisk walking, swimming, or biking  Also include strength training at least 2 times each week  Your healthcare providers can help you create a physical activity plan  • Do not smoke  Nicotine and other chemicals in cigarettes and cigars can raise your cholesterol levels  Ask your healthcare provider for information if you currently smoke and need help to quit  E-cigarettes or smokeless tobacco still contain nicotine  Talk to your healthcare provider before you use these products  • Limit or do not drink alcohol  Alcohol can increase your triglyceride levels  Ask your healthcare provider before you drink alcohol  Ask how much is okay for you to drink in 24 hours or 1 week  Follow up with your doctor as directed:  Write down your questions so you remember to ask them during your visits  © Copyright [employer] 2022 Information is for End User's use only and may not be sold, redistributed or otherwise used for commercial purposes  The above information is an  only  It is not intended as medical advice for individual conditions or treatments   Talk to your doctor, nurse or pharmacist before following any medical regimen to see if it is safe and effective for you

## 2023-02-22 NOTE — ASSESSMENT & PLAN NOTE
The patient is following with endocrinology and has seen a dietician  He has an upcoming appointment  Blood work ordered

## 2023-02-22 NOTE — ASSESSMENT & PLAN NOTE
Lab Results   Component Value Date    EGFR 63 08/16/2022    EGFR 60 08/15/2022    EGFR 79 09/04/2021    CREATININE 1 20 08/16/2022    CREATININE 1 26 08/15/2022    CREATININE 1 01 09/04/2021   Blood work due  NSAID use and low salt diet discussed

## 2023-02-22 NOTE — ASSESSMENT & PLAN NOTE
Blood pressure in the office today is 130/80  He continues on amlodipine, metoprolol xl and cozaar  Low salt diet and exercise recommended

## 2023-03-06 ENCOUNTER — APPOINTMENT (OUTPATIENT)
Dept: LAB | Facility: CLINIC | Age: 63
End: 2023-03-06

## 2023-03-06 DIAGNOSIS — E55.9 VITAMIN D DEFICIENCY: ICD-10-CM

## 2023-03-06 DIAGNOSIS — Z12.5 PROSTATE CANCER SCREENING: ICD-10-CM

## 2023-03-06 DIAGNOSIS — Z11.4 SCREENING FOR HIV (HUMAN IMMUNODEFICIENCY VIRUS): ICD-10-CM

## 2023-03-06 DIAGNOSIS — E11.9 TYPE 2 DIABETES MELLITUS WITHOUT COMPLICATION, WITHOUT LONG-TERM CURRENT USE OF INSULIN (HCC): ICD-10-CM

## 2023-03-06 LAB
25(OH)D3 SERPL-MCNC: 40.6 NG/ML (ref 30–100)
ALBUMIN SERPL BCP-MCNC: 4.2 G/DL (ref 3.5–5)
ALP SERPL-CCNC: 61 U/L (ref 46–116)
ALT SERPL W P-5'-P-CCNC: 36 U/L (ref 12–78)
ANION GAP SERPL CALCULATED.3IONS-SCNC: 6 MMOL/L (ref 4–13)
AST SERPL W P-5'-P-CCNC: 23 U/L (ref 5–45)
BILIRUB SERPL-MCNC: 0.77 MG/DL (ref 0.2–1)
BUN SERPL-MCNC: 22 MG/DL (ref 5–25)
CALCIUM SERPL-MCNC: 9.9 MG/DL (ref 8.3–10.1)
CHLORIDE SERPL-SCNC: 105 MMOL/L (ref 96–108)
CO2 SERPL-SCNC: 24 MMOL/L (ref 21–32)
CREAT SERPL-MCNC: 1.04 MG/DL (ref 0.6–1.3)
EST. AVERAGE GLUCOSE BLD GHB EST-MCNC: 131 MG/DL
GFR SERPL CREATININE-BSD FRML MDRD: 76 ML/MIN/1.73SQ M
GLUCOSE P FAST SERPL-MCNC: 97 MG/DL (ref 65–99)
HBA1C MFR BLD: 6.2 %
POTASSIUM SERPL-SCNC: 4.1 MMOL/L (ref 3.5–5.3)
PROT SERPL-MCNC: 7.5 G/DL (ref 6.4–8.4)
PSA SERPL-MCNC: 3.2 NG/ML (ref 0–4)
SODIUM SERPL-SCNC: 135 MMOL/L (ref 135–147)

## 2023-03-07 ENCOUNTER — TELEPHONE (OUTPATIENT)
Dept: ENDOCRINOLOGY | Facility: CLINIC | Age: 63
End: 2023-03-07

## 2023-03-07 LAB
HIV 1+2 AB+HIV1 P24 AG SERPL QL IA: NORMAL
HIV 2 AB SERPL QL IA: NORMAL
HIV1 AB SERPL QL IA: NORMAL
HIV1 P24 AG SERPL QL IA: NORMAL

## 2023-03-08 ENCOUNTER — OFFICE VISIT (OUTPATIENT)
Dept: ENDOCRINOLOGY | Facility: CLINIC | Age: 63
End: 2023-03-08

## 2023-03-08 VITALS
HEART RATE: 67 BPM | BODY MASS INDEX: 29.69 KG/M2 | SYSTOLIC BLOOD PRESSURE: 130 MMHG | WEIGHT: 189.13 LBS | HEIGHT: 67 IN | DIASTOLIC BLOOD PRESSURE: 70 MMHG

## 2023-03-08 DIAGNOSIS — E11.9 TYPE 2 DIABETES MELLITUS WITHOUT COMPLICATION, WITHOUT LONG-TERM CURRENT USE OF INSULIN (HCC): Primary | ICD-10-CM

## 2023-03-08 DIAGNOSIS — E04.1 THYROID NODULE: ICD-10-CM

## 2023-03-08 NOTE — PROGRESS NOTES
Established Patient Progress Note       CC: Thyroid nodule and Type 2 Diabetes Mellitus      History of Present Illness:     Sheryl James is a 60 y o  male with a history of thyroid nodules and type 2 diabetes mellitus  History of steroid injections in L hand and cervical spine C5-C6 spine, now s/p ablation  Denies family history of thyroid cancer and denies personal history of radiation to neck  He has a previous FNA from 2019 which demonstrated atypia of undetermined significance [place] [facility] [place]  Recent ultrasound guided biopsy with AFIRMA from February 2023 was benign  Continues to deny obstructive symptoms  Type 2 diabetes mellitus: Most recent hemoglobin A1c was 6 2% from March 2023  Has seen diabetes education for [facility] and is concerned that due to carbohydrate recommendations, he has regained the 10 lbs he had lost prior to [facility] visit  Exercise is limited to occupation, he has limited time at home        Patient Active Problem List   Diagnosis   • Thyroid nodule   • Primary osteoarthritis of left knee   • Primary osteoarthritis of right knee   • Essential hypertension   • Mixed hyperlipidemia   • Prediabetes   • Renal cyst   • DDD (degenerative disc disease), lumbar   • Spondylolisthesis of lumbosacral region   • Chronic low back pain   • Peyronie disease   • Vitamin D deficiency   • GERD (gastroesophageal reflux disease)   • Neck pain   • Cervical spondylosis   • Myofascial pain syndrome   • Asymmetrical sensorineural hearing loss   • Tinnitus   • Stage 2 chronic kidney disease   • Renal stone   • BMI 29 0-29 9,adult      Past Medical History:   Diagnosis Date   • Cat scratch fever    • Colon polyp    • Diverticulitis    • GERD (gastroesophageal reflux disease)    • Hyperlipidemia    • Hypertension    • IFG (impaired fasting glucose)    • L3 vertebral fracture ([facility] [address] ) 07/2018   • Lumbar compression fracture (HCC)    • Patella-femoral syndrome    • Thyroid nodule       Past Surgical History:   Procedure Laterality Date   • CLOSED REDUCTION WRIST FRACTURE     • COLONOSCOPY     • FL RETROGRADE PYELOGRAM  8/26/2022   • MI COLONOSCOPY FLX DX W/COLLJ SPEC WHEN PFRMD N/A 5/30/2018    Procedure: COLONOSCOPY;  Surgeon: Jamie Drew MD;  [facility]; Service: Colorectal   • MI CYSTO/URETERO W/LITHOTRIPSY &INDWELL STENT INSRT Left 8/26/2022    Procedure: CYSTOSCOPY URETEROSCOPY, BASKET STONE EXTRACTION, RETROGRADE PYELOGRAM AND INSERTION STENT URETERAL;  Surgeon: Sabino Blackman MD;  [facility];  Service: Urology   • UMBILICAL GRANULOMA EXCISION Left     groin   • US GUIDED THYROID BIOPSY  9/20/2018   • US GUIDED THYROID BIOPSY  2/13/2019   • US GUIDED THYROID BIOPSY  2/13/2023   • WISDOM TOOTH EXTRACTION     • WRIST GANGLION EXCISION  1969    closed reduction       Family History   Problem Relation Age of Onset   • Hypertension Mother    • Melanoma Father    • Diabetes type II Father    • Hypertension Father    • Colon cancer Maternal Grandmother    • Colon cancer Maternal Uncle    • Hypertension Brother      Social History     Tobacco Use   • Smoking status: Never   • Smokeless tobacco: Never   Substance Use Topics   • Alcohol use:  Yes     Alcohol/week: 3 0 standard drinks     Types: 3 Glasses of wine per week     Comment: social     No Known Allergies    Current Outpatient Medications:   •  amLODIPine (NORVASC) 5 mg tablet, Take 2 tablets (10 mg total) by mouth daily, Disp: 90 tablet, Rfl: 0  •  famotidine (PEPCID) 20 mg tablet, Take 1 tablet twice daily before meals, Disp: 60 tablet, Rfl: 5  •  glucosamine 500 MG CAPS capsule, Take by mouth daily  , Disp: , Rfl:   •  ibuprofen (MOTRIN) 200 mg tablet, Take by mouth every 6 (six) hours as needed for mild pain, Disp: , Rfl:   •  losartan (COZAAR) 100 MG tablet, Take 1 tablet (100 mg total) by mouth daily, Disp: 30 tablet, Rfl: 0  •  metoprolol succinate (TOPROL-XL) 100 mg 24 hr tablet, Take 1 tablet (100 mg total) by mouth daily, Disp: 90 tablet, Rfl: 0  •  multivitamin (THERAGRAN) TABS, Take 1 tablet by mouth daily, Disp: , Rfl:   •  Omega-3 Fatty Acids (FISH OIL) 1200 MG CAPS, Take by mouth 2 (two) times a day , Disp: , Rfl:   •  rosuvastatin (CRESTOR) 5 mg tablet, Take 1 tablet (5 mg total) by mouth daily, Disp: 90 tablet, Rfl: 0  •  Sodium Fluoride 5000 Sensitive 1 1-5 % GEL, , Disp: , Rfl:     Review of Systems   Constitutional: Negative for activity change, appetite change, fatigue and unexpected weight change  HENT: Negative for sore throat, trouble swallowing and voice change  Eyes: Negative for visual disturbance  Respiratory: Negative for cough and shortness of breath  Cardiovascular: Negative for chest pain and palpitations  Gastrointestinal: Negative for abdominal distention, abdominal pain, blood in stool, constipation and diarrhea  Endocrine: Negative for polydipsia and polyuria  Musculoskeletal: Positive for arthralgias  Skin: Negative for color change, pallor and rash  Neurological: Negative for dizziness, light-headedness and headaches  Physical Exam:  Body mass index is 29 62 kg/m²  /70 (BP Location: Left arm, Patient Position: Sitting, Cuff Size: Standard)   Pulse 67   Ht 5' 7" (1 702 m)   Wt 85 8 kg (189 lb 2 oz)   BMI 29 62 kg/m²    Wt Readings from Last 3 Encounters:   03/08/23 85 8 kg (189 lb 2 oz)   02/22/23 85 5 kg (188 lb 9 6 oz)   02/15/23 84 6 kg (186 lb 6 4 oz)       Physical Exam  Vitals reviewed  Constitutional:       General: He is not in acute distress  Appearance: He is not ill-appearing  Cardiovascular:      Rate and Rhythm: Normal rate and regular rhythm  Pulses: Normal pulses  Heart sounds: Normal heart sounds  Pulmonary:      Effort: Pulmonary effort is normal       Breath sounds: Normal breath sounds  Musculoskeletal:      Cervical back: Neck supple        Comments: Walks without assist    Lymphadenopathy:      Cervical: No cervical adenopathy  Skin:     General: Skin is warm  Capillary Refill: Capillary refill takes less than 2 seconds  Neurological:      General: No focal deficit present  Mental Status: He is alert and oriented to person, place, and time  Psychiatric:         Mood and Affect: Mood normal          Behavior: Behavior normal          Labs:       Lab Results   Component Value Date    CREATININE 1 04 03/06/2023    CREATININE 1 20 08/16/2022    CREATININE 1 26 08/15/2022    BUN 22 03/06/2023     (U) 08/22/2014    K 4 1 03/06/2023     03/06/2023    CO2 24 03/06/2023     eGFR   Date Value Ref Range Status   03/06/2023 76 ml/min/1 73sq m Final       Lab Results   Component Value Date    CHOL 248 08/09/2015    HDL 36 (L) 08/15/2022    TRIG 321 (H) 08/15/2022       Lab Results   Component Value Date    ALT 36 03/06/2023    AST 23 03/06/2023    ALKPHOS 61 03/06/2023    BILITOT 0 39 08/22/2014       Lab Results   Component Value Date    FREET4 0 85 10/20/2020         Impression & Plan:    Problem List Items Addressed This Visit        Endocrine    Thyroid nodule     Thyroid ultrasound guided biopsy with AFIRMA benign  Repeat thyroid ultrasound in one year   Follow up in six months  Relevant Orders    TSH, 3rd generation with Free T4 reflex    RESOLVED: Type 2 diabetes mellitus without complication, without long-term current use of insulin ([facility] [address] ) - Primary       Lab Results   Component Value Date    HGBA1C 6 2 (H) 03/06/2023     HGA1C at goal with lifestyle modification  Diabetes education-- recently seen in 12/2022  Advised lifestyle modifications including attention to diet including the amount and types of carbohydrates consumed and regular activity  Routine follow up for diabetic eye and foot exams  Ordered blood work to complete prior to next visit  Follow up in six months with hemoglobin A1c in 3 months              Relevant Orders    HEMOGLOBIN A1C W/ EAG ESTIMATION Lab Collect Comprehensive metabolic panel         Discussed with the patient and all questioned fully answered  He will call me if any problems arise  Follow-up appointment in 6 months       Counseled patient on diagnostic results, prognosis, risk and benefit of treatment options, instruction for management, importance of treatment compliance, Risk  factor reduction and impressions      JESSY Carpenter

## 2023-03-08 NOTE — ASSESSMENT & PLAN NOTE
Lab Results   Component Value Date    HGBA1C 6 2 (H) 03/06/2023     HGA1C at goal with lifestyle modification  Diabetes education-- recently seen in 12/2022  Advised lifestyle modifications including attention to diet including the amount and types of carbohydrates consumed and regular activity  Routine follow up for diabetic eye and foot exams  Ordered blood work to complete prior to next visit  Follow up in six months with hemoglobin A1c in 3 months

## 2023-03-08 NOTE — ASSESSMENT & PLAN NOTE
Thyroid ultrasound guided biopsy with AFIRMA benign  Repeat thyroid ultrasound in one year   Follow up in six months

## 2023-03-22 DIAGNOSIS — I10 ESSENTIAL HYPERTENSION: ICD-10-CM

## 2023-03-22 RX ORDER — LOSARTAN POTASSIUM 100 MG/1
100 TABLET ORAL DAILY
Qty: 30 TABLET | Refills: 0 | Status: SHIPPED | OUTPATIENT
Start: 2023-03-22

## 2023-04-01 ENCOUNTER — HOSPITAL ENCOUNTER (OUTPATIENT)
Dept: RADIOLOGY | Facility: HOSPITAL | Age: 63
Discharge: HOME | End: 2023-04-01

## 2023-04-01 DIAGNOSIS — D17.9 ANGIOMYOLIPOMA: ICD-10-CM

## 2023-04-01 RX ADMIN — IOHEXOL 100 ML: 350 INJECTION, SOLUTION INTRAVENOUS at 11:53

## 2023-04-02 DIAGNOSIS — I10 ESSENTIAL HYPERTENSION: ICD-10-CM

## 2023-04-02 DIAGNOSIS — E78.2 MIXED HYPERLIPIDEMIA: ICD-10-CM

## 2023-04-03 RX ORDER — ROSUVASTATIN CALCIUM 5 MG/1
5 TABLET, COATED ORAL DAILY
Qty: 90 TABLET | Refills: 0 | Status: SHIPPED | OUTPATIENT
Start: 2023-04-03

## 2023-04-03 RX ORDER — AMLODIPINE BESYLATE 5 MG/1
10 TABLET ORAL DAILY
Qty: 90 TABLET | Refills: 0 | Status: SHIPPED | OUTPATIENT
Start: 2023-04-03

## 2023-05-17 ENCOUNTER — OFFICE VISIT (OUTPATIENT)
Dept: OBGYN CLINIC | Facility: HOSPITAL | Age: 63
End: 2023-05-17

## 2023-05-17 VITALS
HEART RATE: 58 BPM | WEIGHT: 188.4 LBS | BODY MASS INDEX: 29.57 KG/M2 | SYSTOLIC BLOOD PRESSURE: 123 MMHG | DIASTOLIC BLOOD PRESSURE: 71 MMHG | HEIGHT: 67 IN

## 2023-05-17 DIAGNOSIS — M18.12 ARTHRITIS OF CARPOMETACARPAL (CMC) JOINT OF LEFT THUMB: Primary | ICD-10-CM

## 2023-05-17 RX ORDER — TRIAMCINOLONE ACETONIDE 40 MG/ML
20 INJECTION, SUSPENSION INTRA-ARTICULAR; INTRAMUSCULAR
Status: COMPLETED | OUTPATIENT
Start: 2023-05-17 | End: 2023-05-17

## 2023-05-17 RX ORDER — ROPIVACAINE HYDROCHLORIDE 5 MG/ML
10 INJECTION, SOLUTION EPIDURAL; INFILTRATION; PERINEURAL
Status: COMPLETED | OUTPATIENT
Start: 2023-05-17 | End: 2023-05-17

## 2023-05-17 RX ADMIN — ROPIVACAINE HYDROCHLORIDE 10 ML: 5 INJECTION, SOLUTION EPIDURAL; INFILTRATION; PERINEURAL at 09:46

## 2023-05-17 RX ADMIN — TRIAMCINOLONE ACETONIDE 20 MG: 40 INJECTION, SUSPENSION INTRA-ARTICULAR; INTRAMUSCULAR at 09:46

## 2023-05-17 NOTE — PROGRESS NOTES
ASSESSMENT/PLAN:    Assessment:   Left thumb CMC arthritis    Plan:   Patient was given a left thumb CMC cortisone injection today with Kenalog  He tolerated well  He was advised that we can repeat the cortisone injections every 3 to 4 months as needed for pain  He states he was starting to have some MCP joint discomfort as well  He was advised to pay attention to his symptoms and if he were to have continued pain at the MCP joint we can discuss getting a new x-ray of the left hand at his next visit  He will follow-up in 3 months for reevaluation    Follow Up:  3 months    To Do Next Visit:  Reevaluation    General Discussions:  [facility] Arthritis: The anatomy and physiology of carpometacarpal joint arthritis was discussed with the patient today in the office  Deterioration of the articular cartilage eventually leads to hypermobility at the thumb [facility] joint, resulting in joint subluxation, osteophyte formation, cystic changes within the trapezium and base of the first metacarpal, as well as subchondral sclerosis  Eventually, pain, limited mobility, and compensatory hyperextension at the metacarpophalangeal joint may develop  While normal activity and usage of the thumb joint may provide a painful experience to the patient, this typically does not result in damage to the thumb or hand  Treatment options include resting thumb spica splints to decreased joint edema, pain, and inflammation  Therapy exercises to strengthen the thenar musculature may relieve pain, but do not alter the overall continued development of osteoarthritis  Oral medications, topical medications, corticosteroid injections may decrease pain and increase overall function  Eventually, approximately 5% of patients may require surgical intervention             _____________________________________________________  CHIEF COMPLAINT:  Chief Complaint   Patient presents with   • Left Thumb - Follow-up     CMC- Kenalog          SUBJECTIVE:  Michelle Kirby Ashley Bardales is a 60 y o  male who presents for follow-up regarding left thumb CMC arthritis  He states that the Kenalog injections worked up to 1 week ago  He states he is also having some discomfort over the MCP joint  He is not sure if these are related to 1 another  He denies any numbness or tingling  PAST MEDICAL HISTORY:  Past Medical History:   Diagnosis Date   • Cat scratch fever    • Colon polyp    • Diverticulitis    • GERD (gastroesophageal reflux disease)    • Hyperlipidemia    • Hypertension    • IFG (impaired fasting glucose)    • L3 vertebral fracture ([address] ) 07/2018   • Lumbar compression fracture (HCC)    • Patella-femoral syndrome    • Thyroid nodule        PAST SURGICAL HISTORY:  Past Surgical History:   Procedure Laterality Date   • CLOSED REDUCTION WRIST FRACTURE     • COLONOSCOPY     • FL RETROGRADE PYELOGRAM  8/26/2022   • MI COLONOSCOPY FLX DX W/COLLJ SPEC WHEN PFRMD N/A 5/30/2018    Procedure: COLONOSCOPY;  Surgeon: Judi Rosa MD;  [facility];   Service: Colorectal   • MI CYSTO/URETERO W/LITHOTRIPSY &INDWELL STENT INSRT Left 8/26/2022    Procedure: CYSTOSCOPY URETEROSCOPY, BASKET STONE EXTRACTION, RETROGRADE PYELOGRAM AND INSERTION STENT URETERAL;  Surgeon: Augusta Atwood MD;  [facility];  Service: Urology   • UMBILICAL GRANULOMA EXCISION Left     groin   • US GUIDED THYROID BIOPSY  9/20/2018   • US GUIDED THYROID BIOPSY  2/13/2019   • US GUIDED THYROID BIOPSY  2/13/2023   • WISDOM TOOTH EXTRACTION     • WRIST GANGLION EXCISION  1969    closed reduction        FAMILY HISTORY:  Family History   Problem Relation Age of Onset   • Hypertension Mother    • Melanoma Father    • Diabetes type II Father    • Hypertension Father    • Colon cancer Maternal Grandmother    • Colon cancer Maternal Uncle    • Hypertension Brother        SOCIAL HISTORY:  Social History     Tobacco Use   • Smoking status: Never   • Smokeless tobacco: Never   Vaping Use   • Vaping Use: "Never used   Substance Use Topics   • Alcohol use: Yes     Alcohol/week: 3 0 standard drinks     Types: 3 Glasses of wine per week     Comment: social   • Drug use: No       MEDICATIONS:    Current Outpatient Medications:   •  amLODIPine (NORVASC) 5 mg tablet, Take 2 tablets (10 mg total) by mouth daily, Disp: 90 tablet, Rfl: 0  •  famotidine (PEPCID) 20 mg tablet, Take 1 tablet twice daily before meals, Disp: 60 tablet, Rfl: 5  •  glucosamine 500 MG CAPS capsule, Take by mouth daily  , Disp: , Rfl:   •  ibuprofen (MOTRIN) 200 mg tablet, Take by mouth every 6 (six) hours as needed for mild pain, Disp: , Rfl:   •  losartan (COZAAR) 100 MG tablet, Take 1 tablet (100 mg total) by mouth daily, Disp: 30 tablet, Rfl: 0  •  metoprolol succinate (TOPROL-XL) 100 mg 24 hr tablet, Take 1 tablet (100 mg total) by mouth daily, Disp: 90 tablet, Rfl: 0  •  multivitamin (THERAGRAN) TABS, Take 1 tablet by mouth daily, Disp: , Rfl:   •  Omega-3 Fatty Acids (FISH OIL) 1200 MG CAPS, Take by mouth 2 (two) times a day , Disp: , Rfl:   •  rosuvastatin (CRESTOR) 5 mg tablet, Take 1 tablet (5 mg total) by mouth daily, Disp: 90 tablet, Rfl: 0  •  Sodium Fluoride 5000 Sensitive 1 1-5 % GEL, , Disp: , Rfl:     ALLERGIES:  No Known Allergies    REVIEW OF SYSTEMS:  Pertinent items are noted in HPI  A comprehensive review of systems was negative      LABS:  HgA1c:   Lab Results   Component Value Date    HGBA1C 6 2 (H) 03/06/2023     BMP:   Lab Results   Component Value Date    GLUCOSE 104 07/27/2018    CALCIUM 9 9 03/06/2023     (U) 08/22/2014    K 4 1 03/06/2023    CO2 24 03/06/2023     03/06/2023    BUN 22 03/06/2023    CREATININE 1 04 03/06/2023       _____________________________________________________  PHYSICAL EXAMINATION:  Vital signs: /71   Pulse 58   Ht 5' 7\" (1 702 m)   Wt 85 5 kg (188 lb 6 4 oz)   BMI 29 51 kg/m²   General: well developed and well nourished, alert, oriented times 3 and appears " "comfortable  Psychiatric: Normal  HEENT: Trachea Midline, No torticollis  Cardiovascular: No discernable arrhythmia  Pulmonary: No wheezing or stridor  Abdomen: No rebound or guarding  Extremities: No peripheral edema  Skin: No masses, erythema, lacerations, fluctation, ulcerations  Neurovascular: Sensation Intact to the Median, Ulnar, Radial Nerve, Motor Intact to the Median, Ulnar, Radial Nerve and Pulses Intact    MUSCULOSKELETAL EXAMINATION:  left CMC Exam:  No adduction contracture  No hyperextension deformity of MCP joint  Positive localized tenderness over radial and dorsal aspect of thumb (CMC joint)  Grind test is Positive for pain and Positive for crepitus  No triggering or tenderness over the A1 pulley  No pain with Finkelstein’s maneuver             _____________________________________________________  STUDIES REVIEWED:  No Studies to review      PROCEDURES PERFORMED:  Small joint arthrocentesis: L thumb CMC  [place] Protocol:  Consent: Verbal consent obtained  Risks and benefits: risks, benefits and alternatives were discussed  Consent given by: patient  Time out: Immediately prior to procedure a \"time out\" was called to verify the correct patient, procedure, equipment, support staff and site/side marked as required    Patient understanding: patient states understanding of the procedure being performed  Patient consent: the patient's understanding of the procedure matches consent given  Site marked: the operative site was marked  Patient identity confirmed: verbally with patient    Supporting Documentation  Indications: pain   Procedure Details  Location: thumb - L thumb CMC  Preparation: Patient was prepped and draped in the usual sterile fashion  Needle size: 25 G  Approach: dorsal  Medications administered: 20 mg triamcinolone acetonide 40 mg/mL; 10 mL ropivacaine 0 5 %    Patient tolerance: patient tolerated the procedure well with no immediate complications  Dressing:  Sterile dressing " applied

## 2023-05-23 DIAGNOSIS — I10 ESSENTIAL HYPERTENSION: ICD-10-CM

## 2023-05-23 RX ORDER — LOSARTAN POTASSIUM 100 MG/1
100 TABLET ORAL DAILY
Qty: 30 TABLET | Refills: 0 | Status: SHIPPED | OUTPATIENT
Start: 2023-05-23

## 2023-05-23 RX ORDER — AMLODIPINE BESYLATE 5 MG/1
10 TABLET ORAL DAILY
Qty: 90 TABLET | Refills: 0 | Status: SHIPPED | OUTPATIENT
Start: 2023-05-23

## 2023-05-31 ENCOUNTER — OFFICE VISIT (OUTPATIENT)
Dept: UROLOGY | Facility: AMBULATORY SURGERY CENTER | Age: 63
End: 2023-05-31

## 2023-05-31 VITALS
WEIGHT: 185 LBS | HEIGHT: 67 IN | BODY MASS INDEX: 29.03 KG/M2 | OXYGEN SATURATION: 99 % | HEART RATE: 79 BPM | DIASTOLIC BLOOD PRESSURE: 78 MMHG | SYSTOLIC BLOOD PRESSURE: 122 MMHG

## 2023-05-31 DIAGNOSIS — N40.1 BENIGN PROSTATIC HYPERPLASIA WITH LOWER URINARY TRACT SYMPTOMS, SYMPTOM DETAILS UNSPECIFIED: Primary | ICD-10-CM

## 2023-05-31 DIAGNOSIS — N20.0 NEPHROLITHIASIS: ICD-10-CM

## 2023-05-31 DIAGNOSIS — N28.1 RENAL CYST: ICD-10-CM

## 2023-05-31 LAB
SL AMB  POCT GLUCOSE, UA: ABNORMAL
SL AMB LEUKOCYTE ESTERASE,UA: ABNORMAL
SL AMB POCT BILIRUBIN,UA: ABNORMAL
SL AMB POCT BLOOD,UA: ABNORMAL
SL AMB POCT CLARITY,UA: CLEAR
SL AMB POCT COLOR,UA: YELLOW
SL AMB POCT KETONES,UA: ABNORMAL
SL AMB POCT NITRITE,UA: ABNORMAL
SL AMB POCT PH,UA: 5
SL AMB POCT SPECIFIC GRAVITY,UA: 1.02
SL AMB POCT URINE PROTEIN: ABNORMAL
SL AMB POCT UROBILINOGEN: 0.2

## 2023-05-31 NOTE — PROGRESS NOTES
5/31/2023      Chief Complaint   Patient presents with   • Nephrolithiasis   • Benign Prostatic Hypertrophy         Assessment and Plan    60 y o  male managed by our office    1  Nephrolithiasis  ? Status post cystoscopy, left ureteroscopy with basket stone extraction, retrograde pyelogram insertion subsequent removal of left ureteral stent 8/26/2022 by Dr Mily Gonsales   ? stone analysis-10% calcium oxalate, 90% uric acid  ? Discussed dietary behavior modifications to assist in reduction of stone formation  Patient education material provided  ? Will perform diagnostic imaging based on symptoms    2  Renal cyst  · CT abdomen pelvis performed 4/1/2023 with findings of complex cystic lesion in the midpole of the right kidney measuring approximate 6 4 cm with internal enhancement of septations raising suspicion for cystic renal neoplasia  MRI of abdomen with and without contrast recommended for further evaluation  · Urine cytology ordered  · MRI of abdomen ordered as recommended, BMP ordered  · Follow-up in the office with physician after completion of MRI to discuss results and plan of care    History of Present Illness  Lani Botello is a 60 y o  male here for follow up evaluation of  nephrolithiasis  Patient is status post cystoscopy, left ureteroscopy with basket stone extraction, retrograde pyelogram, insertion and subsequent removal of left ureteral stent performed by Dr Mily Gonsales on 08/26/2022  Postoperatively, patient has been doing well with no complaints of suprapubic abdominal pain and flank pain  Denies all lower urinary tract symptoms  He denies fever and chills              Review of Systems   Constitutional: Negative for chills and fever  Respiratory: Negative for cough and shortness of breath  Cardiovascular: Negative for chest pain  Gastrointestinal: Negative for abdominal distention, abdominal pain, blood in stool, nausea and vomiting     Genitourinary: Negative for difficulty urinating, dysuria, enuresis, flank pain, frequency, hematuria and urgency  Skin: Negative for rash  Urinary Incontinence Screening    Flowsheet Row Most Recent Value   Urinary Incontinence    Urinary Incontinence? No   Incomplete emptying? No   Urinary frequency? No   Urinary urgency? No   Urinary hesitancy? No   Dysuria (painful difficult urination)? No   Nocturia (waking up to use the bathroom)? No   Straining (having to push to go)? No   Weak stream? No   Intermittent stream? No   Post void dribbling? No                 Past Medical History  Past Medical History:   Diagnosis Date   • Cat scratch fever    • Colon polyp    • Diverticulitis    • GERD (gastroesophageal reflux disease)    • Hyperlipidemia    • Hypertension    • IFG (impaired fasting glucose)    • L3 vertebral fracture (HCC) 07/2018   • Lumbar compression fracture (HCC)    • Patella-femoral syndrome    • Thyroid nodule        Past Social History  Past Surgical History:   Procedure Laterality Date   • CLOSED REDUCTION WRIST FRACTURE     • COLONOSCOPY     • FL RETROGRADE PYELOGRAM  8/26/2022   • NC COLONOSCOPY FLX DX W/COLLJ SPEC WHEN PFRMD N/A 5/30/2018    Procedure: COLONOSCOPY;  Surgeon: Horacio Ziegler MD;  [facility];   Service: Colorectal   • NC CYSTO/URETERO W/LITHOTRIPSY &INDWELL STENT INSRT Left 8/26/2022    Procedure: CYSTOSCOPY URETEROSCOPY, BASKET STONE EXTRACTION, RETROGRADE PYELOGRAM AND INSERTION STENT URETERAL;  Surgeon: Radha Jim MD;  Location[facility];  Service: Urology   • UMBILICAL GRANULOMA EXCISION Left     groin   • US GUIDED THYROID BIOPSY  9/20/2018   • US GUIDED THYROID BIOPSY  2/13/2019   • US GUIDED THYROID BIOPSY  2/13/2023   • WISDOM TOOTH EXTRACTION     • WRIST GANGLION EXCISION  1969    closed reduction      Social History     Tobacco Use   Smoking Status Never   Smokeless Tobacco Never       Past Family History  Family History   Problem Relation Age of Onset   • Hypertension Mother • Melanoma Father    • Diabetes type II Father    • Hypertension Father    • Colon cancer Maternal Grandmother    • Colon cancer Maternal Uncle    • Hypertension Brother        Past Social history  Social History     Socioeconomic History   • Marital status: /Civil Union     Spouse name: Not on file   • Number of children: Not on file   • Years of education: Not on file   • Highest education level: Not on file   Occupational History   • Not on file   Tobacco Use   • Smoking status: Never   • Smokeless tobacco: Never   Vaping Use   • Vaping Use: Never used   Substance and Sexual Activity   • Alcohol use:  Yes     Alcohol/week: 3 0 standard drinks of alcohol     Types: 3 Glasses of wine per week     Comment: social   • Drug use: No   • Sexual activity: Not on file   Other Topics Concern   • Not on file   Social History Narrative   • Not on file     Social Determinants of Health     Financial Resource Strain: Not on file   Food Insecurity: Not on file   Transportation Needs: Not on file   Physical Activity: Not on file   Stress: Not on file   Social Connections: Not on file   Intimate Partner Violence: Not on file   Housing Stability: Not on file       Current Medications  Current Outpatient Medications   Medication Sig Dispense Refill   • amLODIPine (NORVASC) 5 mg tablet Take 2 tablets (10 mg total) by mouth daily 90 tablet 0   • famotidine (PEPCID) 20 mg tablet Take 1 tablet twice daily before meals 60 tablet 5   • glucosamine 500 MG CAPS capsule Take by mouth daily       • ibuprofen (MOTRIN) 200 mg tablet Take by mouth every 6 (six) hours as needed for mild pain     • losartan (COZAAR) 100 MG tablet Take 1 tablet (100 mg total) by mouth daily 30 tablet 0   • metoprolol succinate (TOPROL-XL) 100 mg 24 hr tablet Take 1 tablet (100 mg total) by mouth daily 90 tablet 0   • multivitamin (THERAGRAN) TABS Take 1 tablet by mouth daily     • Omega-3 Fatty Acids (FISH OIL) 1200 MG CAPS Take by mouth 2 (two) times a "day      • rosuvastatin (CRESTOR) 5 mg tablet Take 1 tablet (5 mg total) by mouth daily 90 tablet 0   • Sodium Fluoride 5000 Sensitive 1 1-5 % GEL        No current facility-administered medications for this visit  Allergies  No Known Allergies      The following portions of the patient's history were reviewed and updated as appropriate: allergies, current medications, past medical history, past social history, past surgical history and problem list       Vitals  Vitals:    05/31/23 0831   BP: 122/78   BP Location: Left arm   Patient Position: Sitting   Cuff Size: Adult   Pulse: 79   SpO2: 99%   Weight: 83 9 kg (185 lb)   Height: 5' 7\" (1 702 m)           Physical Exam  Physical Exam  Vitals reviewed  Constitutional:       General: He is not in acute distress  Appearance: Normal appearance  He is normal weight  HENT:      Head: Normocephalic  Pulmonary:      Effort: No respiratory distress  Breath sounds: Normal breath sounds  Skin:     General: Skin is warm and dry  Neurological:      General: No focal deficit present  Mental Status: He is alert and oriented to person, place, and time     Psychiatric:         Mood and Affect: Mood normal          Behavior: Behavior normal            Results  Recent Results (from the past 1 hour(s))   POCT urine dip    Collection Time: 05/31/23  8:36 AM   Result Value Ref Range    LEUKOCYTE ESTERASE,UA -     NITRITE,UA -     SL AMB POCT UROBILINOGEN 0 2     POCT URINE PROTEIN -      PH,UA 5 0     BLOOD,UA small     SPECIFIC GRAVITY,UA 1 020     KETONES,UA -     BILIRUBIN,UA -     GLUCOSE, UA -      COLOR,UA yellow     CLARITY,UA clear    ]  Lab Results   Component Value Date    PSA 3 2 03/06/2023    PSA 3 4 08/15/2022    PSA 1 8 10/20/2020     Lab Results   Component Value Date    BUN 22 03/06/2023    CALCIUM 9 9 03/06/2023     03/06/2023    CO2 24 03/06/2023    CREATININE 1 04 03/06/2023    GLUCOSE 104 07/27/2018    K 4 1 03/06/2023     (U) " 08/22/2014     Lab Results   Component Value Date    HCT 45 4 08/16/2022    HGB 15 3 08/16/2022    MCV 97 08/16/2022     08/16/2022    WBC 7 61 08/16/2022     CT ABDOMEN AND PELVIS WITH AND WITHOUT IV CONTRAST     INDICATION:   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 9: Benign lipomatous neoplasm, unspecified      COMPARISON:  CT abdomen and pelvis dated 8/16/2022     TECHNIQUE: Initial CT of the abdomen and pelvis was performed without intravenous contrast   Subsequent dynamic CT evaluation of the abdomen and pelvis was performed after the administration of intravenous contrast in both nephrographic and delayed   phases after the administration of intravenous contrast    Multiplanar 2D reformatted images were created from the source data      Radiation dose length product (DLP) for this visit:  1709 59 mGy-cm   This examination, like all CT scans performed in the [facility], was performed utilizing techniques to minimize radiation dose exposure, including the use of   iterative reconstruction and automated exposure control      IV Contrast:  100 mL of iohexol (OMNIPAQUE)  Enteric Contrast:  Enteric contrast was not administered      FINDINGS:     ABDOMEN     RIGHT KIDNEY AND URETER:  Complex cystic lesion in the midportion of the right kidney measures approximately 6 4 cm in size; several thin internal septations noted (axial images 62 through 69, series 3, for example), internal enhancement of the septations is questioned which   raises suspicion for cystic renal neoplasm  MR of the abdomen with and without contrast recommended for further evaluation  Several additional tiny low-density lesions in the kidney are incidentally noted  No hydronephrosis or hydroureter is seen  No nephrolithiasis  Right kidney otherwise appears grossly unremarkable    No perinephric collection      LEFT KIDNEY AND URETER:  Multiple subcentimeter low-density lesions are scattered in the left kidney, too small to definitively characterize but of unlikely clinical significance  No nephrolithiasis or hydronephrosis is seen  The left kidney otherwise appears grossly   unremarkable  No perinephric collection      URINARY BLADDER:  Mild circumferential bladder wall thickening noted, possibly exaggerated by underdistention  Consider a cystitis in the appropriate clinical setting  No discrete bladder calculi         LOWER CHEST:  No clinically significant abnormality identified in the visualized lower chest      LIVER/BILIARY TREE:  Unremarkable      GALLBLADDER:  No calcified gallstones  No pericholecystic inflammatory change      SPLEEN:  Unremarkable      PANCREAS:  Unremarkable      ADRENAL GLANDS:  Unremarkable      STOMACH AND BOWEL:  Scattered colonic diverticulosis  No discrete evidence of acute diverticulitis  No evidence of bowel obstruction  Otherwise grossly unremarkable      APPENDIX:  No findings to suggest appendicitis      ABDOMINOPELVIC CAVITY:  No ascites  No free intraperitoneal air  No lymphadenopathy      VESSELS:  Mild atherosclerosis; no aortic aneurysm      PELVIS     REPRODUCTIVE ORGANS:  Unremarkable for patient's age      ABDOMINAL WALL/INGUINAL REGIONS:  Unremarkable      OSSEOUS STRUCTURES:  Old compression fracture of L3 redemonstrated  Multilevel degenerative changes of the spine, most prominent in the lower lumbar region  Approximately 3 mm of anterolisthesis of L4 on L5, probably related to facet joint arthropathy   at this level  No acute fracture or destructive osseous lesion         IMPRESSION:     Complex cystic lesion in the midportion of the right kidney measures approximately 6 4 cm in size; several thin internal septations are noted (axial images 62 through 69, series 3, for example), internal enhancement of the septations is questioned which   raises suspicion for cystic renal neoplasm    MR of the abdomen with and without contrast recommended for further evaluation        Mild circumferential bladder wall thickening noted, possibly exaggerated by underdistention  Consider a cystitis in the appropriate clinical setting      Colonic diverticulosis without evidence of acute diverticulitis, and other findings as above  Orders  Orders Placed This Encounter   Procedures   • MRI abdomen w wo contrast     Standing Status:   Future     Standing Expiration Date:   5/31/2027     Scheduling Instructions: There is no preparation for this test  Please leave your jewelry and valuables at home, wedding rings are the exception  All patients will be required to change into a hospital gown and pants  Street clothes are not permitted in the MRI  Magnetic nail polish must be removed prior to arrival for your test  Please bring your insurance cards, a form of photo ID and a list of your medications with you  Arrive 15 minutes prior to your appointment time in order to register  Please bring any prior CT or MRI studies of this area that were not performed at a [facility]  To schedule this appointment, please contact Central Scheduling at [phone]  Prior to your appointment, please make sure you complete the MRI Screening Form when you e-Check in for your appointment  This will be available starting 7 days before your appointment in [address]  You may receive an e-mail with an activation code if you do not have a [employer] account  If you do not have access to a device, we will complete your screening at your appointment  Order Specific Question:   What is the patient's sedation requirement? Answer:   No Sedation     Order Specific Question:   Does the patient have metallic implants? Answer:   No     Order Specific Question:   Release to patient through Medical Envelope     Answer:   Immediate     Order Specific Question:   Is order priority selected as STAT?      Answer:   No     Order Specific Question:   Reason for Exam (FREE TEXT)     Answer:   renal cyst, suspicious for CA     Order Specific Question:   When should the test be performed? Answer:   Urgent- less than one week   • Basic metabolic panel     This is a patient instruction: Patient fasting for 8 hours or longer recommended       Standing Status:   Future     Standing Expiration Date:   5/31/2024   • POCT urine dip       JESSY Ritter

## 2023-06-13 ENCOUNTER — APPOINTMENT (OUTPATIENT)
Dept: LAB | Facility: CLINIC | Age: 63
End: 2023-06-13
Payer: COMMERCIAL

## 2023-06-13 DIAGNOSIS — D17.9 ANGIOMYOLIPOMA: ICD-10-CM

## 2023-06-13 DIAGNOSIS — N28.1 RENAL CYST: ICD-10-CM

## 2023-06-13 LAB
ANION GAP SERPL CALCULATED.3IONS-SCNC: 4 MMOL/L (ref 4–13)
BUN SERPL-MCNC: 17 MG/DL (ref 5–25)
CALCIUM SERPL-MCNC: 9.3 MG/DL (ref 8.3–10.1)
CHLORIDE SERPL-SCNC: 108 MMOL/L (ref 96–108)
CO2 SERPL-SCNC: 27 MMOL/L (ref 21–32)
CREAT SERPL-MCNC: 1.08 MG/DL (ref 0.6–1.3)
GFR SERPL CREATININE-BSD FRML MDRD: 72 ML/MIN/1.73SQ M
GLUCOSE SERPL-MCNC: 99 MG/DL (ref 65–140)
POTASSIUM SERPL-SCNC: 4.3 MMOL/L (ref 3.5–5.3)
SODIUM SERPL-SCNC: 139 MMOL/L (ref 135–147)

## 2023-06-13 PROCEDURE — 36415 COLL VENOUS BLD VENIPUNCTURE: CPT

## 2023-06-13 PROCEDURE — 80048 BASIC METABOLIC PNL TOTAL CA: CPT

## 2023-06-16 ENCOUNTER — HOSPITAL ENCOUNTER (OUTPATIENT)
Dept: RADIOLOGY | Facility: HOSPITAL | Age: 63
Discharge: HOME | End: 2023-06-16
Payer: COMMERCIAL

## 2023-06-16 DIAGNOSIS — N28.1 RENAL CYST: ICD-10-CM

## 2023-06-16 PROCEDURE — G1004 CDSM NDSC: HCPCS

## 2023-06-16 PROCEDURE — A9585 GADOBUTROL INJECTION: HCPCS | Performed by: NURSE PRACTITIONER

## 2023-06-16 PROCEDURE — 74183 MRI ABD W/O CNTR FLWD CNTR: CPT

## 2023-06-16 RX ADMIN — GADOBUTROL 8 ML: 604.72 INJECTION INTRAVENOUS at 21:47

## 2023-06-27 DIAGNOSIS — I10 ESSENTIAL HYPERTENSION: ICD-10-CM

## 2023-06-27 RX ORDER — LOSARTAN POTASSIUM 100 MG/1
100 TABLET ORAL DAILY
Qty: 30 TABLET | Refills: 0 | Status: SHIPPED | OUTPATIENT
Start: 2023-06-27

## 2023-06-30 ENCOUNTER — OFFICE VISIT (OUTPATIENT)
Dept: UROLOGY | Facility: AMBULATORY SURGERY CENTER | Age: 63
End: 2023-06-30
Payer: COMMERCIAL

## 2023-06-30 VITALS
HEART RATE: 70 BPM | SYSTOLIC BLOOD PRESSURE: 138 MMHG | HEIGHT: 67 IN | OXYGEN SATURATION: 98 % | DIASTOLIC BLOOD PRESSURE: 78 MMHG | BODY MASS INDEX: 29.03 KG/M2 | WEIGHT: 185 LBS

## 2023-06-30 DIAGNOSIS — N28.1 RENAL CYST: Primary | ICD-10-CM

## 2023-06-30 NOTE — PROGRESS NOTES
6/30/2023    Gisell Marcus  1960  [id number]        Assessment  Complex renal cyst  History of uric acid nephrolithiasis    Plan  We reviewed his MRI results and images in the office today  We reviewed both axial and coronal images  There is no enhancement noted within the right-sided 6 cm cyst   I would have considered this a Bosniak 1 cyst however radiologist feels that there is a thin septation which would indicate a type II cyst   We discussed that regardless, this is not concerning and does not require intervention  He reports that it did definitely grow from previous and he would like to have some sort of follow-up  I think this is reasonable  After discussion we decided on follow-up MRI in 1 year to evaluate any growth or change in character  We discussed that if he does develop pain we can reevaluate however typically cyst of the size do not cause any pain and do not require any intervention  All questions answered and he is comfortable with this plan  History of Present Illness  Shadi Davis is a 58 y o  male former patient of Dr Jeanine Johnson and Dr Ning Krueger  Had ureteroscopy with stone extraction August 2022, analysis revealed primarily uric acid stone  He has not been treated medically or seeing nephrology  He is doing well since that time without any issues  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[place] [address]  He was found on a CT scan after a fall to have a complex renal cyst   There was concern about contrast-enhancement and possible malignancy  He was sent for MRI follow-up and returns today to discuss results  He thinks he has history of BPH although prostate appears relatively small on imaging  His brother does have history of BPH  No prostate cancer in the family          Review of Systems  Review of Systems      Past Medical History  Past Medical History:   Diagnosis Date   • Cat scratch fever    • Colon polyp    • Diverticulitis    • GERD (gastroesophageal reflux disease) • Hyperlipidemia    • Hypertension    • IFG (impaired fasting glucose)    • L3 vertebral fracture (HCC) 07/2018   • Lumbar compression fracture (HCC)    • Patella-femoral syndrome    • Thyroid nodule        Past Social History  Past Surgical History:   Procedure Laterality Date   • CLOSED REDUCTION WRIST FRACTURE     • COLONOSCOPY     • FL RETROGRADE PYELOGRAM  8/26/2022   • VT COLONOSCOPY FLX DX W/COLLJ SPEC WHEN PFRMD N/A 5/30/2018    Procedure: COLONOSCOPY;  Surgeon: Mary Jo Watt MD;  Location[facility]; Service: Colorectal   • VT CYSTO/URETERO W/LITHOTRIPSY &INDWELL STENT INSRT Left 8/26/2022    Procedure: CYSTOSCOPY URETEROSCOPY, BASKET STONE EXTRACTION, RETROGRADE PYELOGRAM AND INSERTION STENT URETERAL;  Surgeon: Niko Nuñez MD;  [facility];  Service: Urology   • UMBILICAL GRANULOMA EXCISION Left     groin   • US GUIDED THYROID BIOPSY  9/20/2018   • US GUIDED THYROID BIOPSY  2/13/2019   • US GUIDED THYROID BIOPSY  2/13/2023   • WISDOM TOOTH EXTRACTION     • WRIST GANGLION EXCISION  1969    closed reduction        Past Family History  Family History   Problem Relation Age of Onset   • Hypertension Mother    • Melanoma Father    • Diabetes type II Father    • Hypertension Father    • Colon cancer Maternal Grandmother    • Colon cancer Maternal Uncle    • Hypertension Brother        Past Social history  Social History     Socioeconomic History   • Marital status: /Civil Union     Spouse name: Not on file   • Number of children: Not on file   • Years of education: Not on file   • Highest education level: Not on file   Occupational History   • Not on file   Tobacco Use   • Smoking status: Never   • Smokeless tobacco: Never   Vaping Use   • Vaping Use: Never used   Substance and Sexual Activity   • Alcohol use:  Yes     Alcohol/week: 3 0 standard drinks of alcohol     Types: 3 Glasses of wine per week     Comment: social   • Drug use: No   • Sexual activity: Not on file   Other "Topics Concern   • Not on file   Social History Narrative   • Not on file     Social Determinants of Health     Financial Resource Strain: Not on file   Food Insecurity: Not on file   Transportation Needs: Not on file   Physical Activity: Not on file   Stress: Not on file   Social Connections: Not on file   Intimate Partner Violence: Not on file   Housing Stability: Not on file     Social History     Tobacco Use   Smoking Status Never   Smokeless Tobacco Never       Current Medications  Current Outpatient Medications   Medication Sig Dispense Refill   • amLODIPine (NORVASC) 5 mg tablet Take 2 tablets (10 mg total) by mouth daily 90 tablet 0   • famotidine (PEPCID) 20 mg tablet Take 1 tablet twice daily before meals 60 tablet 5   • glucosamine 500 MG CAPS capsule Take by mouth daily       • ibuprofen (MOTRIN) 200 mg tablet Take by mouth every 6 (six) hours as needed for mild pain     • losartan (COZAAR) 100 MG tablet Take 1 tablet (100 mg total) by mouth daily 30 tablet 0   • metoprolol succinate (TOPROL-XL) 100 mg 24 hr tablet Take 1 tablet (100 mg total) by mouth daily 90 tablet 0   • multivitamin (THERAGRAN) TABS Take 1 tablet by mouth daily     • Omega-3 Fatty Acids (FISH OIL) 1200 MG CAPS Take by mouth 2 (two) times a day      • rosuvastatin (CRESTOR) 5 mg tablet Take 1 tablet (5 mg total) by mouth daily 90 tablet 0   • Sodium Fluoride 5000 Sensitive 1 1-5 % GEL        No current facility-administered medications for this visit  Allergies  No Known Allergies    Past Medical History, Social History, Family History, medications and allergies were reviewed      Vitals  Vitals:    06/30/23 1509   BP: 138/78   BP Location: Left arm   Patient Position: Sitting   Cuff Size: Adult   Pulse: 70   SpO2: 98%   Weight: 83 9 kg (185 lb)   Height: 5' 7\" (1 702 m)       Physical Exam  Physical Exam      Results  Lab Results   Component Value Date    PSA 3 2 03/06/2023    PSA 3 4 08/15/2022    PSA 1 8 10/20/2020     Lab " Results   Component Value Date    GLUCOSE 104 07/27/2018    CALCIUM 9 3 06/13/2023     (U) 08/22/2014    K 4 3 06/13/2023    CO2 27 06/13/2023     06/13/2023    BUN 17 06/13/2023    CREATININE 1 08 06/13/2023     Lab Results   Component Value Date    WBC 7 61 08/16/2022    HGB 15 3 08/16/2022    HCT 45 4 08/16/2022    MCV 97 08/16/2022     08/16/2022

## 2023-06-30 NOTE — LETTER
June 30, 2023     Xander Guillen, [address]    Patient: Sadie Dec   YOB: 1960   Date of Visit: 6/30/2023       Dear Dr Ania Michaels: Thank you for referring Brian Sanders to me for evaluation  Below are my notes for this consultation  If you have questions, please do not hesitate to call me  I look forward to following your patient along with you  Sincerely,        Jovi Greenfield MD        CC: No Recipients    Jovi Greenfield MD  6/30/2023  3:35 PM  Sign when Signing Visit  6/30/2023    [place] Dec  1960  [id number]        Assessment  Complex renal cyst  History of uric acid nephrolithiasis    Plan  We reviewed his MRI results and images in the office today  We reviewed both axial and coronal images  There is no enhancement noted within the right-sided 6 cm cyst   I would have considered this a Bosniak 1 cyst however radiologist feels that there is a thin septation which would indicate a type II cyst   We discussed that regardless, this is not concerning and does not require intervention  He reports that it did definitely grow from previous and he would like to have some sort of follow-up  I think this is reasonable  After discussion we decided on follow-up MRI in 1 year to evaluate any growth or change in character  We discussed that if he does develop pain we can reevaluate however typically cyst of the size do not cause any pain and do not require any intervention  All questions answered and he is comfortable with this plan  History of Present Illness  Toy Santiago is a 58 y o  male former patient of Dr Miryam Hayes and Dr Dejan Ennis  Had ureteroscopy with stone extraction August 2022, analysis revealed primarily uric acid stone  He has not been treated medically or seeing nephrology  He is doing well since that time without any issues  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[place] [address]      He was found on a CT scan after a fall to have a complex renal cyst   There was concern about contrast-enhancement and possible malignancy  He was sent for MRI follow-up and returns today to discuss results  He thinks he has history of BPH although prostate appears relatively small on imaging  His brother does have history of BPH  No prostate cancer in the family  Review of Systems  Review of Systems      Past Medical History  Past Medical History:   Diagnosis Date   • Cat scratch fever    • Colon polyp    • Diverticulitis    • GERD (gastroesophageal reflux disease)    • Hyperlipidemia    • Hypertension    • IFG (impaired fasting glucose)    • L3 vertebral fracture (HCC) 07/2018   • Lumbar compression fracture (HCC)    • Patella-femoral syndrome    • Thyroid nodule        Past Social History  Past Surgical History:   Procedure Laterality Date   • CLOSED REDUCTION WRIST FRACTURE     • COLONOSCOPY     • FL RETROGRADE PYELOGRAM  8/26/2022   • RI COLONOSCOPY FLX DX W/COLLJ SPEC WHEN PFRMD N/A 5/30/2018    Procedure: COLONOSCOPY;  Surgeon: Roel Zamorano MD;  [facility];   Service: Colorectal   • RI CYSTO/URETERO W/LITHOTRIPSY &INDWELL STENT INSRT Left 8/26/2022    Procedure: CYSTOSCOPY URETEROSCOPY, BASKET STONE EXTRACTION, RETROGRADE PYELOGRAM AND INSERTION STENT URETERAL;  Surgeon: Cameron Rojas MD;  [facility];  Service: Urology   • UMBILICAL GRANULOMA EXCISION Left     groin   • US GUIDED THYROID BIOPSY  9/20/2018   • US GUIDED THYROID BIOPSY  2/13/2019   • US GUIDED THYROID BIOPSY  2/13/2023   • WISDOM TOOTH EXTRACTION     • WRIST GANGLION EXCISION  1969    closed reduction        Past Family History  Family History   Problem Relation Age of Onset   • Hypertension Mother    • Melanoma Father    • Diabetes type II Father    • Hypertension Father    • Colon cancer Maternal Grandmother    • Colon cancer Maternal Uncle    • Hypertension Brother        Past Social history  Social History     Socioeconomic History   • Marital status: /Civil Union     Spouse name: Not on file   • Number of children: Not on file   • Years of education: Not on file   • Highest education level: Not on file   Occupational History   • Not on file   Tobacco Use   • Smoking status: Never   • Smokeless tobacco: Never   Vaping Use   • Vaping Use: Never used   Substance and Sexual Activity   • Alcohol use:  Yes     Alcohol/week: 3 0 standard drinks of alcohol     Types: 3 Glasses of wine per week     Comment: social   • Drug use: No   • Sexual activity: Not on file   Other Topics Concern   • Not on file   Social History Narrative   • Not on file     Social Determinants of Health     Financial Resource Strain: Not on file   Food Insecurity: Not on file   Transportation Needs: Not on file   Physical Activity: Not on file   Stress: Not on file   Social Connections: Not on file   Intimate Partner Violence: Not on file   Housing Stability: Not on file     Social History     Tobacco Use   Smoking Status Never   Smokeless Tobacco Never       Current Medications  Current Outpatient Medications   Medication Sig Dispense Refill   • amLODIPine (NORVASC) 5 mg tablet Take 2 tablets (10 mg total) by mouth daily 90 tablet 0   • famotidine (PEPCID) 20 mg tablet Take 1 tablet twice daily before meals 60 tablet 5   • glucosamine 500 MG CAPS capsule Take by mouth daily       • ibuprofen (MOTRIN) 200 mg tablet Take by mouth every 6 (six) hours as needed for mild pain     • losartan (COZAAR) 100 MG tablet Take 1 tablet (100 mg total) by mouth daily 30 tablet 0   • metoprolol succinate (TOPROL-XL) 100 mg 24 hr tablet Take 1 tablet (100 mg total) by mouth daily 90 tablet 0   • multivitamin (THERAGRAN) TABS Take 1 tablet by mouth daily     • Omega-3 Fatty Acids (FISH OIL) 1200 MG CAPS Take by mouth 2 (two) times a day      • rosuvastatin (CRESTOR) 5 mg tablet Take 1 tablet (5 mg total) by mouth daily 90 tablet 0   • Sodium Fluoride 5000 Sensitive 1 1-5 % GEL "    No current facility-administered medications for this visit  Allergies  No Known Allergies    Past Medical History, Social History, Family History, medications and allergies were reviewed      Vitals  Vitals:    06/30/23 1509   BP: 138/78   BP Location: Left arm   Patient Position: Sitting   Cuff Size: Adult   Pulse: 70   SpO2: 98%   Weight: 83 9 kg (185 lb)   Height: 5' 7\" (1 702 m)       Physical Exam  Physical Exam      Results  Lab Results   Component Value Date    PSA 3 2 03/06/2023    PSA 3 4 08/15/2022    PSA 1 8 10/20/2020     Lab Results   Component Value Date    GLUCOSE 104 07/27/2018    CALCIUM 9 3 06/13/2023     (U) 08/22/2014    K 4 3 06/13/2023    CO2 27 06/13/2023     06/13/2023    BUN 17 06/13/2023    CREATININE 1 08 06/13/2023     Lab Results   Component Value Date    WBC 7 61 08/16/2022    HGB 15 3 08/16/2022    HCT 45 4 08/16/2022    MCV 97 08/16/2022     08/16/2022           "

## 2023-07-11 DIAGNOSIS — I10 ESSENTIAL HYPERTENSION: ICD-10-CM

## 2023-07-11 DIAGNOSIS — E78.2 MIXED HYPERLIPIDEMIA: ICD-10-CM

## 2023-07-11 RX ORDER — ROSUVASTATIN CALCIUM 5 MG/1
5 TABLET, COATED ORAL DAILY
Qty: 90 TABLET | Refills: 0 | Status: SHIPPED | OUTPATIENT
Start: 2023-07-11

## 2023-07-11 RX ORDER — AMLODIPINE BESYLATE 5 MG/1
10 TABLET ORAL DAILY
Qty: 90 TABLET | Refills: 0 | Status: SHIPPED | OUTPATIENT
Start: 2023-07-11

## 2023-07-29 DIAGNOSIS — I10 ESSENTIAL HYPERTENSION: ICD-10-CM

## 2023-07-31 ENCOUNTER — APPOINTMENT (OUTPATIENT)
Dept: LAB | Facility: CLINIC | Age: 63
End: 2023-07-31
Payer: COMMERCIAL

## 2023-07-31 DIAGNOSIS — Z00.8 HEALTH EXAMINATION IN POPULATION SURVEY: ICD-10-CM

## 2023-07-31 DIAGNOSIS — Z11.4 ENCOUNTER FOR SCREENING FOR HUMAN IMMUNODEFICIENCY VIRUS (HIV): ICD-10-CM

## 2023-07-31 LAB
CHOLEST SERPL-MCNC: 172 MG/DL
EST. AVERAGE GLUCOSE BLD GHB EST-MCNC: 140 MG/DL
HBA1C MFR BLD: 6.5 %
HDLC SERPL-MCNC: 46 MG/DL
LDLC SERPL CALC-MCNC: 92 MG/DL (ref 0–100)
NONHDLC SERPL-MCNC: 126 MG/DL
TRIGL SERPL-MCNC: 169 MG/DL

## 2023-07-31 PROCEDURE — 80061 LIPID PANEL: CPT

## 2023-07-31 PROCEDURE — 83036 HEMOGLOBIN GLYCOSYLATED A1C: CPT

## 2023-07-31 PROCEDURE — 36415 COLL VENOUS BLD VENIPUNCTURE: CPT

## 2023-07-31 PROCEDURE — 87389 HIV-1 AG W/HIV-1&-2 AB AG IA: CPT

## 2023-07-31 RX ORDER — METOPROLOL SUCCINATE 100 MG/1
100 TABLET, EXTENDED RELEASE ORAL DAILY
Qty: 90 TABLET | Refills: 0 | Status: SHIPPED | OUTPATIENT
Start: 2023-07-31

## 2023-07-31 RX ORDER — LOSARTAN POTASSIUM 100 MG/1
100 TABLET ORAL DAILY
Qty: 30 TABLET | Refills: 0 | Status: SHIPPED | OUTPATIENT
Start: 2023-07-31

## 2023-08-23 ENCOUNTER — OFFICE VISIT (OUTPATIENT)
Dept: OBGYN CLINIC | Facility: HOSPITAL | Age: 63
End: 2023-08-23
Payer: COMMERCIAL

## 2023-08-23 VITALS
BODY MASS INDEX: 29.19 KG/M2 | HEART RATE: 70 BPM | WEIGHT: 186 LBS | DIASTOLIC BLOOD PRESSURE: 58 MMHG | OXYGEN SATURATION: 95 % | SYSTOLIC BLOOD PRESSURE: 118 MMHG | HEIGHT: 67 IN

## 2023-08-23 DIAGNOSIS — M18.12 ARTHRITIS OF CARPOMETACARPAL (CMC) JOINT OF LEFT THUMB: Primary | ICD-10-CM

## 2023-08-23 PROCEDURE — 20600 DRAIN/INJ JOINT/BURSA W/O US: CPT | Performed by: ORTHOPAEDIC SURGERY

## 2023-08-23 PROCEDURE — 99213 OFFICE O/P EST LOW 20 MIN: CPT | Performed by: ORTHOPAEDIC SURGERY

## 2023-08-23 RX ADMIN — LIDOCAINE HYDROCHLORIDE 0.5 ML: 10 INJECTION, SOLUTION INFILTRATION; PERINEURAL at 10:00

## 2023-08-23 RX ADMIN — BETAMETHASONE SODIUM PHOSPHATE AND BETAMETHASONE ACETATE 3 MG: 3; 3 INJECTION, SUSPENSION INTRA-ARTICULAR; INTRALESIONAL; INTRAMUSCULAR; SOFT TISSUE at 10:00

## 2023-08-23 NOTE — PROGRESS NOTES
ASSESSMENT/PLAN:    Assessment:   Thumb CMC Arthritis  left    Plan:   Patient was given a left thumb CMC cortisone injection today with Betamethasone  He tolerated well. He was advised that we can repeat the cortisone injections every 3 to 4 months as needed for pain    Follow Up:  3  month(s)    To Do Next Visit:   Re check    General Discussions:     [facility] Arthritis: The anatomy and physiology of carpometacarpal joint arthritis was discussed with the patient today in the office. Deterioration of the articular cartilage eventually leads to hypermobility at the thumb [facility] joint, resulting in joint subluxation, osteophyte formation, cystic changes within the trapezium and base of the first metacarpal, as well as subchondral sclerosis. Eventually, pain, limited mobility, and compensatory hyperextension at the metacarpophalangeal joint may develop. While normal activity and usage of the thumb joint may provide a painful experience to the patient, this typically does not result in damage to the thumb or hand. Treatment options include resting thumb spica splints to decreased joint edema, pain, and inflammation. Therapy exercises to strengthen the thenar musculature may relieve pain, but do not alter the overall continued development of osteoarthritis. Oral medications, topical medications, corticosteroid injections may decrease pain and increase overall function. Eventually, approximately 5% of patients may require surgical intervention. _____________________________________________________  CHIEF COMPLAINT:  Chief Complaint   Patient presents with   • Left Thumb - Follow-up, [facility]     Kenalog given          SUBJECTIVE:  Jerson Coronado is a 58 y.o. male who presents for follow up regarding Thumb CMC Arthritis  left. Pt states his last injection gave him almost 3 months relief.  He is starting to have some mild discomfort/pain in his basal thumb a few days ago      PAST MEDICAL HISTORY:  Past Medical History:   Diagnosis Date   • Cat scratch fever    • Colon polyp    • Diverticulitis    • GERD (gastroesophageal reflux disease)    • Hyperlipidemia    • Hypertension    • IFG (impaired fasting glucose)    • L3 vertebral fracture ([address]) 07/2018   • Lumbar compression fracture (HCC)    • Patella-femoral syndrome    • Thyroid nodule        PAST SURGICAL HISTORY:  Past Surgical History:   Procedure Laterality Date   • CLOSED REDUCTION WRIST FRACTURE     • COLONOSCOPY     • FL RETROGRADE PYELOGRAM  8/26/2022   • OK COLONOSCOPY FLX DX W/COLLJ SPEC WHEN PFRMD N/A 5/30/2018    Procedure: COLONOSCOPY;  Surgeon: Shelby Menchaca MD;  [facility]; Service: Colorectal   • OK CYSTO/URETERO W/LITHOTRIPSY &INDWELL STENT INSRT Left 8/26/2022    Procedure: CYSTOSCOPY URETEROSCOPY, BASKET STONE EXTRACTION, RETROGRADE PYELOGRAM AND INSERTION STENT URETERAL;  Surgeon: Travis Arvizu MD;  [facility];  Service: Urology   • UMBILICAL GRANULOMA EXCISION Left     groin   • US GUIDED THYROID BIOPSY  9/20/2018   • US GUIDED THYROID BIOPSY  2/13/2019   • US GUIDED THYROID BIOPSY  2/13/2023   • WISDOM TOOTH EXTRACTION     • WRIST GANGLION EXCISION  1969    closed reduction        FAMILY HISTORY:  Family History   Problem Relation Age of Onset   • Hypertension Mother    • Melanoma Father    • Diabetes type II Father    • Hypertension Father    • Colon cancer Maternal Grandmother    • Colon cancer Maternal Uncle    • Hypertension Brother        SOCIAL HISTORY:  Social History     Tobacco Use   • Smoking status: Never   • Smokeless tobacco: Never   Vaping Use   • Vaping Use: Never used   Substance Use Topics   • Alcohol use:  Yes     Alcohol/week: 3.0 standard drinks of alcohol     Types: 3 Glasses of wine per week     Comment: social   • Drug use: No       MEDICATIONS:    Current Outpatient Medications:   •  amLODIPine (NORVASC) 5 mg tablet, Take 2 tablets (10 mg total) by mouth daily, Disp: 90 tablet, Rfl: 0  • famotidine (PEPCID) 20 mg tablet, Take 1 tablet twice daily before meals, Disp: 60 tablet, Rfl: 5  •  glucosamine 500 MG CAPS capsule, Take by mouth daily  , Disp: , Rfl:   •  ibuprofen (MOTRIN) 200 mg tablet, Take by mouth every 6 (six) hours as needed for mild pain, Disp: , Rfl:   •  losartan (COZAAR) 100 MG tablet, Take 1 tablet (100 mg total) by mouth daily, Disp: 30 tablet, Rfl: 0  •  metoprolol succinate (TOPROL-XL) 100 mg 24 hr tablet, Take 1 tablet (100 mg total) by mouth daily, Disp: 90 tablet, Rfl: 0  •  multivitamin (THERAGRAN) TABS, Take 1 tablet by mouth daily, Disp: , Rfl:   •  Omega-3 Fatty Acids (FISH OIL) 1200 MG CAPS, Take by mouth 2 (two) times a day , Disp: , Rfl:   •  rosuvastatin (CRESTOR) 5 mg tablet, Take 1 tablet (5 mg total) by mouth daily, Disp: 90 tablet, Rfl: 0  •  Sodium Fluoride 5000 Sensitive 1.1-5 % GEL, , Disp: , Rfl:     ALLERGIES:  No Known Allergies    REVIEW OF SYSTEMS:  Pertinent items are noted in HPI. A comprehensive review of systems was negative.     LABS:  HgA1c:   Lab Results   Component Value Date    HGBA1C 6.5 (H) 07/31/2023     BMP:   Lab Results   Component Value Date    GLUCOSE 104 07/27/2018    CALCIUM 9.3 06/13/2023     (U) 08/22/2014    K 4.3 06/13/2023    CO2 27 06/13/2023     06/13/2023    BUN 17 06/13/2023    CREATININE 1.08 06/13/2023           _____________________________________________________  PHYSICAL EXAMINATION:  Vital signs: /58   Pulse 70   Ht 5' 7" (1.702 m)   Wt 84.4 kg (186 lb)   SpO2 95%   BMI 29.13 kg/m²   General: well developed and well nourished, alert, oriented times 3 and appears comfortable  Psychiatric: Normal  HEENT: Trachea Midline, No torticollis  Cardiovascular: No discernable arrhythmia  Pulmonary: No wheezing or stridor  Abdomen: No rebound or guarding  Extremities: No peripheral edema  Skin: No masses, erythema, lacerations, fluctation, ulcerations  Neurovascular: Sensation Intact to the Median, Ulnar, Radial Nerve, Motor Intact to the Median, Ulnar, Radial Nerve and Pulses Intact    MUSCULOSKELETAL EXAMINATION:  left CMC Exam:  No adduction contracture  - 5 degrees hyperextension deformity of MCP joint  Positive localized tenderness over radial and dorsal aspect of thumb (CMC joint)  Grind test is Positive for pain and Positive for crepitus  No triggering or tenderness over the A1 pulley  No pain with Finkelstein’s maneuver   No collateral ligament instability    _____________________________________________________  STUDIES REVIEWED:  No Studies to review      PROCEDURES PERFORMED:  Small joint arthrocentesis: L thumb CMC  [place] Protocol:  Consent: Verbal consent obtained.   Risks and benefits: risks, benefits and alternatives were discussed  Consent given by: patient  Timeout called at: 8/23/2023 10:44 AM.  Patient understanding: patient states understanding of the procedure being performed  Patient identity confirmed: verbally with patient    Supporting Documentation  Indications: pain   Procedure Details  Location: thumb - L thumb CMC  Needle size: 25 G  Ultrasound guidance: no  Approach: volar  Medications administered: 0.5 mL lidocaine 1 %; 3 mg betamethasone acetate-betamethasone sodium phosphate 6 (3-3) mg/mL    Patient tolerance: patient tolerated the procedure well with no immediate complications  Dressing:  Sterile dressing applied            Scribe Attestation    I,:  Abhinav Seymour am acting as a scribe while in the presence of the attending physician.:       I,:  Ofelia Welsh MD personally performed the services described in this documentation    as scribed in my presence.:

## 2023-08-24 RX ORDER — LIDOCAINE HYDROCHLORIDE 10 MG/ML
0.5 INJECTION, SOLUTION INFILTRATION; PERINEURAL
Status: COMPLETED | OUTPATIENT
Start: 2023-08-23 | End: 2023-08-23

## 2023-08-24 RX ORDER — BETAMETHASONE SODIUM PHOSPHATE AND BETAMETHASONE ACETATE 3; 3 MG/ML; MG/ML
3 INJECTION, SUSPENSION INTRA-ARTICULAR; INTRALESIONAL; INTRAMUSCULAR; SOFT TISSUE
Status: COMPLETED | OUTPATIENT
Start: 2023-08-23 | End: 2023-08-23

## 2023-08-28 DIAGNOSIS — I10 ESSENTIAL HYPERTENSION: ICD-10-CM

## 2023-08-29 RX ORDER — LOSARTAN POTASSIUM 100 MG/1
100 TABLET ORAL DAILY
Qty: 90 TABLET | Refills: 1 | Status: SHIPPED | OUTPATIENT
Start: 2023-08-29

## 2023-08-29 RX ORDER — AMLODIPINE BESYLATE 5 MG/1
10 TABLET ORAL DAILY
Qty: 90 TABLET | Refills: 0 | Status: SHIPPED | OUTPATIENT
Start: 2023-08-29

## 2023-08-30 ENCOUNTER — OFFICE VISIT (OUTPATIENT)
Dept: FAMILY MEDICINE CLINIC | Facility: CLINIC | Age: 63
End: 2023-08-30
Payer: COMMERCIAL

## 2023-08-30 VITALS
RESPIRATION RATE: 16 BRPM | TEMPERATURE: 97.8 F | DIASTOLIC BLOOD PRESSURE: 68 MMHG | BODY MASS INDEX: 29.03 KG/M2 | WEIGHT: 185 LBS | HEART RATE: 62 BPM | HEIGHT: 67 IN | OXYGEN SATURATION: 95 % | SYSTOLIC BLOOD PRESSURE: 116 MMHG

## 2023-08-30 DIAGNOSIS — Z12.11 SCREEN FOR COLON CANCER: Primary | ICD-10-CM

## 2023-08-30 DIAGNOSIS — E78.2 MIXED HYPERLIPIDEMIA: ICD-10-CM

## 2023-08-30 DIAGNOSIS — R73.03 PREDIABETES: ICD-10-CM

## 2023-08-30 DIAGNOSIS — E04.1 THYROID NODULE: ICD-10-CM

## 2023-08-30 DIAGNOSIS — I10 ESSENTIAL HYPERTENSION: ICD-10-CM

## 2023-08-30 DIAGNOSIS — K21.9 GASTROESOPHAGEAL REFLUX DISEASE, UNSPECIFIED WHETHER ESOPHAGITIS PRESENT: ICD-10-CM

## 2023-08-30 DIAGNOSIS — N18.2 STAGE 2 CHRONIC KIDNEY DISEASE: ICD-10-CM

## 2023-08-30 PROBLEM — Z71.85 VACCINE COUNSELING: Status: ACTIVE | Noted: 2023-08-30

## 2023-08-30 PROCEDURE — 99214 OFFICE O/P EST MOD 30 MIN: CPT | Performed by: NURSE PRACTITIONER

## 2023-08-30 NOTE — PROGRESS NOTES
[facility]    NAME: David Prater  AGE: 58 y.o. SEX: male  : 1960     DATE: 2023     Assessment and Plan:     Problem List Items Addressed This Visit        Digestive    GERD (gastroesophageal reflux disease)     Continue pepcid. Continue to monitor diet for triggers            Endocrine    Thyroid nodule     Follows with endocrinology. Biopsy performed in early  which was benign. Cardiovascular and Mediastinum    Essential hypertension     Blood pressure in the office today is 116/68. His continues on amlodipine, metoprolol and cozaar. Low salt diet and exercise recommended            Genitourinary    Stage 2 chronic kidney disease     Lab Results   Component Value Date    EGFR 72 2023    EGFR 76 2023    EGFR 63 2022    CREATININE 1.08 2023    CREATININE 1.04 2023    CREATININE 1.20 2022   GFR stable. Continue to refrain from nephrotoxins            Other    Mixed hyperlipidemia     [facility] blood work reviewed with the patient. He continues on crestor. Low fat diet recommended. Component      Latest Ref Rng 8/15/2022 2023   Cholesterol      See Comment mg/dL 185  172    Triglycerides      See Comment mg/dL 321 (H)  169 (H)    HDL      >=40 mg/dL 36 (L)  46    LDL Calculated      0 - 100 mg/dL 85  92    Non-HDL Cholesterol      mg/dl 149  126       Legend:  (H) High  (L) Low      The 10-year ASCVD risk score (Hamilton GUARDADO, et al., 2019) is: 20.1%    Values used to calculate the score:      Age: 58 years      Sex: Male      Is Non- : No      Diabetic: Yes      Tobacco smoker: No      Systolic Blood Pressure: 399 mmHg      Is BP treated: Yes      HDL Cholesterol: 46 mg/dL      Total Cholesterol: 172 mg/dL           Prediabetes     Continues to follow with endocrinology. Recent Hemoglobin A1c is 6.5. He would like to try to control with diet.   Carbohydrate counting discussed. Surveillance Hemoglobin A1c prior to next appointment in six months. Lab Results   Component Value Date    HGBA1C 6.5 (H) 07/31/2023              Relevant Orders    HEMOGLOBIN A1C W/ EAG ESTIMATION    BMI 29.0-29.9,adult     Discussed lifestyle changes including diet and exercise. Diet should include switching from simple carbohydrates like white rice, white pasta, white bread to brown rice, wheat pasta, wheat bread. Increase exercise to 150 minutes per week, should include cardio and weightlifting. Other Visit Diagnoses     Screen for colon cancer    -  Primary            Depression Screening and Follow-up Plan: Patient was screened for depression during today's encounter. They screened negative with a PHQ-2 score of 0. Return in about 6 months (around 2/29/2024) for Eric Mendenhall, Office Visit. Chief Complaint:     Chief Complaint   Patient presents with   • Follow-up     6 month         History of Present Illness:     James Ramon presents to the office today for follow-up. Recent blood work was reviewed. The patient's hemoglobin A1c is 6.5. He admits to following a poor diet the past several months. He would like to try to bring this down with diet and exercise which is reasonable. Information regarding carbohydrate counting was discussed. He is due for a colonoscopy and a referral was placed to gastroenterology. He continues to follow with endocrinology and urology. He does have a renal cyst which is being managed by urology. I will see the patient back in 6 months. Review of Systems:     Review of Systems   Constitutional: Negative. Negative for fatigue. HENT: Negative. Negative for congestion, postnasal drip, rhinorrhea and trouble swallowing. Eyes: Negative. Negative for visual disturbance. Respiratory: Negative. Negative for choking and shortness of breath. Cardiovascular: Negative. Negative for chest pain. Gastrointestinal: Negative. Endocrine: Negative. Genitourinary: Negative. Musculoskeletal: Negative. Negative for arthralgias, back pain, myalgias and neck pain. Skin: Negative. Neurological: Negative for dizziness and headaches. Psychiatric/Behavioral: Negative.          Problem List:     Patient Active Problem List   Diagnosis   • Thyroid nodule   • Primary osteoarthritis of left knee   • Primary osteoarthritis of right knee   • Essential hypertension   • Mixed hyperlipidemia   • Prediabetes   • Renal cyst   • DDD (degenerative disc disease), lumbar   • Spondylolisthesis of lumbosacral region   • Chronic low back pain   • Peyronie disease   • Vitamin D deficiency   • GERD (gastroesophageal reflux disease)   • Neck pain   • Cervical spondylosis   • Myofascial pain syndrome   • Asymmetrical sensorineural hearing loss   • Tinnitus   • Stage 2 chronic kidney disease   • Renal stone   • BMI 29.0-29.9,adult   • Vaccine counseling        Objective:     /68   Pulse 62   Temp 97.8 °F (36.6 °C) (Temporal)   Resp 16   Ht 5' 7" (1.702 m)   Wt 83.9 kg (185 lb)   SpO2 95%   BMI 28.98 kg/m²     Current Outpatient Medications   Medication Sig Dispense Refill   • amLODIPine (NORVASC) 5 mg tablet Take 2 tablets (10 mg total) by mouth daily 90 tablet 0   • famotidine (PEPCID) 20 mg tablet Take 1 tablet twice daily before meals 60 tablet 5   • glucosamine 500 MG CAPS capsule Take by mouth daily       • ibuprofen (MOTRIN) 200 mg tablet Take by mouth every 6 (six) hours as needed for mild pain     • losartan (COZAAR) 100 MG tablet Take 1 tablet (100 mg total) by mouth daily 90 tablet 1   • metoprolol succinate (TOPROL-XL) 100 mg 24 hr tablet Take 1 tablet (100 mg total) by mouth daily 90 tablet 0   • multivitamin (THERAGRAN) TABS Take 1 tablet by mouth daily     • Omega-3 Fatty Acids (FISH OIL) 1200 MG CAPS Take by mouth 2 (two) times a day      • rosuvastatin (CRESTOR) 5 mg tablet Take 1 tablet (5 mg total) by mouth daily 90 tablet 0 • Sodium Fluoride 5000 Sensitive 1.1-5 % GEL        No current facility-administered medications for this visit. Physical Exam  Vitals reviewed. Constitutional:       Appearance: Normal appearance. HENT:      Head: Normocephalic and atraumatic. Nose: Nose normal.      Mouth/Throat:      Mouth: Mucous membranes are moist.   Eyes:      Extraocular Movements: Extraocular movements intact. Pupils: Pupils are equal, round, and reactive to light. Cardiovascular:      Rate and Rhythm: Normal rate and regular rhythm. Pulses: Normal pulses. Heart sounds: Normal heart sounds. Pulmonary:      Effort: Pulmonary effort is normal.      Breath sounds: Normal breath sounds. Musculoskeletal:         General: Normal range of motion. Skin:     General: Skin is warm. Neurological:      General: No focal deficit present. Mental Status: He is alert and oriented to person, place, and time. Psychiatric:         Mood and Affect: Mood normal.         Behavior: Behavior normal.         Thought Content:  Thought content normal.         Judgment: Judgment normal.         [address]

## 2023-08-30 NOTE — ASSESSMENT & PLAN NOTE
Discussed lifestyle changes including diet and exercise. Diet should include switching from simple carbohydrates like white rice, white pasta, white bread to brown rice, wheat pasta, wheat bread. Increase exercise to 150 minutes per week, should include cardio and weightlifting.

## 2023-08-30 NOTE — ASSESSMENT & PLAN NOTE
Blood pressure in the office today is 116/68. His continues on amlodipine, metoprolol and cozaar.   Low salt diet and exercise recommended

## 2023-08-30 NOTE — ASSESSMENT & PLAN NOTE
Discussed flu vaccine and new COVID booster with patient. He will receive the flu vaccine at work.  He can obtain the COVID booster at any pharmacy when available

## 2023-08-30 NOTE — ASSESSMENT & PLAN NOTE
Continues to follow with endocrinology. Recent Hemoglobin A1c is 6.5. He would like to try to control with diet. Carbohydrate counting discussed. Surveillance Hemoglobin A1c prior to next appointment in six months.      Lab Results   Component Value Date    HGBA1C 6.5 (H) 07/31/2023

## 2023-08-30 NOTE — PATIENT INSTRUCTIONS
Cholesterol and Your Health   AMBULATORY CARE:   Cholesterol  is a waxy, fat-like substance. Your body uses cholesterol to make hormones and new cells, and to protect nerves. Cholesterol is made by your body. It also comes from certain foods you eat, such as meat and dairy products. Your healthcare provider can help you set goals for your cholesterol levels. He or she can help you create a plan to meet your goals. Cholesterol level goals: Your cholesterol level goals depend on your risk for heart disease, your age, and your other health conditions. The following are general guidelines: Total cholesterol  includes low-density lipoprotein (LDL), high-density lipoprotein (HDL), and triglyceride levels. The total cholesterol level should be lower than 200 mg/dL and is best at about 150 mg/dL. LDL cholesterol  is called bad cholesterol  because it forms plaque in your arteries. As plaque builds up, your arteries become narrow, and less blood flows through. When plaque decreases blood flow to your heart, you may have chest pain. If plaque completely blocks an artery that brings blood to your heart, you may have a heart attack. Plaque can break off and form blood clots. Blood clots may block arteries in your brain and cause a stroke. The level should be less than 130 mg/dL and is best at about 100 mg/dL. HDL cholesterol  is called good cholesterol  because it helps remove LDL cholesterol from your arteries. It does this by attaching to LDL cholesterol and carrying it to your liver. Your liver breaks down LDL cholesterol so your body can get rid of it. High levels of HDL cholesterol can help prevent a heart attack and stroke. Low levels of HDL cholesterol can increase your risk for heart disease, heart attack, and stroke. The level should be 60 mg/dL or higher. Triglycerides  are a type of fat that store energy from foods you eat. High levels of triglycerides also cause plaque buildup.  This can increase your risk for a heart attack or stroke. If your triglyceride level is high, your LDL cholesterol level may also be high. The level should be less than 150 mg/dL. Any of the following can increase your risk for high cholesterol:   Smoking cigarettes    Being overweight or obese, or not getting enough exercise    Drinking large amounts of alcohol    A medical condition such as hypertension (high blood pressure) or diabetes    Certain genes passed from your parents to you    Age older than 65 years    What you need to know about having your cholesterol levels checked: Adults 21to 39years of age should have their cholesterol levels checked every 4 to 6 years. Adults 45 years or older should have their cholesterol checked every 1 to 2 years. You may need your cholesterol checked more often, or at a younger age, if you have risk factors for heart disease. You may also need to have your cholesterol checked more often if you have other health conditions, such as diabetes. Blood tests are used to check cholesterol levels. Blood tests measure your levels of triglycerides, LDL cholesterol, and HDL cholesterol. How healthy fats affect your cholesterol levels:  Healthy fats, also called unsaturated fats, help lower LDL cholesterol and triglyceride levels. Healthy fats include the following:  Monounsaturated fats  are found in foods such as olive oil, canola oil, avocado, nuts, and olives. Polyunsaturated fats,  such as omega 3 fats, are found in fish, such as salmon, trout, and tuna. They can also be found in plant foods such as flaxseed, walnuts, and soybeans. How unhealthy fats affect your cholesterol levels:  Unhealthy fats increase LDL cholesterol and triglyceride levels. They are found in foods high in cholesterol, saturated fat, and trans fat:  Cholesterol  is found in eggs, dairy, and meat. Saturated fat  is found in butter, cheese, ice cream, whole milk, and coconut oil.  Saturated fat is also found in meat, such as sausage, hot dogs, and bologna. Trans fat  is found in liquid oils and is used in fried and baked foods. Foods that contain trans fats include chips, crackers, muffins, sweet rolls, microwave popcorn, and cookies. Treatment  for high cholesterol will also decrease your risk of heart disease, heart attack, and stroke. Treatment may include any of the following:  Lifestyle changes  may include food, exercise, weight loss, and quitting smoking. You may also need to decrease the amount of alcohol you drink. Your healthcare provider will want you to start with lifestyle changes. Other treatment may be added if lifestyle changes are not enough. Your healthcare provider may recommend you work with a team to manage hyperlipidemia. The team may include medical experts such as a dietitian, an exercise or physical therapist, and a behavior therapist. Your family members may be included in helping you create lifestyle changes. Medicines  may be given to lower your LDL cholesterol, triglyceride levels, or total cholesterol level. You may need medicines to lower your cholesterol if any of the following is true:    You have a history of stroke, TIA, unstable angina, or a heart attack. Your LDL cholesterol level is 190 mg/dL or higher. You are age 36 to 76 years, have diabetes or heart disease risk factors, and your LDL cholesterol is 70 mg/dL or higher. Supplements  include fish oil, red yeast rice, and garlic. Fish oil may help lower your triglyceride and LDL cholesterol levels. It may also increase your HDL cholesterol level. Red yeast rice may help decrease your total cholesterol level and LDL cholesterol level. Garlic may help lower your total cholesterol level. Do not take any supplements without talking to your healthcare provider. Food changes you can make to lower your cholesterol levels:  A dietitian can help you create a healthy eating plan.  He or she can show you how to read food labels and choose foods low in saturated fat, trans fats, and cholesterol. Decrease the total amount of fat you eat. Choose lean meats, fat-free or 1% fat milk, and low-fat dairy products, such as yogurt and cheese. Try to limit or avoid red meats. Limit or do not eat fried foods or baked goods, such as cookies. Replace unhealthy fats with healthy fats. Cook foods in olive oil or canola oil. Choose soft margarines that are low in saturated fat and trans fat. Seeds, nuts, and avocados are other examples of healthy fats. Eat foods with omega-3 fats. Examples include salmon, tuna, mackerel, walnuts, and flaxseed. Eat fish 2 times per week. Pregnant women should not eat fish that have high levels of mercury, such as shark, swordfish, and allen mackerel. Increase the amount of high-fiber foods you eat. High-fiber foods can help lower your LDL cholesterol. Aim to get between 20 and 30 grams of fiber each day. Fruits and vegetables are high in fiber. Eat at least 5 servings each day. Other high-fiber foods are whole-grain or whole-wheat breads, pastas, or cereals, and brown rice. Eat 3 ounces of whole-grain foods each day. Increase fiber slowly. You may have abdominal discomfort, bloating, and gas if you add fiber to your diet too quickly. Eat healthy protein foods. Examples include low-fat dairy products, skinless chicken and turkey, fish, and nuts. Limit foods and drinks that are high in sugar. Your dietitian or healthcare provider can help you create daily limits for high-sugar foods and drinks. The limit may be lower if you have diabetes or another health condition. Limits can also help you lose weight if needed. Lifestyle changes you can make to lower your cholesterol levels:   Maintain a healthy weight. Ask your healthcare provider what a healthy weight is for you. Ask him or her to help you create a weight loss plan if needed.  Weight loss can decrease your total cholesterol and triglyceride levels. Weight loss may also help keep your blood pressure at a healthy level. Be physically active throughout the day. Physical activity, such as exercise, can help lower your total cholesterol level and maintain a healthy weight. Physical activity can also help increase your HDL cholesterol level. Work with your healthcare provider to create an program that is right for you. Get at least 30 to 40 minutes of moderate physical activity most days of the week. Examples of exercise include brisk walking, swimming, or biking. Also include strength training at least 2 times each week. Your healthcare providers can help you create a physical activity plan. Do not smoke. Nicotine and other chemicals in cigarettes and cigars can raise your cholesterol levels. Ask your healthcare provider for information if you currently smoke and need help to quit. E-cigarettes or smokeless tobacco still contain nicotine. Talk to your healthcare provider before you use these products. Limit or do not drink alcohol. Alcohol can increase your triglyceride levels. Ask your healthcare provider before you drink alcohol. Ask how much is okay for you to drink in 24 hours or 1 week. Follow up with your doctor as directed:  Write down your questions so you remember to ask them during your visits. © Copyright [facility] 2022 Information is for End User's use only and may not be sold, redistributed or otherwise used for commercial purposes. The above information is an  only. It is not intended as medical advice for individual conditions or treatments. Talk to your doctor, nurse or pharmacist before following any medical regimen to see if it is safe and effective for you. Good fat  Good fats come mainly from vegetables, nuts, seeds, and fish. They differ from saturated fats by having fewer hydrogen atoms bonded to their carbon chains. Healthy fats are liquid at room temperature, not solid.  There are two broad categories of beneficial fats: monounsaturated and polyunsaturated fats. Monounsaturated fats. When you dip your bread in olive oil at an [employer], you're getting mostly monounsaturated fat. Monounsaturated fats have a single carbon-to-carbon double bond. The result is that it has two fewer hydrogen atoms than a saturated fat and a bend at the double bond. This structure keeps monounsaturated fats liquid at room temperature. Good sources of monounsaturated fats are olive oil, peanut oil, canola oil, avocados, and most nuts, as well as high-oleic safflower and sunflower oils. The discovery that monounsaturated fat could be healthful came from the [place] Study during the 1960s. It revealed that people in [facility] and other parts of the [address] [place] [address] enjoyed a low rate of heart disease despite a high-fat diet. The main fat in their diet, though, was not the saturated animal fat common in countries with higher rates of heart disease. It was olive oil, which contains mainly monounsaturated fat. This finding produced a surge of interest in olive oil and the "Mediterranean diet," a style of eating regarded as a healthful choice today. Although there's no recommended daily intake of monounsaturated fats, the [place] of [employer] recommends using them as much as possible along with polyunsaturated fats to replace saturated and trans fats. Polyunsaturated fats. When you pour liquid cooking oil into a pan, there's a good chance you're using polyunsaturated fat. Corn oil, sunflower oil, and safflower oil are common examples. Polyunsaturated fats are essential fats. That means they're required for normal body functions but your body can't make them. So you must get them from food. Polyunsaturated fats are used to build cell membranes and the covering of nerves. They are needed for blood clotting, muscle movement, and inflammation.   A polyunsaturated fat has two or more double bonds in its carbon chain. There are two main types of polyunsaturated fats: omega-3 fatty acids and omega-6 fatty acids. The numbers refer to the distance between the beginning of the carbon chain and the first double bond. Both types offer health benefits. Eating polyunsaturated fats in place of saturated fats or highly refined carbohydrates reduces harmful LDL cholesterol and improves the cholesterol profile. It also lowers triglycerides. Good sources of omega-3 fatty acids include fatty fish such as salmon, mackerel, and sardines, flaxseeds, walnuts, canola oil, and unhydrogenated soybean oil. Omega-3 fatty acids may help prevent and even treat heart disease and stroke. In addition to reducing blood pressure, raising HDL, and lowering triglycerides, polyunsaturated fats may help prevent lethal heart rhythms from arising. Evidence also suggests they may help reduce the need for corticosteroid medications in people with rheumatoid arthritis. Studies linking omega-3s to a wide range of other health improvements, including reducing risk of dementia, are inconclusive, and some of them have major flaws, according to a systematic review of the evidence by the Agency for [employer] and Quality. Omega-6 fatty acids have also been linked to protection against heart disease. Foods rich in linoleic acid and other omega-6 fatty acids include vegetable oils such as safflower, soybean, sunflower, walnut, and corn oils. DASH Eating Plan   WHAT YOU NEED TO KNOW:   The DASH (Dietary Approaches to Stop Hypertension) Eating Plan is designed to help prevent or lower high blood pressure. It can also help to lower LDL (bad) cholesterol and decrease your risk for heart disease. The plan is low in sodium, sugar, unhealthy fats, and total fat. It is high in potassium, calcium, magnesium, and fiber. These nutrients are added when you eat more fruits, vegetables, and whole grains.  With the DASH eating plan, you need to eat a certain number of servings from each food group. This will help you get enough of certain nutrients and limit others. The amount of servings you should eat depends on how many calories you need. Your dietitian can help you create meal plans with the right number of servings for each food group. DISCHARGE INSTRUCTIONS:   What you need to know about sodium:  Your dietitian will tell you how much sodium is safe for you to have each day. People with high blood pressure should have no more than 1,500 to 2,300 mg of sodium in a day. A teaspoon (tsp) of salt has 2,300 mg of sodium. This may seem like a difficult goal, but small changes to the foods you eat can make a big difference. Your healthcare provider or dietitian can help you create a meal plan that follows your sodium limit. Read food labels. Food labels can help you choose foods that are low in sodium. The amount of sodium is listed in milligrams (mg). The % Daily Value (DV) column tells you how much of your daily needs are met by 1 serving of the food for each nutrient listed. Choose foods that have less than 5% of the DV of sodium. These foods are considered low in sodium. Foods that have 20% or more of the DV of sodium are considered high in sodium. Avoid foods that have more than 300 mg of sodium in each serving. Choose foods that say low-sodium, reduced-sodium, or no salt added on the food label. Limit added salt. Do not salt food at the table if you add salt when you cook. Use herbs and spices, such as onions, garlic, and salt-free seasonings to add flavor. Try lemon or lime juice or vinegar to add a tart flavor. Use hot peppers or a small amount of hot pepper sauce to add a spicy flavor.  Limit foods high in added salt, such as the following:    Seasonings made with salt, such as garlic salt, celery salt, onion salt, seasoned salt, meat tenderizers, and monosodium glutamate (MSG)    Miso soup and canned or dried soup mixes    Regular soy sauce, barbecue sauce, teriyaki sauce, steak sauce, Worcestershire sauce, and most flavored vinegars    Snack foods, such as salted chips, popcorn, pretzels, pork rinds, salted crackers, and salted nuts    Frozen foods, such as dinners, entrees, vegetables with sauces, and breaded meats    Ask about salt substitutes. Ask your healthcare provider if you may use salt substitutes. Some salt substitutes have ingredients that can be harmful if you have certain health conditions. Choose foods carefully at restaurants. Meals from restaurants, especially fast food restaurants, are often high in sodium. Some restaurants have nutrition information that tells you the amount of sodium in their foods. Ask to have your food prepared with less, or no salt. What you need to know about fats:  Healthy fats include unsaturated fats and omega-3 fatty acids. Unhealthy fats include saturated fats and trans fats. Include healthy fats, such as the following:      Cooking oils, such as soybean, canola, olive, or sunflower    Fatty fish, such as salmon, tuna, mackerel, or sardines    Flaxseed oil or ground flaxseed    ½ cup of cooked beans, such as black beans, kidney beans, or lopez beans    1½ ounces of low-sodium nuts, such as almonds or walnuts    Low-sugar, low-sodium peanut butter    Seeds such as kandi seeds or sunflower seeds       Limit or do not have unhealthy fats, such as the following:      Foods that contain fat from animals, such as fatty meats, whole milk, butter, and cream    Shortening, stick margarine, palm oil, and coconut oil    Full-fat or creamy salad dressing    Creamy soup    Crackers, chips, and baked goods made with margarine or shortening    Foods that are fried in unhealthy fats    Gravy and sauces, such as Juan A or cheese sauces    What you need to know about carbohydrates (carbs): All carbs break down into sugar. Complex carbs contain more fiber than simple carbs.  This means complex carbs go into the bloodstream more slowly and cause less of a blood sugar spike. Try to include more complex carbs and fewer simple carbs. Include complex carbs, such as the followin slice of whole-grain bread    1 ounce of dry cereal that does not contain added sugar    ½ cup of cooked oatmeal    2 ounces of cooked whole-grain pasta    ½ cup of cooked brown rice    Limit or do not have simple carbs, such as the following:      [employer], such as doughnuts, pastries, and cookies    Mixes for cornbread and biscuits    White rice and pasta mixes, such as boxed macaroni and cheese    Instant and cold cereals that contain sugar    Jelly, jam, and ice cream that contain sugar    Condiments such as ketchup    Drinks high in sugar, such as soft drinks, lemonade, and fruit juice    What you need to know about vegetables and fruits:  Vegetables and fruits can be fresh, frozen, or canned. If possible, try to choose low-sodium canned options. Include a variety of vegetables and fruits, such as the followin medium apple, pear, or peach (about ½ cup chopped)    ½ small banana    ½ cup berries, such as blueberries, strawberries, or blackberries    1 cup of raw leafy greens, such as lettuce, spinach, kale, or radha greens    ½ cup of frozen or canned (no added salt) vegetables, such as green beans    ½ cup of fresh, frozen, or canned fruit (canned in light syrup or fruit juice)    ½ cup of vegetable or fruit juice    Limit or do not have vegetables and fruits made in the following ways:      Frozen fruit such as cherries that have added sugar    Fruit in cream or butter sauce    Canned vegetables that are high in sodium    Sauerkraut, pickled vegetables, and other foods prepared in brine    Fried vegetables or vegetables in butter or high-fat sauces    What you need to know about protein foods:    Include lean or low-fat protein foods, such as the following:      Poultry (chicken, turkey) with no skin    Fish (especially fatty fish, such as salmon, fresh tuna, or mackerel)    Lean beef and pork (loin, round, extra lean hamburger)    Egg whites and egg substitutes    1 cup of nonfat (skim) or 1% milk    1½ ounces of fat-free or low-fat cheese    6 ounces of nonfat or low-fat yogurt    Limit or do not have high-fat protein foods, such as the following:      Smoked or cured meat, such as corned beef, peña, ham, hot dogs, and sausage    Canned beans and canned meats or spreads, such as potted meats, sardines, anchovies, and imitation seafood    Deli or lunch meats, such as bologna, ham, turkey, and roast beef    High-fat meat (T-bone steak, regular hamburger, and ribs)    Whole eggs and egg yolks    Whole milk, 2% milk, and cream    Regular cheese and processed cheese    Other guidelines to follow:   Maintain a healthy weight. Your risk for heart disease is higher if you are overweight. Your healthcare provider may suggest that you lose weight if you are overweight. You can lose weight by eating fewer calories and foods that have added sugars and fat. The DASH meal plan can help you do this. Decrease calories by eating smaller portions at each meal and fewer snacks. Ask your healthcare provider for more information about how to lose weight. Exercise regularly. Regular exercise can help you reach or maintain a healthy weight. Regular exercise can also help decrease your blood pressure and improve your cholesterol levels. Get 30 minutes or more of moderate exercise each day of the week. To lose weight, get at least 60 minutes of exercise. Talk to your healthcare provider about the best exercise program for you. Limit alcohol. Women should limit alcohol to 1 drink a day. Men should limit alcohol to 2 drinks a day. A drink of alcohol is 12 ounces of beer, 5 ounces of wine, or 1½ ounces of liquor. For more information:   [facility] and [address]  [address] [address]  [place] [address]  Phone: [phone]  Web Address: [facility].no    © Copyright [employer] 2022 Information is for End User's use only and may not be sold, redistributed or otherwise used for commercial purposes. The above information is an  only. It is not intended as medical advice for individual conditions or treatments. Talk to your doctor, nurse or pharmacist before following any medical regimen to see if it is safe and effective for you.

## 2023-08-30 NOTE — ASSESSMENT & PLAN NOTE
Lab Results   Component Value Date    EGFR 72 06/13/2023    EGFR 76 03/06/2023    EGFR 63 08/16/2022    CREATININE 1.08 06/13/2023    CREATININE 1.04 03/06/2023    CREATININE 1.20 08/16/2022   GFR stable.  Continue to refrain from nephrotoxins

## 2023-09-26 NOTE — PROGRESS NOTES
Established Patient Progress Note      Chief Complaint   Patient presents with   • Thyroid Nodule        Impression & Plan:    Problem List Items Addressed This Visit        Endocrine    Thyroid nodule - Primary     Repeat thyroid US was ordered for February for yearly monitoring. Relevant Orders    US thyroid    Type 2 diabetes mellitus without complication, without long-term current use of insulin (HCC)     A1c is stable - he is not currently on medication therapy and continues to control through dietary measures. Continue to focus on lifestyle modifications. PCP has ordered repeat A1c. Lab Results   Component Value Date    HGBA1C 6.5 (H) 07/31/2023               History of Present Illness:   Obi Gunter is a 58 y.o. male with thyroid nodules and type 2 diabetes. Last seen in the office 3/8/23. For his diabetes, he is currently diet controlled with most recent A1c of 6.5. Denies any complications of diabetes. Denies recent illness or hospitalizations. Home glucose monitoring: are not performed. He has met with [facility] in the past.     For thyroid nodule,  he had a previous FNA from 2019 which demonstrated atypia of undetermined significance with [facility] that was benign. Repeat ultrasound guided biopsy with [facility] from February 2023 was benign. Denies family history of thyroid cancer and denies personal history of radiation to neck. Continues to deny obstructive symptoms.     Patient Active Problem List   Diagnosis   • Thyroid nodule   • Primary osteoarthritis of left knee   • Primary osteoarthritis of right knee   • Essential hypertension   • Mixed hyperlipidemia   • Prediabetes   • Renal cyst   • DDD (degenerative disc disease), lumbar   • Spondylolisthesis of lumbosacral region   • Chronic low back pain   • Peyronie disease   • Vitamin D deficiency   • GERD (gastroesophageal reflux disease)   • Neck pain   • Cervical spondylosis   • Myofascial pain syndrome   • Asymmetrical sensorineural hearing loss   • Tinnitus   • Stage 2 chronic kidney disease   • Renal stone   • BMI 29.0-29.9,adult   • Type 2 diabetes mellitus without complication, without long-term current use of insulin (HCC)   • Vaccine counseling      Past Medical History:   Diagnosis Date   • Cat scratch fever    • Colon polyp    • Diverticulitis    • GERD (gastroesophageal reflux disease)    • Hyperlipidemia    • Hypertension    • IFG (impaired fasting glucose)    • L3 vertebral fracture (HCC) 07/2018   • Lumbar compression fracture (HCC)    • Patella-femoral syndrome    • Thyroid nodule       Past Surgical History:   Procedure Laterality Date   • CLOSED REDUCTION WRIST FRACTURE     • COLONOSCOPY     • FL RETROGRADE PYELOGRAM  8/26/2022   • VT COLONOSCOPY FLX DX W/COLLJ SPEC WHEN PFRMD N/A 5/30/2018    Procedure: COLONOSCOPY;  Surgeon: Obdulia Guajardo MD;  [facility]; Service: Colorectal   • VT CYSTO/URETERO W/LITHOTRIPSY &INDWELL STENT INSRT Left 8/26/2022    Procedure: CYSTOSCOPY URETEROSCOPY, BASKET STONE EXTRACTION, RETROGRADE PYELOGRAM AND INSERTION STENT URETERAL;  Surgeon: Sarah Coker MD;  [facility];  Service: Urology   • UMBILICAL GRANULOMA EXCISION Left     groin   • US GUIDED THYROID BIOPSY  9/20/2018   • US GUIDED THYROID BIOPSY  2/13/2019   • US GUIDED THYROID BIOPSY  2/13/2023   • WISDOM TOOTH EXTRACTION     • WRIST GANGLION EXCISION  1969    closed reduction       Family History   Problem Relation Age of Onset   • Hypertension Mother    • Melanoma Father    • Diabetes type II Father    • Hypertension Father    • Colon cancer Maternal Grandmother    • Colon cancer Maternal Uncle    • Hypertension Brother      Social History     Tobacco Use   • Smoking status: Never   • Smokeless tobacco: Never   Substance Use Topics   • Alcohol use:  Yes     Alcohol/week: 3.0 standard drinks of alcohol     Types: 3 Glasses of wine per week     Comment: social     No Known Allergies      Current Outpatient Medications:   •  amLODIPine (NORVASC) 5 mg tablet, Take 2 tablets (10 mg total) by mouth daily, Disp: 90 tablet, Rfl: 0  •  famotidine (PEPCID) 20 mg tablet, Take 1 tablet twice daily before meals, Disp: 60 tablet, Rfl: 5  •  glucosamine 500 MG CAPS capsule, Take by mouth daily  , Disp: , Rfl:   •  ibuprofen (MOTRIN) 200 mg tablet, Take by mouth every 6 (six) hours as needed for mild pain, Disp: , Rfl:   •  losartan (COZAAR) 100 MG tablet, Take 1 tablet (100 mg total) by mouth daily, Disp: 90 tablet, Rfl: 1  •  metoprolol succinate (TOPROL-XL) 100 mg 24 hr tablet, Take 1 tablet (100 mg total) by mouth daily, Disp: 90 tablet, Rfl: 0  •  multivitamin (THERAGRAN) TABS, Take 1 tablet by mouth daily, Disp: , Rfl:   •  Omega-3 Fatty Acids (FISH OIL) 1200 MG CAPS, Take by mouth 2 (two) times a day , Disp: , Rfl:   •  rosuvastatin (CRESTOR) 5 mg tablet, Take 1 tablet (5 mg total) by mouth daily, Disp: 90 tablet, Rfl: 0  •  Sodium Fluoride 5000 Sensitive 1.1-5 % GEL, , Disp: , Rfl:     Review of Systems   Constitutional: Negative for activity change, appetite change, chills, diaphoresis, fatigue, fever and unexpected weight change. HENT: Negative for sore throat, trouble swallowing and voice change. Respiratory: Negative for chest tightness and shortness of breath. Cardiovascular: Negative for chest pain, palpitations and leg swelling. Endocrine: Negative for polydipsia, polyphagia and polyuria. All other systems reviewed and are negative. Physical Exam:  Body mass index is 28.85 kg/m². /78   Pulse 61   Ht 5' 7" (1.702 m)   Wt 83.6 kg (184 lb 3.2 oz)   BMI 28.85 kg/m²    Wt Readings from Last 3 Encounters:   09/27/23 83.6 kg (184 lb 3.2 oz)   08/30/23 83.9 kg (185 lb)   08/23/23 84.4 kg (186 lb)       Physical Exam  Vitals reviewed. Constitutional:       Appearance: Normal appearance. HENT:      Head: Normocephalic. Cardiovascular:      Rate and Rhythm: Normal rate and regular rhythm. Pulses: Normal pulses. Heart sounds: Normal heart sounds. Pulmonary:      Effort: Pulmonary effort is normal.      Breath sounds: Normal breath sounds. Neurological:      Mental Status: He is alert and oriented to person, place, and time. Psychiatric:         Mood and Affect: Mood normal.         Thought Content: Thought content normal.       Labs:   Lab Results   Component Value Date    HGBA1C 6.5 (H) 07/31/2023    HGBA1C 6.2 (H) 03/06/2023    HGBA1C 6.5 (H) 08/15/2022     Lab Results   Component Value Date    CREATININE 1.08 06/13/2023    CREATININE 1.04 03/06/2023    CREATININE 1.20 08/16/2022    BUN 17 06/13/2023     (U) 08/22/2014    K 4.3 06/13/2023     06/13/2023    CO2 27 06/13/2023     eGFR   Date Value Ref Range Status   06/13/2023 72 ml/min/1.73sq m Final     Lab Results   Component Value Date    CHOL 248 08/09/2015    HDL 46 07/31/2023    TRIG 169 (H) 07/31/2023     Lab Results   Component Value Date    ALT 36 03/06/2023    AST 23 03/06/2023    ALKPHOS 61 03/06/2023    BILITOT 0.39 08/22/2014     Lab Results   Component Value Date    THI5CHOTRVKV 2.100 08/15/2022    TBE4GJTWPUJP 2.430 10/20/2020    JHG3CZHHKFQB 2.610 03/09/2019     Lab Results   Component Value Date    FREET4 0.85 10/20/2020       Orders Placed This Encounter   Procedures   • US thyroid     Standing Status:   Future     Standing Expiration Date:   9/27/2027     Scheduling Instructions:      No prep required. Please bring your insurance cards, a form of photo ID and a list of your medications with you. Arrive 15 minutes prior to your appointment time in order to register. To schedule this appointment, please contact Central Scheduling at [phone]. There are no Patient Instructions on file for this visit. Discussed with the patient and all questioned fully answered. He will call me if any problems arise.     JESSY Guajardo

## 2023-09-27 ENCOUNTER — OFFICE VISIT (OUTPATIENT)
Dept: ENDOCRINOLOGY | Facility: CLINIC | Age: 63
End: 2023-09-27
Payer: COMMERCIAL

## 2023-09-27 VITALS
WEIGHT: 184.2 LBS | HEIGHT: 67 IN | DIASTOLIC BLOOD PRESSURE: 78 MMHG | HEART RATE: 61 BPM | SYSTOLIC BLOOD PRESSURE: 122 MMHG | BODY MASS INDEX: 28.91 KG/M2

## 2023-09-27 DIAGNOSIS — E04.1 THYROID NODULE: Primary | ICD-10-CM

## 2023-09-27 DIAGNOSIS — E11.9 TYPE 2 DIABETES MELLITUS WITHOUT COMPLICATION, WITHOUT LONG-TERM CURRENT USE OF INSULIN (HCC): ICD-10-CM

## 2023-09-27 PROCEDURE — 99213 OFFICE O/P EST LOW 20 MIN: CPT

## 2023-09-27 NOTE — ASSESSMENT & PLAN NOTE
A1c is stable - he is not currently on medication therapy and continues to control through dietary measures. Continue to focus on lifestyle modifications. PCP has ordered repeat A1c.    Lab Results   Component Value Date    HGBA1C 6.5 (H) 07/31/2023

## 2023-10-14 DIAGNOSIS — E78.2 MIXED HYPERLIPIDEMIA: ICD-10-CM

## 2023-10-16 RX ORDER — ROSUVASTATIN CALCIUM 5 MG/1
5 TABLET, COATED ORAL DAILY
Qty: 90 TABLET | Refills: 0 | Status: SHIPPED | OUTPATIENT
Start: 2023-10-16

## 2023-11-10 DIAGNOSIS — I10 ESSENTIAL HYPERTENSION: ICD-10-CM

## 2023-11-10 RX ORDER — METOPROLOL SUCCINATE 100 MG/1
100 TABLET, EXTENDED RELEASE ORAL DAILY
Qty: 90 TABLET | Refills: 0 | Status: SHIPPED | OUTPATIENT
Start: 2023-11-10

## 2023-11-22 ENCOUNTER — OFFICE VISIT (OUTPATIENT)
Dept: OBGYN CLINIC | Facility: HOSPITAL | Age: 63
End: 2023-11-22
Payer: COMMERCIAL

## 2023-11-22 VITALS
WEIGHT: 186.8 LBS | SYSTOLIC BLOOD PRESSURE: 112 MMHG | HEART RATE: 69 BPM | BODY MASS INDEX: 29.32 KG/M2 | HEIGHT: 67 IN | DIASTOLIC BLOOD PRESSURE: 61 MMHG

## 2023-11-22 DIAGNOSIS — M18.12 ARTHRITIS OF CARPOMETACARPAL (CMC) JOINT OF LEFT THUMB: Primary | ICD-10-CM

## 2023-11-22 PROCEDURE — 20600 DRAIN/INJ JOINT/BURSA W/O US: CPT | Performed by: ORTHOPAEDIC SURGERY

## 2023-11-22 PROCEDURE — 99213 OFFICE O/P EST LOW 20 MIN: CPT | Performed by: ORTHOPAEDIC SURGERY

## 2023-11-22 RX ORDER — LIDOCAINE HYDROCHLORIDE 10 MG/ML
0.5 INJECTION, SOLUTION INFILTRATION; PERINEURAL
Status: COMPLETED | OUTPATIENT
Start: 2023-11-22 | End: 2023-11-22

## 2023-11-22 RX ORDER — BETAMETHASONE SODIUM PHOSPHATE AND BETAMETHASONE ACETATE 3; 3 MG/ML; MG/ML
3 INJECTION, SUSPENSION INTRA-ARTICULAR; INTRALESIONAL; INTRAMUSCULAR; SOFT TISSUE
Status: COMPLETED | OUTPATIENT
Start: 2023-11-22 | End: 2023-11-22

## 2023-11-22 RX ADMIN — LIDOCAINE HYDROCHLORIDE 0.5 ML: 10 INJECTION, SOLUTION INFILTRATION; PERINEURAL at 09:45

## 2023-11-22 RX ADMIN — BETAMETHASONE SODIUM PHOSPHATE AND BETAMETHASONE ACETATE 3 MG: 3; 3 INJECTION, SUSPENSION INTRA-ARTICULAR; INTRALESIONAL; INTRAMUSCULAR; SOFT TISSUE at 09:45

## 2023-11-22 NOTE — PROGRESS NOTES
ASSESSMENT/PLAN:    Assessment:   Left thumb CMC arthritis    Plan:   Discussed treatment options including continued observation, activity modification, bracing, anti-inflammatories, hand therapy, cortisone injection, versus surgical intervention. He wishes to proceed with repeat cortisone injection to the left thumb using Celestone today in the office. He may continue activities as tolerated. Follow Up:  3  month(s)    To Do Next Visit:  Re-evaluation    General Discussions:  CMC Arthritis: The anatomy and physiology of carpometacarpal joint arthritis was discussed with the patient today in the office. Deterioration of the articular cartilage eventually leads to hypermobility at the thumb [facility], resulting in joint subluxation, osteophyte formation, cystic changes within the trapezium and base of the first metacarpal, as well as subchondral sclerosis. Eventually, pain, limited mobility, and compensatory hyperextension at the metacarpophalangeal joint may develop. While normal activity and usage of the thumb joint may provide a painful experience to the patient, this typically does not result in damage to the thumb or hand. Treatment options include resting thumb spica splints to decreased joint edema, pain, and inflammation. Therapy exercises to strengthen the thenar musculature may relieve pain, but do not alter the overall continued development of osteoarthritis. Oral medications, topical medications, corticosteroid injections may decrease pain and increase overall function. Eventually, approximately 5% of patients may require surgical intervention. _____________________________________________________  CHIEF COMPLAINT:  Chief Complaint   Patient presents with    Left Thumb - CMC      Beta given          SUBJECTIVE:  Moshe Mackay is a 58 y.o. male who presents for follow up regarding left thumb CMC arthritis.   Last visit he received a cortisone injection using Celestone which was beneficial for him. He has had a gradual return of pain to the base of the left thumb. His pain is worse with activities. He is interested in repeating cortisone injection today in the office. PAST MEDICAL HISTORY:  Past Medical History:   Diagnosis Date    Cat scratch fever     Colon polyp     Diverticulitis     GERD (gastroesophageal reflux disease)     Hyperlipidemia     Hypertension     IFG (impaired fasting glucose)     L3 vertebral fracture (HCC) 07/2018    Lumbar compression fracture (HCC)     Patella-femoral syndrome     Thyroid nodule        PAST SURGICAL HISTORY:  Past Surgical History:   Procedure Laterality Date    CLOSED REDUCTION WRIST FRACTURE      COLONOSCOPY      FL RETROGRADE PYELOGRAM  8/26/2022    NV COLONOSCOPY FLX DX W/COLLJ SPEC WHEN PFRMD N/A 5/30/2018    Procedure: COLONOSCOPY;  Surgeon: Dalene Habermann, MD;  [facility]; Service: Colorectal    NV CYSTO/URETERO W/LITHOTRIPSY &INDWELL STENT INSRT Left 8/26/2022    Procedure: CYSTOSCOPY URETEROSCOPY, BASKET STONE EXTRACTION, RETROGRADE PYELOGRAM AND INSERTION STENT URETERAL;  Surgeon: Margarita Elder MD;  [facility];  Service: Urology    UMBILICAL GRANULOMA EXCISION Left     groin    US GUIDED THYROID BIOPSY  9/20/2018    US GUIDED THYROID BIOPSY  2/13/2019    US GUIDED THYROID BIOPSY  2/13/2023    WISDOM TOOTH EXTRACTION      WRIST GANGLION EXCISION  1969    closed reduction        FAMILY HISTORY:  Family History   Problem Relation Age of Onset    Hypertension Mother     Melanoma Father     Diabetes type II Father     Hypertension Father     Colon cancer Maternal Grandmother     Colon cancer Maternal Uncle     Hypertension Brother        SOCIAL HISTORY:  Social History     Tobacco Use    Smoking status: Never    Smokeless tobacco: Never   Vaping Use    Vaping Use: Never used   Substance Use Topics    Alcohol use:  Yes     Alcohol/week: 3.0 standard drinks of alcohol     Types: 3 Glasses of wine per week Comment: social    Drug use: No       MEDICATIONS:    Current Outpatient Medications:     amLODIPine (NORVASC) 5 mg tablet, Take 2 tablets (10 mg total) by mouth daily, Disp: 90 tablet, Rfl: 0    famotidine (PEPCID) 20 mg tablet, Take 1 tablet twice daily before meals, Disp: 60 tablet, Rfl: 5    glucosamine 500 MG CAPS capsule, Take by mouth daily  , Disp: , Rfl:     ibuprofen (MOTRIN) 200 mg tablet, Take by mouth every 6 (six) hours as needed for mild pain, Disp: , Rfl:     losartan (COZAAR) 100 MG tablet, Take 1 tablet (100 mg total) by mouth daily, Disp: 90 tablet, Rfl: 1    metoprolol succinate (TOPROL-XL) 100 mg 24 hr tablet, Take 1 tablet (100 mg total) by mouth daily, Disp: 90 tablet, Rfl: 0    multivitamin (THERAGRAN) TABS, Take 1 tablet by mouth daily, Disp: , Rfl:     Omega-3 Fatty Acids (FISH OIL) 1200 MG CAPS, Take by mouth 2 (two) times a day , Disp: , Rfl:     rosuvastatin (CRESTOR) 5 mg tablet, Take 1 tablet (5 mg total) by mouth daily, Disp: 90 tablet, Rfl: 0    Sodium Fluoride 5000 Sensitive 1.1-5 % GEL, , Disp: , Rfl:     ALLERGIES:  No Known Allergies    REVIEW OF SYSTEMS:  Pertinent items are noted in HPI. A comprehensive review of systems was negative.     LABS:  HgA1c:   Lab Results   Component Value Date    HGBA1C 6.5 (H) 07/31/2023     BMP:   Lab Results   Component Value Date    GLUCOSE 104 07/27/2018    CALCIUM 9.3 06/13/2023     (U) 08/22/2014    K 4.3 06/13/2023    CO2 27 06/13/2023     06/13/2023    BUN 17 06/13/2023    CREATININE 1.08 06/13/2023           _____________________________________________________  PHYSICAL EXAMINATION:  Vital signs: /61   Pulse 69   Ht 5' 7" (1.702 m)   Wt 84.7 kg (186 lb 12.8 oz)   BMI 29.26 kg/m²   General: well developed and well nourished, alert, oriented times 3, and appears comfortable  Psychiatric: Normal  HEENT: Trachea Midline, No torticollis  Cardiovascular: No discernable arrhythmia  Pulmonary: No wheezing or stridor  Abdomen: No rebound or guarding  Extremities: No peripheral edema  Skin: No masses, erythema, lacerations, fluctation, ulcerations  Neurovascular: Sensation Intact to the Median, Ulnar, Radial Nerve, Motor Intact to the Median, Ulnar, Radial Nerve, and Pulses Intact    MUSCULOSKELETAL EXAMINATION:  Left thumb: tender to palpation over CMC, mild shoulder sign, positive shuck, positive grind, positive circumduction    _____________________________________________________  STUDIES REVIEWED:  No Studies to review      PROCEDURES PERFORMED:  Small joint arthrocentesis: L thumb CMC  [place] Protocol:  Consent: Verbal consent obtained. Risks and benefits: risks, benefits and alternatives were discussed  Consent given by: patient  Time out: Immediately prior to procedure a "time out" was called to verify the correct patient, procedure, equipment, support staff and site/side marked as required.   Timeout called at: 11/22/2023 9:54 AM.  Patient understanding: patient states understanding of the procedure being performed  Site marked: the operative site was marked  Patient identity confirmed: verbally with patient  Supporting Documentation  Indications: pain   Procedure Details  Location: thumb - L thumb CMC  Preparation: Patient was prepped and draped in the usual sterile fashion  Needle size: 25 G  Ultrasound guidance: no  Medications administered: 0.5 mL lidocaine 1 %; 3 mg betamethasone acetate-betamethasone sodium phosphate 6 (3-3) mg/mL    Patient tolerance: patient tolerated the procedure well with no immediate complications  Dressing:  Sterile dressing applied            Scribe Attestation      I,:  Alexandra Pierson am acting as a scribe while in the presence of the attending physician.:       I,:  Sophia Elliott MD personally performed the services described in this documentation    as scribed in my presence.:

## 2023-12-02 DIAGNOSIS — I10 ESSENTIAL HYPERTENSION: ICD-10-CM

## 2023-12-04 RX ORDER — LOSARTAN POTASSIUM 100 MG/1
100 TABLET ORAL DAILY
Qty: 90 TABLET | Refills: 0 | Status: SHIPPED | OUTPATIENT
Start: 2023-12-04

## 2023-12-04 RX ORDER — AMLODIPINE BESYLATE 5 MG/1
10 TABLET ORAL DAILY
Qty: 90 TABLET | Refills: 0 | Status: SHIPPED | OUTPATIENT
Start: 2023-12-04

## 2024-01-17 DIAGNOSIS — I10 ESSENTIAL HYPERTENSION: ICD-10-CM

## 2024-01-17 DIAGNOSIS — E78.2 MIXED HYPERLIPIDEMIA: ICD-10-CM

## 2024-01-17 RX ORDER — AMLODIPINE BESYLATE 5 MG/1
10 TABLET ORAL DAILY
Qty: 90 TABLET | Refills: 0 | Status: SHIPPED | OUTPATIENT
Start: 2024-01-17

## 2024-01-17 RX ORDER — ROSUVASTATIN CALCIUM 5 MG/1
5 TABLET, COATED ORAL DAILY
Qty: 90 TABLET | Refills: 0 | Status: SHIPPED | OUTPATIENT
Start: 2024-01-17

## 2024-02-15 DIAGNOSIS — I10 ESSENTIAL HYPERTENSION: ICD-10-CM

## 2024-02-15 RX ORDER — METOPROLOL SUCCINATE 100 MG/1
100 TABLET, EXTENDED RELEASE ORAL DAILY
Qty: 90 TABLET | Refills: 0 | Status: SHIPPED | OUTPATIENT
Start: 2024-02-15

## 2024-02-21 ENCOUNTER — OFFICE VISIT (OUTPATIENT)
Dept: OBGYN CLINIC | Facility: HOSPITAL | Age: 64
End: 2024-02-21
Payer: COMMERCIAL

## 2024-02-21 ENCOUNTER — HOSPITAL ENCOUNTER (OUTPATIENT)
Dept: RADIOLOGY | Facility: HOSPITAL | Age: 64
Discharge: HOME | End: 2024-02-21
Attending: ORTHOPAEDIC SURGERY
Payer: COMMERCIAL

## 2024-02-21 VITALS
BODY MASS INDEX: 29.7 KG/M2 | WEIGHT: 189.2 LBS | DIASTOLIC BLOOD PRESSURE: 72 MMHG | HEIGHT: 67 IN | HEART RATE: 66 BPM | SYSTOLIC BLOOD PRESSURE: 128 MMHG

## 2024-02-21 DIAGNOSIS — M18.12 ARTHRITIS OF CARPOMETACARPAL (CMC) JOINT OF LEFT THUMB: Primary | ICD-10-CM

## 2024-02-21 DIAGNOSIS — M18.12 ARTHRITIS OF CARPOMETACARPAL (CMC) JOINT OF LEFT THUMB: ICD-10-CM

## 2024-02-21 PROCEDURE — 99213 OFFICE O/P EST LOW 20 MIN: CPT | Performed by: ORTHOPAEDIC SURGERY

## 2024-02-21 PROCEDURE — 20600 DRAIN/INJ JOINT/BURSA W/O US: CPT | Performed by: ORTHOPAEDIC SURGERY

## 2024-02-21 PROCEDURE — 73140 X-RAY EXAM OF FINGER(S): CPT

## 2024-02-21 RX ORDER — LIDOCAINE HYDROCHLORIDE 10 MG/ML
0.5 INJECTION, SOLUTION INFILTRATION; PERINEURAL
Status: COMPLETED | OUTPATIENT
Start: 2024-02-21 | End: 2024-02-21

## 2024-02-21 RX ORDER — TRIAMCINOLONE ACETONIDE 40 MG/ML
20 INJECTION, SUSPENSION INTRA-ARTICULAR; INTRAMUSCULAR
Status: COMPLETED | OUTPATIENT
Start: 2024-02-21 | End: 2024-02-21

## 2024-02-21 RX ADMIN — TRIAMCINOLONE ACETONIDE 20 MG: 40 INJECTION, SUSPENSION INTRA-ARTICULAR; INTRAMUSCULAR at 09:45

## 2024-02-21 RX ADMIN — LIDOCAINE HYDROCHLORIDE 0.5 ML: 10 INJECTION, SOLUTION INFILTRATION; PERINEURAL at 09:45

## 2024-02-21 NOTE — PROGRESS NOTES
ASSESSMENT/PLAN:    Assessment:   Left thumb CMC arthritis    Plan:   Discussed treatment options including continued observation, activity modification, bracing, anti-inflammatories, hand therapy, cortisone injection, versus surgical intervention. Cortisone injection administered to the left thumb CMC joint using Kenalog today in the office. He may continue use of comfort cool brace as needed. He may continue activities as tolerated.     Follow Up:  3  month(s)    To Do Next Visit:  Re-evaluation     General Discussions:  CMC Arthritis: The anatomy and physiology of carpometacarpal joint arthritis was discussed with the patient today in the office.  Deterioration of the articular cartilage eventually leads to hypermobility at the thumb CMC joint, resulting in joint subluxation, osteophyte formation, cystic changes within the trapezium and base of the first metacarpal, as well as subchondral sclerosis.  Eventually, pain, limited mobility, and compensatory hyperextension at the metacarpophalangeal joint may develop.  While normal activity and usage of the thumb joint may provide a painful experience to the patient, this typically does not result in damage to the thumb or hand.  Treatment options include resting thumb spica splints to decreased joint edema, pain, and inflammation.  Therapy exercises to strengthen the thenar musculature may relieve pain, but do not alter the overall continued development of osteoarthritis.  Oral medications, topical medications, corticosteroid injections may decrease pain and increase overall function.  Eventually, approximately 5% of patients may require surgical intervention.       _____________________________________________________  CHIEF COMPLAINT:  Chief Complaint   Patient presents with    Left Thumb - Follow-up     CMC- Beta5'7         SUBJECTIVE:  Flo Brennan is a 63 y.o. male who presents for follow up regarding his left thumb CMC arthritis.  At last visit he received  cortisone injection to the left thumb CMC joint using betamethasone.  He notes that this was mildly beneficial for roughly 1 month.  He has been experiencing worsening left thumb pain.  He has been wearing his Comfort Cool brace as needed.  He notes that he has a family history of gout.    PAST MEDICAL HISTORY:  Past Medical History:   Diagnosis Date    Cat scratch fever     Colon polyp     Diverticulitis     GERD (gastroesophageal reflux disease)     Hyperlipidemia     Hypertension     IFG (impaired fasting glucose)     L3 vertebral fracture (HCC) 07/2018    Lumbar compression fracture (HCC)     Patella-femoral syndrome     Thyroid nodule        PAST SURGICAL HISTORY:  Past Surgical History:   Procedure Laterality Date    CLOSED REDUCTION WRIST FRACTURE      COLONOSCOPY      FL RETROGRADE PYELOGRAM  8/26/2022    AL COLONOSCOPY FLX DX W/COLLJ SPEC WHEN PFRMD N/A 5/30/2018    Procedure: COLONOSCOPY;  Surgeon: DERRICK Martinez MD;  [facility];  Service: Colorectal    AL CYSTO/URETERO W/LITHOTRIPSY &INDWELL STENT INSRT Left 8/26/2022    Procedure: CYSTOSCOPY URETEROSCOPY, BASKET STONE EXTRACTION, RETROGRADE PYELOGRAM AND INSERTION STENT URETERAL;  Surgeon: Ronnie Parra MD;  [facility];  Service: Urology    UMBILICAL GRANULOMA EXCISION Left     groin    US GUIDED THYROID BIOPSY  9/20/2018    US GUIDED THYROID BIOPSY  2/13/2019    US GUIDED THYROID BIOPSY  2/13/2023    WISDOM TOOTH EXTRACTION      WRIST GANGLION EXCISION  1969    closed reduction        FAMILY HISTORY:  Family History   Problem Relation Age of Onset    Hypertension Mother     Melanoma Father     Diabetes type II Father     Hypertension Father     Colon cancer Maternal Grandmother     Colon cancer Maternal Uncle     Hypertension Brother        SOCIAL HISTORY:  Social History     Tobacco Use    Smoking status: Never    Smokeless tobacco: Never   Vaping Use    Vaping status: Never Used   Substance Use Topics    Alcohol use:  "Yes     Alcohol/week: 3.0 standard drinks of alcohol     Types: 3 Glasses of wine per week     Comment: social    Drug use: No       MEDICATIONS:    Current Outpatient Medications:     amLODIPine (NORVASC) 5 mg tablet, Take 2 tablets (10 mg total) by mouth daily, Disp: 90 tablet, Rfl: 0    famotidine (PEPCID) 20 mg tablet, Take 1 tablet twice daily before meals, Disp: 60 tablet, Rfl: 5    glucosamine 500 MG CAPS capsule, Take by mouth daily  , Disp: , Rfl:     ibuprofen (MOTRIN) 200 mg tablet, Take by mouth every 6 (six) hours as needed for mild pain, Disp: , Rfl:     losartan (COZAAR) 100 MG tablet, Take 1 tablet (100 mg total) by mouth daily, Disp: 90 tablet, Rfl: 0    metoprolol succinate (TOPROL-XL) 100 mg 24 hr tablet, Take 1 tablet (100 mg total) by mouth daily, Disp: 90 tablet, Rfl: 0    multivitamin (THERAGRAN) TABS, Take 1 tablet by mouth daily, Disp: , Rfl:     Omega-3 Fatty Acids (FISH OIL) 1200 MG CAPS, Take by mouth 2 (two) times a day , Disp: , Rfl:     rosuvastatin (CRESTOR) 5 mg tablet, Take 1 tablet (5 mg total) by mouth daily, Disp: 90 tablet, Rfl: 0    Sodium Fluoride 5000 Sensitive 1.1-5 % GEL, , Disp: , Rfl:     ALLERGIES:  No Known Allergies    REVIEW OF SYSTEMS:  Pertinent items are noted in HPI.  A comprehensive review of systems was negative.    LABS:  HgA1c:   Lab Results   Component Value Date    HGBA1C 6.5 (H) 07/31/2023     BMP:   Lab Results   Component Value Date    GLUCOSE 104 07/27/2018    CALCIUM 9.3 06/13/2023     (U) 08/22/2014    K 4.3 06/13/2023    CO2 27 06/13/2023     06/13/2023    BUN 17 06/13/2023    CREATININE 1.08 06/13/2023       _____________________________________________________  PHYSICAL EXAMINATION:  Vital signs: /72   Pulse 66   Ht 5' 7\" (1.702 m)   Wt 85.8 kg (189 lb 3.2 oz)   BMI 29.63 kg/m²   General: well developed and well nourished, alert, oriented times 3, and appears comfortable  Psychiatric: Normal  HEENT: Trachea Midline, No " "torticollis  Cardiovascular: No discernable arrhythmia  Pulmonary: No wheezing or stridor  Abdomen: No rebound or guarding  Extremities: No peripheral edema  Skin: No masses, erythema, lacerations, fluctation, ulcerations  Neurovascular: Sensation Intact to the Median, Ulnar, Radial Nerve, Motor Intact to the Median, Ulnar, Radial Nerve, and Pulses Intact    MUSCULOSKELETAL EXAMINATION:  Left thumb: Tender to palpation over CMC joint, 10 degrees hyperextension, no laxity or instability, positive circumduction, positive grind    _____________________________________________________  STUDIES REVIEWED:  Images were reviewed in PACS by Dr. Erickson and demonstrate: X-rays of the left thumb 2/21/2024 were reviewed and shows progressing degenerative changes to the CMC joint compared to radiographs from 2020      PROCEDURES PERFORMED:  Small joint arthrocentesis: L thumb CMC  [place] Protocol:  Consent: Verbal consent obtained.  Risks and benefits: risks, benefits and alternatives were discussed  Consent given by: patient  Time out: Immediately prior to procedure a \"time out\" was called to verify the correct patient, procedure, equipment, support staff and site/side marked as required.  Timeout called at: 2/21/2024 10:20 AM.  Patient understanding: patient states understanding of the procedure being performed  Site marked: the operative site was marked  Required items: required blood products, implants, devices, and special equipment available  Patient identity confirmed: verbally with patient  Supporting Documentation  Indications: pain   Procedure Details  Location: thumb - L thumb CMC  Preparation: Patient was prepped and draped in the usual sterile fashion  Needle size: 25 G  Ultrasound guidance: no  Medications administered: 0.5 mL lidocaine 1 %; 20 mg triamcinolone acetonide 40 mg/mL    Patient tolerance: patient tolerated the procedure well with no immediate complications  Dressing:  Sterile dressing " applied            Scribe Attestation      I,:  Niurka Rossi am acting as a scribe while in the presence of the attending physician.:       I,:  Onofre Erickson MD personally performed the services described in this documentation    as scribed in my presence.:

## 2024-02-24 DIAGNOSIS — I10 ESSENTIAL HYPERTENSION: ICD-10-CM

## 2024-02-26 RX ORDER — AMLODIPINE BESYLATE 5 MG/1
10 TABLET ORAL DAILY
Qty: 90 TABLET | Refills: 0 | Status: SHIPPED | OUTPATIENT
Start: 2024-02-26

## 2024-02-28 ENCOUNTER — OFFICE VISIT (OUTPATIENT)
Dept: FAMILY MEDICINE CLINIC | Facility: CLINIC | Age: 64
End: 2024-02-28
Payer: COMMERCIAL

## 2024-02-28 VITALS
DIASTOLIC BLOOD PRESSURE: 80 MMHG | RESPIRATION RATE: 18 BRPM | OXYGEN SATURATION: 96 % | TEMPERATURE: 97.3 F | WEIGHT: 187.6 LBS | HEIGHT: 67 IN | SYSTOLIC BLOOD PRESSURE: 134 MMHG | HEART RATE: 64 BPM | BODY MASS INDEX: 29.44 KG/M2

## 2024-02-28 DIAGNOSIS — E78.2 MIXED HYPERLIPIDEMIA: ICD-10-CM

## 2024-02-28 DIAGNOSIS — K21.9 GASTROESOPHAGEAL REFLUX DISEASE, UNSPECIFIED WHETHER ESOPHAGITIS PRESENT: ICD-10-CM

## 2024-02-28 DIAGNOSIS — N18.2 STAGE 2 CHRONIC KIDNEY DISEASE: ICD-10-CM

## 2024-02-28 DIAGNOSIS — E11.9 TYPE 2 DIABETES MELLITUS WITHOUT COMPLICATION, WITHOUT LONG-TERM CURRENT USE OF INSULIN (HCC): Primary | ICD-10-CM

## 2024-02-28 DIAGNOSIS — Z12.5 PROSTATE CANCER SCREENING: ICD-10-CM

## 2024-02-28 DIAGNOSIS — I10 ESSENTIAL HYPERTENSION: ICD-10-CM

## 2024-02-28 DIAGNOSIS — E11.9 DIET-CONTROLLED TYPE 2 DIABETES MELLITUS (HCC): ICD-10-CM

## 2024-02-28 DIAGNOSIS — E04.1 THYROID NODULE: ICD-10-CM

## 2024-02-28 LAB
CREAT UR-MCNC: 88.4 MG/DL
MICROALBUMIN UR-MCNC: 15.1 MG/L
MICROALBUMIN/CREAT 24H UR: 17 MG/G CREATININE (ref 0–30)
SL AMB POCT HEMOGLOBIN AIC: 6.1 (ref ?–6.5)

## 2024-02-28 PROCEDURE — 99214 OFFICE O/P EST MOD 30 MIN: CPT | Performed by: NURSE PRACTITIONER

## 2024-02-28 PROCEDURE — 83036 HEMOGLOBIN GLYCOSYLATED A1C: CPT | Performed by: NURSE PRACTITIONER

## 2024-02-28 PROCEDURE — 82570 ASSAY OF URINE CREATININE: CPT | Performed by: NURSE PRACTITIONER

## 2024-02-28 PROCEDURE — 82043 UR ALBUMIN QUANTITATIVE: CPT | Performed by: NURSE PRACTITIONER

## 2024-02-28 NOTE — ASSESSMENT & PLAN NOTE
Lab Results   Component Value Date    EGFR 72 06/13/2023    EGFR 76 03/06/2023    EGFR 63 08/16/2022    CREATININE 1.08 06/13/2023    CREATININE 1.04 03/06/2023    CREATININE 1.20 08/16/2022   Due for surveillance blood work prior to his next visit in 6 months.  Kidney function stable.  Nephrotoxins discussed.

## 2024-02-28 NOTE — ASSESSMENT & PLAN NOTE
Lab Results   Component Value Date    HGBA1C 6.1 02/28/2024   Hemoglobin A1c in the office today is 6.1.  Currently he is diet controlled.  Diabetic foot exam performed in the office today.  He is up-to-date with his eye exams.

## 2024-02-28 NOTE — ASSESSMENT & PLAN NOTE
patient continues on Crestor.  Low-fat diet recommended.  Weight loss recommended.  Surveillance blood work ordered for the patient to have done prior to his next visit.

## 2024-02-28 NOTE — PATIENT INSTRUCTIONS
Cholesterol and Your Health   AMBULATORY CARE:   Cholesterol  is a waxy, fat-like substance. Your body uses cholesterol to make hormones and new cells, and to protect nerves. Cholesterol is made by your body. It also comes from certain foods you eat, such as meat and dairy products. Your healthcare provider can help you set goals for your cholesterol levels. Your provider can help you create a plan to meet your goals.  Cholesterol level goals:  Your cholesterol level goals depend on your risk for heart disease, your age, and your other health conditions. The following are general guidelines:  Total cholesterol  includes low-density lipoprotein (LDL), high-density lipoprotein (HDL), and triglyceride levels. The total cholesterol level should be lower than 200 mg/dL and is best at about 150 mg/dL.    LDL cholesterol  is called bad cholesterol  because it forms plaque in your arteries. As plaque builds up, your arteries become narrow, and less blood flows through. When plaque decreases blood flow to your heart, you may have chest pain. If plaque completely blocks an artery that brings blood to your heart, you may have a heart attack. Plaque can break off and form blood clots. Blood clots may block arteries in your brain and cause a stroke. The level should be less than 130 mg/dL and is best at about 100 mg/dL.         HDL cholesterol  is called good cholesterol  because it helps remove LDL cholesterol from your arteries. It does this by attaching to LDL cholesterol and carrying it to your liver. Your liver breaks down LDL cholesterol so your body can get rid of it. High levels of HDL cholesterol can help prevent a heart attack and stroke. Low levels of HDL cholesterol can increase your risk for heart disease, heart attack, and stroke. The level should be at least 40 mg/dL in males or at least 50 mg/dL in females.    Triglycerides  are a type of fat that store energy from foods you eat. High levels of triglycerides also  cause plaque buildup. This can increase your risk for a heart attack or stroke. If your triglyceride level is high, your LDL cholesterol level may also be high. The level should be less than 150 mg/dL.    Any of the following can increase your risk for high cholesterol:   Smoking or drinking large amounts of alcohol    Having overweight or obesity, or not getting enough exercise    A medical condition such as hypertension (high blood pressure) or diabetes    A family history of high cholesterol    Age older than 65    What you need to know about having your cholesterol levels checked:  Adults 20 to 45 years of age should have their cholesterol levels checked every 4 to 6 years. Adults 45 years or older should have their cholesterol checked every 1 to 2 years. You may need your cholesterol checked more often, or at a younger age, if you have risk factors for heart disease. You may also need to have your cholesterol checked more often if you have other health conditions, such as diabetes. Blood tests are used to check cholesterol levels. Blood tests measure your levels of triglycerides, LDL cholesterol, and HDL cholesterol.  How healthy fats affect your cholesterol levels:  Healthy fats, also called unsaturated fats, help lower LDL cholesterol and triglyceride levels. Healthy fats include the following:  Monounsaturated fats  are found in foods such as olive oil, canola oil, avocado, nuts, and olives.    Polyunsaturated fats,  such as omega 3 fats, are found in fish, such as salmon, trout, and tuna. They can also be found in plant foods such as flaxseed, walnuts, and soybeans.    How unhealthy fats affect your cholesterol levels:  Unhealthy fats increase LDL cholesterol and triglyceride levels. They are found in foods high in cholesterol, saturated fat, and trans fat:  Cholesterol  is found in eggs, dairy, and meat.    Saturated fat  is found in butter, cheese, ice cream, whole milk, and coconut oil. Saturated fat is  also found in meat, such as sausage, hot dogs, and bologna.    Trans fat  is found in liquid oils and is used in fried and baked foods. Foods that contain trans fats include chips, crackers, muffins, sweet rolls, microwave popcorn, and cookies.    Treatment  for high cholesterol will also decrease your risk of heart disease, heart attack, and stroke. Treatment may include any of the following:  Lifestyle changes  may include food, exercise, weight loss, and quitting smoking. You may also need to decrease the amount of alcohol you drink. Your healthcare provider will want you to start with lifestyle changes. Other treatment may be added if lifestyle changes are not enough. Your healthcare provider may recommend you work with a team to manage hyperlipidemia. The team may include medical experts such as a dietitian, an exercise or physical therapist, and a behavior therapist. Your family members may be included in helping you create lifestyle changes.    Medicines  may be given to lower your LDL cholesterol, triglyceride levels, or total cholesterol level. You may need medicines to lower your cholesterol if any of the following is true:    You have a history of stroke, TIA, unstable angina, or a heart attack.    Your LDL cholesterol level is 190 mg/dL or higher.    You are age 40 to 75 years, have diabetes or heart disease risk factors, and your LDL cholesterol is 70 mg/dL or higher.    Supplements  include fish oil, red yeast rice, and garlic. Fish oil may help lower your triglyceride and LDL cholesterol levels. It may also increase your HDL cholesterol level. Red yeast rice may help decrease your total cholesterol level and LDL cholesterol level. Garlic may help lower your total cholesterol level. Do not take any supplements without talking to your healthcare provider.    Food changes you can make to lower your cholesterol levels:  A dietitian can help you create a healthy eating plan. Your dietitian can show you how  to read food labels and choose foods low in saturated fat, trans fats, and cholesterol.     Decrease the total amount of fat you eat.  Choose lean meats, fat-free or 1% fat milk, and low-fat dairy products, such as yogurt and cheese. Try to limit or avoid red meats. Limit or do not eat fried foods or baked goods, such as cookies.    Replace unhealthy fats with healthy fats.  Cook foods in olive oil or canola oil. Choose soft margarines that are low in saturated fat and trans fat. Seeds, nuts, and avocados are other examples of healthy fats.    Eat foods with omega-3 fats.  Examples include salmon, tuna, mackerel, walnuts, and flaxseed. Eat fish 2 times per week. Pregnant women should not eat fish that have high levels of mercury, such as shark, swordfish, and allen mackerel.         Increase the amount of high-fiber foods you eat.  High-fiber foods can help lower your LDL cholesterol. Aim to get between 20 and 30 grams of fiber each day. Fruits and vegetables are high in fiber. Eat at least 5 servings each day. Other high-fiber foods are whole-grain or whole-wheat breads, pastas, or cereals, and brown rice. Eat 3 ounces of whole-grain foods each day. Increase fiber slowly. You may have abdominal discomfort, bloating, and gas if you add fiber to your diet too quickly.         Eat healthy protein foods.  Examples include low-fat dairy products, skinless chicken and turkey, fish, and nuts.    Limit foods and drinks that are high in sugar.  Your dietitian or healthcare provider can help you create daily limits for high-sugar foods and drinks. The limit may be lower if you have diabetes or another health condition. Limits can also help you lose weight if needed.  Lifestyle changes you can make to lower your cholesterol levels:   Maintain a healthy weight.  Ask your healthcare provider what a healthy weight is for you. Ask your provider to help you create a weight loss plan if needed. Weight loss can decrease your total  cholesterol and triglyceride levels. Weight loss may also help keep your blood pressure at a healthy level.    Be physically active throughout the day.  Physical activity, such as exercise, can help lower your total cholesterol level and maintain a healthy weight. Physical activity can also help increase your HDL cholesterol level. Work with your healthcare provider to create an program that is right for you. Get at least 30 to 40 minutes of moderate physical activity most days of the week. Examples of exercise include brisk walking, swimming, or biking. Also include strength training at least 2 times each week. Your healthcare providers can help you create a physical activity plan.            Do not smoke.  Nicotine and other chemicals in cigarettes and cigars can raise your cholesterol levels. Ask your healthcare provider for information if you currently smoke and need help to quit. E-cigarettes or smokeless tobacco still contain nicotine. Talk to your healthcare provider before you use these products.         Limit or do not drink alcohol.  Alcohol can increase your triglyceride levels. Ask your healthcare provider before you drink alcohol. Ask how much is okay for you to drink in 24 hours or 1 week.    Follow up with your doctor as directed:  Write down your questions so you remember to ask them during your visits.  © Copyright [employer] 2023 Information is for End User's use only and may not be sold, redistributed or otherwise used for commercial purposes.  The above information is an  only. It is not intended as medical advice for individual conditions or treatments. Talk to your doctor, nurse or pharmacist before following any medical regimen to see if it is safe and effective for you.  Good fat  Good fats come mainly from vegetables, nuts, seeds, and fish. They differ from saturated fats by having fewer hydrogen atoms bonded to their carbon chains. Healthy fats are liquid at room temperature,  "not solid. There are two broad categories of beneficial fats: monounsaturated and polyunsaturated fats.    Monounsaturated fats. When you dip your bread in olive oil at an Italian restaurant, you're getting mostly monounsaturated fat. Monounsaturated fats have a single carbon-to-carbon double bond. The result is that it has two fewer hydrogen atoms than a saturated fat and a bend at the double bond. This structure keeps monounsaturated fats liquid at room temperature.    Good sources of monounsaturated fats are olive oil, peanut oil, canola oil, avocados, and most nuts, as well as high-oleic safflower and sunflower oils.  The discovery that monounsaturated fat could be healthful came from the [place] Study during the 1960s. It revealed that people in [facility] and other parts of the [place] region enjoyed a low rate of heart disease despite a high-fat diet. The main fat in their diet, though, was not the saturated animal fat common in countries with higher rates of heart disease. It was olive oil, which contains mainly monounsaturated fat. This finding produced a surge of interest in olive oil and the \"Mediterranean diet,\" a style of eating regarded as a healthful choice today.  Although there's no recommended daily intake of monounsaturated fats, the [place] [employer] recommends using them as much as possible along with polyunsaturated fats to replace saturated and trans fats.        Polyunsaturated fats. When you pour liquid cooking oil into a pan, there's a good chance you're using polyunsaturated fat. Corn oil, sunflower oil, and safflower oil are common examples. Polyunsaturated fats are essential fats. That means they're required for normal body functions but your body can't make them. So you must get them from food. Polyunsaturated fats are used to build cell membranes and the covering of nerves. They are needed for blood clotting, muscle movement, and inflammation.  A polyunsaturated fat has " two or more double bonds in its carbon chain. There are two main types of polyunsaturated fats: omega-3 fatty acids and omega-6 fatty acids. The numbers refer to the distance between the beginning of the carbon chain and the first double bond. Both types offer health benefits.  Eating polyunsaturated fats in place of saturated fats or highly refined carbohydrates reduces harmful LDL cholesterol and improves the cholesterol profile. It also lowers triglycerides.    Good sources of omega-3 fatty acids include fatty fish such as salmon, mackerel, and sardines, flaxseeds, walnuts, canola oil, and unhydrogenated soybean oil.  Omega-3 fatty acids may help prevent and even treat heart disease and stroke. In addition to reducing blood pressure, raising HDL, and lowering triglycerides, polyunsaturated fats may help prevent lethal heart rhythms from arising. Evidence also suggests they may help reduce the need for corticosteroid medications in people with rheumatoid arthritis. Studies linking omega-3s to a wide range of other health improvements, including reducing risk of dementia, are inconclusive, and some of them have major flaws, according to a systematic review of the evidence by the Agency for [employer] and Quality.    Omega-6 fatty acids have also been linked to protection against heart disease. Foods rich in linoleic acid and other omega-6 fatty acids include vegetable oils such as safflower, soybean, sunflower, walnut, and corn oils.   Hypertension   WHAT YOU NEED TO KNOW:   Hypertension is high blood pressure. Your blood pressure is the force of your blood moving against the walls of your arteries. Hypertension causes your blood pressure to get so high that your heart has to work much harder than normal. This can damage your heart. Hypertension that does not respond to medicines and lifestyle changes is called resistant hypertension. Hypertension is considered chronic when it continues for 3 months or  longer.  DISCHARGE INSTRUCTIONS:   Call your local emergency number (911 in the [place]) or have someone call if:   You have chest pain.    You have any of the following signs of a heart attack:      Squeezing, pressure, or pain in your chest    You may  also have any of the following:     Discomfort or pain in your back, neck, jaw, stomach, or arm    Shortness of breath    Nausea or vomiting    Lightheadedness or a sudden cold sweat    You become confused or have trouble speaking.    You suddenly feel lightheaded or have trouble breathing.    Return to the emergency department if:   You have a severe headache or vision loss.    You have weakness in an arm or leg.    Call your doctor or cardiologist if:   You feel faint, dizzy, confused, or drowsy.    You have been taking your blood pressure medicine but your pressure is higher than your provider says it should be.    You have questions or concerns about your condition or care.    Medicines:  You may  need any of the following:  Antihypertensives  may be used to help lower your blood pressure. Several kinds of medicines are available. Your healthcare provider will choose medicines based on the kind of hypertension you have. You may need more than one type of medicine. Take the medicine exactly as directed.    Diuretics  help decrease extra fluid that collects in your body. This will help lower your blood pressure. You may urinate more often while you take this medicine.    Cholesterol medicine  helps lower your cholesterol level. A low cholesterol level helps prevent heart disease and makes it easier to control your blood pressure.    Take your medicine as directed.  Contact your healthcare provider if you think your medicine is not helping or if you have side effects. Tell your provider if you are allergic to any medicine. Keep a list of the medicines, vitamins, and herbs you take. Include the amounts, and when and why you take them. Bring the list or the pill bottles  to follow-up visits. Carry your medicine list with you in case of an emergency.    Follow up with your doctor or cardiologist as directed:  You will need to return to have your blood pressure checked and to have other lab tests done. Write down your questions so you remember to ask them during your visits.  Stages of hypertension:  Your healthcare provider will give you a blood pressure goal based on your age, health, and risk for cardiovascular disease. The following are general guidelines on the stages of hypertension:  Normal blood pressure is 119/79 or lower . Your healthcare provider may only check your blood pressure each year if it stays at a normal level.    Elevated blood pressure is 120/79 to 129/79 . This is sometimes called prehypertension. Your healthcare provider may suggest lifestyle changes to help lower your blood pressure to a normal level. He or she may then check it again in 3 to 6 months.    Stage 1 hypertension is 130/80  to 139/89 . Your provider may recommend lifestyle changes, medication, and checks every 3 to 6 months until your blood pressure is controlled.    Stage 2 hypertension is 140/90 or higher . Your provider will recommend lifestyle changes and have you take 2 kinds of hypertension medicines. You will also need to have your blood pressure checked monthly until it is controlled.       Manage hypertension:   Check your blood pressure at home.  Do not smoke, have caffeine, or exercise for at least 30 minutes before you check your blood pressure. Sit and rest for 5 minutes before you check your blood pressure. Extend your arm and support it on a flat surface. Your arm should be at the same level as your heart. Follow the directions that came with your blood pressure monitor. Check your blood pressure 2 times, 1 minute apart, before you take your medicine in the morning. Also check your blood pressure before your evening meal. Keep a record of your readings and bring it to your  follow-up visits. Ask your healthcare provider what your blood pressure should be.         Manage any other health conditions you have.  Health conditions such as diabetes can increase your risk for hypertension. Follow your healthcare provider's instructions and take all your medicines as directed.    Ask about all medicines.  Certain medicines can increase your blood pressure. Examples include oral birth control pills, decongestants, herbal supplements, and NSAIDs, such as ibuprofen. Your healthcare provider can tell you which medicines are safe for you to take. This includes prescription and over-the-counter medicines.    Lifestyle changes you can make to manage hypertension:  Your healthcare provider may recommend you work with a team to manage hypertension. The team may include medical experts such as a dietitian, an exercise or physical therapist, and a behavior therapist. Your family members may be included in helping you create lifestyle changes.     Limit sodium (salt) as directed.  Too much sodium can affect your fluid balance. Check labels to find low-sodium or no-salt-added foods. Some low-sodium foods use potassium salts for flavor. Too much potassium can also cause health problems. Your healthcare provider will tell you how much sodium and potassium are safe for you to have in a day. He or she may recommend that you limit sodium to 2,300 mg a day.         Follow the meal plan recommended by your healthcare provider.  A dietitian or your provider can give you more information on low-sodium plans or the DASH (Dietary Approaches to Stop Hypertension) eating plan. The DASH plan is low in sodium, processed sugar, unhealthy fats, and total fat. It is high in potassium, calcium, and fiber. These can be found in vegetables, fruit, and whole-grain foods.         Be physically active throughout the day.  Physical activity, such as exercise, can help control your blood pressure and your weight. Be physically  active for at least 30 minutes per day, on most days of the week. Include aerobic activity, such as walking or riding a bicycle. Also include strength training at least 2 times each week. Your healthcare providers can help you create a physical activity plan.            Decrease stress.  This may help lower your blood pressure. Learn ways to relax, such as deep breathing or listening to music.    Limit alcohol as directed.  Alcohol can increase your blood pressure. A drink of alcohol is 12 ounces of beer, 5 ounces of wine, or 1½ ounces of liquor.    Do not smoke.  Nicotine and other chemicals in cigarettes and cigars can increase your blood pressure and also cause lung damage. Ask your healthcare provider for information if you currently smoke and need help to quit. E-cigarettes or smokeless tobacco still contain nicotine. Talk to your healthcare provider before you use these products.       © Copyright [employer] 2023 Information is for End User's use only and may not be sold, redistributed or otherwise used for commercial purposes.  The above information is an  only. It is not intended as medical advice for individual conditions or treatments. Talk to your doctor, nurse or pharmacist before following any medical regimen to see if it is safe and effective for you.

## 2024-02-28 NOTE — ASSESSMENT & PLAN NOTE
Follows with endocrinology.  Did have a benign biopsy performed in the past.  Due for thyroid ultrasound.  I did reprint the patient's order so that he could schedule this.

## 2024-02-28 NOTE — PROGRESS NOTES
[facility]    NAME: Flo Brennan  AGE: 63 y.o. SEX: male  : 1960     DATE: 2024     Assessment and Plan:     Problem List Items Addressed This Visit        Digestive    GERD (gastroesophageal reflux disease)     Continue pepcid. Continue to monitor diet for triggers. Stable presently            Endocrine    Thyroid nodule     Follows with endocrinology.  Did have a benign biopsy performed in the past.  Due for thyroid ultrasound.  I did reprint the patient's order so that he could schedule this.         Diet-controlled type 2 diabetes mellitus (HCC) - Primary       Lab Results   Component Value Date    HGBA1C 6.1 2024   Hemoglobin A1c in the office today is 6.1.  Currently he is diet controlled.  Diabetic foot exam performed in the office today.  He is up-to-date with his eye exams.            Cardiovascular and Mediastinum    Essential hypertension     Blood pressure in the office today is 134/80.  He continues on his amlodipine 5 mg and losartan 100 mg as well as his metoprolol  mg.  He continues to watch the salt in his diet.         Relevant Orders    CBC and differential    Comprehensive metabolic panel       Genitourinary    Stage 2 chronic kidney disease     Lab Results   Component Value Date    EGFR 72 2023    EGFR 76 2023    EGFR 63 2022    CREATININE 1.08 2023    CREATININE 1.04 2023    CREATININE 1.20 2022   Due for surveillance blood work prior to his next visit in 6 months.  Kidney function stable.  Nephrotoxins discussed.            Other    Mixed hyperlipidemia      patient continues on Crestor.  Low-fat diet recommended.  Weight loss recommended.  Surveillance blood work ordered for the patient to have done prior to his next visit.         Relevant Orders    Lipid Panel with Direct LDL reflex   Other Visit Diagnoses     Prostate cancer screening        Relevant Orders    PSA, Total  Screen              Return in about 6 months (around 8/28/2024) for yearly physical exam, Princess Gaspar.     Chief Complaint:     Chief Complaint   Patient presents with   • Follow-up     6 month f/u         History of Present Illness:   Sharon presents to the office today for 6-month follow-up.  Overall the patient is stable.  He is overdue for colon cancer screening and was due for a colonoscopy last year.  He does have a family history of colon cancer as well as a removal of polyps.  I did recommend he contact his gastroenterologist to have this scheduled.  He continues to follow with orthopedics for his left thumb arthritis.  Hemoglobin A1c in the office today is 6.1 which is improved from his prior of 6.5.  He is a diet-controlled diabetic.  Diabetic foot exam performed today.  He is up-to-date with diabetic eye exam.  Surveillance blood work ordered for the patient to have done prior to his next visit in 6 months.       Review of Systems:     Review of Systems   Constitutional: Negative.  Negative for fatigue.   HENT: Negative.  Negative for congestion, postnasal drip, rhinorrhea and trouble swallowing.    Eyes: Negative.  Negative for visual disturbance.   Respiratory: Negative.  Negative for choking and shortness of breath.    Cardiovascular: Negative.  Negative for chest pain.   Gastrointestinal: Negative.    Endocrine: Negative.    Genitourinary: Negative.    Musculoskeletal: Negative.  Negative for arthralgias (left thumb), back pain, myalgias and neck pain.   Skin: Negative.    Neurological:  Negative for dizziness and headaches.   Psychiatric/Behavioral: Negative.          Problem List:     Patient Active Problem List   Diagnosis   • Thyroid nodule   • Primary osteoarthritis of left knee   • Primary osteoarthritis of right knee   • Essential hypertension   • Mixed hyperlipidemia   • Renal cyst   • DDD (degenerative disc disease), lumbar   • Spondylolisthesis of lumbosacral region   • Chronic low back pain  "  • Peyronie disease   • Vitamin D deficiency   • GERD (gastroesophageal reflux disease)   • Cervical spondylosis   • Myofascial pain syndrome   • Asymmetrical sensorineural hearing loss   • Stage 2 chronic kidney disease   • Renal stone   • BMI 29.0-29.9,adult   • Diet-controlled type 2 diabetes mellitus (HCC)   • Vaccine counseling        Objective:     /80   Pulse 64   Temp (!) 97.3 °F (36.3 °C) (Tympanic)   Resp 18   Ht 5' 7\" (1.702 m)   Wt 85.1 kg (187 lb 9.6 oz)   SpO2 96%   BMI 29.38 kg/m²     Current Outpatient Medications   Medication Sig Dispense Refill   • amLODIPine (NORVASC) 5 mg tablet Take 2 tablets (10 mg total) by mouth daily 90 tablet 0   • famotidine (PEPCID) 20 mg tablet Take 1 tablet twice daily before meals 60 tablet 5   • glucosamine 500 MG CAPS capsule Take by mouth daily       • ibuprofen (MOTRIN) 200 mg tablet Take by mouth every 6 (six) hours as needed for mild pain     • losartan (COZAAR) 100 MG tablet Take 1 tablet (100 mg total) by mouth daily 90 tablet 0   • metoprolol succinate (TOPROL-XL) 100 mg 24 hr tablet Take 1 tablet (100 mg total) by mouth daily 90 tablet 0   • multivitamin (THERAGRAN) TABS Take 1 tablet by mouth daily     • Omega-3 Fatty Acids (FISH OIL) 1200 MG CAPS Take by mouth 2 (two) times a day      • rosuvastatin (CRESTOR) 5 mg tablet Take 1 tablet (5 mg total) by mouth daily 90 tablet 0   • Sodium Fluoride 5000 Sensitive 1.1-5 % GEL        No current facility-administered medications for this visit.       Physical Exam  Vitals reviewed.   Constitutional:       Appearance: Normal appearance. He is obese.   HENT:      Head: Normocephalic and atraumatic.      Nose: Nose normal.      Mouth/Throat:      Mouth: Mucous membranes are moist.   Eyes:      Extraocular Movements: Extraocular movements intact.      Pupils: Pupils are equal, round, and reactive to light.   Cardiovascular:      Rate and Rhythm: Normal rate and regular rhythm.      Pulses: Normal pulses. " no weak pulses.           Dorsalis pedis pulses are 2+ on the right side and 2+ on the left side.        Posterior tibial pulses are 2+ on the right side and 2+ on the left side.      Heart sounds: Normal heart sounds.   Pulmonary:      Effort: Pulmonary effort is normal.      Breath sounds: Normal breath sounds.   Musculoskeletal:         General: Normal range of motion.   Feet:      Right foot:      Skin integrity: No ulcer, skin breakdown, erythema, warmth, callus or dry skin.      Left foot:      Skin integrity: No ulcer, skin breakdown, erythema, warmth, callus or dry skin.   Skin:     General: Skin is warm.   Neurological:      General: No focal deficit present.      Mental Status: He is alert and oriented to person, place, and time. Mental status is at baseline.   Psychiatric:         Mood and Affect: Mood normal.         Behavior: Behavior normal.         Thought Content: Thought content normal.         Judgment: Judgment normal.         Diabetic Foot Exam    Patient's shoes and socks removed.    Right Foot/Ankle   Right Foot Inspection  Skin Exam: skin normal and skin intact. No dry skin, no warmth, no callus, no erythema, no maceration, no abnormal color, no pre-ulcer, no ulcer and no callus.     Toe Exam: ROM and strength within normal limits.     Sensory   Vibration: intact  Proprioception: intact  Monofilament testing: intact    Vascular  Capillary refills: < 3 seconds  The right DP pulse is 2+. The right PT pulse is 2+.     Right Toe  - Comprehensive Exam  Ecchymosis: none  Arch: normal  Hammertoes: absent  Claw Toes: absent  Swelling: none   Tenderness: none       Left Foot/Ankle  Left Foot Inspection  Skin Exam: skin normal and skin intact. No dry skin, no warmth, no erythema, no maceration, normal color, no pre-ulcer, no ulcer and no callus.     Toe Exam: ROM and strength within normal limits.     Sensory   Vibration: intact  Proprioception: intact  Monofilament testing:  intact    Vascular  Capillary refills: < 3 seconds  The left DP pulse is 2+. The left PT pulse is 2+.     Left Toe  - Comprehensive Exam  Ecchymosis: none  Arch: normal  Hammertoes: absent  Claw toes: absent  Swelling: none   Tenderness: none       Assign Risk Category  No deformity present  No loss of protective sensation  No weak pulses  Risk: 0    JESSY Coffman  [facility]

## 2024-02-28 NOTE — ASSESSMENT & PLAN NOTE
Blood pressure in the office today is 134/80.  He continues on his amlodipine 5 mg and losartan 100 mg as well as his metoprolol  mg.  He continues to watch the salt in his diet.

## 2024-03-06 ENCOUNTER — HOSPITAL ENCOUNTER (OUTPATIENT)
Dept: RADIOLOGY | Facility: HOSPITAL | Age: 64
Discharge: HOME | End: 2024-03-06
Payer: COMMERCIAL

## 2024-03-06 DIAGNOSIS — E04.1 THYROID NODULE: ICD-10-CM

## 2024-03-06 DIAGNOSIS — I10 ESSENTIAL HYPERTENSION: ICD-10-CM

## 2024-03-06 PROCEDURE — 76536 US EXAM OF HEAD AND NECK: CPT

## 2024-03-06 RX ORDER — LOSARTAN POTASSIUM 100 MG/1
100 TABLET ORAL DAILY
Qty: 90 TABLET | Refills: 0 | Status: SHIPPED | OUTPATIENT
Start: 2024-03-06

## 2024-03-13 ENCOUNTER — TELEPHONE (OUTPATIENT)
Dept: ENDOCRINOLOGY | Facility: CLINIC | Age: 64
End: 2024-03-13

## 2024-03-13 NOTE — TELEPHONE ENCOUNTER
----- Message from JESSY Amato sent at 3/13/2024  8:23 AM EDT -----  Thyroid nodule has decreased in size. Would recommend to repeat thyroid US in 2 years for continued monitoring.

## 2024-04-03 ENCOUNTER — OFFICE VISIT (OUTPATIENT)
Dept: ENDOCRINOLOGY | Facility: CLINIC | Age: 64
End: 2024-04-03
Payer: COMMERCIAL

## 2024-04-03 VITALS
HEART RATE: 74 BPM | WEIGHT: 187.8 LBS | SYSTOLIC BLOOD PRESSURE: 130 MMHG | HEIGHT: 67 IN | BODY MASS INDEX: 29.47 KG/M2 | DIASTOLIC BLOOD PRESSURE: 86 MMHG

## 2024-04-03 DIAGNOSIS — E55.9 VITAMIN D DEFICIENCY: Primary | ICD-10-CM

## 2024-04-03 DIAGNOSIS — E11.9 DIET-CONTROLLED TYPE 2 DIABETES MELLITUS (HCC): ICD-10-CM

## 2024-04-03 DIAGNOSIS — E04.1 THYROID NODULE: ICD-10-CM

## 2024-04-03 PROCEDURE — 99214 OFFICE O/P EST MOD 30 MIN: CPT | Performed by: INTERNAL MEDICINE

## 2024-04-03 NOTE — PROGRESS NOTES
Flo Brennan 63 y.o. male MRN: [id number]    Encounter: [id number]      Assessment/Plan     Problem List Items Addressed This Visit          Endocrine    Diet-controlled type 2 diabetes mellitus (HCC)       Lab Results   Component Value Date    HGBA1C 6.1 02/28/2024   Well-controlled-continue to focus on dietary and lifestyle modifications and weight loss         Thyroid nodule     Stable on imaging-will continue to monitor and repeat thyroid ultrasound in 2 years to monitor for any changes in the size or characteristics of thyroid nodule         Relevant Orders    TSH, 3rd generation    T4, free    US thyroid       Other    Vitamin D deficiency - Primary     Will check vitamin D 25-hydroxy         Relevant Orders    Vitamin D 25 hydroxy        CC:   Thyroid nodule    History of Present Illness     HPI:  63-year-old male with thyroid nodule, diet-controlled diabetes seen in follow-up.  He has undergone fine-needle aspiration biopsy of the thyroid nodule twice which were negative for malignancy  No obstructive symptoms     + cough since Friday , no fever       Weight stable     No polyuria , polydipsia , no blurry vision , no numbness and tingling in feet     Review of Systems    Historical Information   Past Medical History:   Diagnosis Date    Cat scratch fever     Colon polyp     Diverticulitis     GERD (gastroesophageal reflux disease)     Hyperlipidemia     Hypertension     IFG (impaired fasting glucose)     L3 vertebral fracture (HCC) 07/2018    Lumbar compression fracture (HCC)     Patella-femoral syndrome     Thyroid nodule      Past Surgical History:   Procedure Laterality Date    CLOSED REDUCTION WRIST FRACTURE      COLONOSCOPY      FL RETROGRADE PYELOGRAM  8/26/2022    SD COLONOSCOPY FLX DX W/COLLJ SPEC WHEN PFRMD N/A 5/30/2018    Procedure: COLONOSCOPY;  Surgeon: DERRICK Martinez MD;  Location: [facility];  Service: Colorectal    SD CYSTO/URETERO W/LITHOTRIPSY &INDWELL STENT INSRT Left 8/26/2022     Procedure: CYSTOSCOPY URETEROSCOPY, BASKET STONE EXTRACTION, RETROGRADE PYELOGRAM AND INSERTION STENT URETERAL;  Surgeon: Ronnie Parra MD;  Location[facility];  Service: Urology    UMBILICAL GRANULOMA EXCISION Left     groin    US GUIDED THYROID BIOPSY  9/20/2018    US GUIDED THYROID BIOPSY  2/13/2019    US GUIDED THYROID BIOPSY  2/13/2023    WISDOM TOOTH EXTRACTION      WRIST GANGLION EXCISION  1969    closed reduction      Social History   Social History     Substance and Sexual Activity   Alcohol Use Yes    Alcohol/week: 3.0 standard drinks of alcohol    Types: 3 Glasses of wine per week    Comment: social     Social History     Substance and Sexual Activity   Drug Use No     Social History     Tobacco Use   Smoking Status Never    Passive exposure: Never   Smokeless Tobacco Never     Family History:   Family History   Problem Relation Age of Onset    Hypertension Mother     Melanoma Father     Diabetes type II Father     Hypertension Father     Colon cancer Maternal Grandmother     Colon cancer Maternal Uncle     Hypertension Brother        Meds/Allergies   Current Outpatient Medications   Medication Sig Dispense Refill    amLODIPine (NORVASC) 5 mg tablet Take 2 tablets (10 mg total) by mouth daily 90 tablet 0    famotidine (PEPCID) 20 mg tablet Take 1 tablet twice daily before meals 60 tablet 5    glucosamine 500 MG CAPS capsule Take by mouth daily        ibuprofen (MOTRIN) 200 mg tablet Take by mouth every 6 (six) hours as needed for mild pain      losartan (COZAAR) 100 MG tablet Take 1 tablet (100 mg total) by mouth daily 90 tablet 0    metoprolol succinate (TOPROL-XL) 100 mg 24 hr tablet Take 1 tablet (100 mg total) by mouth daily 90 tablet 0    multivitamin (THERAGRAN) TABS Take 1 tablet by mouth daily      Omega-3 Fatty Acids (FISH OIL) 1200 MG CAPS Take by mouth 2 (two) times a day       rosuvastatin (CRESTOR) 5 mg tablet Take 1 tablet (5 mg total) by mouth daily 90 tablet 0    Sodium  "Fluoride 5000 Sensitive 1.1-5 % GEL        No current facility-administered medications for this visit.     No Known Allergies    Objective   Vitals: Blood pressure 130/86, pulse 74, height 5' 7\" (1.702 m), weight 85.2 kg (187 lb 12.8 oz).    Physical Exam  Vitals reviewed.   Constitutional:       General: He is not in acute distress.     Appearance: Normal appearance. He is obese. He is not ill-appearing, toxic-appearing or diaphoretic.   HENT:      Head: Normocephalic and atraumatic.   Eyes:      General: No scleral icterus.     Extraocular Movements: Extraocular movements intact.   Neck:      Comments: Right lower pole thyroid nodule  Cardiovascular:      Rate and Rhythm: Normal rate and regular rhythm.      Heart sounds: Normal heart sounds. No murmur heard.  Pulmonary:      Effort: Pulmonary effort is normal. No respiratory distress.      Breath sounds: Normal breath sounds. No wheezing or rales.   Musculoskeletal:      Cervical back: Neck supple.      Right lower leg: No edema.      Left lower leg: No edema.   Lymphadenopathy:      Cervical: No cervical adenopathy.   Skin:     General: Skin is warm and dry.   Neurological:      General: No focal deficit present.      Mental Status: He is alert and oriented to person, place, and time.      Gait: Gait normal.   Psychiatric:         Mood and Affect: Mood normal.         Behavior: Behavior normal.         Thought Content: Thought content normal.         Judgment: Judgment normal.         The history was obtained from the review of the chart, patient.    Lab Results:        Imaging Studies:   Results for orders placed during the hospital encounter of 03/06/24    US thyroid    Impression  Decreased size of a 3.4 cm right lower pole nodule with benign molecular testing.    Reference: ACR Thyroid Imaging, Reporting and Data System (TI-RADS): White Paper of the ACR TI-RADS Committee. J AM Mayur Radiol 2017;14:587-595. (additional recommendations based on American " "Thyroid Association 2015 guidelines.)      Workstation performed: [id number]      I have personally reviewed pertinent reports.      Portions of the record may have been created with voice recognition software. Occasional wrong word or \"sound a like\" substitutions may have occurred due to the inherent limitations of voice recognition software. Read the chart carefully and recognize, using context, where substitutions have occurred.    "

## 2024-04-04 NOTE — ASSESSMENT & PLAN NOTE
Stable on imaging-will continue to monitor and repeat thyroid ultrasound in 2 years to monitor for any changes in the size or characteristics of thyroid nodule

## 2024-04-04 NOTE — ASSESSMENT & PLAN NOTE
Lab Results   Component Value Date    HGBA1C 6.1 02/28/2024   Well-controlled-continue to focus on dietary and lifestyle modifications and weight loss

## 2024-04-17 DIAGNOSIS — E78.2 MIXED HYPERLIPIDEMIA: ICD-10-CM

## 2024-04-17 DIAGNOSIS — I10 ESSENTIAL HYPERTENSION: ICD-10-CM

## 2024-04-17 RX ORDER — ROSUVASTATIN CALCIUM 5 MG/1
5 TABLET, COATED ORAL DAILY
Qty: 90 TABLET | Refills: 0 | Status: SHIPPED | OUTPATIENT
Start: 2024-04-17

## 2024-04-17 RX ORDER — AMLODIPINE BESYLATE 5 MG/1
10 TABLET ORAL DAILY
Qty: 90 TABLET | Refills: 0 | Status: SHIPPED | OUTPATIENT
Start: 2024-04-17

## 2024-05-02 DIAGNOSIS — E78.2 MIXED HYPERLIPIDEMIA: ICD-10-CM

## 2024-05-04 RX ORDER — ROSUVASTATIN CALCIUM 5 MG/1
5 TABLET, COATED ORAL DAILY
Qty: 90 TABLET | Refills: 1 | Status: SHIPPED | OUTPATIENT
Start: 2024-05-04

## 2024-05-22 ENCOUNTER — OFFICE VISIT (OUTPATIENT)
Dept: OBGYN CLINIC | Facility: HOSPITAL | Age: 64
End: 2024-05-22
Payer: COMMERCIAL

## 2024-05-22 VITALS
SYSTOLIC BLOOD PRESSURE: 145 MMHG | DIASTOLIC BLOOD PRESSURE: 73 MMHG | WEIGHT: 186.2 LBS | HEIGHT: 67 IN | BODY MASS INDEX: 29.22 KG/M2

## 2024-05-22 DIAGNOSIS — M18.12 ARTHRITIS OF CARPOMETACARPAL (CMC) JOINT OF LEFT THUMB: Primary | ICD-10-CM

## 2024-05-22 DIAGNOSIS — I10 ESSENTIAL HYPERTENSION: ICD-10-CM

## 2024-05-22 PROCEDURE — 99213 OFFICE O/P EST LOW 20 MIN: CPT | Performed by: ORTHOPAEDIC SURGERY

## 2024-05-22 PROCEDURE — 20600 DRAIN/INJ JOINT/BURSA W/O US: CPT | Performed by: ORTHOPAEDIC SURGERY

## 2024-05-22 RX ORDER — METOPROLOL SUCCINATE 100 MG/1
100 TABLET, EXTENDED RELEASE ORAL DAILY
Qty: 90 TABLET | Refills: 0 | Status: SHIPPED | OUTPATIENT
Start: 2024-05-22

## 2024-05-22 RX ORDER — LIDOCAINE HYDROCHLORIDE 10 MG/ML
0.5 INJECTION, SOLUTION INFILTRATION; PERINEURAL
Status: COMPLETED | OUTPATIENT
Start: 2024-05-22 | End: 2024-05-22

## 2024-05-22 RX ORDER — TRIAMCINOLONE ACETONIDE 40 MG/ML
20 INJECTION, SUSPENSION INTRA-ARTICULAR; INTRAMUSCULAR
Status: COMPLETED | OUTPATIENT
Start: 2024-05-22 | End: 2024-05-22

## 2024-05-22 RX ADMIN — LIDOCAINE HYDROCHLORIDE 0.5 ML: 10 INJECTION, SOLUTION INFILTRATION; PERINEURAL at 09:15

## 2024-05-22 RX ADMIN — TRIAMCINOLONE ACETONIDE 20 MG: 40 INJECTION, SUSPENSION INTRA-ARTICULAR; INTRAMUSCULAR at 09:15

## 2024-05-22 NOTE — PROGRESS NOTES
ASSESSMENT/PLAN:    Assessment:   Thumb CMC Arthritis  left    Plan:   Resume activities as tolerated, NSAIDs, and heat  Corticosteroid injction with Kenalog to the left thumb today    Follow Up:  3  month(s)    To Do Next Visit:  reevaluate    General Discussions:     CMC Arthritis: The anatomy and physiology of carpometacarpal joint arthritis was discussed with the patient today in the office.  Deterioration of the articular cartilage eventually leads to hypermobility at the thumb CMC joint, resulting in joint subluxation, osteophyte formation, cystic changes within the trapezium and base of the first metacarpal, as well as subchondral sclerosis.  Eventually, pain, limited mobility, and compensatory hyperextension at the metacarpophalangeal joint may develop.  While normal activity and usage of the thumb joint may provide a painful experience to the patient, this typically does not result in damage to the thumb or hand.  Treatment options include resting thumb spica splints to decreased joint edema, pain, and inflammation.  Therapy exercises to strengthen the thenar musculature may relieve pain, but do not alter the overall continued development of osteoarthritis.  Oral medications, topical medications, corticosteroid injections may decrease pain and increase overall function.  Eventually, approximately 5% of patients may require surgical intervention.         _____________________________________________________  CHIEF COMPLAINT:  Chief Complaint   Patient presents with    Left Thumb - Follow-up     CMC- Kenalog         SUBJECTIVE:  Flo Brennan is a 63 y.o. male who presents for follow up regarding Thumb CMC Arthritis  left.  Since last visit, Flo Brennan has tried steroid injections, NSAIDs, Tylenol, therapy, bracing, and heat with relief.  Today there is Pain  Moderate  Constant  Dull and Stiffness/LROM to the left thumb.    Radiation: None  Associated symptoms: None  Work status: employed at [employer]    PAST MEDICAL HISTORY:  Past Medical History:   Diagnosis Date    Cat scratch fever     Colon polyp     Diverticulitis     GERD (gastroesophageal reflux disease)     Hyperlipidemia     Hypertension     IFG (impaired fasting glucose)     L3 vertebral fracture (HCC) 07/2018    Lumbar compression fracture (HCC)     Patella-femoral syndrome     Thyroid nodule        PAST SURGICAL HISTORY:  Past Surgical History:   Procedure Laterality Date    CLOSED REDUCTION WRIST FRACTURE      COLONOSCOPY      FL RETROGRADE PYELOGRAM  8/26/2022    MA COLONOSCOPY FLX DX W/COLLJ SPEC WHEN PFRMD N/A 5/30/2018    Procedure: COLONOSCOPY;  Surgeon: DERRICK Martinez MD;  [facility];  Service: Colorectal    MA CYSTO/URETERO W/LITHOTRIPSY &INDWELL STENT INSRT Left 8/26/2022    Procedure: CYSTOSCOPY URETEROSCOPY, BASKET STONE EXTRACTION, RETROGRADE PYELOGRAM AND INSERTION STENT URETERAL;  Surgeon: Ronnie Parra MD;  [facility];  Service: Urology    UMBILICAL GRANULOMA EXCISION Left     groin    US GUIDED THYROID BIOPSY  9/20/2018    US GUIDED THYROID BIOPSY  2/13/2019    US GUIDED THYROID BIOPSY  2/13/2023    WISDOM TOOTH EXTRACTION      WRIST GANGLION EXCISION  1969    closed reduction        FAMILY HISTORY:  Family History   Problem Relation Age of Onset    Hypertension Mother     Melanoma Father     Diabetes type II Father     Hypertension Father     Colon cancer Maternal Grandmother     Colon cancer Maternal Uncle     Hypertension Brother        SOCIAL HISTORY:  Social History     Tobacco Use    Smoking status: Never     Passive exposure: Never    Smokeless tobacco: Never   Vaping Use    Vaping status: Never Used   Substance Use Topics    Alcohol use: Yes     Alcohol/week: 3.0 standard drinks of alcohol     Types: 3 Glasses of wine per week     Comment: social    Drug use: No       MEDICATIONS:    Current Outpatient Medications:     amLODIPine (NORVASC) 5 mg tablet, Take 2 tablets (10 mg total) by  "mouth daily, Disp: 90 tablet, Rfl: 0    famotidine (PEPCID) 20 mg tablet, Take 1 tablet twice daily before meals, Disp: 60 tablet, Rfl: 5    glucosamine 500 MG CAPS capsule, Take by mouth daily  , Disp: , Rfl:     ibuprofen (MOTRIN) 200 mg tablet, Take by mouth every 6 (six) hours as needed for mild pain, Disp: , Rfl:     losartan (COZAAR) 100 MG tablet, Take 1 tablet (100 mg total) by mouth daily, Disp: 90 tablet, Rfl: 0    metoprolol succinate (TOPROL-XL) 100 mg 24 hr tablet, Take 1 tablet (100 mg total) by mouth daily, Disp: 90 tablet, Rfl: 0    multivitamin (THERAGRAN) TABS, Take 1 tablet by mouth daily, Disp: , Rfl:     Omega-3 Fatty Acids (FISH OIL) 1200 MG CAPS, Take by mouth 2 (two) times a day , Disp: , Rfl:     rosuvastatin (CRESTOR) 5 mg tablet, Take 1 tablet (5 mg total) by mouth daily, Disp: 90 tablet, Rfl: 1    Sodium Fluoride 5000 Sensitive 1.1-5 % GEL, , Disp: , Rfl:     ALLERGIES:  No Known Allergies    REVIEW OF SYSTEMS:  Pertinent items are noted in HPI.  A comprehensive review of systems was negative.    LABS:  HgA1c:   Lab Results   Component Value Date    HGBA1C 6.1 02/28/2024     BMP:   Lab Results   Component Value Date    GLUCOSE 104 07/27/2018    CALCIUM 9.3 06/13/2023     (U) 08/22/2014    K 4.3 06/13/2023    CO2 27 06/13/2023     06/13/2023    BUN 17 06/13/2023    CREATININE 1.08 06/13/2023           _____________________________________________________  PHYSICAL EXAMINATION:  Vital signs: /73   Ht 5' 7\" (1.702 m)   Wt 84.5 kg (186 lb 3.2 oz)   BMI 29.16 kg/m²   General: well developed and well nourished, alert, oriented times 3, and appears comfortable  Psychiatric: Normal  HEENT: Trachea Midline, No torticollis  Cardiovascular: No discernable arrhythmia  Pulmonary: No wheezing or stridor  Abdomen: No rebound or guarding  Extremities: No peripheral edema  Skin: No masses, erythema, lacerations, fluctation, ulcerations  Neurovascular: Sensation Intact to the Median, " "Ulnar, Radial Nerve, Motor Intact to the Median, Ulnar, Radial Nerve, and Pulses Intact    MUSCULOSKELETAL EXAMINATION:  LEFT SIDE:  CMC: No Tenderness to STT, No Instability, Positive tendnerness CMC, Positive Shoulder Sign, Positive Hyperextension MP, and positive circumduction    _____________________________________________________  STUDIES REVIEWED:  No Studies to review      PROCEDURES PERFORMED:  Small joint arthrocentesis: L thumb CMC  [place] Protocol:  Consent given by: patient  Time out: Immediately prior to procedure a \"time out\" was called to verify the correct patient, procedure, equipment, support staff and site/side marked as required.  Timeout called at: 5/22/2024 9:29 AM.  Site marked: the operative site was marked  Supporting Documentation  Indications: pain   Procedure Details  Location: thumb - L thumb CMC  Preparation: Patient was prepped and draped in the usual sterile fashion  Needle size: 25 G  Ultrasound guidance: no  Approach: volar  Medications administered: 0.5 mL lidocaine 1 %; 20 mg triamcinolone acetonide 40 mg/mL    Patient tolerance: patient tolerated the procedure well with no immediate complications  Dressing:  Sterile dressing applied             Scribe Attestation      I,:   am acting as a scribe while in the presence of the attending physician.:       I,:   personally performed the services described in this documentation    as scribed in my presence.:             "

## 2024-06-11 DIAGNOSIS — I10 ESSENTIAL HYPERTENSION: ICD-10-CM

## 2024-06-12 RX ORDER — AMLODIPINE BESYLATE 5 MG/1
10 TABLET ORAL DAILY
Qty: 90 TABLET | Refills: 1 | Status: SHIPPED | OUTPATIENT
Start: 2024-06-12

## 2024-06-12 RX ORDER — LOSARTAN POTASSIUM 100 MG/1
100 TABLET ORAL DAILY
Qty: 90 TABLET | Refills: 1 | Status: SHIPPED | OUTPATIENT
Start: 2024-06-12

## 2024-07-31 DIAGNOSIS — E78.2 MIXED HYPERLIPIDEMIA: ICD-10-CM

## 2024-07-31 DIAGNOSIS — I10 ESSENTIAL HYPERTENSION: ICD-10-CM

## 2024-08-01 RX ORDER — ROSUVASTATIN CALCIUM 5 MG/1
5 TABLET, COATED ORAL DAILY
Qty: 90 TABLET | Refills: 0 | Status: SHIPPED | OUTPATIENT
Start: 2024-08-01

## 2024-08-01 RX ORDER — AMLODIPINE BESYLATE 5 MG/1
10 TABLET ORAL DAILY
Qty: 180 TABLET | Refills: 1 | Status: SHIPPED | OUTPATIENT
Start: 2024-08-01

## 2024-08-01 NOTE — TELEPHONE ENCOUNTER
Patient needs updated blood work and has previously placed orders. Please contact patient to go for labs. Courtesy refill provided.  Lipid panel

## 2024-08-28 ENCOUNTER — OFFICE VISIT (OUTPATIENT)
Dept: OBGYN CLINIC | Facility: HOSPITAL | Age: 64
End: 2024-08-28
Payer: MEDICARE

## 2024-08-28 VITALS
BODY MASS INDEX: 28.72 KG/M2 | SYSTOLIC BLOOD PRESSURE: 134 MMHG | HEART RATE: 70 BPM | DIASTOLIC BLOOD PRESSURE: 76 MMHG | HEIGHT: 67 IN | WEIGHT: 183 LBS

## 2024-08-28 DIAGNOSIS — M18.12 ARTHRITIS OF CARPOMETACARPAL (CMC) JOINT OF LEFT THUMB: Primary | ICD-10-CM

## 2024-08-28 PROCEDURE — 99213 OFFICE O/P EST LOW 20 MIN: CPT | Performed by: ORTHOPAEDIC SURGERY

## 2024-08-28 PROCEDURE — 20600 DRAIN/INJ JOINT/BURSA W/O US: CPT | Performed by: ORTHOPAEDIC SURGERY

## 2024-08-28 RX ORDER — ROPIVACAINE HYDROCHLORIDE 5 MG/ML
10 INJECTION, SOLUTION EPIDURAL; INFILTRATION; PERINEURAL
Status: COMPLETED | OUTPATIENT
Start: 2024-08-28 | End: 2024-08-28

## 2024-08-28 RX ORDER — TRIAMCINOLONE ACETONIDE 40 MG/ML
20 INJECTION, SUSPENSION INTRA-ARTICULAR; INTRAMUSCULAR
Status: COMPLETED | OUTPATIENT
Start: 2024-08-28 | End: 2024-08-28

## 2024-08-28 RX ADMIN — ROPIVACAINE HYDROCHLORIDE 10 ML: 5 INJECTION, SOLUTION EPIDURAL; INFILTRATION; PERINEURAL at 11:00

## 2024-08-28 RX ADMIN — TRIAMCINOLONE ACETONIDE 20 MG: 40 INJECTION, SUSPENSION INTRA-ARTICULAR; INTRAMUSCULAR at 11:00

## 2024-08-28 NOTE — PROGRESS NOTES
ASSESSMENT/PLAN:    Assessment:   Left thumb CMC arthritis    Plan:   Patient was given a left thumb CMC cortisone injection today with Kenalog.  He tolerated well.  He was advised that we can repeat the cortisone injection every 3 to 4 months as needed for pain  He will follow-up in 3 months for reevaluation    Follow Up:  3 months    To Do Next Visit:  Reevaluation    General Discussions:  CMC Arthritis: The anatomy and physiology of carpometacarpal joint arthritis was discussed with the patient today in the office.  Deterioration of the articular cartilage eventually leads to hypermobility at the thumb CMC joint, resulting in joint subluxation, osteophyte formation, cystic changes within the trapezium and base of the first metacarpal, as well as subchondral sclerosis.  Eventually, pain, limited mobility, and compensatory hyperextension at the metacarpophalangeal joint may develop.  While normal activity and usage of the thumb joint may provide a painful experience to the patient, this typically does not result in damage to the thumb or hand.  Treatment options include resting thumb spica splints to decreased joint edema, pain, and inflammation.  Therapy exercises to strengthen the thenar musculature may relieve pain, but do not alter the overall continued development of osteoarthritis.  Oral medications, topical medications, corticosteroid injections may decrease pain and increase overall function.  Eventually, approximately 5% of patients may require surgical intervention.           _____________________________________________________  CHIEF COMPLAINT:  Chief Complaint   Patient presents with    Left Thumb - Follow-up     CMC- Kenalog         SUBJECTIVE:  Flo Brennan is a 64 y.o. male who presents for follow-up regarding left thumb CMC arthritis.  He was previously given a cortisone injection which he states lasted for 3 months.  He just started to note some of the pain return at the base of his thumb.   He states he would like to try repeat cortisone injection for this issue.    PAST MEDICAL HISTORY:  Past Medical History:   Diagnosis Date    Cat scratch fever     Colon polyp     Diverticulitis     GERD (gastroesophageal reflux disease)     Hyperlipidemia     Hypertension     IFG (impaired fasting glucose)     L3 vertebral fracture (HCC) 07/2018    Lumbar compression fracture (HCC)     Patella-femoral syndrome     Thyroid nodule        PAST SURGICAL HISTORY:  Past Surgical History:   Procedure Laterality Date    CLOSED REDUCTION WRIST FRACTURE      COLONOSCOPY      FL RETROGRADE PYELOGRAM  8/26/2022    RI COLONOSCOPY FLX DX W/COLLJ SPEC WHEN PFRMD N/A 5/30/2018    Procedure: COLONOSCOPY;  Surgeon: DERRICK Martinez MD;  Location: [facility];  Service: Colorectal    RI CYSTO/URETERO W/LITHOTRIPSY &INDWELL STENT INSRT Left 8/26/2022    Procedure: CYSTOSCOPY URETEROSCOPY, BASKET STONE EXTRACTION, RETROGRADE PYELOGRAM AND INSERTION STENT URETERAL;  Surgeon: Ronnie Parra MD;  Location: [facility];  Service: Urology    UMBILICAL GRANULOMA EXCISION Left     groin    US GUIDED THYROID BIOPSY  9/20/2018    US GUIDED THYROID BIOPSY  2/13/2019    US GUIDED THYROID BIOPSY  2/13/2023    WISDOM TOOTH EXTRACTION      WRIST GANGLION EXCISION  1969    closed reduction        FAMILY HISTORY:  Family History   Problem Relation Age of Onset    Hypertension Mother     Melanoma Father     Diabetes type II Father     Hypertension Father     Colon cancer Maternal Grandmother     Colon cancer Maternal Uncle     Hypertension Brother        SOCIAL HISTORY:  Social History     Tobacco Use    Smoking status: Never     Passive exposure: Never    Smokeless tobacco: Never   Vaping Use    Vaping status: Never Used   Substance Use Topics    Alcohol use: Yes     Alcohol/week: 3.0 standard drinks of alcohol     Types: 3 Glasses of wine per week     Comment: social    Drug use: No       MEDICATIONS:    Current Outpatient Medications:      "amLODIPine (NORVASC) 5 mg tablet, Take 2 tablets (10 mg total) by mouth daily, Disp: 180 tablet, Rfl: 1    famotidine (PEPCID) 20 mg tablet, Take 1 tablet twice daily before meals, Disp: 60 tablet, Rfl: 5    glucosamine 500 MG CAPS capsule, Take by mouth daily  , Disp: , Rfl:     ibuprofen (MOTRIN) 200 mg tablet, Take by mouth every 6 (six) hours as needed for mild pain, Disp: , Rfl:     losartan (COZAAR) 100 MG tablet, Take 1 tablet (100 mg total) by mouth daily, Disp: 90 tablet, Rfl: 1    metoprolol succinate (TOPROL-XL) 100 mg 24 hr tablet, Take 1 tablet (100 mg total) by mouth daily, Disp: 90 tablet, Rfl: 0    multivitamin (THERAGRAN) TABS, Take 1 tablet by mouth daily, Disp: , Rfl:     Omega-3 Fatty Acids (FISH OIL) 1200 MG CAPS, Take by mouth 2 (two) times a day , Disp: , Rfl:     rosuvastatin (CRESTOR) 5 mg tablet, Take 1 tablet (5 mg total) by mouth daily, Disp: 90 tablet, Rfl: 0    Sodium Fluoride 5000 Sensitive 1.1-5 % GEL, , Disp: , Rfl:     ALLERGIES:  No Known Allergies    REVIEW OF SYSTEMS:  Pertinent items are noted in HPI.  A comprehensive review of systems was negative.    LABS:  HgA1c:   Lab Results   Component Value Date    HGBA1C 6.1 02/28/2024     BMP:   Lab Results   Component Value Date    GLUCOSE 104 07/27/2018    CALCIUM 9.3 06/13/2023     (U) 08/22/2014    K 4.3 06/13/2023    CO2 27 06/13/2023     06/13/2023    BUN 17 06/13/2023    CREATININE 1.08 06/13/2023       _____________________________________________________  PHYSICAL EXAMINATION:  Vital signs: /76   Pulse 70   Ht 5' 7\" (1.702 m)   Wt 83 kg (183 lb)   BMI 28.66 kg/m²   General: well developed and well nourished, alert, oriented times 3, and appears comfortable  Psychiatric: Normal  HEENT: Trachea Midline, No torticollis  Cardiovascular: No discernable arrhythmia  Pulmonary: No wheezing or stridor  Abdomen: No rebound or guarding  Extremities: No peripheral edema  Skin: No masses, erythema, lacerations, " "fluctation, ulcerations  Neurovascular: Sensation Intact to the Median, Ulnar, Radial Nerve, Motor Intact to the Median, Ulnar, Radial Nerve, and Pulses Intact    MUSCULOSKELETAL EXAMINATION:  left CMC Exam:  No adduction contracture  No hyperextension deformity of MCP joint  Positive localized tenderness over radial and dorsal aspect of thumb (CMC joint)  Grind test is positive for pain and positive for crepitus  No triggering or tenderness over the A1 pulley  No pain with Finkelstein’s maneuver           _____________________________________________________  STUDIES REVIEWED:  No Studies to review      PROCEDURES PERFORMED:  Small joint arthrocentesis: L thumb CMC  [place] Protocol:  Consent: Verbal consent obtained.  Risks and benefits: risks, benefits and alternatives were discussed  Consent given by: patient  Time out: Immediately prior to procedure a \"time out\" was called to verify the correct patient, procedure, equipment, support staff and site/side marked as required.  Patient understanding: patient states understanding of the procedure being performed  Patient consent: the patient's understanding of the procedure matches consent given  Site marked: the operative site was marked  Patient identity confirmed: verbally with patient  Supporting Documentation  Indications: pain   Procedure Details  Location: thumb - L thumb CMC  Preparation: Patient was prepped and draped in the usual sterile fashion  Needle size: 25 G  Approach: dorsal  Medications administered: 20 mg triamcinolone acetonide 40 mg/mL; 10 mL ropivacaine 0.5 %    Patient tolerance: patient tolerated the procedure well with no immediate complications  Dressing:  Sterile dressing applied            Scribe Attestation      I,:  Bartolo Manuel PA-C am acting as a scribe while in the presence of the attending physician.:       I,:  Onofre Erickson MD personally performed the services described in this documentation    as scribed in my " presence.:

## 2024-09-04 ENCOUNTER — OFFICE VISIT (OUTPATIENT)
Dept: FAMILY MEDICINE CLINIC | Facility: CLINIC | Age: 64
End: 2024-09-04
Payer: MEDICARE

## 2024-09-04 VITALS
HEIGHT: 67 IN | SYSTOLIC BLOOD PRESSURE: 126 MMHG | DIASTOLIC BLOOD PRESSURE: 72 MMHG | WEIGHT: 181.6 LBS | TEMPERATURE: 98.3 F | HEART RATE: 67 BPM | RESPIRATION RATE: 18 BRPM | OXYGEN SATURATION: 97 % | BODY MASS INDEX: 28.5 KG/M2

## 2024-09-04 DIAGNOSIS — K21.9 GASTROESOPHAGEAL REFLUX DISEASE, UNSPECIFIED WHETHER ESOPHAGITIS PRESENT: ICD-10-CM

## 2024-09-04 DIAGNOSIS — E78.2 MIXED HYPERLIPIDEMIA: ICD-10-CM

## 2024-09-04 DIAGNOSIS — E11.9 DIET-CONTROLLED TYPE 2 DIABETES MELLITUS (HCC): Primary | ICD-10-CM

## 2024-09-04 DIAGNOSIS — E04.1 THYROID NODULE: ICD-10-CM

## 2024-09-04 DIAGNOSIS — Z00.00 WELCOME TO MEDICARE PREVENTIVE VISIT: ICD-10-CM

## 2024-09-04 DIAGNOSIS — Z71.85 VACCINE COUNSELING: ICD-10-CM

## 2024-09-04 DIAGNOSIS — I10 ESSENTIAL HYPERTENSION: ICD-10-CM

## 2024-09-04 DIAGNOSIS — N18.2 STAGE 2 CHRONIC KIDNEY DISEASE: ICD-10-CM

## 2024-09-04 LAB — SL AMB POCT HEMOGLOBIN AIC: 5.7 (ref ?–6.5)

## 2024-09-04 PROCEDURE — 99214 OFFICE O/P EST MOD 30 MIN: CPT | Performed by: NURSE PRACTITIONER

## 2024-09-04 PROCEDURE — 83036 HEMOGLOBIN GLYCOSYLATED A1C: CPT | Performed by: NURSE PRACTITIONER

## 2024-09-04 PROCEDURE — 1123F ACP DISCUSS/DSCN MKR DOCD: CPT | Performed by: NURSE PRACTITIONER

## 2024-09-04 PROCEDURE — G0402 INITIAL PREVENTIVE EXAM: HCPCS | Performed by: NURSE PRACTITIONER

## 2024-09-04 RX ORDER — ROSUVASTATIN CALCIUM 5 MG/1
5 TABLET, COATED ORAL DAILY
Qty: 90 TABLET | Refills: 1 | Status: SHIPPED | OUTPATIENT
Start: 2024-09-04

## 2024-09-04 RX ORDER — METOPROLOL SUCCINATE 100 MG/1
100 TABLET, EXTENDED RELEASE ORAL DAILY
Qty: 90 TABLET | Refills: 1 | Status: SHIPPED | OUTPATIENT
Start: 2024-09-04

## 2024-09-04 RX ORDER — AMLODIPINE BESYLATE 10 MG/1
10 TABLET ORAL DAILY
Qty: 100 TABLET | Refills: 1 | Status: SHIPPED | OUTPATIENT
Start: 2024-09-04

## 2024-09-04 RX ORDER — LOSARTAN POTASSIUM 100 MG/1
100 TABLET ORAL DAILY
Qty: 90 TABLET | Refills: 1 | Status: SHIPPED | OUTPATIENT
Start: 2024-09-04

## 2024-09-04 NOTE — PATIENT INSTRUCTIONS
Medicare Preventive Visit Patient Instructions  Thank you for completing your Welcome to Medicare Visit or Medicare Annual Wellness Visit today. Your next wellness visit will be due in one year (9/5/2025).  The screening/preventive services that you may require over the next 5-10 years are detailed below. Some tests may not apply to you based off risk factors and/or age. Screening tests ordered at today's visit but not completed yet may show as past due. Also, please note that scanned in results may not display below.  Preventive Screenings:  Service Recommendations Previous Testing/Comments   Colorectal Cancer Screening  Colonoscopy    Fecal Occult Blood Test (FOBT)/Fecal Immunochemical Test (FIT)  Fecal DNA/Cologuard Test  Flexible Sigmoidoscopy Age: 45-75 years old   Colonoscopy: every 10 years (May be performed more frequently if at higher risk)  OR  FOBT/FIT: every 1 year  OR  Cologuard: every 3 years  OR  Sigmoidoscopy: every 5 years  Screening may be recommended earlier than age 45 if at higher risk for colorectal cancer. Also, an individualized decision between you and your healthcare provider will decide whether screening between the ages of 76-85 would be appropriate. Colonoscopy: 05/30/2018  FOBT/FIT: Not on file  Cologuard: Not on file  Sigmoidoscopy: Not on file          Prostate Cancer Screening Individualized decision between patient and health care provider in men between ages of 55-69   Medicare will cover every 12 months beginning on the day after your 50th birthday PSA: 3.2 ng/mL           Hepatitis C Screening Once for adults born between 1945 and 1965  More frequently in patients at high risk for Hepatitis C Hep C Antibody: 03/15/2020        Diabetes Screening 1-2 times per year if you're at risk for diabetes or have pre-diabetes Fasting glucose: 97 mg/dL (3/6/2023)  A1C: 6.1 (2/28/2024)      Cholesterol Screening Once every 5 years if you don't have a lipid disorder. May order more often based  on risk factors. Lipid panel: 07/31/2023         Other Preventive Screenings Covered by Medicare:  Abdominal Aortic Aneurysm (AAA) Screening: covered once if your at risk. You're considered to be at risk if you have a family history of AAA or a male between the age of 65-75 who smoking at least 100 cigarettes in your lifetime.  Lung Cancer Screening: covers low dose CT scan once per year if you meet all of the following conditions: (1) Age 55-77; (2) No signs or symptoms of lung cancer; (3) Current smoker or have quit smoking within the last 15 years; (4) You have a tobacco smoking history of at least 20 pack years (packs per day x number of years you smoked); (5) You get a written order from a healthcare provider.  Glaucoma Screening: covered annually if you're considered high risk: (1) You have diabetes OR (2) Family history of glaucoma OR (3)  aged 50 and older OR (4)  American aged 65 and older  Osteoporosis Screening: covered every 2 years if you meet one of the following conditions: (1) Have a vertebral abnormality; (2) On glucocorticoid therapy for more than 3 months; (3) Have primary hyperparathyroidism; (4) On osteoporosis medications and need to assess response to drug therapy.  HIV Screening: covered annually if you're between the age of 15-65. Also covered annually if you are younger than 15 and older than 65 with risk factors for HIV infection. For pregnant patients, it is covered up to 3 times per pregnancy.    Immunizations:  Immunization Recommendations   Influenza Vaccine Annual influenza vaccination during flu season is recommended for all persons aged >= 6 months who do not have contraindications   Pneumococcal Vaccine   * Pneumococcal conjugate vaccine = PCV13 (Prevnar 13), PCV15 (Vaxneuvance), PCV20 (Prevnar 20)  * Pneumococcal polysaccharide vaccine = PPSV23 (Pneumovax) Adults 19-65 yo with certain risk factors or if 65+ yo  If never received any pneumonia vaccine:  recommend Prevnar 20 (PCV20)  Give PCV20 if previously received 1 dose of PCV13 or PPSV23   Hepatitis B Vaccine 3 dose series if at intermediate or high risk (ex: diabetes, end stage renal disease, liver disease)   Respiratory syncytial virus (RSV) Vaccine - COVERED BY MEDICARE PART D  * RSVPreF3 (Arexvy) CDC recommends that adults 60 years of age and older may receive a single dose of RSV vaccine using shared clinical decision-making (SCDM)   Tetanus (Td) Vaccine - COST NOT COVERED BY MEDICARE PART B Following completion of primary series, a booster dose should be given every 10 years to maintain immunity against tetanus. Td may also be given as tetanus wound prophylaxis.   Tdap Vaccine - COST NOT COVERED BY MEDICARE PART B Recommended at least once for all adults. For pregnant patients, recommended with each pregnancy.   Shingles Vaccine (Shingrix) - COST NOT COVERED BY MEDICARE PART B  2 shot series recommended in those 19 years and older who have or will have weakened immune systems or those 50 years and older     Health Maintenance Due:      Topic Date Due   • Colorectal Cancer Screening  05/30/2023   • HIV Screening  Completed   • Hepatitis C Screening  Completed     Immunizations Due:      Topic Date Due   • Pneumococcal Vaccine: 65+ Years (1 of 2 - PCV) Never done   • IPV Vaccine (2 of 3 - Adult catch-up series) 03/22/1985   • COVID-19 Vaccine (5 - 2023-24 season) 09/01/2024   • Influenza Vaccine (1) 09/01/2024     Advance Directives   What are advance directives?  Advance directives are legal documents that state your wishes and plans for medical care. These plans are made ahead of time in case you lose your ability to make decisions for yourself. Advance directives can apply to any medical decision, such as the treatments you want, and if you want to donate organs.   What are the types of advance directives?  There are many types of advance directives, and each state has rules about how to use them. You  may choose a combination of any of the following:  Living will:  This is a written record of the treatment you want. You can also choose which treatments you do not want, which to limit, and which to stop at a certain time. This includes surgery, medicine, IV fluid, and tube feedings.   Durable power of  for healthcare (DPAHC):  This is a written record that states who you want to make healthcare choices for you when you are unable to make them for yourself. This person, called a proxy, is usually a family member or a friend. You may choose more than 1 proxy.  Do not resuscitate (DNR) order:  A DNR order is used in case your heart stops beating or you stop breathing. It is a request not to have certain forms of treatment, such as CPR. A DNR order may be included in other types of advance directives.  Medical directive:  This covers the care that you want if you are in a coma, near death, or unable to make decisions for yourself. You can list the treatments you want for each condition. Treatment may include pain medicine, surgery, blood transfusions, dialysis, IV or tube feedings, and a ventilator (breathing machine).  Values history:  This document has questions about your views, beliefs, and how you feel and think about life. This information can help others choose the care that you would choose.  Why are advance directives important?  An advance directive helps you control your care. Although spoken wishes may be used, it is better to have your wishes written down. Spoken wishes can be misunderstood, or not followed. Treatments may be given even if you do not want them. An advance directive may make it easier for your family to make difficult choices about your care.   Weight Management   Why it is important to manage your weight:  Being overweight increases your risk of health conditions such as heart disease, high blood pressure, type 2 diabetes, and certain types of cancer. It can also increase your  risk for osteoarthritis, sleep apnea, and other respiratory problems. Aim for a slow, steady weight loss. Even a small amount of weight loss can lower your risk of health problems.  How to lose weight safely:  A safe and healthy way to lose weight is to eat fewer calories and get regular exercise. You can lose up about 1 pound a week by decreasing the number of calories you eat by 500 calories each day.   Healthy meal plan for weight management:  A healthy meal plan includes a variety of foods, contains fewer calories, and helps you stay healthy. A healthy meal plan includes the following:  Eat whole-grain foods more often.  A healthy meal plan should contain fiber. Fiber is the part of grains, fruits, and vegetables that is not broken down by your body. Whole-grain foods are healthy and provide extra fiber in your diet. Some examples of whole-grain foods are whole-wheat breads and pastas, oatmeal, brown rice, and bulgur.  Eat a variety of vegetables every day.  Include dark, leafy greens such as spinach, kale, radha greens, and mustard greens. Eat yellow and orange vegetables such as carrots, sweet potatoes, and winter squash.   Eat a variety of fruits every day.  Choose fresh or canned fruit (canned in its own juice or light syrup) instead of juice. Fruit juice has very little or no fiber.  Eat low-fat dairy foods.  Drink fat-free (skim) milk or 1% milk. Eat fat-free yogurt and low-fat cottage cheese. Try low-fat cheeses such as mozzarella and other reduced-fat cheeses.  Choose meat and other protein foods that are low in fat.  Choose beans or other legumes such as split peas or lentils. Choose fish, skinless poultry (chicken or turkey), or lean cuts of red meat (beef or pork). Before you cook meat or poultry, cut off any visible fat.   Use less fat and oil.  Try baking foods instead of frying them. Add less fat, such as margarine, sour cream, regular salad dressing and mayonnaise to foods. Eat fewer high-fat  foods. Some examples of high-fat foods include french fries, doughnuts, ice cream, and cakes.  Eat fewer sweets.  Limit foods and drinks that are high in sugar. This includes candy, cookies, regular soda, and sweetened drinks.  Exercise:  Exercise at least 30 minutes per day on most days of the week. Some examples of exercise include walking, biking, dancing, and swimming. You can also fit in more physical activity by taking the stairs instead of the elevator or parking farther away from stores. Ask your healthcare provider about the best exercise plan for you.      © Copyright [employer] 2018 Information is for End User's use only and may not be sold, redistributed or otherwise used for commercial purposes. All illustrations and images included in CareNotes® are the copyrighted property of [employer] or [employer]

## 2024-09-04 NOTE — PROGRESS NOTES
Ambulatory Visit  Name: Flo Brennan      : 1960      MRN: [id number]  Encounter Provider: JESSY Coffman  Encounter Date: 2024   Encounter department: [facility]    Assessment & Plan   1. Diet-controlled type 2 diabetes mellitus (HCC)  Assessment & Plan:    Lab Results   Component Value Date    HGBA1C 5.7 2024   Patient's hemoglobin A1c in the office today is 5.7.  Previous A1c was 6.1.  The patient is currently diet controlled.  He is trying to eat healthier.  He is exercising.  Orders:  -     POCT hemoglobin A1c  2. Welcome to Medicare preventive visit  -     POCT ECG  3. Essential hypertension  Assessment & Plan:  Blood pressure in the office today is 126/72.  He continues on his amlodipine 5 mg daily as well as losartan 100 mg daily and metoprolol  mg.  He continues to watch the salt in his diet.  He has lost 6 pounds so far this year.  Orders:  -     amLODIPine (NORVASC) 10 mg tablet; Take 1 tablet (10 mg total) by mouth daily  -     losartan (COZAAR) 100 MG tablet; Take 1 tablet (100 mg total) by mouth daily  -     metoprolol succinate (TOPROL-XL) 100 mg 24 hr tablet; Take 1 tablet (100 mg total) by mouth daily  4. Mixed hyperlipidemia  Assessment & Plan:  Patient continues on Crestor.  He is due for lipid panel.  I did remind him to have his blood work done in the next week or 2.  Low-fat diet.  Orders:  -     rosuvastatin (CRESTOR) 5 mg tablet; Take 1 tablet (5 mg total) by mouth daily  5. Gastroesophageal reflux disease, unspecified whether esophagitis present  Assessment & Plan:  Patient is no longer taking the famotidine.  He is watching his diet for triggers.  6. Thyroid nodule  Assessment & Plan:  Continues to follow with endocrinology.  They will continue to monitor with imaging for period of 2 years.  7. Stage 2 chronic kidney disease  Assessment & Plan:  Lab Results   Component Value Date    EGFR 72 2023    EGFR 76 2023    EGFR 63  08/16/2022    CREATININE 1.08 06/13/2023    CREATININE 1.04 03/06/2023    CREATININE 1.20 08/16/2022   Patient is due for surveillance blood work.  I did remind him to have this done in the next week or 2.  8. BMI 28.0-28.9,adult  Assessment & Plan:  Lifestyle modifications discussed.  He has lost 6 pounds over the past year.  Continue with diet and exercise.  9. Vaccine counseling  Assessment & Plan:  Discussed both the flu vaccine and the COVID booster.  He will make an appointment to get the flu vaccine in our office in mid October.      Depression Screening and Follow-up Plan: Patient was screened for depression during today's encounter. They screened negative with a PHQ-2 score of 0.      Preventive health issues were discussed with patient, and age appropriate screening tests were ordered as noted in patient's After Visit Summary. Personalized health advice and appropriate referrals for health education or preventive services given if needed, as noted in patient's After Visit Summary.    History of Present Illness     Hayden presents to the office today for welcome to Medicare visit and follow-up.  He is now retired.  He has no new medical concerns.  He did not have a surveillance blood work done and he will have this done in the next week or 2.  EKG in the office today shows normal sinus rhythm with a heart rate of 61.  He is due for colon cancer screening and does have an upcoming appointment with his colorectal provider.  He is overdue for an eye exam and I did remind him to have this done.  His diabetes currently is diet controlled.  I will see him back in the office in 6 months.       Patient Care Team:  JESSY Wells as PCP - General (Internal Medicine)  Bertha Perez MD as PCP - Endocrinology (Endocrinology)  DERRICK Martinez MD as Endoscopist  Soniya Aguilera as Diabetes Educator (Nutrition)  JESSY Amato as Nurse Practitioner (Endocrinology)    Review of Systems   Constitutional:  Negative.  Negative for fatigue.   HENT: Negative.  Negative for congestion, postnasal drip, rhinorrhea and trouble swallowing.    Eyes: Negative.  Negative for visual disturbance.   Respiratory: Negative.  Negative for choking and shortness of breath.    Cardiovascular: Negative.  Negative for chest pain.   Gastrointestinal: Negative.    Endocrine: Negative.    Genitourinary: Negative.    Musculoskeletal: Negative.  Negative for arthralgias, back pain, myalgias and neck pain.   Skin: Negative.    Neurological:  Negative for dizziness and headaches.   Psychiatric/Behavioral: Negative.       Medical History Reviewed by provider this encounter:  Tobacco  Allergies  Meds  Problems  Med Hx  Surg Hx  Fam Hx       Annual Wellness Visit Questionnaire   Flo is here for his Welcome to Medicare visit.     Health Risk Assessment:   Patient rates overall health as very good. Patient feels that their physical health rating is same. Patient is satisfied with their life. Eyesight was rated as same. Hearing was rated as same. Patient feels that their emotional and mental health rating is same. Patients states they are never, rarely angry. Patient states they are never, rarely unusually tired/fatigued. Pain experienced in the last 7 days has been some. Patient's pain rating has been 3/10. Patient states that he has experienced no weight loss or gain in last 6 months.     Depression Screening:   PHQ-2 Score: 0      Fall Risk Screening:   In the past year, patient has experienced: no history of falling in past year      Home Safety:  Patient does not have trouble with stairs inside or outside of their home. Patient has working smoke alarms and has working carbon monoxide detector. Home safety hazards include: none.     Nutrition:   Current diet is Regular and Diabetic.     Medications:   Patient is not currently taking any over-the-counter supplements. Patient is able to manage medications.     Activities of Daily Living  (ADLs)/Instrumental Activities of Daily Living (IADLs):   Walk and transfer into and out of bed and chair?: Yes  Dress and groom yourself?: Yes    Bathe or shower yourself?: Yes    Feed yourself? Yes  Do your laundry/housekeeping?: Yes  Manage your money, pay your bills and track your expenses?: Yes  Make your own meals?: Yes    Do your own shopping?: Yes    Previous Hospitalizations:   Any hospitalizations or ED visits within the last 12 months?: No      Advance Care Planning:   Living will: No    Durable POA for healthcare: No    Advanced directive: Yes    ACP document given: Yes      Cognitive Screening:   Provider or family/friend/caregiver concerned regarding cognition?: No    PREVENTIVE SCREENINGS      Cardiovascular Screening:    General: History Lipid Disorder    Due for: Lipid Panel      Diabetes Screening:     General: Screening Not Indicated and History Diabetes    Due for: Blood Glucose      Colorectal Cancer Screening:     General: Screening Current      Abdominal Aortic Aneurysm (AAA) Screening:    Risk factors include: age between 65-76 yo        Lung Cancer Screening:     General: Screening Not Indicated      Hepatitis C Screening:    General: Screening Current    Screening, Brief Intervention, and Referral to Treatment (SBIRT)    Screening    Typical number of drinks in a week: 2    Single Item Drug Screening:  How often have you used an illegal drug (including marijuana) or a prescription medication for non-medical reasons in the past year? never    Single Item Drug Screen Score: 0  Interpretation: Negative screen for possible drug use disorder    Social Determinants of Health     Food Insecurity: No Food Insecurity (9/5/2024)    Hunger Vital Sign    • Worried About Running Out of Food in the Last Year: Never true    • Ran Out of Food in the Last Year: Never true   Transportation Needs: No Transportation Needs (9/5/2024)    PRAPARE - Transportation    • Lack of Transportation (Medical): No    •  "Lack of Transportation (Non-Medical): No   Housing Stability: Unknown (9/5/2024)    Housing Stability Vital Sign    • Unable to Pay for Housing in the Last Year: No    • Homeless in the Last Year: No   Utilities: Not At Risk (9/5/2024)    [facility] Utilities    • Threatened with loss of utilities: No     Vision Screening    Right eye Left eye Both eyes   Without correction      With correction 20/25 20/20 20/20       Objective     /72   Pulse 67   Temp 98.3 °F (36.8 °C) (Temporal)   Resp 18   Ht 5' 7\" (1.702 m)   Wt 82.4 kg (181 lb 9.6 oz)   SpO2 97%   BMI 28.44 kg/m²     Physical Exam  Vitals and nursing note reviewed.   Constitutional:       Appearance: Normal appearance. He is well-developed. He is obese.   HENT:      Head: Normocephalic and atraumatic.      Right Ear: Tympanic membrane, ear canal and external ear normal. There is no impacted cerumen.      Left Ear: Tympanic membrane, ear canal and external ear normal. There is no impacted cerumen.      Nose: Nose normal.      Mouth/Throat:      Mouth: Mucous membranes are moist.      Pharynx: Oropharynx is clear.   Eyes:      Conjunctiva/sclera: Conjunctivae normal.   Cardiovascular:      Rate and Rhythm: Normal rate and regular rhythm.      Pulses: Normal pulses.      Heart sounds: Normal heart sounds. No murmur heard.  Pulmonary:      Effort: Pulmonary effort is normal. No respiratory distress.      Breath sounds: Normal breath sounds.   Abdominal:      General: Bowel sounds are normal. There is no distension.      Palpations: Abdomen is soft. There is no mass.      Tenderness: There is no abdominal tenderness. There is no guarding or rebound.      Hernia: No hernia is present.   Musculoskeletal:         General: Normal range of motion.      Cervical back: Normal range of motion and neck supple.   Skin:     General: Skin is warm and dry.   Neurological:      General: No focal deficit present.      Mental Status: He is alert and oriented to person, " place, and time. Mental status is at baseline.   Psychiatric:         Mood and Affect: Mood normal.         Behavior: Behavior normal.         Thought Content: Thought content normal.         Judgment: Judgment normal.

## 2024-09-05 PROCEDURE — G0403 EKG FOR INITIAL PREVENT EXAM: HCPCS | Performed by: NURSE PRACTITIONER

## 2024-09-05 NOTE — ASSESSMENT & PLAN NOTE
Discussed both the flu vaccine and the COVID booster.  He will make an appointment to get the flu vaccine in our office in mid October.

## 2024-09-05 NOTE — ASSESSMENT & PLAN NOTE
Blood pressure in the office today is 126/72.  He continues on his amlodipine 5 mg daily as well as losartan 100 mg daily and metoprolol  mg.  He continues to watch the salt in his diet.  He has lost 6 pounds so far this year.

## 2024-09-05 NOTE — ASSESSMENT & PLAN NOTE
Patient continues on Crestor.  He is due for lipid panel.  I did remind him to have his blood work done in the next week or 2.  Low-fat diet.

## 2024-09-05 NOTE — ASSESSMENT & PLAN NOTE
Lab Results   Component Value Date    HGBA1C 5.7 09/04/2024   Patient's hemoglobin A1c in the office today is 5.7.  Previous A1c was 6.1.  The patient is currently diet controlled.  He is trying to eat healthier.  He is exercising.

## 2024-09-05 NOTE — ASSESSMENT & PLAN NOTE
Lab Results   Component Value Date    EGFR 72 06/13/2023    EGFR 76 03/06/2023    EGFR 63 08/16/2022    CREATININE 1.08 06/13/2023    CREATININE 1.04 03/06/2023    CREATININE 1.20 08/16/2022   Patient is due for surveillance blood work.  I did remind him to have this done in the next week or 2.

## 2024-09-05 NOTE — ASSESSMENT & PLAN NOTE
Continues to follow with endocrinology.  They will continue to monitor with imaging for period of 2 years.

## 2024-09-05 NOTE — ASSESSMENT & PLAN NOTE
Lifestyle modifications discussed.  He has lost 6 pounds over the past year.  Continue with diet and exercise.

## 2024-09-24 NOTE — H&P (VIEW-ONLY)
Colon and Rectal Surgery   Flo Brennan 64 y.o. male MRN [id number]  Encounter: [id number]  09/25/24 2:58 PM            Assessment: Flo Brennan is a 64 y.o. male who has a thrombosed external hemorrhoid and family: Cancer risk.      Plan:   Grade I hemorrhoids  The patient had a 1 month history of onset and resolution of the thrombosed external hemorrhoid.  This is nearly resolved on examination.  Minor internal hemorrhoidal prolapse is visible but not symptomatic.  There is a small post anal tag that is also asymptomatic.  None of these require therapy.  He is to call if symptoms warrant further investigation.    Family history of colon cancer  The patient has second and third-degree relatives who had colon cancer.  However, given the recent bleeding, I think investigation is reasonable now, 6 years after his prior colonoscopy.  He agrees to colonoscopy assessment. Colonoscopy risks, not limited to bleeding, perforated colon, need for surgery, and missed lesions were discussed. Alternatives were discussed. Questions were answered. He agreed to the procedure.        Subjective     HPI    Flo Brennan is a 64 y.o. male who presents for hemorrhoidal symptoms.    He reports about a month ago he developed a tender perianal lump. Around the onset of this lump he began to experience rectal bleeding for around 3 days. This was bright red blood in the toilet bowl with bowel movements.  He notes that he has not had rectal bleeding in the past. The lump has reduced in size.     He has daily bowel movements with soft and formed stool.    Last colonoscopy was performed on 05/30/2018 with a 5 year recall.    He reports a family history of colon cancer through his maternal uncle and maternal grandmother.     Historical Information   Past Medical History:   Diagnosis Date    Cat scratch fever     Colon polyp     Diverticulitis     GERD (gastroesophageal reflux disease)     Hyperlipidemia     Hypertension     IFG  (impaired fasting glucose)     L3 vertebral fracture (HCC) 07/2018    Lumbar compression fracture (HCC)     Patella-femoral syndrome     Thyroid nodule      Past Surgical History:   Procedure Laterality Date    CLOSED REDUCTION WRIST FRACTURE      COLONOSCOPY      FL RETROGRADE PYELOGRAM  8/26/2022    AK COLONOSCOPY FLX DX W/COLLJ SPEC WHEN PFRMD N/A 5/30/2018    Procedure: COLONOSCOPY;  Surgeon: DERRICK Martinez MD;  Location: [facility];  Service: Colorectal    AK CYSTO/URETERO W/LITHOTRIPSY &INDWELL STENT INSRT Left 8/26/2022    Procedure: CYSTOSCOPY URETEROSCOPY, BASKET STONE EXTRACTION, RETROGRADE PYELOGRAM AND INSERTION STENT URETERAL;  Surgeon: Ronnie Parra MD;  Location: [facility];  Service: Urology    UMBILICAL GRANULOMA EXCISION Left     groin    US GUIDED THYROID BIOPSY  9/20/2018    US GUIDED THYROID BIOPSY  2/13/2019    US GUIDED THYROID BIOPSY  2/13/2023    WISDOM TOOTH EXTRACTION      WRIST GANGLION EXCISION  1969    closed reduction        Meds/Allergies       Current Outpatient Medications:     amLODIPine (NORVASC) 10 mg tablet, Take 1 tablet (10 mg total) by mouth daily, Disp: 100 tablet, Rfl: 1    glucosamine 500 MG CAPS capsule, Take by mouth daily  , Disp: , Rfl:     ibuprofen (MOTRIN) 200 mg tablet, Take by mouth every 6 (six) hours as needed for mild pain, Disp: , Rfl:     losartan (COZAAR) 100 MG tablet, Take 1 tablet (100 mg total) by mouth daily, Disp: 90 tablet, Rfl: 1    metoprolol succinate (TOPROL-XL) 100 mg 24 hr tablet, Take 1 tablet (100 mg total) by mouth daily, Disp: 90 tablet, Rfl: 1    multivitamin (THERAGRAN) TABS, Take 1 tablet by mouth daily, Disp: , Rfl:     Omega-3 Fatty Acids (FISH OIL) 1200 MG CAPS, Take by mouth 2 (two) times a day , Disp: , Rfl:     rosuvastatin (CRESTOR) 5 mg tablet, Take 1 tablet (5 mg total) by mouth daily, Disp: 90 tablet, Rfl: 1    Sodium Fluoride 5000 Sensitive 1.1-5 % GEL, , Disp: , Rfl:   No Known Allergies    Social History   Social  "History     Substance and Sexual Activity   Drug Use No     Social History     Tobacco Use   Smoking Status Never    Passive exposure: Never   Smokeless Tobacco Never         Family History   Problem Relation Age of Onset    Hypertension Mother     Melanoma Father     Diabetes type II Father     Hypertension Father     Colon cancer Maternal Grandmother     Colon cancer Maternal Uncle     Hypertension Brother          Review of Systems    Objective   Current Vitals:  Vitals:    09/25/24 1426   Weight: 82.6 kg (182 lb)   Height: 5' 7\" (1.702 m)         Physical Exam  Constitutional:       Appearance: Normal appearance.   Cardiovascular:      Rate and Rhythm: Normal rate and regular rhythm.   Pulmonary:      Effort: Pulmonary effort is normal.      Breath sounds: Normal breath sounds.   Genitourinary:     Comments: Internal hemorrhoids visible with distraction of the buttocks, posterior small skin tag, left side resolving thrombosed external hemorrhoid.  Neurological:      General: No focal deficit present.      Mental Status: He is alert and oriented to person, place, and time.               "

## 2024-09-24 NOTE — PROGRESS NOTES
Colon and Rectal Surgery   Flo Brennan 64 y.o. male MRN [id number]  Encounter: [id number]  09/25/24 2:58 PM            Assessment: Flo Brennan is a 64 y.o. male who has a thrombosed external hemorrhoid and family: Cancer risk.      Plan:   Grade I hemorrhoids  The patient had a 1 month history of onset and resolution of the thrombosed external hemorrhoid.  This is nearly resolved on examination.  Minor internal hemorrhoidal prolapse is visible but not symptomatic.  There is a small post anal tag that is also asymptomatic.  None of these require therapy.  He is to call if symptoms warrant further investigation.    Family history of colon cancer  The patient has second and third-degree relatives who had colon cancer.  However, given the recent bleeding, I think investigation is reasonable now, 6 years after his prior colonoscopy.  He agrees to colonoscopy assessment. Colonoscopy risks, not limited to bleeding, perforated colon, need for surgery, and missed lesions were discussed. Alternatives were discussed. Questions were answered. He agreed to the procedure.        Subjective     HPI    Flo Brennan is a 64 y.o. male who presents for hemorrhoidal symptoms.    He reports about a month ago he developed a tender perianal lump. Around the onset of this lump he began to experience rectal bleeding for around 3 days. This was bright red blood in the toilet bowl with bowel movements.  He notes that he has not had rectal bleeding in the past. The lump has reduced in size.     He has daily bowel movements with soft and formed stool.    Last colonoscopy was performed on 05/30/2018 with a 5 year recall.    He reports a family history of colon cancer through his maternal uncle and maternal grandmother.     Historical Information   Past Medical History:   Diagnosis Date    Cat scratch fever     Colon polyp     Diverticulitis     GERD (gastroesophageal reflux disease)     Hyperlipidemia     Hypertension     IFG  (impaired fasting glucose)     L3 vertebral fracture (HCC) 07/2018    Lumbar compression fracture (HCC)     Patella-femoral syndrome     Thyroid nodule      Past Surgical History:   Procedure Laterality Date    CLOSED REDUCTION WRIST FRACTURE      COLONOSCOPY      FL RETROGRADE PYELOGRAM  8/26/2022    [place] COLONOSCOPY FLX DX W/COLLJ SPEC WHEN PFRMD N/A 5/30/2018    Procedure: COLONOSCOPY;  Surgeon: DERRICK Martinez MD;  Location: [facility];  Service: Colorectal    VT CYSTO/URETERO W/LITHOTRIPSY &INDWELL STENT INSRT Left 8/26/2022    Procedure: CYSTOSCOPY URETEROSCOPY, BASKET STONE EXTRACTION, RETROGRADE PYELOGRAM AND INSERTION STENT URETERAL;  Surgeon: Ronnie Parra MD;  Location[facility];  Service: Urology    UMBILICAL GRANULOMA EXCISION Left     groin    US GUIDED THYROID BIOPSY  9/20/2018    US GUIDED THYROID BIOPSY  2/13/2019    US GUIDED THYROID BIOPSY  2/13/2023    WISDOM TOOTH EXTRACTION      WRIST GANGLION EXCISION  1969    closed reduction        Meds/Allergies       Current Outpatient Medications:     amLODIPine (NORVASC) 10 mg tablet, Take 1 tablet (10 mg total) by mouth daily, Disp: 100 tablet, Rfl: 1    glucosamine 500 MG CAPS capsule, Take by mouth daily  , Disp: , Rfl:     ibuprofen (MOTRIN) 200 mg tablet, Take by mouth every 6 (six) hours as needed for mild pain, Disp: , Rfl:     losartan (COZAAR) 100 MG tablet, Take 1 tablet (100 mg total) by mouth daily, Disp: 90 tablet, Rfl: 1    metoprolol succinate (TOPROL-XL) 100 mg 24 hr tablet, Take 1 tablet (100 mg total) by mouth daily, Disp: 90 tablet, Rfl: 1    multivitamin (THERAGRAN) TABS, Take 1 tablet by mouth daily, Disp: , Rfl:     Omega-3 Fatty Acids (FISH OIL) 1200 MG CAPS, Take by mouth 2 (two) times a day , Disp: , Rfl:     rosuvastatin (CRESTOR) 5 mg tablet, Take 1 tablet (5 mg total) by mouth daily, Disp: 90 tablet, Rfl: 1    Sodium Fluoride 5000 Sensitive 1.1-5 % GEL, , Disp: , Rfl:   No Known Allergies    Social History   Social  "History     Substance and Sexual Activity   Drug Use No     Social History     Tobacco Use   Smoking Status Never    Passive exposure: Never   Smokeless Tobacco Never         Family History   Problem Relation Age of Onset    Hypertension Mother     Melanoma Father     Diabetes type II Father     Hypertension Father     Colon cancer Maternal Grandmother     Colon cancer Maternal Uncle     Hypertension Brother          Review of Systems    Objective   Current Vitals:  Vitals:    09/25/24 1426   Weight: 82.6 kg (182 lb)   Height: 5' 7\" (1.702 m)         Physical Exam  Constitutional:       Appearance: Normal appearance.   Cardiovascular:      Rate and Rhythm: Normal rate and regular rhythm.   Pulmonary:      Effort: Pulmonary effort is normal.      Breath sounds: Normal breath sounds.   Genitourinary:     Comments: Internal hemorrhoids visible with distraction of the buttocks, posterior small skin tag, left side resolving thrombosed external hemorrhoid.  Neurological:      General: No focal deficit present.      Mental Status: He is alert and oriented to person, place, and time.               "

## 2024-09-25 ENCOUNTER — OFFICE VISIT (OUTPATIENT)
Age: 64
End: 2024-09-25
Payer: MEDICARE

## 2024-09-25 ENCOUNTER — TELEPHONE (OUTPATIENT)
Age: 64
End: 2024-09-25

## 2024-09-25 VITALS — BODY MASS INDEX: 28.56 KG/M2 | WEIGHT: 182 LBS | HEIGHT: 67 IN

## 2024-09-25 DIAGNOSIS — Z80.0 FAMILY HISTORY OF COLON CANCER: ICD-10-CM

## 2024-09-25 DIAGNOSIS — K64.0 GRADE I HEMORRHOIDS: Primary | ICD-10-CM

## 2024-09-25 PROCEDURE — 99203 OFFICE O/P NEW LOW 30 MIN: CPT | Performed by: COLON & RECTAL SURGERY

## 2024-09-25 NOTE — ASSESSMENT & PLAN NOTE
The patient has second and third-degree relatives who had colon cancer.  However, given the recent bleeding, I think investigation is reasonable now, 6 years after his prior colonoscopy.  He agrees to colonoscopy assessment. Colonoscopy risks, not limited to bleeding, perforated colon, need for surgery, and missed lesions were discussed. Alternatives were discussed. Questions were answered. He agreed to the procedure.

## 2024-09-25 NOTE — ASSESSMENT & PLAN NOTE
The patient had a 1 month history of onset and resolution of the thrombosed external hemorrhoid.  This is nearly resolved on examination.  Minor internal hemorrhoidal prolapse is visible but not symptomatic.  There is a small post anal tag that is also asymptomatic.  None of these require therapy.  He is to call if symptoms warrant further investigation.

## 2024-09-25 NOTE — TELEPHONE ENCOUNTER
9/25/24 OV grade I hemorrhoid, family cancer risk.    10/11/24 EMILIANO PATRICK TR instructions given to patient

## 2024-09-25 NOTE — LETTER
Flo Brennan  [address]  [place] [address]      Location:  [facility] - [address] [place] [id number] - [facility] - Entrance A - 1st Floor    Performing Physician: JUHI Martinez MD      DATE OF PROCEDURE: October 11, 2024 TIME OF PROCEDURE:           TBD                      The OR/GI Lab will contact you the evening prior to your procedure with your exact arrival time.    We kindly ask that you immediately notify us of any need to cancel or reschedule your procedure.      WEEK BEFORE THE PROCEDURE:  Do not take Iron tablets for one week  5 days prior to the appointment AVOID vegetables and fruits with skins or seeds, nuts, corn, popcorn, and whole grain breads.  Purchase: One (1) 238-gram container of Miralax (polyethylene glycol 3350), four (4) 5 mg Dulcolax (bisacodyl) tablets, and 64-ounces of Gatorade (sports drink) - no red, orange, or purple. These may be purchased at any pharmacy without a prescription. Generic products are permissible.  Arrange responsible transportation for day of the procedure.      DAY BEFORE THE PROCEDURE:  CLEAR liquids only for entire day prior. Nothing red, orange or purple.  You MAY have:  Soda  Water  Broth Gatorade  Jell-O  Popsicles Coffee/tea without  Milk/creamer     YOU MAY NOT HAVE:  Solid foods  Milk and milk products  Juice with pulp    BOWEL PREPARATION: Includes: One (1) 238-gram container of Miralax (polyethylene glycol 3350), four (4) 5 mg Dulcolax (bisacodyl) tablets, and 64-ounces of Gatorade (sports drink). Preparation may be refrigerated. Entire bowel prep should be completed.    Afternoon before the procedure (2:00 pm - 5:00 pm):  Take two (2) 5 mg Dulcolax laxative tablets.    Evening before the procedure (6:00 pm):  Mix entire container of Miralax with 64-ounces of Gatorade and shake until all medication is dissolved.  Begin drinking solution. Drink an eight (8) ounce cup every 10-15 minutes until you have consumed  half (32 ounces) of the solution. Refrigerate remaining solution.    Night before the procedure (8:00 pm):  Take two (2) 5 mg Dulcolax laxative tablets.    Beginning 5 hours before your procedure:  Drink the remaining amount of prepared solution (32 ounces). Drink an eight (8) ounce cup every 10-15 minutes until you have consumed the remaining solution.      Bowel prep should be completed 4 hours prior to procedure time.  NOTHING TO EAT OR DRINK AFTER MIDNIGHT -EXCEPT YOUR BOWEL PREP                                                                                                                                                                                DAY OF THE PROCEDURE:  You may brush your teeth.  Leave all jewelry at home. Take out any facial piercings, if able.  Please arrive for your procedure as indicated by the OR / GI Lab / Endoscopy Unit. The hospital will contact you the day before with your exact arrival time.  Make sure you have arranged ahead of time for a responsible adult (18 or older) to accompany and drive you home after the procedure. Please discuss any transportation concerns with our staff prior to your procedure.  The effects of the anesthesia can persist for 24 hours. After receiving the sedation, you must exercise caution before engaging in any activity that could harm yourself and others (such as driving a car). Do not make any important decisions or do not drink any alcoholic beverages during this time period.  After your procedure, you may have anything you’d like to eat or drink. You will probably want to start with something light. Please include plenty of fluids. Avoid items that cause gas such as sodas and salads.      SPECIAL INSTRUCTIONS:    For patients currently taking blood thinners and/or antiplatelet therapy our office will contact the prescribing provider. Our office will contact you with any required changes to your medication regimen.  Blood thinner (i.e. - Coumadin,  Pradaxa, Lovenox, Xarelto, Eliquis)    Antiplatelet (i.e. - Plavix, Aggrenox, Effient, Brilinta)      Diabetes:  If you are Diabetic, please see separate Diabetic Instruction Sheet.  Prescribed medications:  Do not stop your aspirin, or any of your other medications (unless instructed otherwise).  Take the rest of your prescribed medications with small sips of water at least 2 hours prior to your procedure.      NOTHING TO EAT OR DRINK (INCLUDING CHEWING GUM) AFTER 12 MIDNIGHT PRIOR TO YOUR PROCEDURE EXCEPT YOUR BOWEL PREP.        For any questions or concerns related to your bowel preparation or pre-procedure instructions, please contact our office.  Thank you for choosing [facility]!    Revised 1/2023    [phone]

## 2024-10-01 ENCOUNTER — TELEPHONE (OUTPATIENT)
Age: 64
End: 2024-10-01

## 2024-10-01 NOTE — TELEPHONE ENCOUNTER
Patient calling states insurance company is stating on there records it is showing he has diagnosis of Osteoporosis in not listed in patient  medical history he clarified is they were mistaken it for Osteoarthritis and they had stated no. Pt states insurance is requiring for PCP to place not in chart stating her is not diagnosed with Osteoporosis.      Please review.  Thank you

## 2024-10-10 RX ORDER — SODIUM CHLORIDE, SODIUM LACTATE, POTASSIUM CHLORIDE, CALCIUM CHLORIDE 600; 310; 30; 20 MG/100ML; MG/100ML; MG/100ML; MG/100ML
125 INJECTION, SOLUTION INTRAVENOUS CONTINUOUS
Status: CANCELLED | OUTPATIENT
Start: 2024-10-10

## 2024-10-10 RX ORDER — LIDOCAINE HYDROCHLORIDE 10 MG/ML
0.5 INJECTION, SOLUTION EPIDURAL; INFILTRATION; INTRACAUDAL; PERINEURAL ONCE AS NEEDED
Status: CANCELLED | OUTPATIENT
Start: 2024-10-10

## 2024-10-11 ENCOUNTER — ANESTHESIA EVENT (OUTPATIENT)
Dept: GASTROENTEROLOGY | Facility: HOSPITAL | Age: 64
End: 2024-10-11
Payer: MEDICARE

## 2024-10-11 ENCOUNTER — ANESTHESIA (OUTPATIENT)
Dept: GASTROENTEROLOGY | Facility: HOSPITAL | Age: 64
End: 2024-10-11
Payer: MEDICARE

## 2024-10-11 ENCOUNTER — HOSPITAL ENCOUNTER (OUTPATIENT)
Dept: GASTROENTEROLOGY | Facility: HOSPITAL | Age: 64
Setting detail: OUTPATIENT SURGERY
Discharge: HOME | End: 2024-10-11
Attending: COLON & RECTAL SURGERY
Payer: MEDICARE

## 2024-10-11 VITALS
TEMPERATURE: 98.4 F | HEART RATE: 70 BPM | OXYGEN SATURATION: 95 % | DIASTOLIC BLOOD PRESSURE: 64 MMHG | SYSTOLIC BLOOD PRESSURE: 115 MMHG | RESPIRATION RATE: 18 BRPM

## 2024-10-11 DIAGNOSIS — K64.0 GRADE I HEMORRHOIDS: ICD-10-CM

## 2024-10-11 DIAGNOSIS — Z80.0 FAMILY HISTORY OF COLON CANCER: ICD-10-CM

## 2024-10-11 PROCEDURE — G0121 COLON CA SCRN NOT HI RSK IND: HCPCS | Performed by: COLON & RECTAL SURGERY

## 2024-10-11 RX ORDER — EPHEDRINE SULFATE 50 MG/ML
INJECTION INTRAVENOUS AS NEEDED
Status: DISCONTINUED | OUTPATIENT
Start: 2024-10-11 | End: 2024-10-11

## 2024-10-11 RX ORDER — PROPOFOL 10 MG/ML
INJECTION, EMULSION INTRAVENOUS AS NEEDED
Status: DISCONTINUED | OUTPATIENT
Start: 2024-10-11 | End: 2024-10-11

## 2024-10-11 RX ORDER — SODIUM CHLORIDE 9 MG/ML
INJECTION, SOLUTION INTRAVENOUS CONTINUOUS PRN
Status: DISCONTINUED | OUTPATIENT
Start: 2024-10-11 | End: 2024-10-11

## 2024-10-11 RX ORDER — LIDOCAINE HYDROCHLORIDE 10 MG/ML
INJECTION, SOLUTION EPIDURAL; INFILTRATION; INTRACAUDAL; PERINEURAL AS NEEDED
Status: DISCONTINUED | OUTPATIENT
Start: 2024-10-11 | End: 2024-10-11

## 2024-10-11 RX ADMIN — EPHEDRINE SULFATE 10 MG: 50 INJECTION, SOLUTION INTRAVENOUS at 12:15

## 2024-10-11 RX ADMIN — PROPOFOL 25 MG: 10 INJECTION, EMULSION INTRAVENOUS at 12:18

## 2024-10-11 RX ADMIN — EPHEDRINE SULFATE 10 MG: 50 INJECTION, SOLUTION INTRAVENOUS at 12:18

## 2024-10-11 RX ADMIN — PROPOFOL 50 MG: 10 INJECTION, EMULSION INTRAVENOUS at 12:10

## 2024-10-11 RX ADMIN — PROPOFOL 50 MG: 10 INJECTION, EMULSION INTRAVENOUS at 12:07

## 2024-10-11 RX ADMIN — LIDOCAINE HYDROCHLORIDE 25 MG: 10 INJECTION, SOLUTION EPIDURAL; INFILTRATION; INTRACAUDAL; PERINEURAL at 12:04

## 2024-10-11 RX ADMIN — SODIUM CHLORIDE: 0.9 INJECTION, SOLUTION INTRAVENOUS at 12:04

## 2024-10-11 RX ADMIN — EPHEDRINE SULFATE 10 MG: 50 INJECTION, SOLUTION INTRAVENOUS at 12:24

## 2024-10-11 RX ADMIN — PROPOFOL 25 MG: 10 INJECTION, EMULSION INTRAVENOUS at 12:14

## 2024-10-11 RX ADMIN — PROPOFOL 100 MG: 10 INJECTION, EMULSION INTRAVENOUS at 12:04

## 2024-10-11 NOTE — ANESTHESIA PREPROCEDURE EVALUATION
Procedure:  COLONOSCOPY    Relevant Problems   CARDIO   (+) Essential hypertension   (+) Mixed hyperlipidemia      ENDO   (+) Diet-controlled type 2 diabetes mellitus (HCC)      GI/HEPATIC   (+) GERD (gastroesophageal reflux disease)      /RENAL   (+) Renal cyst   (+) Renal stone   (+) Stage 2 chronic kidney disease      MUSCULOSKELETAL   (+) Cervical spondylosis   (+) Chronic low back pain   (+) DDD (degenerative disc disease), lumbar   (+) Myofascial pain syndrome   (+) Primary osteoarthritis of left knee   (+) Primary osteoarthritis of right knee      NEURO/PSYCH   (+) Chronic low back pain   (+) Myofascial pain syndrome        Physical Exam    Airway    Mallampati score: II  TM Distance: >3 FB  Neck ROM: full     Dental   No notable dental hx     Cardiovascular  Rhythm: regular, Rate: normal, Cardiovascular exam normal    Pulmonary  Pulmonary exam normal Breath sounds clear to auscultation    Other Findings        Anesthesia Plan  ASA Score- 2     Anesthesia Type- IV sedation with anesthesia with ASA Monitors.         Additional Monitors:     Airway Plan:     Comment: Discussed risks/benefits, including medication reactions, awareness, aspiration, and serious/life threatening complications. Plan to maintain native airway with IVGA, monitored with EtCO2.       Plan Factors-Exercise tolerance (METS): >4 METS.    Chart reviewed.    Patient summary reviewed.      Patient instructed to abstain from smoking on day of procedure. Patient did not smoke on day of surgery.            Induction- intravenous.    Postoperative Plan-         Informed Consent- Anesthetic plan and risks discussed with patient.  I personally reviewed this patient with the CRNA. Discussed and agreed on the Anesthesia Plan with the CRNA..

## 2024-10-11 NOTE — ANESTHESIA POSTPROCEDURE EVALUATION
Post-Op Assessment Note    CV Status:  Stable  Pain Score: 0    Pain management: adequate       Mental Status:  Sleepy   Hydration Status:  Euvolemic   PONV Controlled:  Controlled   Airway Patency:  Patent     Post Op Vitals Reviewed: Yes    No anethesia notable event occurred.    Staff: CRNA           Last Filed PACU Vitals:  Vitals Value Taken Time   Temp 98.4 °F (36.9 °C) 10/11/24 1225   Pulse 59 10/11/24 1225   BP 85/52 10/11/24 1225   Resp 20 10/11/24 1225   SpO2 95 % 10/11/24 1225       Modified Tamiko:  Activity: 2 (10/11/2024 12:26 PM)  Respiration: 2 (10/11/2024 12:26 PM)  Circulation: 1 (10/11/2024 12:26 PM)  Consciousness: 0 (10/11/2024 12:26 PM)  Oxygen Saturation: 2 (10/11/2024 12:26 PM)  Modified Tamiko Score: 7 (10/11/2024 12:26 PM)

## 2024-10-11 NOTE — INTERVAL H&P NOTE
H&P reviewed. After examining the patient I find no changes in the patients condition since the H&P had been written.    Vitals:    10/11/24 1137   BP: 149/73   Pulse: 61   Resp: 18   Temp: (!) 96 °F (35.6 °C)   SpO2: 98%

## 2024-10-14 NOTE — ANESTHESIA POSTPROCEDURE EVALUATION
Post-Op Assessment Note    CV Status:  Stable  Pain Score: 0    Pain management: adequate       Mental Status:  Awake and sleepy   Hydration Status:  Stable   PONV Controlled:  None   Airway Patency:  Patent     Post Op Vitals Reviewed: Yes    No anethesia notable event occurred.    Staff: Anesthesiologist           Last Filed PACU Vitals:  Vitals Value Taken Time   Temp 98.4 °F (36.9 °C) 10/11/24 1225   Pulse 70 10/11/24 1240   /64 10/11/24 1240   Resp 18 10/11/24 1240   SpO2 95 % 10/11/24 1240       Modified Tamiko:  No data recorded

## 2024-10-16 ENCOUNTER — APPOINTMENT (OUTPATIENT)
Dept: LAB | Facility: CLINIC | Age: 64
End: 2024-10-16
Payer: MEDICARE

## 2024-10-16 ENCOUNTER — IMMUNIZATIONS (OUTPATIENT)
Dept: FAMILY MEDICINE CLINIC | Facility: CLINIC | Age: 64
End: 2024-10-16
Payer: OTHER GOVERNMENT

## 2024-10-16 DIAGNOSIS — I10 ESSENTIAL HYPERTENSION: ICD-10-CM

## 2024-10-16 DIAGNOSIS — E78.2 MIXED HYPERLIPIDEMIA: ICD-10-CM

## 2024-10-16 DIAGNOSIS — Z12.5 PROSTATE CANCER SCREENING: ICD-10-CM

## 2024-10-16 DIAGNOSIS — Z23 ENCOUNTER FOR IMMUNIZATION: Primary | ICD-10-CM

## 2024-10-16 LAB
25(OH)D3 SERPL-MCNC: 49.8 NG/ML (ref 30–100)
ALBUMIN SERPL BCG-MCNC: 4.2 G/DL (ref 3.5–5)
ALP SERPL-CCNC: 55 U/L (ref 34–104)
ALT SERPL W P-5'-P-CCNC: 17 U/L (ref 7–52)
ANION GAP SERPL CALCULATED.3IONS-SCNC: 9 MMOL/L (ref 4–13)
AST SERPL W P-5'-P-CCNC: 19 U/L (ref 13–39)
BASOPHILS # BLD AUTO: 0.05 THOUSANDS/ΜL (ref 0–0.1)
BASOPHILS NFR BLD AUTO: 1 % (ref 0–1)
BILIRUB SERPL-MCNC: 0.57 MG/DL (ref 0.2–1)
BUN SERPL-MCNC: 17 MG/DL (ref 5–25)
CALCIUM SERPL-MCNC: 9.3 MG/DL (ref 8.4–10.2)
CHLORIDE SERPL-SCNC: 104 MMOL/L (ref 96–108)
CHOLEST SERPL-MCNC: 187 MG/DL
CO2 SERPL-SCNC: 27 MMOL/L (ref 21–32)
CREAT SERPL-MCNC: 0.99 MG/DL (ref 0.6–1.3)
EOSINOPHIL # BLD AUTO: 0.01 THOUSAND/ΜL (ref 0–0.61)
EOSINOPHIL NFR BLD AUTO: 0 % (ref 0–6)
ERYTHROCYTE [DISTWIDTH] IN BLOOD BY AUTOMATED COUNT: 12.8 % (ref 11.6–15.1)
GFR SERPL CREATININE-BSD FRML MDRD: 79 ML/MIN/1.73SQ M
GLUCOSE P FAST SERPL-MCNC: 119 MG/DL (ref 65–99)
HCT VFR BLD AUTO: 43.5 % (ref 36.5–49.3)
HDLC SERPL-MCNC: 45 MG/DL
HGB BLD-MCNC: 14.3 G/DL (ref 12–17)
IMM GRANULOCYTES # BLD AUTO: 0.01 THOUSAND/UL (ref 0–0.2)
IMM GRANULOCYTES NFR BLD AUTO: 0 % (ref 0–2)
LDLC SERPL CALC-MCNC: 106 MG/DL (ref 0–100)
LYMPHOCYTES # BLD AUTO: 1.44 THOUSANDS/ΜL (ref 0.6–4.47)
LYMPHOCYTES NFR BLD AUTO: 23 % (ref 14–44)
MCH RBC QN AUTO: 33 PG (ref 26.8–34.3)
MCHC RBC AUTO-ENTMCNC: 32.9 G/DL (ref 31.4–37.4)
MCV RBC AUTO: 101 FL (ref 82–98)
MONOCYTES # BLD AUTO: 0.61 THOUSAND/ΜL (ref 0.17–1.22)
MONOCYTES NFR BLD AUTO: 10 % (ref 4–12)
NEUTROPHILS # BLD AUTO: 4.1 THOUSANDS/ΜL (ref 1.85–7.62)
NEUTS SEG NFR BLD AUTO: 66 % (ref 43–75)
NRBC BLD AUTO-RTO: 0 /100 WBCS
PLATELET # BLD AUTO: 247 THOUSANDS/UL (ref 149–390)
PMV BLD AUTO: 9.8 FL (ref 8.9–12.7)
POTASSIUM SERPL-SCNC: 4.4 MMOL/L (ref 3.5–5.3)
PROT SERPL-MCNC: 7.3 G/DL (ref 6.4–8.4)
PSA SERPL-MCNC: 4.38 NG/ML (ref 0–4)
RBC # BLD AUTO: 4.33 MILLION/UL (ref 3.88–5.62)
SODIUM SERPL-SCNC: 140 MMOL/L (ref 135–147)
T4 FREE SERPL-MCNC: 0.7 NG/DL (ref 0.61–1.12)
TRIGL SERPL-MCNC: 181 MG/DL
TSH SERPL DL<=0.05 MIU/L-ACNC: 2.78 UIU/ML (ref 0.45–4.5)
WBC # BLD AUTO: 6.22 THOUSAND/UL (ref 4.31–10.16)

## 2024-10-16 PROCEDURE — 80061 LIPID PANEL: CPT

## 2024-10-16 PROCEDURE — G0103 PSA SCREENING: HCPCS

## 2024-10-16 PROCEDURE — 80053 COMPREHEN METABOLIC PANEL: CPT

## 2024-10-16 PROCEDURE — G0008 ADMIN INFLUENZA VIRUS VAC: HCPCS

## 2024-10-16 PROCEDURE — 90662 IIV NO PRSV INCREASED AG IM: CPT

## 2024-10-16 PROCEDURE — 85025 COMPLETE CBC W/AUTO DIFF WBC: CPT

## 2024-10-18 DIAGNOSIS — R97.20 ELEVATED PSA: Primary | ICD-10-CM

## 2024-10-24 ENCOUNTER — OFFICE VISIT (OUTPATIENT)
Dept: UROLOGY | Facility: AMBULATORY SURGERY CENTER | Age: 64
End: 2024-10-24
Payer: MEDICARE

## 2024-10-24 VITALS
SYSTOLIC BLOOD PRESSURE: 120 MMHG | HEART RATE: 68 BPM | DIASTOLIC BLOOD PRESSURE: 80 MMHG | HEIGHT: 67 IN | BODY MASS INDEX: 28.56 KG/M2 | WEIGHT: 182 LBS | OXYGEN SATURATION: 97 %

## 2024-10-24 DIAGNOSIS — N28.1 RENAL CYST: Primary | ICD-10-CM

## 2024-10-24 DIAGNOSIS — R97.20 ELEVATED PSA: ICD-10-CM

## 2024-10-24 PROCEDURE — 99213 OFFICE O/P EST LOW 20 MIN: CPT

## 2024-10-24 RX ORDER — TADALAFIL 5 MG/1
5 TABLET ORAL DAILY PRN
COMMUNITY

## 2024-10-24 NOTE — PROGRESS NOTES
Office Visit- Urology  Flo Brennan 1960 MRN: [id number]      Assessment/Discussion/Plan    64 y.o. male managed by     Renal cyst  -Characterized as a Bosniak 2 renal cyst in June 2023 that did not require further workup  -Per documentation patient was to have follow-up MRI in 1 year but does not like this was obtained offered repeat MRI per physician documentation -  Patient was agreeable to proceed with repeat MRI    2.  Nephrolithiasis    3.  Elevated PSA  -PSA returned at 4.3 in October 2024.  Previous baseline 3.2 in March 2023   -prostate examination today with no nodularity appreciated  -Discussed factors that can falsely elevate PSA.  Will obtain a short interval repeat PSA in the next few weeks.  If PSA still elevated we will plan to obtain a multiparametric MRI of the prostate.  Patient was agreeable to proceed with repeat MRI      Chief Complaint:   Flo is a 64 y.o. male presenting to the office for a follow up visit regarding renal cyst         Subjective    Patient is a 64-year-old male with a history of nephrolithiasis with ureteroscopy in August 2022.  He also has a history of a complex renal cyst that was evaluated as a Bosniak 2 renal cyst in June 2023 with an MRI.  He was last seen in the office in June 2023 by Dr. Patino.  At that point in time he plan was for patient to follow-up in 1 year with repeat MRI.  Patient presents the office today for follow-up in regards primarily to his elevated PSA number.  PSA obtained by PCP returned at 4.3 previous baseline of 3.2 in March 2023.  No known family history of prostate cancer.  No known family history of breast, ovarian, pancreatic cancer.  No bothersome urinary tract symptoms no gross hematuria or dysuria      ROS:   Review of Systems   Constitutional: Negative.  Negative for chills, fatigue and fever.   HENT: Negative.     Respiratory:  Negative for shortness of breath.    Cardiovascular:  Negative for chest pain.    Gastrointestinal: Negative.  Negative for abdominal pain.   Endocrine: Negative.    Musculoskeletal: Negative.    Skin: Negative.    Neurological: Negative.  Negative for dizziness and light-headedness.   Hematological: Negative.    Psychiatric/Behavioral: Negative.           Past Medical History  Past Medical History:   Diagnosis Date    Cat scratch fever     Colon polyp     Diverticulitis     GERD (gastroesophageal reflux disease)     Hyperlipidemia     Hypertension     IFG (impaired fasting glucose)     L3 vertebral fracture (HCC) 07/2018    Lumbar compression fracture (HCC)     Patella-femoral syndrome     Thyroid nodule        Past Surgical History  Past Surgical History:   Procedure Laterality Date    CLOSED REDUCTION WRIST FRACTURE      COLONOSCOPY      FL RETROGRADE PYELOGRAM  8/26/2022    WI COLONOSCOPY FLX DX W/COLLJ SPEC WHEN PFRMD N/A 5/30/2018    Procedure: COLONOSCOPY;  Surgeon: DERRICK Martinez MD;  [facility]: [facility];  Service: Colorectal    WI CYSTO/URETERO W/LITHOTRIPSY &INDWELL STENT INSRT Left 8/26/2022    Procedure: CYSTOSCOPY URETEROSCOPY, BASKET STONE EXTRACTION, RETROGRADE PYELOGRAM AND INSERTION STENT URETERAL;  Surgeon: Ronnie Parra MD;  [facility];  Service: Urology    UMBILICAL GRANULOMA EXCISION Left     groin    US GUIDED THYROID BIOPSY  9/20/2018    US GUIDED THYROID BIOPSY  2/13/2019    US GUIDED THYROID BIOPSY  2/13/2023    WISDOM TOOTH EXTRACTION      WRIST GANGLION EXCISION  1969    closed reduction        Past Family History  Family History   Problem Relation Age of Onset    Hypertension Mother     Melanoma Father     Diabetes type II Father     Hypertension Father     Colon cancer Maternal Grandmother     Colon cancer Maternal Uncle     Hypertension Brother        Past Social history  Social History     Socioeconomic History    Marital status: /Civil Union     Spouse name: Not on file    Number of children: Not on file    Years of education: Not  on file    Highest education level: Not on file   Occupational History    Not on file   Tobacco Use    Smoking status: Never     Passive exposure: Never    Smokeless tobacco: Never   Vaping Use    Vaping status: Never Used   Substance and Sexual Activity    Alcohol use: Yes     Alcohol/week: 3.0 standard drinks of alcohol     Types: 3 Glasses of wine per week     Comment: social    Drug use: No    Sexual activity: Not on file   Other Topics Concern    Not on file   Social History Narrative    Not on file     Social Determinants of Health     Financial Resource Strain: Not on file   Food Insecurity: No Food Insecurity (9/5/2024)    Nursing - Inadequate Food Risk Classification     Worried About Running Out of Food in the Last Year: Never true     Ran Out of Food in the Last Year: Never true     Ran Out of Food in the Last Year: Not on file   Transportation Needs: No Transportation Needs (9/5/2024)    PRAPARE - Transportation     Lack of Transportation (Medical): No     Lack of Transportation (Non-Medical): No   Physical Activity: Not on file   Stress: Not on file   Social Connections: Not on file   Intimate Partner Violence: Not on file   Housing Stability: Unknown (9/5/2024)    Housing Stability Vital Sign     Unable to Pay for Housing in the Last Year: No     Number of Times Moved in the Last Year: Not on file     Homeless in the Last Year: No       Current Medications  Current Outpatient Medications   Medication Sig Dispense Refill    amLODIPine (NORVASC) 10 mg tablet Take 1 tablet (10 mg total) by mouth daily 100 tablet 1    glucosamine 500 MG CAPS capsule Take by mouth daily        losartan (COZAAR) 100 MG tablet Take 1 tablet (100 mg total) by mouth daily 90 tablet 1    metoprolol succinate (TOPROL-XL) 100 mg 24 hr tablet Take 1 tablet (100 mg total) by mouth daily 90 tablet 1    multivitamin (THERAGRAN) TABS Take 1 tablet by mouth daily      Omega-3 Fatty Acids (FISH OIL) 1200 MG CAPS Take by mouth 2 (two)  "times a day       rosuvastatin (CRESTOR) 5 mg tablet Take 1 tablet (5 mg total) by mouth daily 90 tablet 1    Sodium Fluoride 5000 Sensitive 1.1-5 % GEL       tadalafil (CIALIS) 5 MG tablet Take 5 mg by mouth daily as needed for erectile dysfunction       No current facility-administered medications for this visit.       Allergies  No Known Allergies    OBJECTIVE    Vitals   Vitals:    10/24/24 1120   Weight: 82.6 kg (182 lb)   Height: 5' 7\" (1.702 m)       PVR:    Physical Exam  Constitutional:       General: He is not in acute distress.     Appearance: Normal appearance. He is normal weight. He is not ill-appearing or toxic-appearing.   HENT:      Head: Normocephalic and atraumatic.   Eyes:      Conjunctiva/sclera: Conjunctivae normal.   Cardiovascular:      Rate and Rhythm: Normal rate.   Pulmonary:      Effort: Pulmonary effort is normal. No respiratory distress.   Skin:     General: Skin is warm and dry.   Neurological:      General: No focal deficit present.      Mental Status: He is alert and oriented to person, place, and time.      Cranial Nerves: No cranial nerve deficit.   Psychiatric:         Mood and Affect: Mood normal.         Behavior: Behavior normal.         Thought Content: Thought content normal.          Labs:     Lab Results   Component Value Date    PSA 4.379 (H) 10/16/2024    PSA 3.2 03/06/2023    PSA 3.4 08/15/2022    PSA 1.8 10/20/2020     Lab Results   Component Value Date    CREATININE 0.99 10/16/2024      Lab Results   Component Value Date    HGBA1C 5.7 09/04/2024     Lab Results   Component Value Date    GLUCOSE 104 07/27/2018    CALCIUM 9.3 10/16/2024     (U) 08/22/2014    K 4.4 10/16/2024    CO2 27 10/16/2024     10/16/2024    BUN 17 10/16/2024    CREATININE 0.99 10/16/2024       I have personally reviewed all pertinent lab results and reviewed with patient    Imaging       Edgar Sauer PA-C  Date: 10/24/2024 Time: 11:24 AM  [facility]    This " note was written using fluency dictation software. Please excuse any resulting minor grammatical errors.

## 2024-11-06 ENCOUNTER — HOSPITAL ENCOUNTER (OUTPATIENT)
Dept: RADIOLOGY | Facility: HOSPITAL | Age: 64
Discharge: HOME | End: 2024-11-06
Payer: MEDICARE

## 2024-11-06 DIAGNOSIS — N28.1 RENAL CYST: ICD-10-CM

## 2024-11-06 PROCEDURE — A9585 GADOBUTROL INJECTION: HCPCS | Performed by: NURSE PRACTITIONER

## 2024-11-06 PROCEDURE — 74183 MRI ABD W/O CNTR FLWD CNTR: CPT

## 2024-11-06 RX ORDER — GADOBUTROL 604.72 MG/ML
8 INJECTION INTRAVENOUS
Status: COMPLETED | OUTPATIENT
Start: 2024-11-06 | End: 2024-11-06

## 2024-11-06 RX ADMIN — GADOBUTROL 8 ML: 604.72 INJECTION INTRAVENOUS at 07:04

## 2024-11-11 ENCOUNTER — TELEPHONE (OUTPATIENT)
Dept: UROLOGY | Facility: CLINIC | Age: 64
End: 2024-11-11

## 2024-11-11 NOTE — TELEPHONE ENCOUNTER
Called and LM for patient per communication consent with Edgar's note. Informed to call back with questions or concerns.

## 2024-11-11 NOTE — TELEPHONE ENCOUNTER
Please call patient to inform him that his MRI reveals that cyst is a Bosniak 1.  This does not need any dedicated follow-up at this point in time.  Patient should obtain updated PSA  In the next 1 to 2 weeks to reassess if it is still elevated

## 2024-11-12 ENCOUNTER — APPOINTMENT (OUTPATIENT)
Dept: LAB | Facility: HOSPITAL | Age: 64
End: 2024-11-12
Payer: MEDICARE

## 2024-11-12 DIAGNOSIS — R97.20 ELEVATED PSA: ICD-10-CM

## 2024-11-12 DIAGNOSIS — N28.1 RENAL CYST: ICD-10-CM

## 2024-11-12 LAB
ANION GAP SERPL CALCULATED.3IONS-SCNC: 5 MMOL/L (ref 4–13)
BUN SERPL-MCNC: 21 MG/DL (ref 5–25)
CALCIUM SERPL-MCNC: 9.7 MG/DL (ref 8.4–10.2)
CHLORIDE SERPL-SCNC: 104 MMOL/L (ref 96–108)
CO2 SERPL-SCNC: 28 MMOL/L (ref 21–32)
CREAT SERPL-MCNC: 1.07 MG/DL (ref 0.6–1.3)
GFR SERPL CREATININE-BSD FRML MDRD: 72 ML/MIN/1.73SQ M
GLUCOSE SERPL-MCNC: 98 MG/DL (ref 65–140)
POTASSIUM SERPL-SCNC: 4.6 MMOL/L (ref 3.5–5.3)
PSA FREE MFR SERPL: 19.52 %
PSA FREE SERPL-MCNC: 0.83 NG/ML
PSA SERPL-MCNC: 4.25 NG/ML (ref 0–4)
SODIUM SERPL-SCNC: 137 MMOL/L (ref 135–147)

## 2024-11-12 PROCEDURE — 80048 BASIC METABOLIC PNL TOTAL CA: CPT

## 2024-11-12 PROCEDURE — 84153 ASSAY OF PSA TOTAL: CPT

## 2024-11-12 PROCEDURE — 36415 COLL VENOUS BLD VENIPUNCTURE: CPT

## 2024-11-12 PROCEDURE — 84154 ASSAY OF PSA FREE: CPT

## 2024-11-14 DIAGNOSIS — R97.20 PSA ELEVATION: Primary | ICD-10-CM

## 2024-11-18 PROBLEM — Z86.39 HISTORY OF TYPE 2 DIABETES MELLITUS: Status: ACTIVE | Noted: 2022-11-16

## 2024-12-04 ENCOUNTER — OFFICE VISIT (OUTPATIENT)
Dept: OBGYN CLINIC | Facility: HOSPITAL | Age: 64
End: 2024-12-04
Payer: MEDICARE

## 2024-12-04 VITALS — HEIGHT: 67 IN | WEIGHT: 188 LBS | BODY MASS INDEX: 29.51 KG/M2

## 2024-12-04 DIAGNOSIS — M18.12 ARTHRITIS OF CARPOMETACARPAL (CMC) JOINT OF LEFT THUMB: Primary | ICD-10-CM

## 2024-12-04 PROCEDURE — 20600 DRAIN/INJ JOINT/BURSA W/O US: CPT | Performed by: ORTHOPAEDIC SURGERY

## 2024-12-04 PROCEDURE — 99213 OFFICE O/P EST LOW 20 MIN: CPT | Performed by: ORTHOPAEDIC SURGERY

## 2024-12-04 RX ORDER — TRIAMCINOLONE ACETONIDE 40 MG/ML
20 INJECTION, SUSPENSION INTRA-ARTICULAR; INTRAMUSCULAR
Status: COMPLETED | OUTPATIENT
Start: 2024-12-04 | End: 2024-12-04

## 2024-12-04 RX ORDER — LIDOCAINE HYDROCHLORIDE 10 MG/ML
1 INJECTION, SOLUTION INFILTRATION; PERINEURAL
Status: COMPLETED | OUTPATIENT
Start: 2024-12-04 | End: 2024-12-04

## 2024-12-04 RX ADMIN — TRIAMCINOLONE ACETONIDE 20 MG: 40 INJECTION, SUSPENSION INTRA-ARTICULAR; INTRAMUSCULAR at 11:00

## 2024-12-04 RX ADMIN — LIDOCAINE HYDROCHLORIDE 1 ML: 10 INJECTION, SOLUTION INFILTRATION; PERINEURAL at 11:00

## 2024-12-04 NOTE — PROGRESS NOTES
ASSESSMENT/PLAN:    Assessment:   Left thumb CMC arthritis    Plan:   The patient consented and underwent a repeat left thumb CMC injection with Kenalog in the office today.  Discussed we can repeat these injections every 3 months as needed.    Follow Up:  3  month(s)    To Do Next Visit:  Repeat evaluation    _____________________________________________________  CHIEF COMPLAINT:  Chief Complaint   Patient presents with    Left Thumb - Follow-up     CMC- Kenalog         SUBJECTIVE:  Flo Brennan is a 64 y.o. year old male who presents for follow up regarding Thumb CMC Arthritis  left.  Since last visit, Flo Brennan underwent a steroid injection with Kenalog at his last visit which he states provided him with good relief. He states his pain started to return over the past few weeks with the cold weather. He notes pain to the base of his thumb that's worse with pinch and . He does use the comfort cool brace as needed. He will take Tylenol at night.       PAST MEDICAL HISTORY:  Past Medical History:   Diagnosis Date    Cat scratch fever     Colon polyp     Diverticulitis     GERD (gastroesophageal reflux disease)     Hyperlipidemia     Hypertension     IFG (impaired fasting glucose)     L3 vertebral fracture (HCC) 07/2018    Lumbar compression fracture (HCC)     Patella-femoral syndrome     Thyroid nodule        PAST SURGICAL HISTORY:  Past Surgical History:   Procedure Laterality Date    CLOSED REDUCTION WRIST FRACTURE      COLONOSCOPY      FL RETROGRADE PYELOGRAM  8/26/2022    [place] COLONOSCOPY FLX DX W/COLLJ SPEC WHEN PFRMD N/A 5/30/2018    Procedure: COLONOSCOPY;  Surgeon: DERRICK Martinez MD;  [facility];  Service: Colorectal    [place] CYSTO/URETERO W/LITHOTRIPSY &INDWELL STENT INSRT Left 8/26/2022    Procedure: CYSTOSCOPY URETEROSCOPY, BASKET STONE EXTRACTION, RETROGRADE PYELOGRAM AND INSERTION STENT URETERAL;  Surgeon: Ronnie Parra MD;  [facility];  Service: Urology     UMBILICAL GRANULOMA EXCISION Left     groin    US GUIDED THYROID BIOPSY  9/20/2018    US GUIDED THYROID BIOPSY  2/13/2019    US GUIDED THYROID BIOPSY  2/13/2023    WISDOM TOOTH EXTRACTION      WRIST GANGLION EXCISION  1969    closed reduction        FAMILY HISTORY:  Family History   Problem Relation Age of Onset    Hypertension Mother     Melanoma Father     Diabetes type II Father     Hypertension Father     Colon cancer Maternal Grandmother     Colon cancer Maternal Uncle     Hypertension Brother        SOCIAL HISTORY:  Social History     Tobacco Use    Smoking status: Never     Passive exposure: Never    Smokeless tobacco: Never   Vaping Use    Vaping status: Never Used   Substance Use Topics    Alcohol use: Yes     Alcohol/week: 3.0 standard drinks of alcohol     Types: 3 Glasses of wine per week     Comment: social    Drug use: No       MEDICATIONS:    Current Outpatient Medications:     amLODIPine (NORVASC) 10 mg tablet, Take 1 tablet (10 mg total) by mouth daily, Disp: 100 tablet, Rfl: 1    glucosamine 500 MG CAPS capsule, Take by mouth daily  , Disp: , Rfl:     losartan (COZAAR) 100 MG tablet, Take 1 tablet (100 mg total) by mouth daily, Disp: 90 tablet, Rfl: 1    metoprolol succinate (TOPROL-XL) 100 mg 24 hr tablet, Take 1 tablet (100 mg total) by mouth daily, Disp: 90 tablet, Rfl: 1    multivitamin (THERAGRAN) TABS, Take 1 tablet by mouth daily, Disp: , Rfl:     Omega-3 Fatty Acids (FISH OIL) 1200 MG CAPS, Take by mouth 2 (two) times a day , Disp: , Rfl:     rosuvastatin (CRESTOR) 5 mg tablet, Take 1 tablet (5 mg total) by mouth daily, Disp: 90 tablet, Rfl: 1    Sodium Fluoride 5000 Sensitive 1.1-5 % GEL, , Disp: , Rfl:     tadalafil (CIALIS) 5 MG tablet, Take 5 mg by mouth daily as needed for erectile dysfunction, Disp: , Rfl:     ALLERGIES:  No Known Allergies    REVIEW OF SYSTEMS:  Pertinent items are noted in HPI.  A comprehensive review of systems was negative.    LABS:  HgA1c:   Lab Results    Component Value Date    HGBA1C 5.7 09/04/2024     BMP:   Lab Results   Component Value Date    GLUCOSE 104 07/27/2018    CALCIUM 9.7 11/12/2024     (U) 08/22/2014    K 4.6 11/12/2024    CO2 28 11/12/2024     11/12/2024    BUN 21 11/12/2024    CREATININE 1.07 11/12/2024           _____________________________________________________  PHYSICAL EXAMINATION:  General: well developed and well nourished, alert, oriented times 3, and appears comfortable  Psychiatric: Normal  HEENT: Trachea Midline, No torticollis  Cardiovascular: No discernable arrhythmia  Pulmonary: No wheezing or stridor  Skin: No masses, erythema, lacerations, fluctation, ulcerations  Neurovascular: Sensation Intact to the Median, Ulnar, Radial Nerve, Motor Intact to the Median, Ulnar, Radial Nerve, and Pulses Intact    MUSCULOSKELETAL EXAMINATION:  LEFT SIDE:  CMC: Positive grind and Positive tendnerness CMC and no UCL laxity, no MP hyperextension, minimal crepitus with circumduction.    _____________________________________________________  STUDIES REVIEWED:  No Studies to review      PROCEDURES PERFORMED:  Small joint arthrocentesis: L thumb CMC  [place] Protocol:  procedure performed by consultantConsent: Verbal consent obtained.  Consent given by: patient  Patient identity confirmed: verbally with patient  Supporting Documentation  Indications: pain   Procedure Details  Location: thumb - L thumb CMC  Needle size: 25 G  Ultrasound guidance: no  Medications administered: 1 mL lidocaine 1 %; 20 mg triamcinolone acetonide 40 mg/mL    Patient tolerance: patient tolerated the procedure well with no immediate complications  Dressing:  Sterile dressing applied             Scribe Attestation      I,:  Hina Garcia MA am acting as a scribe while in the presence of the attending physician.:       I,:  Onofre Erickson MD personally performed the services described in this documentation    as scribed in my presence.:             Scribe  Attestation      I,:  Hina Garcia MA am acting as a scribe while in the presence of the attending physician.:       I,:  Onofre Erickson MD personally performed the services described in this documentation    as scribed in my presence.:

## 2024-12-18 ENCOUNTER — RESULTS FOLLOW-UP (OUTPATIENT)
Dept: UROLOGY | Facility: CLINIC | Age: 64
End: 2024-12-18

## 2024-12-19 NOTE — TELEPHONE ENCOUNTER
Pt made aware, central scheduling number given to schedule MRI      ----- Message from Edgar Sauer PA-C sent at 12/18/2024  5:12 PM EST -----  PSA  was still mildly elevated.  I think is reasonable to proceed with MRI that was previously ordered at last visit please help with scheduling of this MRI

## 2024-12-31 ENCOUNTER — HOSPITAL ENCOUNTER (OUTPATIENT)
Facility: MEDICAL CENTER | Age: 64
Discharge: HOME | End: 2024-12-31
Payer: MEDICARE

## 2024-12-31 DIAGNOSIS — R97.20 PSA ELEVATION: ICD-10-CM

## 2024-12-31 PROCEDURE — 76377 3D RENDER W/INTRP POSTPROCES: CPT

## 2024-12-31 PROCEDURE — A9585 GADOBUTROL INJECTION: HCPCS

## 2024-12-31 PROCEDURE — 72197 MRI PELVIS W/O & W/DYE: CPT

## 2024-12-31 RX ORDER — GADOBUTROL 604.72 MG/ML
8 INJECTION INTRAVENOUS
Status: COMPLETED | OUTPATIENT
Start: 2024-12-31 | End: 2024-12-31

## 2024-12-31 RX ADMIN — GADOBUTROL 8 ML: 604.72 INJECTION INTRAVENOUS at 13:01

## 2025-01-03 ENCOUNTER — RESULTS FOLLOW-UP (OUTPATIENT)
Dept: OTHER | Facility: HOSPITAL | Age: 65
End: 2025-01-03

## 2025-01-03 DIAGNOSIS — R97.20 ELEVATED PSA: Primary | ICD-10-CM

## 2025-01-03 NOTE — TELEPHONE ENCOUNTER
Called and spoke to patient informing him his prostate does not appear to be enlarged.  On MRI there is no overt abnormalities seen to indicate prostate cancer however the elevation of his PSA is not corresponding with the size of his prostate (PSA density of 0.18).  Per Edgar he would like him to get 1 more PSA at this point within 3 months,informed PSA order is active in chart and he will be reached out to once the results of the PSA are received.Informed him if his PSA is still elevated we may have to consider proceeding with a biopsy.        Please call patient to inform him that his MRI came back.  His prostate does not appear to be enlarged.  On MRI there is no overt abnormalities seen to indicate prostate cancer however the elevation of his PSA is not corresponding with the size of his prostate (PSA density of 0.18). I would like him to get 1 more PSA at this point with 3 months since he last had a PSA if his PSA is still elevated we may have to consider proceeding with a biopsy.  I will call with results of the updated PSA

## 2025-01-03 NOTE — TELEPHONE ENCOUNTER
Please call patient to inform him that his MRI came back.  His prostate does not appear to be enlarged.  On MRI there is no overt abnormalities seen to indicate prostate cancer however the elevation of his PSA is not corresponding with the size of his prostate (PSA density of 0.18). I would like him to get 1 more PSA at this point with 3 months since he last had a PSA if his PSA is still elevated we may have to consider proceeding with a biopsy.  I will call with results of the updated PSA

## 2025-01-17 ENCOUNTER — APPOINTMENT (OUTPATIENT)
Dept: LAB | Facility: CLINIC | Age: 65
End: 2025-01-17
Payer: MEDICARE

## 2025-01-17 DIAGNOSIS — R97.20 ELEVATED PSA: ICD-10-CM

## 2025-01-17 LAB — PSA SERPL-MCNC: 3.08 NG/ML (ref 0–4)

## 2025-01-17 PROCEDURE — 84153 ASSAY OF PSA TOTAL: CPT

## 2025-01-17 PROCEDURE — 36415 COLL VENOUS BLD VENIPUNCTURE: CPT

## 2025-01-21 ENCOUNTER — RESULTS FOLLOW-UP (OUTPATIENT)
Dept: UROLOGY | Facility: AMBULATORY SURGERY CENTER | Age: 65
End: 2025-01-21

## 2025-01-21 DIAGNOSIS — R97.20 ELEVATED PSA: Primary | ICD-10-CM

## 2025-01-24 NOTE — TELEPHONE ENCOUNTER
Pt called to schedule f/u appt in 6 months as per previous message.  Pt scheduled with the AP on 7/21/25.  Pt sts that he will have PSA done prior to the appt

## 2025-02-17 DIAGNOSIS — E78.2 MIXED HYPERLIPIDEMIA: ICD-10-CM

## 2025-02-17 DIAGNOSIS — I10 ESSENTIAL HYPERTENSION: ICD-10-CM

## 2025-02-17 RX ORDER — AMLODIPINE BESYLATE 10 MG/1
10 TABLET ORAL DAILY
Qty: 90 TABLET | Refills: 3 | Status: SHIPPED | OUTPATIENT
Start: 2025-02-17

## 2025-02-17 RX ORDER — ROSUVASTATIN CALCIUM 5 MG/1
5 TABLET, COATED ORAL DAILY
Qty: 90 TABLET | Refills: 3 | Status: SHIPPED | OUTPATIENT
Start: 2025-02-17

## 2025-02-17 RX ORDER — METOPROLOL SUCCINATE 100 MG/1
100 TABLET, EXTENDED RELEASE ORAL DAILY
Qty: 90 TABLET | Refills: 3 | Status: SHIPPED | OUTPATIENT
Start: 2025-02-17

## 2025-02-17 RX ORDER — LOSARTAN POTASSIUM 100 MG/1
100 TABLET ORAL DAILY
Qty: 90 TABLET | Refills: 3 | Status: SHIPPED | OUTPATIENT
Start: 2025-02-17

## 2025-02-28 ENCOUNTER — RA CDI HCC (OUTPATIENT)
Dept: OTHER | Facility: HOSPITAL | Age: 65
End: 2025-02-28

## 2025-02-28 NOTE — PROGRESS NOTES
HCC coding opportunities       Chart reviewed, no opportunity found: CHART REVIEWED, NO OPPORTUNITY FOUND        Patients Insurance     Medicare Insurance: Medicare          
27-Jan-2018

## 2025-03-05 ENCOUNTER — OFFICE VISIT (OUTPATIENT)
Dept: OBGYN CLINIC | Facility: HOSPITAL | Age: 65
End: 2025-03-05
Payer: MEDICARE

## 2025-03-05 VITALS — BODY MASS INDEX: 29.51 KG/M2 | WEIGHT: 188 LBS | HEIGHT: 67 IN

## 2025-03-05 DIAGNOSIS — M77.02 MEDIAL EPICONDYLITIS OF LEFT ELBOW: Primary | ICD-10-CM

## 2025-03-05 DIAGNOSIS — M18.12 ARTHRITIS OF CARPOMETACARPAL (CMC) JOINT OF LEFT THUMB: ICD-10-CM

## 2025-03-05 PROCEDURE — 20600 DRAIN/INJ JOINT/BURSA W/O US: CPT | Performed by: PHYSICIAN ASSISTANT

## 2025-03-05 PROCEDURE — 99213 OFFICE O/P EST LOW 20 MIN: CPT | Performed by: PHYSICIAN ASSISTANT

## 2025-03-05 RX ORDER — LIDOCAINE HYDROCHLORIDE 10 MG/ML
0.5 INJECTION, SOLUTION INFILTRATION; PERINEURAL
Status: COMPLETED | OUTPATIENT
Start: 2025-03-05 | End: 2025-03-05

## 2025-03-05 RX ORDER — TRIAMCINOLONE ACETONIDE 40 MG/ML
20 INJECTION, SUSPENSION INTRA-ARTICULAR; INTRAMUSCULAR
Status: COMPLETED | OUTPATIENT
Start: 2025-03-05 | End: 2025-03-05

## 2025-03-05 RX ADMIN — TRIAMCINOLONE ACETONIDE 20 MG: 40 INJECTION, SUSPENSION INTRA-ARTICULAR; INTRAMUSCULAR at 11:15

## 2025-03-05 RX ADMIN — LIDOCAINE HYDROCHLORIDE 0.5 ML: 10 INJECTION, SOLUTION INFILTRATION; PERINEURAL at 11:15

## 2025-03-05 NOTE — PROGRESS NOTES
ORTHOPAEDIC HAND, WRIST, AND ELBOW OFFICE  VISIT     Name: Flo Brennan      : 1960      MRN: [id number]  Encounter Provider: Bartolo Manuel PA-C  Encounter Date: 3/5/2025   Encounter department: [facility] [place]  :  Assessment & Plan  Medial epicondylitis of left elbow         Arthritis of carpometacarpal (CMC) joint of left thumb               General Discussions:  CMC Arthritis: The anatomy and physiology of carpometacarpal joint arthritis was discussed with the patient today in the office.  Deterioration of the articular cartilage eventually leads to hypermobility at the thumb CMC joint, resulting in joint subluxation, osteophyte formation, cystic changes within the trapezium and base of the first metacarpal, as well as subchondral sclerosis.  Eventually, pain, limited mobility, and compensatory hyperextension at the metacarpophalangeal joint may develop.  While normal activity and usage of the thumb joint may provide a painful experience to the patient, this typically does not result in damage to the thumb or hand.  Treatment options include resting thumb spica splints to decreased joint edema, pain, and inflammation.  Therapy exercises to strengthen the thenar musculature may relieve pain, but do not alter the overall continued development of osteoarthritis.  Oral medications, topical medications, corticosteroid injections may decrease pain and increase overall function.  Eventually, approximately 5% of patients may require surgical intervention.     Medial Epicondylitis: The anatomy and physiology of medial epicondylitis was discussed with the patient today.  Typically, one traumatic injury, or repetitive use may cause a partial tear of the flexor pronator mass.  This creates pain over the medial epicondyle.  This pain typically is made worse with palm up lifting activities as well as anything that involves strength and stability of the wrist.  The pain may  radiate from the wrist up to the elbow.  At times, the shoulder may be weak as well which can predispose or cause continuation of the problem.  Conservative treatment usually cures a vast majority of patients; however, this may take up to 6-9 months.  Conservative treatment options typically include activity modification, therapy for strengthening of the shoulder and elbow, nocturnal wrist support splints, and possible corticosteroid injections.  Corticosteroid injections do not change the natural history of this process, but may decrease the pain temporarily.  Surgery is required in fewer than 5% of patients.  At times, the ulnar nerve may be aggravated with this condition.        History of Present Illness   HPI  Chief Complaint   Patient presents with    Left Thumb - Follow-up     CMC - [place]       Flo Brennan is a 64 y.o. male who presents for follow-up regarding left thumb CMC arthritis.  He states that his previous cortisone injection worked for about 2 months.  He started to note some pain and discomfort at the base of his thumb with certain activities.  He states he would like to try repeat cortisone injection for this issue.    Of note he does have some pain over the inside of his elbow.  He denies any injuries or trauma.  He states with certain motions he will have discomfort and when lifting heavy objects.      REVIEW OF SYSTEMS:  General: no fever, no chills  HEENT:  No loss of hearing or eyesight problems  Eyes:  No red eyes  Respiratory:  No coughing, shortness of breath or wheezing  Cardiovascular:  No chest pain, no palpitations  GI:  Abdomen soft nontender, denies nausea  Endocrine:  No muscle weakness, no frequent urination, no excessive thirst  Urinary:  No dysuria, no incontinence  Musculoskeletal: see HPI and PE  SKIN:  No skin rash, no dry skin  Neurological:  No headaches, no confusion  Psychiatric:  No suicide thoughts, no anxiety, no depression  Review of all other systems is  "negative    Objective   Ht 5' 7\" (1.702 m)   Wt 85.3 kg (188 lb)   BMI 29.44 kg/m²      General: well developed and well nourished, alert, oriented times 3, and appears comfortable  Psychiatric: Normal  HEENT: Trachea Midline, No torticollis  Cardiovascular: No discernable arrhythmia  Pulmonary: No wheezing or stridor  Abdomen: No rebound or guarding  Extremities: No peripheral edema  Skin: No masses, erythema, lacerations, fluctation, ulcerations  Neurovascular: Sensation Intact to the Median, Ulnar, Radial Nerve, Motor Intact to the Median, Ulnar, Radial Nerve, and Pulses Intact    Musculoskeletal exam:  left CMC Exam:  No adduction contracture  No hyperextension deformity of MCP joint  Positive localized tenderness over radial and dorsal aspect of thumb (CMC joint)  Grind test is Positive for pain and Positive for crepitus  No triggering or tenderness over the A1 pulley  No pain with Finkelstein’s maneuver       Left Elbow:    No swelling or deformity  Full range of motion with flexion, extension, supination and pronation.  Positive tenderness to palpation over the Medial Epicondyle.  No Pain with passive flexion of the wrist with elbow fully extended.  Pain with resisted wrist flexion with elbow fully extended.  No pain with resisted forearm supination with the elbow fully extended.  Negative tinels over the ulnar nerve at the elbow.                STUDIES REVIEWED:  No Studies to review      PROCEDURES PERFORMED:  Small joint arthrocentesis: L thumb CMC  [place] Protocol:  Consent: Verbal consent obtained.  Risks and benefits: risks, benefits and alternatives were discussed  Consent given by: patient  Time out: Immediately prior to procedure a \"time out\" was called to verify the correct patient, procedure, equipment, support staff and site/side marked as required.  Patient understanding: patient states understanding of the procedure being performed  Patient consent: the patient's understanding of the " procedure matches consent given  Site marked: the operative site was marked  Patient identity confirmed: verbally with patient  Supporting Documentation  Indications: pain   Procedure Details  Location: thumb - L thumb CMC  Preparation: Patient was prepped and draped in the usual sterile fashion  Needle size: 25 G  Approach: volar  Medications administered: 0.5 mL lidocaine 1 %; 20 mg triamcinolone acetonide 40 mg/mL    Patient tolerance: patient tolerated the procedure well with no immediate complications  Dressing:  Sterile dressing applied

## 2025-03-05 NOTE — PATIENT INSTRUCTIONS
Patient Education     Medial Epicondylitis Exercises   About this topic   The elbow is where your upper arm bone meets the two lower bones in your arm. There is a bump on the inside of your elbow at the bottom of your upper arm bone. It is the medial epicondyle. A few tendons attach here. Tendons attach muscles to bone. These muscles are used to curl your wrist and fingers down.  When these tendons get sore, swollen, and torn from overuse you have medial epicondylitis. It is also called golfer's elbow. This is a common problem in golf players. It may also happen with other activities or sports. Exercises can help to make this problem better.  General   Before starting with a program, ask your doctor if you are healthy enough to do these exercises. Your doctor may have you work with a  or physical therapist to make a safe exercise program to meet your needs.  Stretching Exercises   Stretching exercises keep your muscles flexible. They also stop them from getting tight. Start by doing each of these stretches 2 to 3 times. In order for your body to make changes, you will need to hold these stretches for 20 to 30 seconds. Try to do the stretches 2 to 3 times each day. Do all exercises slowly.  Wrist stretches bending back ? Straighten your elbow and have your palm facing up. Keeping your elbow straight, bend your hand and fingers back so that your fingers are now pointing to the floor. Grab your hand with your other hand and push back the wrist further until you feel a stretch.  Strengthening Exercises   Strengthening exercises keep your muscles firm and strong. Start by repeating each exercise 2 to 3 times. Work up to doing each exercise 10 times. Try to do the exercises 2 to 3 times each day. Do all exercises slowly.  Some exercises can be done holding a small weight. You can use a can of soup or a hand weight. If the exercise gets too easy, use heavier weights or do the exercise more times.  Wrist exercises  seated with your lower arm supported on a table or your leg. Your hand should be off the edge:  Bending up ? Have your palm facing UP with a small weight in your hand. Bend your wrist up as far as you can. Now, lower the weight back down. Be sure to control the weight as you lower it. Repeat using other hand.  Bending down ? Have your palm facing DOWN with a small weight in your hand. Bend your wrist back as far as you can. Now, lower the weight back down. Be sure to control the weight as you lower it. Repeat using other hand.  Side-to-side ? Position your hand SIDEWAYS with your thumb up. With a weight in your hand, lift your hand straight up. Now, lower your hand back down. Repeat using other hand.  Lower arm twists with weight ? Start by having your elbow bent with your arm at your side. Hold a small weight in your hand. Keeping your arm at your side and not moving your elbow, turn the lower arm until your palm is facing down. Now, turn the lower arm until your palm is facing up. Repeat using other hand.  Biceps curls standing ? Start by having your arms straight down at your side with your palm facing out. Bend your elbow and slowly move your lower arm towards your chest. Do not move your shoulder as you move your lower arm. Breathe out when you do this motion. Hold this position for 3 to 5 seconds. Lower your lower arm to the starting position. Do not lock your elbow. Always keep a slight bend in your elbow. Breathe in when lowering your lower arm. Repeat the exercise on the other arm. You may exercise both arms at the same time. You can also do this exercise with different hand positions. Try doing this exercise with thumbs pointed up or palms facing down to get all parts of the biceps muscle.  Ball squeezes ? Gently squeeze a tennis ball 10 times. If you have no pain, squeeze with more pressure.  Golf swings ? Once your pain has lessened and you are almost ready to return to the golf course, try swinging the  golf club without using a ball. This will get you used to the weight of the golf club.  Your doctor or therapist may also have you use a rubber bar or Flex bar for exercises to help your medial epicondylitis.             What will the results be?   Less pain and swelling  Increased strength  Better range of motion  Greater ease doing arm activities  Helpful tips   Stay active and work out to keep your muscles strong and flexible.  Keep a healthy weight to avoid putting too much stress on your joints. Eat a healthy diet to keep your muscles healthy.  Be sure you do not hold your breath when exercising. This can raise your blood pressure. If you tend to hold your breath, try counting out loud when exercising. If any exercise bothers you, stop right away.  Always warm up before stretching. Heated muscles stretch much easier than cool muscles. Stretching cool muscles can lead to injury.  Try walking and swinging your arms at an easy pace for a few minutes to warm up your muscles. Do this again after exercising.  Never bounce when doing stretches.  Doing exercises before a meal may be a good way to get into a routine.  If you are using weights, choose a weight that will allow you to repeat the exercise 10 times before resting. If you easily do 10 repeats, you may not be using enough weight. If you are not able to do 10 repeats, you are using too heavy of a weight.  Exercise may be slightly uncomfortable, but you should not have sharp pains. If you do get sharp pains, stop what you are doing. If the sharp pains continue, call your doctor.  Last Reviewed Date   2021-08-27  Consumer Information Use and Disclaimer   This generalized information is a limited summary of diagnosis, treatment, and/or medication information. It is not meant to be comprehensive and should be used as a tool to help the user understand and/or assess potential diagnostic and treatment options. It does NOT include all information about conditions,  treatments, medications, side effects, or risks that may apply to a specific patient. It is not intended to be medical advice or a substitute for the medical advice, diagnosis, or treatment of a health care provider based on the health care provider's examination and assessment of a patient’s specific and unique circumstances. Patients must speak with a health care provider for complete information about their health, medical questions, and treatment options, including any risks or benefits regarding use of medications. This information does not endorse any treatments or medications as safe, effective, or approved for treating a specific patient. [employer] and its affiliates disclaim any warranty or liability relating to this information or the use thereof. The use of this information is governed by the Terms of Use, available at https://www.woltersLizhiuwer.com/en/know/clinical-effectiveness-terms   Copyright   Copyright © 2024 [employer], [employer] and its affiliates and/or licensors. All rights reserved.

## 2025-03-07 ENCOUNTER — OFFICE VISIT (OUTPATIENT)
Dept: FAMILY MEDICINE CLINIC | Facility: CLINIC | Age: 65
End: 2025-03-07
Payer: MEDICARE

## 2025-03-07 VITALS
SYSTOLIC BLOOD PRESSURE: 118 MMHG | DIASTOLIC BLOOD PRESSURE: 74 MMHG | BODY MASS INDEX: 29.41 KG/M2 | HEIGHT: 67 IN | HEART RATE: 84 BPM | RESPIRATION RATE: 19 BRPM | TEMPERATURE: 97.6 F | WEIGHT: 187.4 LBS | OXYGEN SATURATION: 95 %

## 2025-03-07 DIAGNOSIS — I10 ESSENTIAL HYPERTENSION: ICD-10-CM

## 2025-03-07 DIAGNOSIS — E78.2 MIXED HYPERLIPIDEMIA: ICD-10-CM

## 2025-03-07 DIAGNOSIS — H90.3 ASYMMETRICAL SENSORINEURAL HEARING LOSS: ICD-10-CM

## 2025-03-07 DIAGNOSIS — Z86.39 HISTORY OF TYPE 2 DIABETES MELLITUS: ICD-10-CM

## 2025-03-07 DIAGNOSIS — E04.1 THYROID NODULE: ICD-10-CM

## 2025-03-07 DIAGNOSIS — N18.2 STAGE 2 CHRONIC KIDNEY DISEASE: ICD-10-CM

## 2025-03-07 DIAGNOSIS — Z80.0 FAMILY HISTORY OF COLON CANCER: ICD-10-CM

## 2025-03-07 DIAGNOSIS — R73.03 PREDIABETES: Primary | ICD-10-CM

## 2025-03-07 PROBLEM — K21.9 GERD (GASTROESOPHAGEAL REFLUX DISEASE): Status: RESOLVED | Noted: 2020-03-18 | Resolved: 2025-03-07

## 2025-03-07 PROCEDURE — 99214 OFFICE O/P EST MOD 30 MIN: CPT | Performed by: NURSE PRACTITIONER

## 2025-03-07 PROCEDURE — G2211 COMPLEX E/M VISIT ADD ON: HCPCS | Performed by: NURSE PRACTITIONER

## 2025-03-07 NOTE — ASSESSMENT & PLAN NOTE
Lab Results   Component Value Date    ZAT7TRYHFQUE 2.782 10/16/2024     3/6/2024  IMPRESSION:     Decreased size of a 3.4 cm right lower pole nodule with benign molecular testing.  The patient was following with endocrinology.  The patient had an ultrasound of the thyroid performed in 2024 which showed a decrease in the size of his thyroid nodule.  2-year follow-up imaging was ordered.  He will have this done next year.  No problems swallowing or food getting stuck.  TSH will be ordered.    Orders:    TSH, 3rd generation; Future

## 2025-03-07 NOTE — ASSESSMENT & PLAN NOTE
Lab Results   Component Value Date    EGFR 72 11/12/2024    EGFR 79 10/16/2024    EGFR 72 06/13/2023    CREATININE 1.07 11/12/2024    CREATININE 0.99 10/16/2024    CREATININE 1.08 06/13/2023   Kidney function stable.  Avoid nephrotoxins.  Surveillance blood work ordered.

## 2025-03-07 NOTE — ASSESSMENT & PLAN NOTE
Lab Results   Component Value Date    HGBA1C 5.7 09/04/2024   Patient with a history of 1 elevated hemoglobin A1c.  Since that time it has been below 6.  He was never on medication for diabetes.  Continue with low-carb diet and exercise.

## 2025-03-07 NOTE — ASSESSMENT & PLAN NOTE
Patient is up to date with colon cancer screening.  Last colonoscopy was in October 2024.  10-year follow-up recommended.

## 2025-03-07 NOTE — ASSESSMENT & PLAN NOTE
Component      Latest Ref Rng 10/16/2024   Cholesterol      See Comment mg/dL 187    Triglycerides      See Comment mg/dL 181 (H)    HDL      >=40 mg/dL 45    LDL Calculated      0 - 100 mg/dL 106 (H)       Legend:  (H) High  The 10-year ASCVD risk score (Hamilton GUARDADO, et al., 2019) is: 23.7%    Values used to calculate the score:      Age: 64 years      Sex: Male      Is Non- : No      Diabetic: Yes      Tobacco smoker: No      Systolic Blood Pressure: 118 mmHg      Is BP treated: Yes      HDL Cholesterol: 45 mg/dL      Total Cholesterol: 187 mg/dL  Patient continues on his Crestor 5 mg daily.  Lipid panel ordered for the patient to have done prior to his next visit.  Weight is stable.  Low-fat diet and exercise recommended.  Orders:    Lipid Panel with Direct LDL reflex; Future

## 2025-03-07 NOTE — ASSESSMENT & PLAN NOTE
Patient with a 5 pound weight gain since his last visit.  His wife is having issues with left knee arthritis and they are less active due to this.  She is undergoing physical therapy.  Lifestyle modifications discussed.  Weight loss recommended.

## 2025-03-07 NOTE — ASSESSMENT & PLAN NOTE
Blood pressure in the office today is 118/74.  He remains on his metoprolol 100 mg daily, losartan 100 mg daily, and amlodipine 10 mg daily.  Low-salt diet recommended.  Exercise and weight loss recommended.  Orders:    CBC and differential; Future    Comprehensive metabolic panel; Future

## 2025-03-07 NOTE — PROGRESS NOTES
Name: Flo Brennan      : 1960      MRN: [id number]  Encounter Provider: JESSY Coffman  Encounter Date: 3/7/2025   Encounter department: [facility]  :  Assessment & Plan  Thyroid nodule  Lab Results   Component Value Date    [id number] 2.782 10/16/2024     3/6/2024  IMPRESSION:     Decreased size of a 3.4 cm right lower pole nodule with benign molecular testing.  The patient was following with endocrinology.  The patient had an ultrasound of the thyroid performed in  which showed a decrease in the size of his thyroid nodule.  2-year follow-up imaging was ordered.  He will have this done next year.  No problems swallowing or food getting stuck.  TSH will be ordered.    Orders:    TSH, 3rd generation; Future    Mixed hyperlipidemia  Component      Latest Ref Rng 10/16/2024   Cholesterol      See Comment mg/dL 187    Triglycerides      See Comment mg/dL 181 (H)    HDL      >=40 mg/dL 45    LDL Calculated      0 - 100 mg/dL 106 (H)       Legend:  (H) High  The 10-year ASCVD risk score (Hamilton GUARDADO, et al., 2019) is: 23.7%    Values used to calculate the score:      Age: 64 years      Sex: Male      Is Non- : No      Diabetic: Yes      Tobacco smoker: No      Systolic Blood Pressure: 118 mmHg      Is BP treated: Yes      HDL Cholesterol: 45 mg/dL      Total Cholesterol: 187 mg/dL  Patient continues on his Crestor 5 mg daily.  Lipid panel ordered for the patient to have done prior to his next visit.  Weight is stable.  Low-fat diet and exercise recommended.  Orders:    Lipid Panel with Direct LDL reflex; Future    Essential hypertension  Blood pressure in the office today is 118/74.  He remains on his metoprolol 100 mg daily, losartan 100 mg daily, and amlodipine 10 mg daily.  Low-salt diet recommended.  Exercise and weight loss recommended.  Orders:    CBC and differential; Future    Comprehensive metabolic panel; Future    Prediabetes    Orders:     Hemoglobin A1C; Future    Asymmetrical sensorineural hearing loss  Patient did have an audiology exam in the past.  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         Stage 2 chronic kidney disease  Lab Results   Component Value Date    EGFR 72 11/12/2024    EGFR 79 10/16/2024    EGFR 72 06/13/2023    CREATININE 1.07 11/12/2024    CREATININE 0.99 10/16/2024    CREATININE 1.08 06/13/2023   Kidney function stable.  Avoid nephrotoxins.  Surveillance blood work ordered.         Family history of colon cancer  Patient is up to date with colon cancer screening.  Last colonoscopy was in October 2024.  10-year follow-up recommended.         BMI 29.0-29.9,adult  Patient with a 5 pound weight gain since his last visit.  His wife is having issues with left knee arthritis and they are less active due to this.  She is undergoing physical therapy.  Lifestyle modifications discussed.  Weight loss recommended.         History of type 2 diabetes mellitus  Lab Results   Component Value Date    HGBA1C 5.7 09/04/2024   Patient with a history of 1 elevated hemoglobin A1c.  Since that time it has been below 6.  He was never on medication for diabetes.  Continue with low-carb diet and exercise.                 Depression Screening and Follow-up Plan: Patient was screened for depression during today's encounter. They screened negative with a PHQ-2 score of 0.        History of Present Illness   Patient presents to the office today for 6-month follow-up.  Overall he feels well.  He did have an elevated PSA at his last appointment and did see urology.  Repeat PSA was normal.  He continues to follow closely with them.  He recently saw orthopedics for tennis elbow and an injection for carpal tunnel.  He is performing exercises at home.  He is up-to-date with flu vaccines.  Declines COVID vaccines.      Review of Systems   Constitutional: Negative.  Negative for fatigue.   HENT: Negative.  Negative for congestion,  "postnasal drip, rhinorrhea and trouble swallowing.    Eyes: Negative.  Negative for visual disturbance.   Respiratory: Negative.  Negative for choking and shortness of breath.    Cardiovascular: Negative.  Negative for chest pain.   Gastrointestinal: Negative.    Endocrine: Negative.    Genitourinary: Negative.    Musculoskeletal:  Positive for arthralgias and joint swelling (left elbow). Negative for back pain, myalgias and neck pain.   Skin: Negative.    Neurological:  Negative for dizziness and headaches.   Psychiatric/Behavioral: Negative.         Objective   /74   Pulse 84   Temp 97.6 °F (36.4 °C) (Tympanic)   Resp 19   Ht 5' 7\" (1.702 m)   Wt 85 kg (187 lb 6.4 oz)   SpO2 95%   BMI 29.35 kg/m²      Physical Exam  Vitals reviewed.   Constitutional:       Appearance: Normal appearance. He is obese.   HENT:      Head: Normocephalic and atraumatic.      Nose: Nose normal.      Mouth/Throat:      Mouth: Mucous membranes are moist.   Eyes:      Extraocular Movements: Extraocular movements intact.      Pupils: Pupils are equal, round, and reactive to light.   Cardiovascular:      Rate and Rhythm: Normal rate and regular rhythm.      Pulses: Normal pulses.      Heart sounds: Normal heart sounds.   Pulmonary:      Effort: Pulmonary effort is normal.      Breath sounds: Normal breath sounds.   Musculoskeletal:         General: Normal range of motion.   Skin:     General: Skin is warm.   Neurological:      General: No focal deficit present.      Mental Status: He is alert and oriented to person, place, and time. Mental status is at baseline.   Psychiatric:         Mood and Affect: Mood normal.         Behavior: Behavior normal.         Thought Content: Thought content normal.         Judgment: Judgment normal.         "

## 2025-05-28 NOTE — PROGRESS NOTES
Chief Complaint   Patient presents with    Follow-up     Hypertension     Health Maintenance   Topic Date Due    PT PLAN OF CARE  Never done    HIV Screening  Never done    Annual Physical  Never done    DTaP,Tdap,and Td Vaccines (2 - Td or Tdap) 05/14/2018    Depression Screening PHQ  03/24/2022    BMI: Followup Plan  03/24/2022    BMI: Adult  06/17/2022    Colorectal Cancer Screening  05/30/2028    Hepatitis C Screening  Completed    Influenza Vaccine  Completed    COVID-19 Vaccine  Completed    Pneumococcal Vaccine: Pediatrics (0 to 5 Years) and At-Risk Patients (6 to 59 Years)  Aged Out    HIB Vaccine  Aged Out    Hepatitis B Vaccine  Aged Out    IPV Vaccine  Aged Out    Hepatitis A Vaccine  Aged Out    Meningococcal ACWY Vaccine  Aged Out    HPV Vaccine  Aged Out              Assessment/Plan:    Essential hypertension  BP on retake 144/98  Recommended to increase dose of Losartan from 50 to 100 mg daily  Continue Metoprolol  mg 1 tablet daily  Follow a low-sodium diet, lose weight  Check BMP  Monitor blood pressure at home and call with blood pressure log in 1 week  Mixed hyperlipidemia  Continue Crestor 5 mg daily, Omega 3  Check CMP, lipid panel  Neck pain  Will check X-ray neck to r/o bone abnormality  Start Medrol Dosepak  Consider referral to PT  Neck muscle spasm  Take Flexeril 5 mg at bedtime  Follow-up in 4 weeks  Diagnoses and all orders for this visit:    Essential hypertension  -     Comprehensive metabolic panel; Future  -     losartan (COZAAR) 100 MG tablet; Take 1 tablet (100 mg total) by mouth daily    Mixed hyperlipidemia  -     Lipid panel; Future  -     Comprehensive metabolic panel; Future    Neck pain  -     XR spine cervical 2 or 3 vw injury; Future  -     cyclobenzaprine (FLEXERIL) 5 mg tablet;  Take 1 tablet (5 mg total) by mouth 3 (three) times a day as needed for muscle spasms  -     methylPREDNISolone 4 MG tablet therapy pack; Take as directed with food    Neck muscle spasm  -     cyclobenzaprine (FLEXERIL) 5 mg tablet; Take 1 tablet (5 mg total) by mouth 3 (three) times a day as needed for muscle spasms          Subjective:      Patient ID: Indigo Amin is a 63 y o  male  HPI     Patient presents to the office to discuss medications for HTN  Patient was seen in [facility] ER on May 21, 2021 due to elevated blood pressure at 190/105  Blood work, EKG were unremarkable  He was discharge from ER withistructions to follow-up with PCP  Patient called office on 5/25/21and was prescribed Losartan 50 mg daily in addition to regular dose of Metoprolol  mg daily  Patient continues with frontal and occipital headache  Denies chest pain, dizziness, SOB, visual disturbances  Blood pressure at home ranges in 160's/ 80-90's  Hyperlipidemia - currently on Rosuvastatin 5 mg daily  Family history is positive for HTN in both parents and his brother  Patient c/o right-sided neck pain radiating from the base of the skull down to left posterior shoulder for the last 2 weeks  Patient has difficulty to turn neck to the right side  Denies falls  No prior history of neck injury or herniated disc  Denies numbness, tingling in upper extremities  Denies to tobacco use  The following portions of the patient's history were reviewed and updated as appropriate: allergies, current medications, past medical history, past social history, past surgical history and problem list     Review of Systems   Constitutional: Negative for activity change, appetite change, chills, fatigue and fever  HENT: Negative for congestion, ear pain, nosebleeds, sore throat, tinnitus and trouble swallowing  Eyes: Negative for pain, discharge, redness, itching and visual disturbance  Wears glasses   Cardiovascular: Negative for chest pain, palpitations and leg swelling     Gastrointestinal: Negative for abdominal pain, blood in stool, constipation, diarrhea, nausea and vomiting  Genitourinary: Negative for difficulty urinating, dysuria, flank pain, frequency and hematuria  Musculoskeletal: Positive for neck pain  Negative for back pain and gait problem  Skin: Negative for rash  Neurological: Positive for headaches  Negative for dizziness and syncope  Hematological: Negative  Psychiatric/Behavioral: Negative for dysphoric mood and sleep disturbance  The patient is not nervous/anxious  Objective:      /82 (BP Location: Left arm, Patient Position: Sitting, Cuff Size: Adult)   Pulse 76   Temp 97 5 °F (36 4 °C) (Tympanic)   Resp 14   Ht 5' 7" (1 702 m)   Wt 84 4 kg (186 lb)   SpO2 98%   BMI 29 13 kg/m²     BP on retake 144/98  Physical Exam  Vitals and nursing note reviewed  Constitutional:       Appearance: Normal appearance  Comments: Overweight   HENT:      Head: Normocephalic and atraumatic  Eyes:      Conjunctiva/sclera: Conjunctivae normal       Pupils: Pupils are equal, round, and reactive to light  Neck:      Vascular: No carotid bruit  Cardiovascular:      Rate and Rhythm: Normal rate and regular rhythm  Heart sounds: No murmur heard  Pulmonary:      Effort: Pulmonary effort is normal       Breath sounds: Normal breath sounds  Abdominal:      General: There is no distension  Palpations: Abdomen is soft  Tenderness: There is no abdominal tenderness  Musculoskeletal:      Cervical back: Normal range of motion and neck supple  Comments: Neck exam: decreased ROM with lateral rotation to the right side  Paraspinal tenderness on right side  Skin:     General: Skin is warm and dry  Findings: No rash  Neurological:      General: No focal deficit present  Mental Status: He is alert  Cranial Nerves: No cranial nerve deficit  Motor: No weakness        Gait: Gait normal  Negative

## 2025-06-11 VITALS — BODY MASS INDEX: 29.35 KG/M2 | WEIGHT: 187 LBS | HEIGHT: 67 IN

## 2025-06-11 DIAGNOSIS — M18.12 ARTHRITIS OF CARPOMETACARPAL (CMC) JOINT OF LEFT THUMB: ICD-10-CM

## 2025-06-11 DIAGNOSIS — M77.02 MEDIAL EPICONDYLITIS OF LEFT ELBOW: Primary | ICD-10-CM

## 2025-06-11 PROCEDURE — 99213 OFFICE O/P EST LOW 20 MIN: CPT | Performed by: ORTHOPAEDIC SURGERY

## 2025-06-11 PROCEDURE — 20600 DRAIN/INJ JOINT/BURSA W/O US: CPT | Performed by: PHYSICIAN ASSISTANT

## 2025-06-11 RX ORDER — TRIAMCINOLONE ACETONIDE 40 MG/ML
20 INJECTION, SUSPENSION INTRA-ARTICULAR; INTRAMUSCULAR
Status: COMPLETED | OUTPATIENT
Start: 2025-06-11 | End: 2025-06-11

## 2025-06-11 RX ORDER — LIDOCAINE HYDROCHLORIDE 10 MG/ML
0.5 INJECTION, SOLUTION INFILTRATION; PERINEURAL
Status: COMPLETED | OUTPATIENT
Start: 2025-06-11 | End: 2025-06-11

## 2025-06-11 RX ADMIN — TRIAMCINOLONE ACETONIDE 20 MG: 40 INJECTION, SUSPENSION INTRA-ARTICULAR; INTRAMUSCULAR at 11:00

## 2025-06-11 RX ADMIN — LIDOCAINE HYDROCHLORIDE 0.5 ML: 10 INJECTION, SOLUTION INFILTRATION; PERINEURAL at 11:00

## 2025-06-11 NOTE — PROGRESS NOTES
ORTHOPAEDIC HAND, WRIST, AND ELBOW OFFICE  VISIT     Name: Flo Brennan      : 1960      MRN: [id number]  Encounter Provider: Onofre Erickson MD  Encounter Date: 2025   Encounter department: [facility] [place]  :  Assessment & Plan  Medial epicondylitis of left elbow  Patient has complete resolution of his symptoms at the elbow at this time  He can use it as tolerated for his activities of daily living  He will follow-up as needed for this issue       Arthritis of carpometacarpal (CMC) joint of left thumb  Patient was given a left thumb CMC cortisone injection today with Kenalog.  He tolerated well.  He was advised we can repeat the cortisone injection every 3 to 4 months as needed for pain  He will follow-up in 3 months for reevaluation             General Discussions:  CMC Arthritis: The anatomy and physiology of carpometacarpal joint arthritis was discussed with the patient today in the office.  Deterioration of the articular cartilage eventually leads to hypermobility at the thumb CMC joint, resulting in joint subluxation, osteophyte formation, cystic changes within the trapezium and base of the first metacarpal, as well as subchondral sclerosis.  Eventually, pain, limited mobility, and compensatory hyperextension at the metacarpophalangeal joint may develop.  While normal activity and usage of the thumb joint may provide a painful experience to the patient, this typically does not result in damage to the thumb or hand.  Treatment options include resting thumb spica splints to decreased joint edema, pain, and inflammation.  Therapy exercises to strengthen the thenar musculature may relieve pain, but do not alter the overall continued development of osteoarthritis.  Oral medications, topical medications, dietary changes, and corticosteroid injections may decrease pain and increase overall function.  Eventually, approximately 10-20% of patients may require  "surgical intervention.           History of Present Illness   HPI  Chief Complaint   Patient presents with    Left Thumb - Follow-up     CMC- Kenalog       Flo Brennan is a 64 y.o. male who presents for follow-up regarding left thumb CMC arthritis.  He states that the previous cortisone injection lasted for about 2 months.  He states that he has been having pain on and off at the base of his thumb with his activities of daily living.  He states that he would like to try repeat cortisone injection for this issue as it has worked in the past.      REVIEW OF SYSTEMS:  General: no fever, no chills  HEENT:  No loss of hearing or eyesight problems  Eyes:  No red eyes  Respiratory:  No coughing, shortness of breath or wheezing  Cardiovascular:  No chest pain, no palpitations  GI:  Abdomen soft nontender, denies nausea  Endocrine:  No muscle weakness, no frequent urination, no excessive thirst  Urinary:  No dysuria, no incontinence  Musculoskeletal: see HPI and PE  SKIN:  No skin rash, no dry skin  Neurological:  No headaches, no confusion  Psychiatric:  No suicide thoughts, no anxiety, no depression  Review of all other systems is negative    Objective   Ht 5' 7\" (1.702 m)   Wt 84.8 kg (187 lb)   BMI 29.29 kg/m²      General: well developed and well nourished, alert, oriented times 3, and appears comfortable  Psychiatric: Normal  HEENT: Trachea Midline, No torticollis  Cardiovascular: No discernable arrhythmia  Pulmonary: No wheezing or stridor  Abdomen: No rebound or guarding  Extremities: No peripheral edema  Skin: No masses, erythema, lacerations, fluctation, ulcerations  Neurovascular: Sensation Intact to the Median, Ulnar, Radial Nerve, Motor Intact to the Median, Ulnar, Radial Nerve, and Pulses Intact    Musculoskeletal exam:  left CMC Exam:  No adduction contracture  No hyperextension deformity of MCP joint  Positive localized tenderness over radial and dorsal aspect of thumb (CMC joint)  Grind test is " "Positive for pain and Positive for crepitus  No triggering or tenderness over the A1 pulley  No pain with Finkelstein’s maneuver             STUDIES REVIEWED:  No Studies to review      PROCEDURES PERFORMED:  Small joint arthrocentesis: L thumb CMC    Performed by: Bartolo Manuel PA-C  Authorized by: Bartolo Manuel PA-C    Universal Protocol:  Consent: Verbal consent obtained  Risks and benefits: risks, benefits and alternatives were discussed  Consent given by: patient  Time out: Immediately prior to procedure a \"time out\" was called to verify the correct patient, procedure, equipment, support staff and site/side marked as required.  Patient understanding: patient states understanding of the procedure being performed  Patient consent: the patient's understanding of the procedure matches consent given  Site marked: the operative site was marked  Patient identity confirmed: verbally with patient  Supporting Documentation  Indications: pain   Procedure Details  Location: thumb - L thumb CMC  Preparation: Patient was prepped and draped in the usual sterile fashion  Needle size: 25 G  Approach: volar  Medications administered: 0.5 mL lidocaine 1 %; 20 mg triamcinolone acetonide 40 mg/mL    Patient tolerance: patient tolerated the procedure well with no immediate complications  Dressing:  Sterile dressing applied        -      "

## 2025-07-16 ENCOUNTER — APPOINTMENT (OUTPATIENT)
Dept: LAB | Facility: CLINIC | Age: 65
End: 2025-07-16
Payer: MEDICARE

## 2025-07-16 DIAGNOSIS — R97.20 ELEVATED PSA: ICD-10-CM

## 2025-07-16 LAB — PSA SERPL-MCNC: 3.99 NG/ML (ref 0–4)

## 2025-07-16 PROCEDURE — 84153 ASSAY OF PSA TOTAL: CPT

## 2025-07-16 PROCEDURE — 36415 COLL VENOUS BLD VENIPUNCTURE: CPT

## 2025-07-21 ENCOUNTER — OFFICE VISIT (OUTPATIENT)
Dept: UROLOGY | Facility: AMBULATORY SURGERY CENTER | Age: 65
End: 2025-07-21
Payer: MEDICARE

## 2025-07-21 VITALS
BODY MASS INDEX: 28.25 KG/M2 | DIASTOLIC BLOOD PRESSURE: 60 MMHG | OXYGEN SATURATION: 96 % | HEART RATE: 66 BPM | WEIGHT: 180 LBS | SYSTOLIC BLOOD PRESSURE: 122 MMHG | HEIGHT: 67 IN

## 2025-07-21 DIAGNOSIS — N52.8 OTHER MALE ERECTILE DYSFUNCTION: ICD-10-CM

## 2025-07-21 DIAGNOSIS — Z12.5 SCREENING FOR PROSTATE CANCER: Primary | ICD-10-CM

## 2025-07-21 PROCEDURE — 99213 OFFICE O/P EST LOW 20 MIN: CPT

## 2025-07-21 RX ORDER — TADALAFIL 5 MG/1
5 TABLET ORAL DAILY PRN
Qty: 30 TABLET | Refills: 1 | Status: SHIPPED | OUTPATIENT
Start: 2025-07-21

## 2025-07-21 NOTE — ASSESSMENT & PLAN NOTE
Controlled on Cialis 5 mg daily  Patient reports good effect and continues to use Cialis  We will continue to monitor for worsening/progression erectile dysfunction

## 2025-07-21 NOTE — PROGRESS NOTES
7/21/2025      Assessment and Plan    65 y.o. male managed by Dr. Patino    Screening for prostate cancer  Denies a known family history for prostate cancer  Multiparametric MRI of the prostate performed 12/31/2024 grade the patient has PI-RADS category 2  Patient's most recent PSA was performed 7/16/2025 and returned at a value of 3.99.  Refer to PSA trend below.  LEANN performed today.  Palpable benign prostate.  Reassured the patient that his LEANN in the office today was unremarkable.  Reviewed his most recent PSA and his PSA trend.  Discussed PSA remains within the normal range.  Can plan to repeat PSA in 1 year.  Patient will repeat a PSA in 1 year with follow-up in the office that time and undergo LEANN    Lab Results   Component Value Date    PSA 3.989 07/16/2025    PSA 3.080 01/17/2025    PSA 4.247 (H) 11/12/2024        Other male erectile dysfunction  Controlled on Cialis 5 mg daily  Patient reports good effect and continues to use Cialis  We will continue to monitor for worsening/progression erectile dysfunction        History of Present Illness  Flo Brennan is a 65 y.o. male here for evaluation of history of elevated PSA and history of nephrolithiasis.  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    Patient was last seen in the office on 10/20/2024.  Patient underwent PSA testing in October 2024 which returned elevated a value of 4.3.  Patient was recommended undergoing multiparametric MRI of the prostate which was performed 12/31/2024 and graded the patient has PI-RADS category 2 with a prostate volume of 24 mL.    Patient was advised undergo repeat PSA testing thereafter which was completed 1/17/2025 and returned normal at a value of 3.080.  Patient underwent PSA testing most recently on 7/16/2025 which returned at a value of 3.989.    Patient's lower urinary tract symptoms are currently controlled on Cialis 5 mg once daily.    Today, the patient reports that his lower urinary  "tract symptoms are ultimately unchanged compared to a year ago.  Patient does continue to use Cialis 5 mg daily and reports that this works well for him.  Patient denies any known family history for prostate cancer.  Patient offers no other complaints today's office visit.    Review of Systems   Constitutional:  Negative for chills and fever.   HENT:  Negative for ear pain and sore throat.    Eyes:  Negative for pain and visual disturbance.   Respiratory:  Negative for cough and shortness of breath.    Cardiovascular:  Negative for chest pain and palpitations.   Gastrointestinal:  Negative for abdominal pain and vomiting.   Genitourinary:  Negative for decreased urine volume, difficulty urinating, dysuria, flank pain, frequency, hematuria and urgency.   Musculoskeletal:  Negative for arthralgias and back pain.   Skin:  Negative for color change and rash.   Neurological:  Negative for seizures and syncope.   All other systems reviewed and are negative.               Vitals  Vitals:    07/21/25 1105   BP: 122/60   BP Location: Left arm   Patient Position: Sitting   Cuff Size: Standard   Pulse: 66   SpO2: 96%   Weight: 81.6 kg (180 lb)   Height: 5' 7\" (1.702 m)       Physical Exam  Vitals reviewed.   Constitutional:       General: He is not in acute distress.     Appearance: Normal appearance. He is not ill-appearing.   HENT:      Head: Normocephalic and atraumatic.      Nose: Nose normal.     Eyes:      General: No scleral icterus.    Pulmonary:      Effort: No respiratory distress.   Abdominal:      General: Abdomen is flat. There is no distension.      Palpations: Abdomen is soft.      Tenderness: There is no abdominal tenderness.     Musculoskeletal:         General: Normal range of motion.      Cervical back: Normal range of motion.     Skin:     General: Skin is warm.      Coloration: Skin is not jaundiced.     Neurological:      Mental Status: He is alert and oriented to person, place, and time.      Gait: Gait " normal.     Psychiatric:         Mood and Affect: Mood normal.         Behavior: Behavior normal.           Past History  Past Medical History[1]  Social History[2]  Tobacco Use History[3]  Family History[4]    The following portions of the patient's history were reviewed and updated as appropriate: allergies, current medications, past medical history, past social history, past surgical history and problem list.    Results  No results found for this or any previous visit (from the past hour).]  Lab Results   Component Value Date    PSA 3.989 07/16/2025    PSA 3.080 01/17/2025    PSA 4.247 (H) 11/12/2024    PSA 4.379 (H) 10/16/2024     Lab Results   Component Value Date    GLUCOSE 104 07/27/2018    CALCIUM 9.7 11/12/2024     (U) 08/22/2014    K 4.6 11/12/2024    CO2 28 11/12/2024     11/12/2024    BUN 21 11/12/2024    CREATININE 1.07 11/12/2024     Lab Results   Component Value Date    WBC 6.22 10/16/2024    HGB 14.3 10/16/2024    HCT 43.5 10/16/2024     (H) 10/16/2024     10/16/2024             [1]   Past Medical History:  Diagnosis Date    Cat scratch fever     Colon polyp     Diverticulitis     GERD (gastroesophageal reflux disease)     GERD (gastroesophageal reflux disease) 03/18/2020    Hyperlipidemia     Hypertension     IFG (impaired fasting glucose)     L3 vertebral fracture (HCC) 07/2018    Lumbar compression fracture (HCC)     Patella-femoral syndrome     Thyroid nodule    [2]   Social History  Socioeconomic History    Marital status: /Civil Union   Tobacco Use    Smoking status: Never     Passive exposure: Never    Smokeless tobacco: Never   Vaping Use    Vaping status: Never Used   Substance and Sexual Activity    Alcohol use: Yes     Alcohol/week: 3.0 standard drinks of alcohol     Types: 3 Glasses of wine per week     Comment: social    Drug use: No     Social Drivers of Health     Food Insecurity: No Food Insecurity (9/5/2024)    Nursing - Inadequate Food Risk  Classification     Worried About Running Out of Food in the Last Year: Never true     Ran Out of Food in the Last Year: Never true   Transportation Needs: No Transportation Needs (9/5/2024)    PRAPARE - Transportation     Lack of Transportation (Medical): No     Lack of Transportation (Non-Medical): No   Housing Stability: Unknown (9/5/2024)    Housing Stability Vital Sign     Unable to Pay for Housing in the Last Year: No     Homeless in the Last Year: No   [3]   Social History  Tobacco Use   Smoking Status Never    Passive exposure: Never   Smokeless Tobacco Never   [4]   Family History  Problem Relation Name Age of Onset    Hypertension Mother      Melanoma Father      Diabetes type II Father      Hypertension Father      Colon cancer Maternal Grandmother      Colon cancer Maternal Uncle      Hypertension Brother

## 2025-07-21 NOTE — ASSESSMENT & PLAN NOTE
Denies a known family history for prostate cancer  Multiparametric MRI of the prostate performed 12/31/2024 grade the patient has PI-RADS category 2  Patient's most recent PSA was performed 7/16/2025 and returned at a value of 3.99.  Refer to PSA trend below.  LEANN performed today.  Palpable benign prostate.  Reassured the patient that his LEANN in the office today was unremarkable.  Reviewed his most recent PSA and his PSA trend.  Discussed PSA remains within the normal range.  Can plan to repeat PSA in 1 year.  Patient will repeat a PSA in 1 year with follow-up in the office that time and undergo LEANN    Lab Results   Component Value Date    PSA 3.989 07/16/2025    PSA 3.080 01/17/2025    PSA 4.247 (H) 11/12/2024

## 2025-08-17 DIAGNOSIS — I10 ESSENTIAL HYPERTENSION: ICD-10-CM

## 2025-08-17 DIAGNOSIS — E78.2 MIXED HYPERLIPIDEMIA: ICD-10-CM

## 2025-08-19 RX ORDER — METOPROLOL SUCCINATE 100 MG/1
100 TABLET, EXTENDED RELEASE ORAL DAILY
Qty: 90 TABLET | Refills: 1 | Status: SHIPPED | OUTPATIENT
Start: 2025-08-19

## 2025-08-19 RX ORDER — AMLODIPINE BESYLATE 10 MG/1
10 TABLET ORAL DAILY
Qty: 90 TABLET | Refills: 1 | Status: SHIPPED | OUTPATIENT
Start: 2025-08-19

## 2025-08-19 RX ORDER — ROSUVASTATIN CALCIUM 5 MG/1
5 TABLET, COATED ORAL DAILY
Qty: 90 TABLET | Refills: 1 | Status: SHIPPED | OUTPATIENT
Start: 2025-08-19

## 2025-08-19 RX ORDER — LOSARTAN POTASSIUM 100 MG/1
100 TABLET ORAL DAILY
Qty: 90 TABLET | Refills: 1 | Status: SHIPPED | OUTPATIENT
Start: 2025-08-19

## 2025-08-20 PROBLEM — Z12.5 SCREENING FOR PROSTATE CANCER: Status: RESOLVED | Noted: 2025-07-21 | Resolved: 2025-08-20

## (undated) DEVICE — SHEATH URETERAL ACCESS 10/12FR 45CM PROXIS

## (undated) DEVICE — CATH FOLEY 12FR 5ML 2 WAY SILICONE ELASTIMER

## (undated) DEVICE — PACK TUR

## (undated) DEVICE — GLOVE SRG BIOGEL 7

## (undated) DEVICE — STERILE SURGICAL LUBRICANT,  TUBE: Brand: SURGILUBE

## (undated) DEVICE — CATH URETERAL 5FR X 70 CM FLEX TIP POLYUR BARD

## (undated) DEVICE — 3M™ TEGADERM™ TRANSPARENT FILM DRESSING FRAME STYLE, 1624W, 2-3/8 IN X 2-3/4 IN (6 CM X 7 CM), 100/CT 4CT/CASE: Brand: 3M™ TEGADERM™

## (undated) DEVICE — PREMIUM DRY TRAY LF: Brand: MEDLINE INDUSTRIES, INC.

## (undated) DEVICE — INVIEW CLEAR LEGGINGS: Brand: CONVERTORS

## (undated) DEVICE — BASKET SPECIMEN RETRIVAL 1.9FR 120CM

## (undated) DEVICE — UROCATCH BAG

## (undated) DEVICE — GUIDEWIRE STRGHT TIP 0.035 IN  SOLO PLUS

## (undated) DEVICE — CHLORHEXIDINE 4PCT 4 OZ